# Patient Record
Sex: FEMALE | Race: WHITE | NOT HISPANIC OR LATINO | Employment: OTHER | URBAN - METROPOLITAN AREA
[De-identification: names, ages, dates, MRNs, and addresses within clinical notes are randomized per-mention and may not be internally consistent; named-entity substitution may affect disease eponyms.]

---

## 2021-05-05 ENCOUNTER — TELEPHONE (OUTPATIENT)
Dept: ADMINISTRATIVE | Facility: OTHER | Age: 71
End: 2021-05-05

## 2021-05-05 RX ORDER — LEVOTHYROXINE SODIUM 0.1 MG/1
100 TABLET ORAL DAILY
COMMUNITY
End: 2021-06-02 | Stop reason: SDUPTHER

## 2021-05-05 NOTE — TELEPHONE ENCOUNTER
----- Message from Skip Sun MA sent at 5/5/2021 10:22 AM EDT -----  Regarding: mammo; [facility]  05/05/21 10:22 AM    Hello, our patient Autumn Mccauley has had Mammogram completed/performed  Please assist in updating the patient chart by pulling the Care Everywhere (CE) document  The date of service is 9/17/2020       Thank you,  Skip Sun MA  [facility]

## 2021-05-06 ENCOUNTER — TELEPHONE (OUTPATIENT)
Dept: ADMINISTRATIVE | Facility: OTHER | Age: 71
End: 2021-05-06

## 2021-05-06 ENCOUNTER — OFFICE VISIT (OUTPATIENT)
Dept: FAMILY MEDICINE CLINIC | Facility: CLINIC | Age: 71
End: 2021-05-06
Payer: COMMERCIAL

## 2021-05-06 VITALS
HEIGHT: 65 IN | RESPIRATION RATE: 16 BRPM | BODY MASS INDEX: 28.46 KG/M2 | DIASTOLIC BLOOD PRESSURE: 82 MMHG | SYSTOLIC BLOOD PRESSURE: 122 MMHG | OXYGEN SATURATION: 99 % | WEIGHT: 170.8 LBS | HEART RATE: 82 BPM

## 2021-05-06 DIAGNOSIS — Z12.11 SCREEN FOR COLON CANCER: ICD-10-CM

## 2021-05-06 DIAGNOSIS — Z13.820 OSTEOPOROSIS SCREENING: ICD-10-CM

## 2021-05-06 DIAGNOSIS — C88.0 WALDENSTROM MACROGLOBULINEMIA (HCC): ICD-10-CM

## 2021-05-06 DIAGNOSIS — Z00.00 MEDICARE ANNUAL WELLNESS VISIT, INITIAL: Primary | ICD-10-CM

## 2021-05-06 DIAGNOSIS — H61.23 BILATERAL IMPACTED CERUMEN: ICD-10-CM

## 2021-05-06 DIAGNOSIS — Z13.220 SCREENING, LIPID: ICD-10-CM

## 2021-05-06 DIAGNOSIS — D46.9 MYELODYSPLASIA (MYELODYSPLASTIC SYNDROME) (HCC): ICD-10-CM

## 2021-05-06 DIAGNOSIS — E03.9 HYPOTHYROIDISM, UNSPECIFIED TYPE: ICD-10-CM

## 2021-05-06 PROBLEM — C88.00 WALDENSTROM MACROGLOBULINEMIA: Status: ACTIVE | Noted: 2021-05-06

## 2021-05-06 PROCEDURE — 1125F AMNT PAIN NOTED PAIN PRSNT: CPT | Performed by: FAMILY MEDICINE

## 2021-05-06 PROCEDURE — 3725F SCREEN DEPRESSION PERFORMED: CPT | Performed by: FAMILY MEDICINE

## 2021-05-06 PROCEDURE — 1170F FXNL STATUS ASSESSED: CPT | Performed by: FAMILY MEDICINE

## 2021-05-06 PROCEDURE — 3288F FALL RISK ASSESSMENT DOCD: CPT | Performed by: FAMILY MEDICINE

## 2021-05-06 PROCEDURE — G0438 PPPS, INITIAL VISIT: HCPCS | Performed by: FAMILY MEDICINE

## 2021-05-06 PROCEDURE — 1101F PT FALLS ASSESS-DOCD LE1/YR: CPT | Performed by: FAMILY MEDICINE

## 2021-05-06 NOTE — TELEPHONE ENCOUNTER
Upon review of the In Basket request we have found this is a duplicate request and no further action is needed  This request was completed upon initial request, the patient chart is up to date, and this message will now be closed  Any additional questions or concerns should be emailed to the Practice Liaisons via [email or web address] email, please do not reply via In Basket      Thank you  Aron Ty

## 2021-05-06 NOTE — PATIENT INSTRUCTIONS
Medicare Preventive Visit Patient Instructions  Thank you for completing your Welcome to Medicare Visit or Medicare Annual Wellness Visit today  Your next wellness visit will be due in one year (5/7/2022)  The screening/preventive services that you may require over the next 5-10 years are detailed below  Some tests may not apply to you based off risk factors and/or age  Screening tests ordered at today's visit but not completed yet may show as past due  Also, please note that scanned in results may not display below  Preventive Screenings:  Service Recommendations Previous Testing/Comments   Colorectal Cancer Screening  * Colonoscopy    * Fecal Occult Blood Test (FOBT)/Fecal Immunochemical Test (FIT)  * Fecal DNA/Cologuard Test  * Flexible Sigmoidoscopy Age: 54-65 years old   Colonoscopy: every 10 years (may be performed more frequently if at higher risk)  OR  FOBT/FIT: every 1 year  OR  Cologuard: every 3 years  OR  Sigmoidoscopy: every 5 years  Screening may be recommended earlier than age 48 if at higher risk for colorectal cancer  Also, an individualized decision between you and your healthcare provider will decide whether screening between the ages of 74-80 would be appropriate  Colonoscopy: Not on file  FOBT/FIT: Not on file  Cologuard: Not on file  Sigmoidoscopy: Not on file          Breast Cancer Screening Age: 36 years old  Frequency: every 1-2 years  Not required if history of left and right mastectomy Mammogram: Not on file        Cervical Cancer Screening Between the ages of 21-29, pap smear recommended once every 3 years  Between the ages of 33-67, can perform pap smear with HPV co-testing every 5 years     Recommendations may differ for women with a history of total hysterectomy, cervical cancer, or abnormal pap smears in past  Pap Smear: Not on file    Screening Not Indicated   Hepatitis C Screening Once for adults born between [facility]  More frequently in patients at high risk for Hepatitis C Hep C Antibody: Not on file    Screening Current   Diabetes Screening 1-2 times per year if you're at risk for diabetes or have pre-diabetes Fasting glucose: No results in last 5 years   A1C: No results in last 5 years        Cholesterol Screening Once every 5 years if you don't have a lipid disorder  May order more often based on risk factors  Lipid panel: Not on file          Other Preventive Screenings Covered by Medicare:  1  Abdominal Aortic Aneurysm (AAA) Screening: covered once if your at risk  You're considered to be at risk if you have a family history of AAA  2  Lung Cancer Screening: covers low dose CT scan once per year if you meet all of the following conditions: (1) Age 50-69; (2) No signs or symptoms of lung cancer; (3) Current smoker or have quit smoking within the last 15 years; (4) You have a tobacco smoking history of at least 30 pack years (packs per day multiplied by number of years you smoked); (5) You get a written order from a healthcare provider  3  Glaucoma Screening: covered annually if you're considered high risk: (1) You have diabetes OR (2) Family history of glaucoma OR (3)  aged 48 and older OR (3)  American aged 72 and older  3  Osteoporosis Screening: covered every 2 years if you meet one of the following conditions: (1) You're estrogen deficient and at risk for osteoporosis based off medical history and other findings; (2) Have a vertebral abnormality; (3) On glucocorticoid therapy for more than 3 months; (4) Have primary hyperparathyroidism; (5) On osteoporosis medications and need to assess response to drug therapy  · Last bone density test (DXA Scan): Not on file  5  HIV Screening: covered annually if you're between the age of 12-76  Also covered annually if you are younger than 13 and older than 72 with risk factors for HIV infection  For pregnant patients, it is covered up to 3 times per pregnancy      Immunizations:  Immunization Recommendations   Influenza Vaccine Annual influenza vaccination during flu season is recommended for all persons aged >= 6 months who do not have contraindications   Pneumococcal Vaccine (Prevnar and Pneumovax)  * Prevnar = PCV13  * Pneumovax = PPSV23   Adults 25-60 years old: 1-3 doses may be recommended based on certain risk factors  Adults 72 years old: Prevnar (PCV13) vaccine recommended followed by Pneumovax (PPSV23) vaccine  If already received PPSV23 since turning 65, then PCV13 recommended at least one year after PPSV23 dose  Hepatitis B Vaccine 3 dose series if at intermediate or high risk (ex: diabetes, end stage renal disease, liver disease)   Tetanus (Td) Vaccine - COST NOT COVERED BY MEDICARE PART B Following completion of primary series, a booster dose should be given every 10 years to maintain immunity against tetanus  Td may also be given as tetanus wound prophylaxis  Tdap Vaccine - COST NOT COVERED BY MEDICARE PART B Recommended at least once for all adults  For pregnant patients, recommended with each pregnancy  Shingles Vaccine (Shingrix) - COST NOT COVERED BY MEDICARE PART B  2 shot series recommended in those aged 48 and above     Health Maintenance Due:      Topic Date Due    MAMMOGRAM  Never done    Colorectal Cancer Screening  Never done    Hepatitis C Screening  05/06/2022 (Originally 1950)     Immunizations Due:      Topic Date Due    Pneumococcal Vaccine: 65+ Years (1 of 1 - PPSV23) Never done     Advance Directives   What are advance directives? Advance directives are legal documents that state your wishes and plans for medical care  These plans are made ahead of time in case you lose your ability to make decisions for yourself  Advance directives can apply to any medical decision, such as the treatments you want, and if you want to donate organs  What are the types of advance directives?   There are many types of advance directives, and each state has rules about how to use them  You may choose a combination of any of the following:  · Living will: This is a written record of the treatment you want  You can also choose which treatments you do not want, which to limit, and which to stop at a certain time  This includes surgery, medicine, IV fluid, and tube feedings  · Durable power of  for healthcare [facility]): This is a written record that states who you want to make healthcare choices for you when you are unable to make them for yourself  This person, called a proxy, is usually a family member or a friend  You may choose more than 1 proxy  · Do not resuscitate (DNR) order:  A DNR order is used in case your heart stops beating or you stop breathing  It is a request not to have certain forms of treatment, such as CPR  A DNR order may be included in other types of advance directives  · Medical directive: This covers the care that you want if you are in a coma, near death, or unable to make decisions for yourself  You can list the treatments you want for each condition  Treatment may include pain medicine, surgery, blood transfusions, dialysis, IV or tube feedings, and a ventilator (breathing machine)  · Values history: This document has questions about your views, beliefs, and how you feel and think about life  This information can help others choose the care that you would choose  Why are advance directives important? An advance directive helps you control your care  Although spoken wishes may be used, it is better to have your wishes written down  Spoken wishes can be misunderstood, or not followed  Treatments may be given even if you do not want them  An advance directive may make it easier for your family to make difficult choices about your care  Weight Management   Why it is important to manage your weight:  Being overweight increases your risk of health conditions such as heart disease, high blood pressure, type 2 diabetes, and certain types of cancer   It can also increase your risk for osteoarthritis, sleep apnea, and other respiratory problems  Aim for a slow, steady weight loss  Even a small amount of weight loss can lower your risk of health problems  How to lose weight safely:  A safe and healthy way to lose weight is to eat fewer calories and get regular exercise  You can lose up about 1 pound a week by decreasing the number of calories you eat by 500 calories each day  Healthy meal plan for weight management:  A healthy meal plan includes a variety of foods, contains fewer calories, and helps you stay healthy  A healthy meal plan includes the following:  · Eat whole-grain foods more often  A healthy meal plan should contain fiber  Fiber is the part of grains, fruits, and vegetables that is not broken down by your body  Whole-grain foods are healthy and provide extra fiber in your diet  Some examples of whole-grain foods are whole-wheat breads and pastas, oatmeal, brown rice, and bulgur  · Eat a variety of vegetables every day  Include dark, leafy greens such as spinach, kale, maryellen greens, and mustard greens  Eat yellow and orange vegetables such as carrots, sweet potatoes, and winter squash  · Eat a variety of fruits every day  Choose fresh or canned fruit (canned in its own juice or light syrup) instead of juice  Fruit juice has very little or no fiber  · Eat low-fat dairy foods  Drink fat-free (skim) milk or 1% milk  Eat fat-free yogurt and low-fat cottage cheese  Try low-fat cheeses such as mozzarella and other reduced-fat cheeses  · Choose meat and other protein foods that are low in fat  Choose beans or other legumes such as split peas or lentils  Choose fish, skinless poultry (chicken or turkey), or lean cuts of red meat (beef or pork)  Before you cook meat or poultry, cut off any visible fat  · Use less fat and oil  Try baking foods instead of frying them   Add less fat, such as margarine, sour cream, regular salad dressing and mayonnaise to foods  Eat fewer high-fat foods  Some examples of high-fat foods include french fries, doughnuts, ice cream, and cakes  · Eat fewer sweets  Limit foods and drinks that are high in sugar  This includes candy, cookies, regular soda, and sweetened drinks  Exercise:  Exercise at least 30 minutes per day on most days of the week  Some examples of exercise include walking, biking, dancing, and swimming  You can also fit in more physical activity by taking the stairs instead of the elevator or parking farther away from stores  Ask your healthcare provider about the best exercise plan for you  © Copyright [employer] 2018 Information is for End User's use only and may not be sold, redistributed or otherwise used for commercial purposes   All illustrations and images included in CareNotes® are the copyrighted property of A D A M , Inc  or [address] [place] [address]

## 2021-05-06 NOTE — TELEPHONE ENCOUNTER
----- Message from InstallFreejoselo Qureshi sent at 5/6/2021  8:51 AM EDT -----  Regarding: MAMMO  05/06/21 8:53 AM    Hello, our patient Ruth Villafuerte has had Mammogram completed/performed  Please assist in updating the patient chart by pulling the Care Everywhere (CE) document  The date of service is 09/2020       Thank you,  Marivel York [facility]

## 2021-05-06 NOTE — TELEPHONE ENCOUNTER
Upon review of the In Basket request we were able to locate, review, and update the patient chart as requested for Mammogram     Any additional questions or concerns should be emailed to the Practice Liaisons via [email or web address] email, please do not reply via In Basket      Thank you  Teresa Gipson

## 2021-05-06 NOTE — PROGRESS NOTES
Assessment and Plan:     Problem List Items Addressed This Visit     None      BMI Counseling: Body mass index is 28 42 kg/m²  The BMI is above normal  Nutrition recommendations include reducing portion sizes, decreasing overall calorie intake, 3-5 servings of fruits/vegetables daily, reducing fast food intake and consuming healthier snacks  Exercise recommendations include moderate aerobic physical activity for 150 minutes/week  Preventive health issues were discussed with patient, and age appropriate screening tests were ordered as noted in patient's After Visit Summary  Personalized health advice and appropriate referrals for health education or preventive services given if needed, as noted in patient's After Visit Summary  History of Present Illness:     Patient presents for Medicare Annual Wellness visit    Patient Care Team:  Wolf Redmond MD as PCP - General (Family Medicine)     Problem List:     There is no problem list on file for this patient       Past Medical and Surgical History:     Past Medical History:   Diagnosis Date    Cancer [facility])      Past Surgical History:   Procedure Laterality Date    HYSTERECTOMY        Family History:     Family History   Problem Relation Age of Onset    No Known Problems Mother     No Known Problems Father     Cancer Maternal Aunt       Social History:     E-Cigarette/Vaping    E-Cigarette Use Never User      E-Cigarette/Vaping Substances    Nicotine No     THC No     CBD No     Flavoring No     Other No     Unknown No      Social History     Socioeconomic History    Marital status:      Spouse name: None    Number of children: None    Years of education: None    Highest education level: None   Occupational History    None   Social Needs    Financial resource strain: None    Food insecurity     Worry: None     Inability: None    Transportation needs     Medical: None     Non-medical: None   Tobacco Use    Smoking status: Former Smoker     Packs/day: 0 50     Years: 10 00     Pack years: 5 00     Quit date: 45     Years since quittin 3    Smokeless tobacco: Never Used   Substance and Sexual Activity    Alcohol use: Never     Frequency: Never    Drug use: Never    Sexual activity: None   Lifestyle    Physical activity     Days per week: None     Minutes per session: None    Stress: None   Relationships    Social connections     Talks on phone: None     Gets together: None     Attends Judaism service: None     Active member of club or organization: None     Attends meetings of clubs or organizations: None     Relationship status: None    Intimate partner violence     Fear of current or ex partner: None     Emotionally abused: None     Physically abused: None     Forced sexual activity: None   Other Topics Concern    None   Social History Narrative    Most recent tobacco use screenin2018      Medications and Allergies:     Current Outpatient Medications   Medication Sig Dispense Refill    levothyroxine 100 mcg tablet Take 100 mcg by mouth daily       No current facility-administered medications for this visit  Allergies   Allergen Reactions    Penicillins Rash      Immunizations:     Immunization History   Administered Date(s) Administered    SARS-CoV-2 / COVID-19 mRNA IM (Ernst Caldwell) 2021, 2021      Health Maintenance:         Topic Date Due    MAMMOGRAM  Never done    Colorectal Cancer Screening  Never done    Hepatitis C Screening  2022 (Originally 1950)         Topic Date Due    Pneumococcal Vaccine: 65+ Years (1 of 1 - PPSV23) Never done      Medicare Health Risk Assessment:     /82   Pulse 82   Resp 16   Ht 5' 5" (1 651 m)   Wt 77 5 kg (170 lb 12 8 oz)   SpO2 99%   BMI 28 42 kg/m²      Dulce Cleveland is here for her Initial Wellness visit  Health Risk Assessment:   Patient rates overall health as excellent  Patient feels that their physical health rating is same   Patient is very satisfied with their life  Eyesight was rated as same  Hearing was rated as same  Patient feels that their emotional and mental health rating is same  Patients states they are never, rarely angry  Patient states they are never, rarely unusually tired/fatigued  Pain experienced in the last 7 days has been none  Depression Screening:   PHQ-2 Score: 0      Fall Risk Screening: In the past year, patient has experienced: no history of falling in past year      Urinary Incontinence Screening:   Patient has not leaked urine accidently in the last six months  Home Safety:  Patient does not have trouble with stairs inside or outside of their home  Patient has working smoke alarms and has working carbon monoxide detector  Home safety hazards include: none  Nutrition:   Current diet is Regular  Medications:   Patient is not currently taking any over-the-counter supplements  Patient is able to manage medications  Activities of Daily Living (ADLs)/Instrumental Activities of Daily Living (IADLs):   Walk and transfer into and out of bed and chair?: Yes  Dress and groom yourself?: Yes    Bathe or shower yourself?: Yes    Feed yourself?  Yes  Do your laundry/housekeeping?: Yes  Manage your money, pay your bills and track your expenses?: Yes  Make your own meals?: Yes    Do your own shopping?: Yes    Previous Hospitalizations:   Any hospitalizations or ED visits within the last 12 months?: No      Advance Care Planning:   Living will: No    Durable POA for healthcare: No    Advanced directive: No      Cognitive Screening:   Provider or family/friend/caregiver concerned regarding cognition?: No    PREVENTIVE SCREENINGS      Cardiovascular Screening:    General: Risks and Benefits Discussed    Due for: Lipid Panel      Diabetes Screening:     General: Risks and Benefits Discussed    Due for: Blood Glucose      Colorectal Cancer Screening:     General: Risks and Benefits Discussed    Due for: [place] Breast Cancer Screening:     General: Risks and Benefits Discussed and Screening Current      Cervical Cancer Screening:    General: Screening Not Indicated      Osteoporosis Screening:    General: Risks and Benefits Discussed    Due for: DXA Axial      Abdominal Aortic Aneurysm (AAA) Screening:        General: Risks and Benefits Discussed and Screening Not Indicated      Lung Cancer Screening:     General: Screening Not Indicated      Hepatitis C Screening:    General: Screening Current    Screening, Brief Intervention, and Referral to Treatment (SBIRT)    Screening    Typical number of drinks in a week: 0    AUDIT-C Screenin) How often did you have a drink containing alcohol in the past year? never  2) How many drinks did you have on a typical day when you were drinking in the past year? never  3) How often did you have 6 or more drinks on one occasion in the past year? never    AUDIT-C Score: 0  Interpretation: Score 0-2 (female): Negative screen for alcohol misuse    Other Counseling Topics:   Car/seat belt/driving safety, skin self-exam, sunscreen and calcium and vitamin D intake and regular weightbearing exercise         Lamont Pires MD

## 2021-05-13 ENCOUNTER — OFFICE VISIT (OUTPATIENT)
Dept: FAMILY MEDICINE CLINIC | Facility: CLINIC | Age: 71
End: 2021-05-13
Payer: COMMERCIAL

## 2021-05-13 VITALS
SYSTOLIC BLOOD PRESSURE: 126 MMHG | HEART RATE: 110 BPM | DIASTOLIC BLOOD PRESSURE: 86 MMHG | HEIGHT: 65 IN | BODY MASS INDEX: 28.42 KG/M2 | WEIGHT: 170.6 LBS | OXYGEN SATURATION: 98 % | RESPIRATION RATE: 16 BRPM

## 2021-05-13 DIAGNOSIS — Z13.820 OSTEOPOROSIS SCREENING: ICD-10-CM

## 2021-05-13 DIAGNOSIS — H61.23 BILATERAL IMPACTED CERUMEN: Primary | ICD-10-CM

## 2021-05-13 PROCEDURE — 69210 REMOVE IMPACTED EAR WAX UNI: CPT | Performed by: FAMILY MEDICINE

## 2021-05-13 PROCEDURE — 1160F RVW MEDS BY RX/DR IN RCRD: CPT | Performed by: FAMILY MEDICINE

## 2021-05-13 PROCEDURE — 1036F TOBACCO NON-USER: CPT | Performed by: FAMILY MEDICINE

## 2021-05-13 PROCEDURE — 99214 OFFICE O/P EST MOD 30 MIN: CPT | Performed by: FAMILY MEDICINE

## 2021-05-13 PROCEDURE — 3008F BODY MASS INDEX DOCD: CPT | Performed by: FAMILY MEDICINE

## 2021-05-13 RX ORDER — CIPROFLOXACIN AND DEXAMETHASONE 3; 1 MG/ML; MG/ML
4 SUSPENSION/ DROPS AURICULAR (OTIC) 2 TIMES DAILY
Qty: 7.5 ML | Refills: 0 | Status: SHIPPED | OUTPATIENT
Start: 2021-05-13 | End: 2022-01-15 | Stop reason: HOSPADM

## 2021-05-13 NOTE — PROGRESS NOTES
Subjective:      Patient ID: Krish Juan is a 69 y o  female  Ear Fullness   There is pain in both ears  This is a chronic problem  The current episode started more than 1 month ago  The problem has been unchanged  There has been no fever  The pain is moderate  Associated symptoms include hearing loss  Pertinent negatives include no headaches  Treatments tried: Debrox  The treatment provided no relief  Past Medical History:   Diagnosis Date    Cancer [facility])        Family History   Problem Relation Age of Onset    No Known Problems Mother     No Known Problems Father     Cancer Maternal Aunt        Past Surgical History:   Procedure Laterality Date    HYSTERECTOMY          reports that she quit smoking about 28 years ago  She has a 5 00 pack-year smoking history  She has never used smokeless tobacco  She reports that she does not drink alcohol or use drugs  Current Outpatient Medications:     carbamide peroxide (DEBROX) 6 5 % otic solution, Administer 5 drops into both ears 2 (two) times a day for 4 days, Disp: 15 mL, Rfl: 0    ciprofloxacin-dexamethasone (CIPRODEX) otic suspension, Administer 4 drops into both ears 2 (two) times a day for 5 days, Disp: 7 5 mL, Rfl: 0    levothyroxine 100 mcg tablet, Take 100 mcg by mouth daily, Disp: , Rfl:     The following portions of the patient's history were reviewed and updated as appropriate: allergies, current medications, past family history, past medical history, past social history, past surgical history and problem list     Review of Systems   Constitutional: Negative  HENT: Positive for hearing loss  Eyes: Negative  Respiratory: Negative  Negative for shortness of breath  Cardiovascular: Negative  Negative for chest pain and palpitations  Gastrointestinal: Negative  Endocrine: Negative  Genitourinary: Negative  Musculoskeletal: Negative  Negative for myalgias  Neurological: Negative  Negative for headaches  Psychiatric/Behavioral: Negative  Objective:    /86   Pulse (!) 110   Resp 16   Ht 5' 5" (1 651 m)   Wt 77 4 kg (170 lb 9 6 oz)   SpO2 98%   BMI 28 39 kg/m²      Physical Exam  Constitutional:       Appearance: She is well-developed  HENT:      Head: Normocephalic  Right Ear: External ear normal  There is impacted cerumen  Left Ear: External ear normal  There is impacted cerumen  Eyes:      Pupils: Pupils are equal, round, and reactive to light  Neck:      Musculoskeletal: Normal range of motion and neck supple  Cardiovascular:      Rate and Rhythm: Normal rate and regular rhythm  Pulmonary:      Effort: Pulmonary effort is normal       Breath sounds: Normal breath sounds  Abdominal:      General: Bowel sounds are normal       Palpations: Abdomen is soft  Musculoskeletal: Normal range of motion  Neurological:      Mental Status: She is alert and oriented to person, place, and time  Psychiatric:         Behavior: Behavior normal          Judgment: Judgment normal            Ear cerumen removal    Date/Time: 5/13/2021 3:16 PM  Performed by: Bryan Robles MD  Authorized by: Bryan Robles MD   Universal Protocol:  Consent: Verbal consent obtained  Risks and benefits: risks, benefits and alternatives were discussed  Consent given by: patient      Patient location:  Clinic  Procedure details:     Local anesthetic:  None    Location:  R ear (Both ears)    Procedure type: curette      Approach:  External  Post-procedure details:     Complication:  None    Hearing quality:  Improved    Patient tolerance of procedure: Tolerated well, no immediate complications  Comments: Thick hard wax removed both ears        No results found for this or any previous visit (from the past 1008 hour(s))  Assessment/Plan:    No problem-specific Assessment & Plan notes found for this encounter             Problem List Items Addressed This Visit        Nervous and Auditory Bilateral impacted cerumen - Primary     B/l Ear wax removal done  Thick hard wax removed  Relevant Medications    ciprofloxacin-dexamethasone (CIPRODEX) otic suspension    Other Relevant Orders    Ear cerumen removal       Other    Osteoporosis screening    Relevant Orders    DXA bone density spine hip and pelvis          I have spent 40 minutes with Patient  today in which greater than 50% of this time was spent in counseling/coordination of care regarding ear wax removal

## 2021-05-30 LAB
CHOLEST SERPL-MCNC: 239 MG/DL
CHOLEST/HDLC SERPL: 4.1 (CALC)
HDLC SERPL-MCNC: 59 MG/DL
LDLC SERPL CALC-MCNC: 157 MG/DL (CALC)
NONHDLC SERPL-MCNC: 180 MG/DL (CALC)
TRIGL SERPL-MCNC: 113 MG/DL
TSH SERPL-ACNC: 1.55 MIU/L (ref 0.4–4.5)

## 2021-06-01 ENCOUNTER — TELEPHONE (OUTPATIENT)
Dept: FAMILY MEDICINE CLINIC | Facility: CLINIC | Age: 71
End: 2021-06-01

## 2021-06-01 DIAGNOSIS — E78.2 MIXED HYPERLIPIDEMIA: Primary | ICD-10-CM

## 2021-06-01 RX ORDER — LEVOTHYROXINE SODIUM 0.1 MG/1
100 TABLET ORAL DAILY
Qty: 90 TABLET | Refills: 0 | Status: CANCELLED | OUTPATIENT
Start: 2021-06-01

## 2021-06-01 NOTE — TELEPHONE ENCOUNTER
----- Message from Caroline Alba MD sent at 6/1/2021  1:09 PM EDT -----  Please call the patient regarding her abnormal result  Patient's cholesterol is elevated  Please advised low-fat diet  Repeat lipid panel at 4 months interval   Will follow up thereafter  Please provide with lab orders

## 2021-06-01 NOTE — TELEPHONE ENCOUNTER
ADVISED PT; [facility] FOR 10/7/2021 NEEDS THYROID MED SENT TO PHARMACY

## 2021-06-02 DIAGNOSIS — E03.9 HYPOTHYROIDISM, UNSPECIFIED TYPE: Primary | ICD-10-CM

## 2021-06-02 RX ORDER — LEVOTHYROXINE SODIUM 0.1 MG/1
100 TABLET ORAL DAILY
Qty: 90 TABLET | Refills: 1 | Status: SHIPPED | OUTPATIENT
Start: 2021-06-02 | End: 2021-11-26

## 2021-06-29 NOTE — TELEPHONE ENCOUNTER
Not sure if fish oil could increase please her cholesterol  Please advise her to discontinue fish oil  Metabolic panel has been ordered September to reassess lipid panel  Please advise her to use over-the-counter tear substitute like refresh tear for dry eye  If persist, she should fup with ophthalmology    A a so this is not a working and this is not working

## 2021-06-29 NOTE — TELEPHONE ENCOUNTER
Patient called in, she said that she has been taking fish oil for her dry eyes and wants to know if that could cause her high cholesterol? She said its never been this high and if that is the cause, she will just use eye drops  Please call her back

## 2021-09-29 LAB
ALBUMIN SERPL-MCNC: 4.1 G/DL (ref 3.6–5.1)
ALBUMIN/GLOB SERPL: 1.8 (CALC) (ref 1–2.5)
ALP SERPL-CCNC: 70 U/L (ref 37–153)
ALT SERPL-CCNC: 9 U/L (ref 6–29)
AST SERPL-CCNC: 15 U/L (ref 10–35)
BILIRUB SERPL-MCNC: 0.5 MG/DL (ref 0.2–1.2)
BUN SERPL-MCNC: 17 MG/DL (ref 7–25)
BUN/CREAT SERPL: NORMAL (CALC) (ref 6–22)
CALCIUM SERPL-MCNC: 9 MG/DL (ref 8.6–10.4)
CHLORIDE SERPL-SCNC: 106 MMOL/L (ref 98–110)
CHOLEST SERPL-MCNC: 202 MG/DL
CHOLEST/HDLC SERPL: 3.5 (CALC)
CO2 SERPL-SCNC: 32 MMOL/L (ref 20–32)
CREAT SERPL-MCNC: 0.67 MG/DL (ref 0.6–0.93)
GLOBULIN SER CALC-MCNC: 2.3 G/DL (CALC) (ref 1.9–3.7)
GLUCOSE SERPL-MCNC: 92 MG/DL (ref 65–99)
HDLC SERPL-MCNC: 58 MG/DL
LDLC SERPL CALC-MCNC: 126 MG/DL (CALC)
NONHDLC SERPL-MCNC: 144 MG/DL (CALC)
POTASSIUM SERPL-SCNC: 3.8 MMOL/L (ref 3.5–5.3)
PROT SERPL-MCNC: 6.4 G/DL (ref 6.1–8.1)
SL AMB EGFR AFRICAN AMERICAN: 102 ML/MIN/1.73M2
SL AMB EGFR NON AFRICAN AMERICAN: 88 ML/MIN/1.73M2
SODIUM SERPL-SCNC: 145 MMOL/L (ref 135–146)
TRIGL SERPL-MCNC: 79 MG/DL

## 2021-10-06 ENCOUNTER — TELEPHONE (OUTPATIENT)
Dept: ADMINISTRATIVE | Facility: OTHER | Age: 71
End: 2021-10-06

## 2021-10-07 ENCOUNTER — OFFICE VISIT (OUTPATIENT)
Dept: FAMILY MEDICINE CLINIC | Facility: CLINIC | Age: 71
End: 2021-10-07
Payer: COMMERCIAL

## 2021-10-07 VITALS
WEIGHT: 157.8 LBS | BODY MASS INDEX: 26.29 KG/M2 | SYSTOLIC BLOOD PRESSURE: 130 MMHG | OXYGEN SATURATION: 97 % | HEIGHT: 65 IN | RESPIRATION RATE: 18 BRPM | HEART RATE: 90 BPM | DIASTOLIC BLOOD PRESSURE: 82 MMHG

## 2021-10-07 DIAGNOSIS — E03.9 HYPOTHYROIDISM, UNSPECIFIED TYPE: Primary | ICD-10-CM

## 2021-10-07 DIAGNOSIS — Z23 ENCOUNTER FOR IMMUNIZATION: ICD-10-CM

## 2021-10-07 DIAGNOSIS — E78.2 MIXED HYPERLIPIDEMIA: ICD-10-CM

## 2021-10-07 PROBLEM — H61.23 BILATERAL IMPACTED CERUMEN: Status: RESOLVED | Noted: 2021-05-06 | Resolved: 2021-10-07

## 2021-10-07 PROCEDURE — 90662 IIV NO PRSV INCREASED AG IM: CPT | Performed by: FAMILY MEDICINE

## 2021-10-07 PROCEDURE — 3725F SCREEN DEPRESSION PERFORMED: CPT | Performed by: FAMILY MEDICINE

## 2021-10-07 PROCEDURE — G0008 ADMIN INFLUENZA VIRUS VAC: HCPCS | Performed by: FAMILY MEDICINE

## 2021-10-07 PROCEDURE — 1036F TOBACCO NON-USER: CPT | Performed by: FAMILY MEDICINE

## 2021-10-07 PROCEDURE — 1160F RVW MEDS BY RX/DR IN RCRD: CPT | Performed by: FAMILY MEDICINE

## 2021-10-07 PROCEDURE — 99214 OFFICE O/P EST MOD 30 MIN: CPT | Performed by: FAMILY MEDICINE

## 2021-10-07 PROCEDURE — 3008F BODY MASS INDEX DOCD: CPT | Performed by: FAMILY MEDICINE

## 2021-10-08 ENCOUNTER — TELEPHONE (OUTPATIENT)
Dept: ADMINISTRATIVE | Facility: OTHER | Age: 71
End: 2021-10-08

## 2021-12-16 ENCOUNTER — OFFICE VISIT (OUTPATIENT)
Dept: FAMILY MEDICINE CLINIC | Facility: CLINIC | Age: 71
End: 2021-12-16
Payer: COMMERCIAL

## 2021-12-16 VITALS
HEART RATE: 131 BPM | BODY MASS INDEX: 26.16 KG/M2 | WEIGHT: 157 LBS | OXYGEN SATURATION: 98 % | HEIGHT: 65 IN | SYSTOLIC BLOOD PRESSURE: 130 MMHG | DIASTOLIC BLOOD PRESSURE: 82 MMHG | RESPIRATION RATE: 16 BRPM

## 2021-12-16 DIAGNOSIS — R30.0 DYSURIA: Primary | ICD-10-CM

## 2021-12-16 LAB
SL AMB  POCT GLUCOSE, UA: NORMAL
SL AMB LEUKOCYTE ESTERASE,UA: 500
SL AMB POCT BILIRUBIN,UA: NORMAL
SL AMB POCT BLOOD,UA: 50
SL AMB POCT CLARITY,UA: CLEAR
SL AMB POCT COLOR,UA: YELLOW
SL AMB POCT KETONES,UA: NORMAL
SL AMB POCT NITRITE,UA: NORMAL
SL AMB POCT PH,UA: 5
SL AMB POCT SPECIFIC GRAVITY,UA: 1.02
SL AMB POCT URINE PROTEIN: NORMAL
SL AMB POCT UROBILINOGEN: NORMAL

## 2021-12-16 PROCEDURE — 81003 URINALYSIS AUTO W/O SCOPE: CPT | Performed by: FAMILY MEDICINE

## 2021-12-16 PROCEDURE — 3008F BODY MASS INDEX DOCD: CPT | Performed by: FAMILY MEDICINE

## 2021-12-16 PROCEDURE — 1036F TOBACCO NON-USER: CPT | Performed by: FAMILY MEDICINE

## 2021-12-16 PROCEDURE — 1160F RVW MEDS BY RX/DR IN RCRD: CPT | Performed by: FAMILY MEDICINE

## 2021-12-16 PROCEDURE — 99214 OFFICE O/P EST MOD 30 MIN: CPT | Performed by: FAMILY MEDICINE

## 2021-12-16 RX ORDER — CIPROFLOXACIN 500 MG/1
500 TABLET, FILM COATED ORAL EVERY 12 HOURS SCHEDULED
Qty: 10 TABLET | Refills: 0 | Status: SHIPPED | OUTPATIENT
Start: 2021-12-16 | End: 2021-12-21

## 2022-01-10 ENCOUNTER — APPOINTMENT (EMERGENCY)
Dept: RADIOLOGY | Facility: HOSPITAL | Age: 72
DRG: 308 | End: 2022-01-10
Payer: COMMERCIAL

## 2022-01-10 ENCOUNTER — HOSPITAL ENCOUNTER (INPATIENT)
Facility: HOSPITAL | Age: 72
LOS: 5 days | Discharge: HOME | DRG: 308 | End: 2022-01-15
Attending: EMERGENCY MEDICINE | Admitting: INTERNAL MEDICINE
Payer: COMMERCIAL

## 2022-01-10 ENCOUNTER — APPOINTMENT (INPATIENT)
Dept: NON INVASIVE DIAGNOSTICS | Facility: HOSPITAL | Age: 72
DRG: 308 | End: 2022-01-10
Payer: COMMERCIAL

## 2022-01-10 DIAGNOSIS — I48.91 NEW ONSET A-FIB (HCC): ICD-10-CM

## 2022-01-10 DIAGNOSIS — I50.9 CHF (CONGESTIVE HEART FAILURE) (HCC): ICD-10-CM

## 2022-01-10 DIAGNOSIS — R00.0 IRREGULAR TACHYCARDIA: Primary | ICD-10-CM

## 2022-01-10 DIAGNOSIS — I50.9 ACUTE CONGESTIVE HEART FAILURE, UNSPECIFIED HEART FAILURE TYPE (HCC): ICD-10-CM

## 2022-01-10 DIAGNOSIS — R06.02 SOB (SHORTNESS OF BREATH): ICD-10-CM

## 2022-01-10 PROBLEM — C88.00 WALDENSTROM MACROGLOBULINEMIA: Status: ACTIVE | Noted: 2017-07-26

## 2022-01-10 PROBLEM — Z85.71 HISTORY OF HODGKIN'S LYMPHOMA: Status: ACTIVE | Noted: 2017-07-26

## 2022-01-10 PROBLEM — D46.9 MYELODYSPLASIA (MYELODYSPLASTIC SYNDROME) (HCC): Status: ACTIVE | Noted: 2017-07-26

## 2022-01-10 PROBLEM — I50.41 ACUTE HEART FAILURE WITH REDUCED EJECTION FRACTION AND DIASTOLIC DYSFUNCTION (HCC): Status: ACTIVE | Noted: 2022-01-10

## 2022-01-10 PROBLEM — C88.0 WALDENSTROM MACROGLOBULINEMIA (HCC): Status: ACTIVE | Noted: 2017-07-26

## 2022-01-10 PROBLEM — E83.42 HYPOMAGNESEMIA: Status: ACTIVE | Noted: 2022-01-10

## 2022-01-10 PROBLEM — R21 RASH: Status: ACTIVE | Noted: 2017-07-26

## 2022-01-10 LAB
2HR DELTA HS TROPONIN: 21 NG/L
4HR DELTA HS TROPONIN: 24 NG/L
ALBUMIN SERPL BCP-MCNC: 3.9 G/DL (ref 3.5–5)
ALP SERPL-CCNC: 77 U/L (ref 46–116)
ALT SERPL W P-5'-P-CCNC: 16 U/L (ref 12–78)
ANION GAP SERPL CALCULATED.3IONS-SCNC: 8 MMOL/L (ref 4–13)
AORTIC ROOT: 4.8 CM
APICAL FOUR CHAMBER EJECTION FRACTION: 10 %
APTT PPP: 30 SECONDS (ref 23–37)
APTT PPP: 31 SECONDS (ref 23–37)
ASCENDING AORTA: 3.6 CM
AST SERPL W P-5'-P-CCNC: 14 U/L (ref 5–45)
ATRIAL RATE: 124 BPM
ATRIAL RATE: 153 BPM
AV LVOT PEAK GRADIENT: 2 MMHG
AV PEAK GRADIENT: 7 MMHG
AV REGURGITATION PRESSURE HALF TIME: 0.3 MS
BASOPHILS # BLD AUTO: 0.03 THOUSANDS/ΜL (ref 0–0.1)
BASOPHILS NFR BLD AUTO: 0 % (ref 0–1)
BILIRUB SERPL-MCNC: 0.78 MG/DL (ref 0.2–1)
BUN SERPL-MCNC: 10 MG/DL (ref 5–25)
CALCIUM SERPL-MCNC: 9.5 MG/DL (ref 8.3–10.1)
CARDIAC TROPONIN I PNL SERPL HS: 18 NG/L
CARDIAC TROPONIN I PNL SERPL HS: 39 NG/L
CARDIAC TROPONIN I PNL SERPL HS: 42 NG/L
CHLORIDE SERPL-SCNC: 103 MMOL/L (ref 100–108)
CO2 SERPL-SCNC: 30 MMOL/L (ref 21–32)
CREAT SERPL-MCNC: 0.75 MG/DL (ref 0.6–1.3)
D DIMER PPP FEU-MCNC: 0.55 UG/ML FEU
DOP CALC RVOT PEAK VEL: 0.59 M/S
E WAVE DECELERATION TIME: 108 MS
EOSINOPHIL # BLD AUTO: 0.1 THOUSAND/ΜL (ref 0–0.61)
EOSINOPHIL NFR BLD AUTO: 2 % (ref 0–6)
ERYTHROCYTE [DISTWIDTH] IN BLOOD BY AUTOMATED COUNT: 14.1 % (ref 11.6–15.1)
ERYTHROCYTE [DISTWIDTH] IN BLOOD BY AUTOMATED COUNT: 14.2 % (ref 11.6–15.1)
FLUAV RNA RESP QL NAA+PROBE: NEGATIVE
FLUBV RNA RESP QL NAA+PROBE: NEGATIVE
GFR SERPL CREATININE-BSD FRML MDRD: 79 ML/MIN/1.73SQ M
GLUCOSE SERPL-MCNC: 120 MG/DL (ref 65–140)
HCT VFR BLD AUTO: 40.1 % (ref 34.8–46.1)
HCT VFR BLD AUTO: 42.6 % (ref 34.8–46.1)
HGB BLD-MCNC: 13.1 G/DL (ref 11.5–15.4)
HGB BLD-MCNC: 13.8 G/DL (ref 11.5–15.4)
IMM GRANULOCYTES # BLD AUTO: 0.03 THOUSAND/UL (ref 0–0.2)
IMM GRANULOCYTES NFR BLD AUTO: 0 % (ref 0–2)
INR PPP: 1.03 (ref 0.84–1.19)
INR PPP: 1.03 (ref 0.84–1.19)
INTERVENTRICULAR SEPTUM IN DIASTOLE (PARASTERNAL SHORT AXIS VIEW): 1.2 CM
LEFT ATRIUM AREA SYSTOLE SINGLE PLANE A4C: 23.2 CM2
LEFT VENTRICULAR INTERNAL DIMENSION IN DIASTOLE: 5 CM (ref 4.03–6)
LEFT VENTRICULAR POSTERIOR WALL IN END DIASTOLE: 1.2 CM
LYMPHOCYTES # BLD AUTO: 1.62 THOUSANDS/ΜL (ref 0.6–4.47)
LYMPHOCYTES NFR BLD AUTO: 24 % (ref 14–44)
MAGNESIUM SERPL-MCNC: 1.5 MG/DL (ref 1.6–2.6)
MAGNESIUM SERPL-MCNC: 2.2 MG/DL (ref 1.6–2.6)
MCH RBC QN AUTO: 29.6 PG (ref 26.8–34.3)
MCH RBC QN AUTO: 29.6 PG (ref 26.8–34.3)
MCHC RBC AUTO-ENTMCNC: 32.4 G/DL (ref 31.4–37.4)
MCHC RBC AUTO-ENTMCNC: 32.7 G/DL (ref 31.4–37.4)
MCV RBC AUTO: 91 FL (ref 82–98)
MCV RBC AUTO: 91 FL (ref 82–98)
MONOCYTES # BLD AUTO: 0.46 THOUSAND/ΜL (ref 0.17–1.22)
MONOCYTES NFR BLD AUTO: 7 % (ref 4–12)
MV PEAK A VEL: 0.3 M/S
MV PEAK E VEL: 108 CM/S
MV STENOSIS PRESSURE HALF TIME: 0 MS
NEUTROPHILS # BLD AUTO: 4.56 THOUSANDS/ΜL (ref 1.85–7.62)
NEUTS SEG NFR BLD AUTO: 67 % (ref 43–75)
NRBC BLD AUTO-RTO: 0 /100 WBCS
NT-PROBNP SERPL-MCNC: 4408 PG/ML
P AXIS: 94 DEGREES
POTASSIUM SERPL-SCNC: 3.5 MMOL/L (ref 3.5–5.3)
PR INTERVAL: 184 MS
PROT SERPL-MCNC: 7.7 G/DL (ref 6.4–8.2)
PROTHROMBIN TIME: 13.5 SECONDS (ref 11.6–14.5)
PROTHROMBIN TIME: 13.5 SECONDS (ref 11.6–14.5)
PV PEAK GRADIENT: 2 MMHG
QRS AXIS: -13 DEGREES
QRS AXIS: -54 DEGREES
QRSD INTERVAL: 102 MS
QRSD INTERVAL: 152 MS
QT INTERVAL: 346 MS
QT INTERVAL: 410 MS
QTC INTERVAL: 487 MS
QTC INTERVAL: 648 MS
RBC # BLD AUTO: 4.42 MILLION/UL (ref 3.81–5.12)
RBC # BLD AUTO: 4.66 MILLION/UL (ref 3.81–5.12)
RIGHT ATRIUM AREA SYSTOLE A4C: 14.6 CM2
RIGHT VENTRICLE ID DIMENSION: 3.6 CM
RSV RNA RESP QL NAA+PROBE: NEGATIVE
SARS-COV-2 RNA RESP QL NAA+PROBE: NEGATIVE
SL CV AV DECELERATION TIME RETROGRADE: 1025 MS
SL CV AV PEAK GRADIENT RETROGRADE: 95 MMHG
SL CV LV EF: 25
SL CV PED ECHO LEFT VENTRICLE DIASTOLIC VOLUME (MOD BIPLANE) 2D: 116 ML
SL CV RVOT MAX GRADIENT: 1 MMHG
SODIUM SERPL-SCNC: 141 MMOL/L (ref 136–145)
T WAVE AXIS: 72 DEGREES
T WAVE AXIS: 77 DEGREES
TRICUSPID VALVE PEAK REGURGITATION VELOCITY: 2.51 M/S
TSH SERPL DL<=0.05 MIU/L-ACNC: 2.33 UIU/ML (ref 0.36–3.74)
TV PEAK GRADIENT: 25 MMHG
VENTRICULAR RATE: 119 BPM
VENTRICULAR RATE: 150 BPM
WBC # BLD AUTO: 6.8 THOUSAND/UL (ref 4.31–10.16)
WBC # BLD AUTO: 6.88 THOUSAND/UL (ref 4.31–10.16)
Z-SCORE OF LEFT VENTRICULAR DIMENSION IN END SYSTOLE: 0.16

## 2022-01-10 PROCEDURE — 71045 X-RAY EXAM CHEST 1 VIEW: CPT

## 2022-01-10 PROCEDURE — 99285 EMERGENCY DEPT VISIT HI MDM: CPT | Performed by: EMERGENCY MEDICINE

## 2022-01-10 PROCEDURE — 83735 ASSAY OF MAGNESIUM: CPT

## 2022-01-10 PROCEDURE — 85379 FIBRIN DEGRADATION QUANT: CPT

## 2022-01-10 PROCEDURE — 83880 ASSAY OF NATRIURETIC PEPTIDE: CPT

## 2022-01-10 PROCEDURE — 85730 THROMBOPLASTIN TIME PARTIAL: CPT

## 2022-01-10 PROCEDURE — 93005 ELECTROCARDIOGRAM TRACING: CPT

## 2022-01-10 PROCEDURE — 83735 ASSAY OF MAGNESIUM: CPT | Performed by: INTERNAL MEDICINE

## 2022-01-10 PROCEDURE — 80053 COMPREHEN METABOLIC PANEL: CPT

## 2022-01-10 PROCEDURE — 85027 COMPLETE CBC AUTOMATED: CPT | Performed by: INTERNAL MEDICINE

## 2022-01-10 PROCEDURE — 99222 1ST HOSP IP/OBS MODERATE 55: CPT | Performed by: INTERNAL MEDICINE

## 2022-01-10 PROCEDURE — 85025 COMPLETE CBC W/AUTO DIFF WBC: CPT

## 2022-01-10 PROCEDURE — 93356 MYOCRD STRAIN IMG SPCKL TRCK: CPT | Performed by: INTERNAL MEDICINE

## 2022-01-10 PROCEDURE — 96375 TX/PRO/DX INJ NEW DRUG ADDON: CPT

## 2022-01-10 PROCEDURE — 93010 ELECTROCARDIOGRAM REPORT: CPT | Performed by: INTERNAL MEDICINE

## 2022-01-10 PROCEDURE — 93306 TTE W/DOPPLER COMPLETE: CPT

## 2022-01-10 PROCEDURE — 85730 THROMBOPLASTIN TIME PARTIAL: CPT | Performed by: INTERNAL MEDICINE

## 2022-01-10 PROCEDURE — 0241U HB NFCT DS VIR RESP RNA 4 TRGT: CPT

## 2022-01-10 PROCEDURE — 36415 COLL VENOUS BLD VENIPUNCTURE: CPT

## 2022-01-10 PROCEDURE — 85610 PROTHROMBIN TIME: CPT

## 2022-01-10 PROCEDURE — 87040 BLOOD CULTURE FOR BACTERIA: CPT | Performed by: INTERNAL MEDICINE

## 2022-01-10 PROCEDURE — 85610 PROTHROMBIN TIME: CPT | Performed by: INTERNAL MEDICINE

## 2022-01-10 PROCEDURE — 93356 MYOCRD STRAIN IMG SPCKL TRCK: CPT

## 2022-01-10 PROCEDURE — 84443 ASSAY THYROID STIM HORMONE: CPT

## 2022-01-10 PROCEDURE — 99223 1ST HOSP IP/OBS HIGH 75: CPT | Performed by: INTERNAL MEDICINE

## 2022-01-10 PROCEDURE — 93306 TTE W/DOPPLER COMPLETE: CPT | Performed by: INTERNAL MEDICINE

## 2022-01-10 PROCEDURE — 99285 EMERGENCY DEPT VISIT HI MDM: CPT

## 2022-01-10 PROCEDURE — 84484 ASSAY OF TROPONIN QUANT: CPT

## 2022-01-10 PROCEDURE — 96365 THER/PROPH/DIAG IV INF INIT: CPT

## 2022-01-10 RX ORDER — LEVOTHYROXINE SODIUM 0.1 MG/1
100 TABLET ORAL DAILY
Status: DISCONTINUED | OUTPATIENT
Start: 2022-01-11 | End: 2022-01-15 | Stop reason: HOSPADM

## 2022-01-10 RX ORDER — FUROSEMIDE 10 MG/ML
20 INJECTION INTRAMUSCULAR; INTRAVENOUS ONCE
Status: COMPLETED | OUTPATIENT
Start: 2022-01-10 | End: 2022-01-10

## 2022-01-10 RX ORDER — HEPARIN SODIUM 5000 [USP'U]/ML
5000 INJECTION, SOLUTION INTRAVENOUS; SUBCUTANEOUS EVERY 8 HOURS SCHEDULED
Status: DISCONTINUED | OUTPATIENT
Start: 2022-01-10 | End: 2022-01-10

## 2022-01-10 RX ORDER — MAGNESIUM SULFATE HEPTAHYDRATE 40 MG/ML
2 INJECTION, SOLUTION INTRAVENOUS ONCE
Status: COMPLETED | OUTPATIENT
Start: 2022-01-10 | End: 2022-01-10

## 2022-01-10 RX ORDER — FUROSEMIDE 10 MG/ML
40 INJECTION INTRAMUSCULAR; INTRAVENOUS DAILY
Status: DISCONTINUED | OUTPATIENT
Start: 2022-01-10 | End: 2022-01-13

## 2022-01-10 RX ORDER — AMIODARONE HYDROCHLORIDE 200 MG/1
200 TABLET ORAL 3 TIMES DAILY
Status: DISCONTINUED | OUTPATIENT
Start: 2022-01-11 | End: 2022-01-15 | Stop reason: HOSPADM

## 2022-01-10 RX ORDER — DILTIAZEM HYDROCHLORIDE 5 MG/ML
15 INJECTION INTRAVENOUS ONCE
Status: COMPLETED | OUTPATIENT
Start: 2022-01-10 | End: 2022-01-10

## 2022-01-10 RX ORDER — BENZONATATE 100 MG/1
100 CAPSULE ORAL 3 TIMES DAILY PRN
Status: DISCONTINUED | OUTPATIENT
Start: 2022-01-10 | End: 2022-01-10

## 2022-01-10 RX ORDER — ACETAMINOPHEN 325 MG/1
650 TABLET ORAL EVERY 6 HOURS PRN
Status: DISCONTINUED | OUTPATIENT
Start: 2022-01-10 | End: 2022-01-15 | Stop reason: HOSPADM

## 2022-01-10 RX ORDER — METOPROLOL TARTRATE 5 MG/5ML
5 INJECTION INTRAVENOUS EVERY 6 HOURS PRN
Status: DISCONTINUED | OUTPATIENT
Start: 2022-01-10 | End: 2022-01-15 | Stop reason: HOSPADM

## 2022-01-10 RX ORDER — AMIODARONE HYDROCHLORIDE 200 MG/1
200 TABLET ORAL 3 TIMES DAILY
Status: DISCONTINUED | OUTPATIENT
Start: 2022-01-11 | End: 2022-01-10

## 2022-01-10 RX ORDER — ONDANSETRON 2 MG/ML
4 INJECTION INTRAMUSCULAR; INTRAVENOUS EVERY 6 HOURS PRN
Status: DISCONTINUED | OUTPATIENT
Start: 2022-01-10 | End: 2022-01-15 | Stop reason: HOSPADM

## 2022-01-10 RX ORDER — POTASSIUM CHLORIDE 20 MEQ/1
40 TABLET, EXTENDED RELEASE ORAL ONCE
Status: COMPLETED | OUTPATIENT
Start: 2022-01-10 | End: 2022-01-10

## 2022-01-10 RX ORDER — HEPARIN SODIUM 10000 [USP'U]/100ML
3-20 INJECTION, SOLUTION INTRAVENOUS
Status: DISCONTINUED | OUTPATIENT
Start: 2022-01-10 | End: 2022-01-11

## 2022-01-10 RX ORDER — HEPARIN SODIUM 1000 [USP'U]/ML
3900 INJECTION, SOLUTION INTRAVENOUS; SUBCUTANEOUS ONCE
Status: DISCONTINUED | OUTPATIENT
Start: 2022-01-10 | End: 2022-01-11

## 2022-01-10 RX ADMIN — DILTIAZEM HYDROCHLORIDE 15 MG: 5 INJECTION, SOLUTION INTRAVENOUS at 08:01

## 2022-01-10 RX ADMIN — MAGNESIUM SULFATE HEPTAHYDRATE 2 G: 40 INJECTION, SOLUTION INTRAVENOUS at 08:44

## 2022-01-10 RX ADMIN — POTASSIUM CHLORIDE 40 MEQ: 1500 TABLET, EXTENDED RELEASE ORAL at 13:59

## 2022-01-10 RX ADMIN — HEPARIN SODIUM 5000 UNITS: 5000 INJECTION INTRAVENOUS; SUBCUTANEOUS at 13:59

## 2022-01-10 RX ADMIN — HEPARIN SODIUM 12 UNITS/KG/HR: 10000 INJECTION, SOLUTION INTRAVENOUS at 15:39

## 2022-01-10 RX ADMIN — FUROSEMIDE 20 MG: 10 INJECTION, SOLUTION INTRAMUSCULAR; INTRAVENOUS at 08:38

## 2022-01-10 RX ADMIN — FUROSEMIDE 40 MG: 10 INJECTION, SOLUTION INTRAMUSCULAR; INTRAVENOUS at 17:52

## 2022-01-10 RX ADMIN — METOPROLOL TARTRATE 25 MG: 25 TABLET, FILM COATED ORAL at 13:59

## 2022-01-10 RX ADMIN — METOPROLOL TARTRATE 25 MG: 25 TABLET, FILM COATED ORAL at 20:06

## 2022-01-10 RX ADMIN — DILTIAZEM HYDROCHLORIDE 30 MG: 30 TABLET, FILM COATED ORAL at 09:10

## 2022-01-10 RX ADMIN — DEXTROSE 150 MG: 50 INJECTION, SOLUTION INTRAVENOUS at 18:19

## 2022-01-10 NOTE — H&P
[facility]  [facility]- Brant Pickup 1950, 66 y o  female MRN: [id number]  Unit/Bed#: ED 07 Encounter: [id number]  Primary Care Provider: Arjun Shafer MD   Date and time admitted to hospital: 1/10/2022  6:45 AM    * New onset a-fib w/ RVR ([facility])  Assessment & Plan  · Heart rate in the 120's, EKG shows sinus tachycardia, telemetry reviewed which shows runs of AFib  · Patient was given 10 mg IV Cardizem as well as 15 mg IV Cardizem  TSH wnl  No known valvular heart disease  · Start p o  Metoprolol tartrate 25 mg q 12, titrate for HR <110  Would avoid further Cardizem S patient may have cardiomyopathy - f/u EF from TTE  · Telemetry monitoring  · Check TTE assess left atrium/valvular dysfunction  · [id number] = 3 start heparin gtt, consideration for DOAC, though will need to price out with patient's insurance before starting  · Cardiology consult appreciated    Acute congestive heart failure ([facility] [address] )  Assessment & Plan  Wt Readings from Last 3 Encounters:   01/10/22 68 9 kg (152 lb)   12/16/21 71 2 kg (157 lb)   10/07/21 71 6 kg (157 lb 12 8 oz)   Presents with progressive dyspnea and orthopnea for at least last 2 months  Chest x-ray with asymmetric pulmonary edema right greater than left  ProBNP level elevated at 4408  · Suspect tachy mediated cardiomyopathy, likely some degree of systolic dysfunction  Will follow up TTE  · Patient did receive x1 dose at 20 mg IV Lasix in the ED, will reassess for further IV diuresis  · 1800 mL fluid restriction and 2 g sodium restriction  · Daily standing weights  · Nutrition consult for dietary counseling  · Cardiology consult          Hypomagnesemia  Assessment & Plan  · Repleted with 2 g Mag sulfate  Telemetry monitoring  Follow up repeat magnesium level this afternoon    History of Hodgkin's lymphoma  Assessment & Plan  · In remission    Continue observation with [facility]    Mixed hyperlipidemia  Assessment & Plan  · Most recent lipid panel with  triglycerides 79  · Patient not on statin    Waldenstrom macroglobulinemia ([facility] [address] )  Assessment & Plan  · Follows with [facility]  Continue observation  Myelodysplasia (myelodysplastic syndrome) ([facility] [address] )  Assessment & Plan  · Follows with [facility]  Follow-up platelet count    Hypothyroidism  Assessment & Plan  · TSH within normal limits  Continue levothyroxine 100 mcg daily    VTE Pharmacologic Prophylaxis: VTE Score: 3 Moderate Risk (Score 3-4) - Pharmacological DVT Prophylaxis Ordered: heparin drip  Code Status: Level 1 - Full Code   Discussion with family: Updated  (daughter) at bedside  Anticipated Length of Stay: Patient will be admitted on an inpatient basis with an anticipated length of stay of greater than 2 midnights secondary to New onset AFib with RVR and heart failure  Total Time for Visit, including Counseling / Coordination of Care: 60 minutes Greater than 50% of this total time spent on direct patient counseling and coordination of care  Chief Complaint: SOB for 2 months     History of Present Illness:  Gt Dorantes is a 66 y o  female with a PMH of mixed hyperlipidemia, hypothyroidism controlled with levothyroxine, myelodysplasia, and Waldenström macroglobulinemia who presents with SOB for 2 months  Patient has been experiencing orthopnea which is relieved by elevation via pillows and SOB with walking which is relieved with sitting down  Denies any chest pain during these episodes  Pt has had a cough for many months which is worsened with respiratory viral illnesses  She had a viral illness 2 months ago and the cough worsened and has lingered  Patient went to the doctors 3 weeks ago for a UTI which has since resolved, but had an increased HR which she was observed for in office until it resolved  She does admitted to having palpitations occasionally, but has no associated syncope or dizziness   Denies leg edema, abdominal swelling or pain, nausea, vomiting,diarrhea, headaches, fever, chills, weakness or fatigue  Patient lives at home and has no issues ambulating or doing her ADLs  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  Quit smoking 35 years ago, no alcohol or illicit drug use  Review of Systems:  Review of Systems   Constitutional: Negative for chills, fatigue and fever  Respiratory: Positive for cough and shortness of breath (for 2 months)  Negative for wheezing  Cardiovascular: Positive for palpitations  Negative for chest pain and leg swelling  Gastrointestinal: Negative for abdominal distention, abdominal pain, diarrhea, nausea and vomiting  Neurological: Negative for dizziness, syncope, light-headedness and headaches  Past Medical and Surgical History:   Past Medical History:   Diagnosis Date    Cancer ([facility] [address] )     Disease of thyroid gland        Past Surgical History:   Procedure Laterality Date    HYSTERECTOMY         Meds/Allergies:  Prior to Admission medications    Medication Sig Start Date End Date Taking? Authorizing Provider   levothyroxine 100 mcg tablet TAKE 1 TABLET BY MOUTH EVERY DAY 21  Yes Tahmina Lei MD   carbamide peroxide (DEBROX) 6 5 % otic solution Administer 5 drops into both ears 2 (two) times a day for 4 days 5/6/21 5/10/21  Jayson Cockayne, MD   ciprofloxacin-dexamethasone (CIPRODEX) otic suspension Administer 4 drops into both ears 2 (two) times a day for 5 days 21  Jayson Cockayne, MD     I have reviewed home medications with patient personally  Allergies:    Allergies   Allergen Reactions    Penicillins Rash       Social History:  Marital Status:    Occupation: hairdresser    Substance Use History:   Social History     Substance and Sexual Activity   Alcohol Use Never     Social History     Tobacco Use   Smoking Status Former Smoker    Packs/day: 0 50    Years: 10 00    Pack years: 5 00    Quit date: 45    Years since quittin 0   Smokeless Tobacco Never Used     Social History     Substance and Sexual Activity   Drug Use Never       Family History:  Family History   Problem Relation Age of Onset    No Known Problems Mother     No Known Problems Father     Cancer Maternal Aunt        Physical Exam:     Vitals:   Blood Pressure: 116/75 (01/10/22 1500)  Pulse: 92 (01/10/22 1500)  Temperature: 97 9 °F (36 6 °C) (01/10/22 1500)  Temp Source: Oral (01/10/22 1500)  Respirations: 18 (01/10/22 1500)  Height: 5' 4" (162 6 cm) (01/10/22 1330)  Weight - Scale: 68 9 kg (152 lb) (01/10/22 1330)  SpO2: 97 % (01/10/22 1500)    Physical Exam  Constitutional:       General: She is not in acute distress  Appearance: Normal appearance  HENT:      Head: Normocephalic and atraumatic  Mouth/Throat:      Mouth: Mucous membranes are moist       Pharynx: Oropharynx is clear  Cardiovascular:      Rate and Rhythm: Regular rhythm  Tachycardia present  Pulses: Normal pulses  Heart sounds: Normal heart sounds  Pulmonary:      Effort: Pulmonary effort is normal       Breath sounds: Rhonchi present  Abdominal:      General: Bowel sounds are normal  There is no distension  Palpations: Abdomen is soft  Tenderness: There is no abdominal tenderness  Musculoskeletal:      Right lower leg: No edema  Left lower leg: No edema  Skin:     General: Skin is warm and dry  Neurological:      Mental Status: She is alert and oriented to person, place, and time  Psychiatric:         Mood and Affect: Mood normal          Behavior: Behavior normal          Thought Content:  Thought content normal          Judgment: Judgment normal         Additional Data:     Lab Results:  Results from last 7 days   Lab Units 01/10/22  0735   WBC Thousand/uL 6 80   HEMOGLOBIN g/dL 13 8   HEMATOCRIT % 42 6   NEUTROS PCT % 67   LYMPHS PCT % 24   MONOS PCT % 7   EOS PCT % 2     Results from last 7 days   Lab Units 01/10/22  0735   SODIUM mmol/L 141   POTASSIUM mmol/L 3 5   CHLORIDE mmol/L 103   CO2 mmol/L 30   BUN mg/dL 10   CREATININE mg/dL 0 75   ANION GAP mmol/L 8   CALCIUM mg/dL 9 5   ALBUMIN g/dL 3 9   TOTAL BILIRUBIN mg/dL 0 78   ALK PHOS U/L 77   ALT U/L 16   AST U/L 14   GLUCOSE RANDOM mg/dL 120     Results from last 7 days   Lab Units 01/10/22  0735   INR  1 03                   Imaging: Reviewed radiology reports from this admission including: chest xray  XR chest 1 view portable   ED Interpretation by Orly Fortune DO (01/10 5876)   Diffuse right-sided patchy infiltrates, infiltrates of the left lung base      Final Result by Claude Breaux MD (01/10 9436)      Patchy bilateral ground glass opacity, greater on the right, which could be due to Covid 19 and/or asymmetric edema  Workstation performed: [id number]             EKG and Other Studies Reviewed on Admission:   · EKG: Sinus Tachycardia    w/ PACs    ** Please Note: This note has been constructed using a voice recognition system   **

## 2022-01-10 NOTE — ASSESSMENT & PLAN NOTE
· Heart rate in the 120's, EKG shows sinus tachycardia, telemetry reviewed which shows runs of AFib  · Patient was given 10 mg IV Cardizem as well as 15 mg IV Cardizem  TSH wnl  No known valvular heart disease  · Start p o  Metoprolol tartrate 25 mg q 12, titrate for HR <110    Would avoid further Cardizem S patient may have cardiomyopathy - f/u EF from TTE  · Telemetry monitoring  · Check TTE assess left atrium/valvular dysfunction  · [id number] = 3 start heparin gtt, consideration for DOAC, though will need to price out with patient's insurance before starting  · Cardiology consult appreciated

## 2022-01-10 NOTE — ASSESSMENT & PLAN NOTE
Wt Readings from Last 3 Encounters:   01/10/22 68 9 kg (152 lb)   12/16/21 71 2 kg (157 lb)   10/07/21 71 6 kg (157 lb 12 8 oz)   Presents with progressive dyspnea and orthopnea for at least last 2 months  Chest x-ray with asymmetric pulmonary edema right greater than left  ProBNP level elevated at 4408  · Suspect tachy mediated cardiomyopathy, likely some degree of systolic dysfunction    Will follow up TTE  · Patient did receive x1 dose at 20 mg IV Lasix in the ED, will reassess for further IV diuresis  · 1800 mL fluid restriction and 2 g sodium restriction  · Daily standing weights  · Nutrition consult for dietary counseling  · Cardiology consult

## 2022-01-10 NOTE — CONSULTS
[facility] 1950, 66 y o  female MRN: [id number]  Unit/Bed#: ED 07 Encounter: [id number]  Primary Care Provider: Mickie Lopes MD   Date and time admitted to hospital: 1/10/2022  6:45 AM    Inpatient Consult to Nutrition Services  Consult performed by: Caridad Mcfarland MD  Consult ordered by: King Loco MD        Assessment and Plan      1  Acute Congestive Heart Failure  POA which shortness of breath, orthopnea, consistent cough that has been ongoing for last 2-3 months  Warts URI to 3 months ago, since the infection shortness of breath has gotten worse, last night woke up 4 a m  With difficulty breathing  In ED on admission was found to have AFib with RVR  Lab Results   Component Value Date    HSTNI0 18 01/10/2022    HSTNI2 39 01/10/2022    HSTNID2 21 01/10/2022     -TSH - WNL  No recent T4 available,   -[id number] = 3  -Mg 1 5 on admission, given IV Mg 2g in ED    -CAD risk factors , quit 30 years ago, 20 pack/year history, mixed hyperlipidemia  -presenting EKG - signs of left atrial dilatation, QRS with left axis deviation  1st EKG with a fib, last EKG NSR, no ST changes  -ECHO :  LVEF 25%, severe global hypokinesis, unable to assess diastolic function  Left atrial filling pressure elevated  The AV - mild regurgitation, small mobile mass on the aortic aspect  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  MV - moderate annular calcification, mild regurgitation  TV - is mild regurgitation  -CXR :  Patchy bilateral ground-glass opacity, greater on the right, no effusion  L    BP Readings from Last 3 Encounters:   01/10/22 116/75   12/16/21 130/82   10/07/21 130/82    Estimated Creatinine Clearance: 64 7 mL/min (by C-G formula based on SCr of 0 75 mg/dL)         3  New onset atrial fibrillation with RVR    POA was found to have atrial fibrillation with RVR, patient received Cardizem 15 mg IV then 30 mg PO,  In ED HR was averaging  in NSR  4  Mixed HLD   Lab Results   Component Value Date    LDLCALC 126 (H) 09/29/2021    HDL 58 09/29/2021    CHOLESTEROL 202 (H) 09/29/2021     Not on any statin medication, doing diet modification, low fat diet  Nutritionist is consulted  5  Hypothyroidism - levothyroxine 100 mcg daily  6  History of Hodgkin Lymphoma, completed chemotherapy, in remission  7  Early myelodysplastic syndrome with mild thrombocytopenia, platelet count stable  Impression:  Presenting symptoms currently secondary to acute congestive heart failure, unclear etiology  Possible secondary to chronic atrial fibrillation versus viral cardiomyopathy  Cannot rule out CAD, which is low on differential at this point, considering imaging pattern, labs and clinical presentation  Patient will need diuresis, with the addition of other heart failure medications such as ENTRESTO/ACEI/ARB and heart rate and rhythm control, goal is to keep patient in normal sinus rhythm  Plan     · Continue IV lasix 40 mg daily  · Patient is on 2L NC, wean as appropriate  · Metoprolol 25 mg bid  · Continue Heparin gtt, patient will need AC,  Price NOAC price check will be needed  · Will likely start on Entresto, will need to price check, if not affordable then will start on ACEI/ARB  · Will give Amiodarone 150 mg IV 1hr load today, then 200 mg PO tid for 1 week, followed by 200 mg bid, then 200 mg daily  · Will try to keep the patient in NSR and evaluate in 3 months, with ECHO f/u/  · Will then evaluate for ischemic cardiomyopathy/CAD, if there is no improvement with rate/rhytm control therapy  · Patient will likely need a life vest    · Continue Telemetry  · Daily BMP, keep Mg>2 0, K>4 0  Strict I/O, weight , 3574-0944 ml fluid restriction and 1 5-2 0 g salt restriction  Patient will benefit from a dietitian consult, inpatient consult to nutrition services is ordered                History of Present Illness:  Russ Olmedo is a 66 y o  female with past medical history of hyperlipidemia, hypothyroidism, myelodysplasia, history of Hodgkin lymphoma in remission who is originally presents for shortness of breath for 2 months, orthopnea which is relieved by elevation of pillows sleeping in a recliner/sofa  Reports upper respiratory infection 2-3 months ago, when whole family was sick  Since that has had persistent coughing and worsening shortness of breath, initially thought, shortness of breath is due to wearing a face mask consistently, it got worse eventually, to a point that the patient wasn't able to sleep flat and developed SOB while taking the steps in her condo  Yesterday morning woke up with severe shortness of breath which prompted the emergency department visit  Patient received IV Cardizem 15 mg in the ED, followed by 30 mg of p o  Cardizem, 20 mg Lasix and was started on 2 L of oxygen, NC  Patient denies any cardiac pathology history, denies any cardiac family history  Denies any chest pain, however reports occasional palpitations, she was found to be tachycardic during her last doctor visit  Blood pressure than at last visit was 130/82, POA blood pressure was 188/111, which improved with Cardizem, left blood pressure 116/75  On exam there is no lower extremity edema, course inspiratory crackles were auscultated bilaterally throughout lungs  No murmurs normal S1-S2  Labs were remarkable for:  Magnesium 1 5, BNP of 4400, troponins are negative  Chest x-ray - consistent with patchy opacity, more right than left, likely in the setting of vascular congestion  Echo showed EF of 25%, global hypokinesis  Review of Systems:  Review of Systems   Constitutional: Negative for chills and fever  HENT: Negative for ear pain and sore throat  Eyes: Negative for pain and visual disturbance  Respiratory: Positive for cough, chest tightness and shortness of breath      Cardiovascular: Positive for palpitations  Negative for chest pain  Gastrointestinal: Negative for abdominal pain and vomiting  Genitourinary: Negative for dysuria and hematuria  Musculoskeletal: Negative for arthralgias and back pain  Skin: Negative for color change and rash  Neurological: Negative for seizures and syncope  All other systems reviewed and are negative  Past Medical and Surgical History:   Past Medical History:   Diagnosis Date    Cancer ([address] )     Disease of thyroid gland        Past Surgical History:   Procedure Laterality Date    HYSTERECTOMY         Meds/Allergies:  all medications and allergies reviewed    Allergies: Allergies   Allergen Reactions    Penicillins Rash       Social History:  Marital Status: /Civil Union  Substance Use History:   Social History     Substance and Sexual Activity   Alcohol Use Never     Social History     Tobacco Use   Smoking Status Former Smoker    Packs/day: 0 50    Years: 10 00    Pack years: 5 00    Quit date: 45    Years since quittin 0   Smokeless Tobacco Never Used     Social History     Substance and Sexual Activity   Drug Use Never       Family History:  Family History   Problem Relation Age of Onset    No Known Problems Mother     No Known Problems Father     Cancer Maternal Aunt        Physical Exam:   Vitals:   Blood Pressure: 116/75 (01/10/22 1500)  Pulse: 92 (01/10/22 1500)  Temperature: 97 9 °F (36 6 °C) (01/10/22 1500)  Temp Source: Oral (01/10/22 1500)  Respirations: 18 (01/10/22 1500)  Height: 5' 4" (162 6 cm) (01/10/22 1330)  Weight - Scale: 68 9 kg (152 lb) (01/10/22 1330)  SpO2: 97 % (01/10/22 1500)    Physical Exam  Vitals and nursing note reviewed  Constitutional:       General: She is not in acute distress  Appearance: She is well-developed  She is not diaphoretic  Interventions: She is not intubated  HENT:      Head: Normocephalic and atraumatic        Nose: Nose normal    Eyes:      General: No scleral icterus  Conjunctiva/sclera: Conjunctivae normal    Neck:      Trachea: No tracheal deviation  Cardiovascular:      Rate and Rhythm: Tachycardia present  Rhythm irregular  Pulses: Normal pulses  Radial pulses are 2+ on the right side and 2+ on the left side  Dorsalis pedis pulses are 2+ on the right side and 2+ on the left side  Heart sounds: Normal heart sounds, S1 normal and S2 normal  No murmur heard  No friction rub  No gallop  Pulmonary:      Effort: Pulmonary effort is normal  No respiratory distress  She is not intubated  Breath sounds: No stridor  Rhonchi and rales present  No decreased breath sounds or wheezing  Chest:      Chest wall: No tenderness  Abdominal:      General: Bowel sounds are normal  There is no distension  Palpations: Abdomen is soft  There is no mass  Tenderness: There is no abdominal tenderness  Musculoskeletal:         General: No tenderness or deformity  Right lower leg: No edema  Left lower leg: No edema  Lymphadenopathy:      Cervical: No cervical adenopathy  Skin:     General: Skin is warm and dry  Coloration: Skin is not pale  Findings: No erythema  Neurological:      Mental Status: She is alert and oriented to person, place, and time  Psychiatric:         Speech: Speech normal          Behavior: Behavior normal          Thought Content:  Thought content normal          Judgment: Judgment normal             Additional Data:   Lab Results:    Results from last 7 days   Lab Units 01/10/22  0735   WBC Thousand/uL 6 80   HEMOGLOBIN g/dL 13 8   HEMATOCRIT % 42 6   NEUTROS PCT % 67   LYMPHS PCT % 24   MONOS PCT % 7   EOS PCT % 2     Results from last 7 days   Lab Units 01/10/22  0735   SODIUM mmol/L 141   POTASSIUM mmol/L 3 5   CHLORIDE mmol/L 103   CO2 mmol/L 30   BUN mg/dL 10   CREATININE mg/dL 0 75   ANION GAP mmol/L 8   CALCIUM mg/dL 9 5   ALBUMIN g/dL 3 9   TOTAL BILIRUBIN mg/dL 0 78   ALK PHOS U/L 77   ALT U/L 16   AST U/L 14   GLUCOSE RANDOM mg/dL 120     Results from last 7 days   Lab Units 01/10/22  0735   INR  1 03         No results found for: HGBA1C            Imaging: Reviewed radiology reports from this admission including: chest xray and ECHO  XR chest 1 view portable   ED Interpretation by 82Hossein Cantor DO (01/10 7611)   Diffuse right-sided patchy infiltrates, infiltrates of the left lung base      Final Result by Elodia Blank MD (01/10 9279)      Patchy bilateral ground glass opacity, greater on the right, which could be due to Covid 19 and/or asymmetric edema  Workstation performed: [id number]             EKG, Pathology, and Other Studies Reviewed on Admission:   · EKG: Atrial fibrillation  HR averagin 110  ** Please Note: This note may have been constructed using a voice recognition system  **

## 2022-01-10 NOTE — ED ATTENDING ATTESTATION
1/10/2022  IDemarco DO, saw and evaluated the patient  I have discussed the patient with the resident/non-physician practitioner and agree with the resident's/non-physician practitioner's findings, Plan of Care, and MDM as documented in the resident's/non-physician practitioner's note, except where noted  All available labs and Radiology studies were reviewed  I was present for key portions of any procedure(s) performed by the resident/non-physician practitioner and I was immediately available to provide assistance  At this point I agree with the current assessment done in the Emergency Department  I have conducted an independent evaluation of this patient a history and physical is as follows:    65-year-old female presenting to the ED for evaluation of shortness of breath, intermittent palpitations, orthopnea, ongoing for at least 1 month possibly longer  She states time she will get a bed in the middle the night and feel very short of breath feels better when she stands up  She denies any peripheral edema  No fevers or chills  She has been vaccinated for COVID-19, but has not had a booster given yet  She has remote history of tobacco use,  but no longer smokes  On physical exam: pt is unwell appearing, in NAD  mucous membranes moist   She is mildly tachypneic, has bilateral rales, crackles at the bases and mid lung fields, with faint expiratory wheezes in the upper lung fields  Heart is tachycardic, irregular rate and rhythm  Abdomen NT, ND, no R/R/G  Normal bowel sounds  Neuro intact, gcs 15  Cap refill < 2 sec, skin warm and dry      ED Course     Admit for acute CHF w/ rapid afib vs  MAT, needs cardiac eval, echo, diuresis    Critical Care Time  Procedures

## 2022-01-10 NOTE — ASSESSMENT & PLAN NOTE
· Repleted with 2 g Mag sulfate  Telemetry monitoring    Follow up repeat magnesium level this afternoon

## 2022-01-10 NOTE — ED PROVIDER NOTES
History  Chief Complaint   Patient presents with    Shortness of Breath     pt states has been having SOB on and off more at night, pt was tested for Covid Fri was neg, today SOB is worse today     Sidney Pinzon is a 66year old female with a history of myelodysplastic syndrome and hypothyroidism here today for evaluation of worsening shortness of breath worse at night/while lying down  Patient says that she had a cold" 2 months ago, the patient says that she began with some mild shortness of breath at this time, and that over the next 2 months her shortness of breath has worsened  She says her shortness breath is particularly worse at night while she is lying down to sleep, she says she has to get up walk around her house to improve her breathing  Early this morning around 4:30 a m , the patient awoke and was feeling short of breath, she tried to walk around her house as she has done before but it did not help her symptoms prompting her to come to the emergency department for evaluation  Patient denies any recent upper respiratory illnesses  Patient denies any significant cardiac history  She denies any chest pain today  Patient does not have a history of PE or DVT, though she does admit to a history of a blood clot at the site of a PICC line  Around the patient denies any history of COPD or emphysema, though she does have a 20 pack-year smoking history, she says that she quit smoking 30 years ago        History provided by:  Patient   used: No    Shortness of Breath  Severity:  Moderate  Onset quality:  Gradual  Duration:  2 months  Relieved by:  Sitting up  Associated symptoms: no abdominal pain, no chest pain, no cough, no diaphoresis, no ear pain, no fever, no hemoptysis, no neck pain, no rash, no sore throat, no sputum production, no syncope, no swollen glands, no vomiting and no wheezing    Risk factors: hx of cancer    Risk factors: no hx of PE/DVT, no prolonged immobilization and no recent surgery        Prior to Admission Medications   Prescriptions Last Dose Informant Patient Reported? Taking?   carbamide peroxide (DEBROX) 6 5 % otic solution   No No   Sig: Administer 5 drops into both ears 2 (two) times a day for 4 days   ciprofloxacin-dexamethasone (CIPRODEX) otic suspension   No No   Sig: Administer 4 drops into both ears 2 (two) times a day for 5 days   levothyroxine 100 mcg tablet   No No   Sig: TAKE 1 TABLET BY MOUTH EVERY DAY      Facility-Administered Medications: None       Past Medical History:   Diagnosis Date    Cancer ([facility] [address] )     Disease of thyroid gland        Past Surgical History:   Procedure Laterality Date    HYSTERECTOMY         Family History   Problem Relation Age of Onset    No Known Problems Mother     No Known Problems Father     Cancer Maternal Aunt      I have reviewed and agree with the history as documented  E-Cigarette/Vaping    E-Cigarette Use Never User      E-Cigarette/Vaping Substances    Nicotine No     THC No     CBD No     Flavoring No     Other No     Unknown No      Social History     Tobacco Use    Smoking status: Former Smoker     Packs/day: 0 50     Years: 10 00     Pack years: 5 00     Quit date:      Years since quittin 0    Smokeless tobacco: Never Used   Vaping Use    Vaping Use: Never used   Substance Use Topics    Alcohol use: Never    Drug use: Never        Review of Systems   Constitutional: Negative for chills, diaphoresis and fever  HENT: Negative for ear pain and sore throat  Eyes: Negative for pain and visual disturbance  Respiratory: Positive for shortness of breath  Negative for cough, hemoptysis, sputum production and wheezing  Cardiovascular: Negative for chest pain, palpitations, leg swelling and syncope  Gastrointestinal: Negative for abdominal pain and vomiting  Genitourinary: Negative for dysuria and hematuria  Musculoskeletal: Negative for arthralgias, back pain and neck pain     Skin: Negative for color change and rash  Neurological: Negative for dizziness, seizures, syncope, weakness and light-headedness  All other systems reviewed and are negative  Physical Exam  ED Triage Vitals   Temperature Pulse Respirations Blood Pressure SpO2   01/10/22 0629 01/10/22 0629 01/10/22 0629 01/10/22 0629 01/10/22 0629   98 1 °F (36 7 °C) (!) 122 18 (!) 186/111 95 %      Temp Source Heart Rate Source Patient Position - Orthostatic VS BP Location FiO2 (%)   01/10/22 0629 -- 01/10/22 0830 01/10/22 0830 --   Oral  Lying Right arm       Pain Score       01/10/22 0629       No Pain             Orthostatic Vital Signs  Vitals:    01/10/22 0629 01/10/22 0815 01/10/22 0830 01/10/22 0910   BP: (!) 186/111 156/88 147/85 151/90   Pulse: (!) 122 (!) 112 (!) 108    Patient Position - Orthostatic VS:   Lying        Physical Exam  Vitals and nursing note reviewed  Constitutional:       General: She is not in acute distress  Appearance: Normal appearance  Comments: Patient is somewhat cachectic in appearance, she does not appear to be in any acute distress   HENT:      Head: Normocephalic and atraumatic  Mouth/Throat:      Mouth: Mucous membranes are moist       Pharynx: Oropharynx is clear  Eyes:      General: No scleral icterus  Conjunctiva/sclera: Conjunctivae normal    Cardiovascular:      Rate and Rhythm: Regular rhythm  Tachycardia present  Heart sounds: Normal heart sounds  No murmur heard  No friction rub  Pulmonary:      Effort: Pulmonary effort is normal  No tachypnea, accessory muscle usage or respiratory distress  Breath sounds: Examination of the right-upper field reveals wheezing  Examination of the left-upper field reveals wheezing  Examination of the right-middle field reveals rales  Examination of the left-middle field reveals rales  Examination of the right-lower field reveals rales  Examination of the left-lower field reveals rales  Wheezing and rales present   No decreased breath sounds or rhonchi  Comments: Patient is not in any respiratory distress, she is not tachypneic or using any accessory muscles  Patient is satting at 94% on room air, 96% on 2 L NC  Chest:      Chest wall: No mass or tenderness  Abdominal:      General: Abdomen is flat  There is no distension  Palpations: Abdomen is soft  There is no mass  Tenderness: There is no abdominal tenderness  There is no guarding  Musculoskeletal:         General: No tenderness or signs of injury  Cervical back: Neck supple  No rigidity  Right lower leg: No tenderness  No edema  Left lower leg: No tenderness  No edema  Skin:     General: Skin is warm  Coloration: Skin is not jaundiced  Findings: No erythema or rash  Neurological:      General: No focal deficit present  Mental Status: She is alert  Mental status is at baseline  Psychiatric:         Mood and Affect: Mood normal          Behavior: Behavior normal          ED Medications  Medications   magnesium sulfate 2 g/50 mL IVPB (premix) 2 g (2 g Intravenous New Bag 1/10/22 0844)   diltiazem (CARDIZEM) injection 15 mg (15 mg Intravenous Given 1/10/22 0801)   furosemide (LASIX) injection 20 mg (20 mg Intravenous Given 1/10/22 0838)   diltiazem (CARDIZEM) tablet 30 mg (30 mg Oral Given 1/10/22 0910)       Diagnostic Studies  Results Reviewed     Procedure Component Value Units Date/Time    COVID/FLU/RSV - 2 hour TAT [[id number]]  (Normal) Collected: 01/10/22 0804    Lab Status: Final result Specimen: Nares from Nose Updated: 01/10/22 0848     SARS-CoV-2 Negative     INFLUENZA A PCR Negative     INFLUENZA B PCR Negative     RSV PCR Negative    Narrative:      FOR PEDIATRIC PATIENTS - copy/paste COVID Guidelines URL to browser: [email or web address] [email or web address]    SARS-CoV-2 assay is a Nucleic Acid Amplification assay intended for the  qualitative detection of nucleic acid from SARS-CoV-2 in nasopharyngeal  swabs  Results are for the presumptive identification of SARS-CoV-2 RNA  Positive results are indicative of infection with SARS-CoV-2, the virus  causing COVID-19, but do not rule out bacterial infection or co-infection  with other viruses  Laboratories within the [place] and its  territories are required to report all positive results to the appropriate  public health authorities  Negative results do not preclude SARS-CoV-2  infection and should not be used as the sole basis for treatment or other  patient management decisions  Negative results must be combined with  clinical observations, patient history, and epidemiological information  This test has not been FDA cleared or approved  This test has been authorized by FDA under an Emergency Use Authorization  (EUA)  This test is only authorized for the duration of time the  declaration that circumstances exist justifying the authorization of the  emergency use of an in vitro diagnostic tests for detection of SARS-CoV-2  virus and/or diagnosis of COVID-19 infection under section 564(b)(1) of  the Act, 21 U  S C  838YUK-7(B)(4), unless the authorization is terminated  or revoked sooner  The test has been validated but independent review by FDA  and CLIA is pending  Test performed using [employer] GeneXpert: This RT-PCR assay targets N2,  a region unique to SARS-CoV-2  A conserved region in the E-gene was chosen  for pan-Sarbecovirus detection which includes SARS-CoV-2      D-Dimer [[id number]]  (Abnormal) Collected: 01/10/22 0735    Lab Status: Final result Specimen: Blood from Arm, Left Updated: 01/10/22 0818     D-Dimer, Quant 0 55 ug/ml FEU     CBC and differential [[id number]] Collected: 01/10/22 0735    Lab Status: Final result Specimen: Blood from Arm, Left Updated: 01/10/22 0818     WBC 6 80 Thousand/uL      RBC 4 66 Million/uL      Hemoglobin 13 8 g/dL      Hematocrit 42 6 %      MCV 91 fL      MCH 29 6 pg      MCHC 32 4 g/dL      RDW 14 2 %      MPV --     Platelets --     nRBC 0 /100 WBCs      Neutrophils Relative 67 %      Immat GRANS % 0 %      Lymphocytes Relative 24 %      Monocytes Relative 7 %      Eosinophils Relative 2 %      Basophils Relative 0 %      Neutrophils Absolute 4 56 Thousands/µL      Immature Grans Absolute 0 03 Thousand/uL      Lymphocytes Absolute 1 62 Thousands/µL      Monocytes Absolute 0 46 Thousand/µL      Eosinophils Absolute 0 10 Thousand/µL      Basophils Absolute 0 03 Thousands/µL     NT-BNP PRO [[id number]]  (Abnormal) Collected: 01/10/22 0735    Lab Status: Final result Specimen: Blood from Arm, Left Updated: 01/10/22 0816     NT-proBNP 4,408 pg/mL     TSH [[id number]]  (Normal) Collected: 01/10/22 0735    Lab Status: Final result Specimen: Blood from Arm, Left Updated: 01/10/22 0816     TSH 3RD GENERATON 2 325 uIU/mL     Narrative:      Patients undergoing fluorescein dye angiography may retain small amounts of fluorescein in the body for 48-72 hours post procedure  Samples containing fluorescein can produce falsely depressed TSH values  If the patient had this procedure,a specimen should be resubmitted post fluorescein clearance        Magnesium [[id number]]  (Abnormal) Collected: 01/10/22 0735    Lab Status: Final result Specimen: Blood from Arm, Left Updated: 01/10/22 0816     Magnesium 1 5 mg/dL     HS Troponin 0hr (reflex protocol) [[id number]]  (Normal) Collected: 01/10/22 0735    Lab Status: Final result Specimen: Blood from Arm, Left Updated: 01/10/22 0815     hs TnI 0hr 18 ng/L     HS Troponin I 2hr [[id number]]     Lab Status: No result Specimen: Blood     HS Troponin I 4hr [[id number]]     Lab Status: No result Specimen: Blood     Comprehensive metabolic panel [[id number]] Collected: 01/10/22 0735    Lab Status: Final result Specimen: Blood from Arm, Left Updated: 01/10/22 0807     Sodium 141 mmol/L      Potassium 3 5 mmol/L      Chloride 103 mmol/L      CO2 30 mmol/L      ANION GAP 8 mmol/L BUN 10 mg/dL      Creatinine 0 75 mg/dL      Glucose 120 mg/dL      Calcium 9 5 mg/dL      AST 14 U/L      ALT 16 U/L      Alkaline Phosphatase 77 U/L      Total Protein 7 7 g/dL      Albumin 3 9 g/dL      Total Bilirubin 0 78 mg/dL      eGFR 79 ml/min/1 73sq m     Narrative:      [place] guidelines for Chronic Kidney Disease (CKD):     Stage 1 with normal or high GFR (GFR > 90 mL/min/1 73 square meters)    Stage 2 Mild CKD (GFR = 60-89 mL/min/1 73 square meters)    Stage 3A Moderate CKD (GFR = 45-59 mL/min/1 73 square meters)    Stage 3B Moderate CKD (GFR = 30-44 mL/min/1 73 square meters)    Stage 4 Severe CKD (GFR = 15-29 mL/min/1 73 square meters)    Stage 5 End Stage CKD (GFR <15 mL/min/1 73 square meters)  Note: GFR calculation is accurate only with a steady state creatinine    Protime-INR [[id number]]  (Normal) Collected: 01/10/22 0735    Lab Status: Final result Specimen: Blood from Arm, Left Updated: 01/10/22 0759     Protime 13 5 seconds      INR 1 03    APTT [[id number]]  (Normal) Collected: 01/10/22 0735    Lab Status: Final result Specimen: Blood from Arm, Left Updated: 01/10/22 0759     PTT 30 seconds                  XR chest 1 view portable   ED Interpretation by Adriel Cantor DO (01/10 9921)   Diffuse right-sided patchy infiltrates, infiltrates of the left lung base      Final Result by Chriss Ayers MD (01/10 1060)      Patchy bilateral ground glass opacity, greater on the right, which could be due to Covid 19 and/or asymmetric edema                    Workstation performed: [id number]               Procedures  ECG 12 Lead Documentation Only    Date/Time: 1/10/2022 6:56 AM  Performed by: Adriel Cantor DO  Authorized by: Adriel Cantor DO     Indications / Diagnosis:  Shortness of breath  ECG reviewed by me, the ED Provider: yes    Patient location:  ED  Previous ECG:     Previous ECG:  Unavailable  Interpretation: Interpretation: abnormal    Rate:     ECG rate:  150    ECG rate assessment: tachycardic    Rhythm:     Rhythm: atrial fibrillation    Ectopy:     Ectopy: PVCs      PVCs:  Infrequent  QRS:     QRS axis:  Normal    QRS intervals:  Normal  Conduction:     Conduction: normal    ST segments:     ST segments:  Normal  T waves:     T waves: normal    Comments:      Atrial fibrillation with rapid ventricular response versus multifocal atrial tachycardia  ECG 12 Lead Documentation Only    Date/Time: 1/10/2022 8:22 AM  Performed by: The Ratnakar BankHossein Cantor DO  Authorized by: Adriel Cantor DO     Indications / Diagnosis:  Tachycardia  ECG reviewed by me, the ED Provider: yes    Patient location:  ED  Previous ECG:     Previous ECG:  Compared to current    Comparison ECG info:  Heart rate is decreased slightly, this EKG is post Cardizem bolus    Similarity:  Changes noted  Interpretation:     Interpretation: abnormal    Rate:     ECG rate:  118    ECG rate assessment: tachycardic    Rhythm:     Rhythm: atrial fibrillation    Ectopy:     Ectopy: none    QRS:     QRS axis:  Normal    QRS intervals:  Normal  Conduction:     Conduction: normal    ST segments:     ST segments:  Normal  T waves:     T waves: normal    Comments:      Atrial fibrillation with RVR verses multifocal atrial tachycardia  ECG 12 Lead Documentation Only    Date/Time: 1/10/2022 8:59 AM  Performed by: 82Hossein Cantor DO  Authorized by: 82Hossein Cantor DO     Indications / Diagnosis:  Tachycardia  Patient location:  ED  Previous ECG:     Previous ECG:  Compared to current    Comparison ECG info:  Patient has a regular rhythm    Similarity:  Changes noted  Interpretation:     Interpretation: non-specific    Rate:     ECG rate:  110    ECG rate assessment: tachycardic    Rhythm:     Rhythm: sinus rhythm    Ectopy:     Ectopy: none    QRS:     QRS axis:  Normal    QRS intervals:  Normal  Conduction:     Conduction: normal    ST segments:     ST segments:  Normal  T waves:     T waves: normal    Comments:      Sinus tachycardia          ED Course       [id number] SCORE      Most Recent Value   [id number]    Age 1 Filed at: 01/10/2022 4355   Sex 1 Filed at: 01/10/2022 0910   CHF History 0 Filed at: 01/10/2022 0910   HTN History 0 Filed at: 01/10/2022 6217   Stroke or TIA Symptoms Previously 0 Filed at: 01/10/2022 0910   Vascular Disease History 0 Filed at: 01/10/2022 7364   Diabetes History 0 Filed at: 01/10/2022 0910   [id number] Score 2 Filed at: 01/10/2022 3192                                Wells' Criteria for PE      Most Recent Value   Wells' Criteria for PE    Clinical signs and symptoms of DVT 0 Filed at: 01/10/2022 2255   PE is primary diagnosis or equally likely 3 Filed at: 01/10/2022 0713   HR >100 1 5 Filed at: 01/10/2022 5519   Immobilization at least 3 days or Surgery in the previous 4 weeks 0 Filed at: 01/10/2022 4492   Previous, objectively diagnosed PE or DVT 0 Filed at: 01/10/2022 2987   Hemoptysis 0 Filed at: 01/10/2022 9310   Malignancy with treatment within 6 months or palliative 1 Filed at: 01/10/2022 3542   Wells' Criteria Total 5 5 Filed at: 01/10/2022 1510            MDM  Number of Diagnoses or Management Options  CHF (congestive heart failure) ([facility] [address] ): new and requires workup  Irregular tachycardia: new and requires workup  SOB (shortness of breath): new and requires workup     Amount and/or Complexity of Data Reviewed  Clinical lab tests: ordered and reviewed  Tests in the radiology section of CPT®: ordered and reviewed  Review and summarize past medical records: yes  Independent visualization of images, tracings, or specimens: yes    Risk of Complications, Morbidity, and/or Mortality  General comments: 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 with a history of myelodysplastic syndrome and hypothyroidism here today for evaluation of worsening shortness of breath worse at night/while lying down       On arrival, the patient was tachycardic between 140 and 150, she was satting at 94% on room air at rest   Patient was placed on 2 L nasal cannula, her oxygen saturation paula to 96%  Lung exam showed scattered crackles worse at the lung bases and occasional expiratory wheezes  ECG showed what appeared to be in irregular tachycardia, atrial fibrillation versus MT  Patient was given a bolus of Cardizem, repeat ECG showed her to still be in a regular tachycardia, though her heart rate did improve was around 110 beats per minute  After some more time, repeat EKG showed the patient to now be in sinus tachycardia with a regular rhythm, she was still tachycardic at around 110 beats per minute  Chest x-ray showed bilateral patchy infiltrates consistent with pneumonia or uneven edema  COVID/RSV/influenza swab was negative  BNP was significantly elevated over 4400, given her chest x-ray conjunction this BNP level she is most likely in new onset congestive heart failure which is causing her shortness of breath  Patient to be admitted for further treatment and evaluation of this new onset congestive heart failure in the setting of an irregular tachycardia      Patient Progress  Patient progress: stable      Disposition  Final diagnoses:   Irregular tachycardia   SOB (shortness of breath)   CHF (congestive heart failure) ([facility] [address] )     Time reflects when diagnosis was documented in both MDM as applicable and the Disposition within this note     Time User Action Codes Description Comment    1/10/2022  9:13 AM Yvon Chen Add [R00 0] Irregular tachycardia     1/10/2022  9:14 AM Yvon Chen Add [R06 02] SOB (shortness of breath)     1/10/2022  9:15 AM Yvon Chen Add [I50 9] CHF (congestive heart failure) [facility])       ED Disposition     ED Disposition Condition Date/Time Comment    Admit Stable Mon Jose Enrique 10, 2022  9:20 AM Case was discussed with Dr Verenice Francois and the patient's admission status was agreed to be Admission Status: inpatient status to the service of Dr Nilson Li  Follow-up Information    None         Patient's Medications   Discharge Prescriptions    No medications on file     No discharge procedures on file  PDMP Review     None           ED Provider  Attending physically available and evaluated Hardy Gan I managed the patient along with the ED Attending      Electronically Signed by         Akin Hutton DO  01/10/22 3512

## 2022-01-11 LAB
ANION GAP SERPL CALCULATED.3IONS-SCNC: 7 MMOL/L (ref 4–13)
APTT PPP: 56 SECONDS (ref 23–37)
APTT PPP: 61 SECONDS (ref 23–37)
ATRIAL RATE: 116 BPM
BASOPHILS # BLD AUTO: 0.02 THOUSANDS/ΜL (ref 0–0.1)
BASOPHILS NFR BLD AUTO: 0 % (ref 0–1)
BUN SERPL-MCNC: 13 MG/DL (ref 5–25)
CALCIUM SERPL-MCNC: 9.3 MG/DL (ref 8.3–10.1)
CHLORIDE SERPL-SCNC: 101 MMOL/L (ref 100–108)
CO2 SERPL-SCNC: 31 MMOL/L (ref 21–32)
CREAT SERPL-MCNC: 0.79 MG/DL (ref 0.6–1.3)
EOSINOPHIL # BLD AUTO: 0.14 THOUSAND/ΜL (ref 0–0.61)
EOSINOPHIL NFR BLD AUTO: 3 % (ref 0–6)
ERYTHROCYTE [DISTWIDTH] IN BLOOD BY AUTOMATED COUNT: 14.3 % (ref 11.6–15.1)
GFR SERPL CREATININE-BSD FRML MDRD: 75 ML/MIN/1.73SQ M
GLUCOSE SERPL-MCNC: 111 MG/DL (ref 65–140)
HCT VFR BLD AUTO: 40.9 % (ref 34.8–46.1)
HGB BLD-MCNC: 13.1 G/DL (ref 11.5–15.4)
IMM GRANULOCYTES # BLD AUTO: 0.01 THOUSAND/UL (ref 0–0.2)
IMM GRANULOCYTES NFR BLD AUTO: 0 % (ref 0–2)
LYMPHOCYTES # BLD AUTO: 2.01 THOUSANDS/ΜL (ref 0.6–4.47)
LYMPHOCYTES NFR BLD AUTO: 37 % (ref 14–44)
MCH RBC QN AUTO: 29.2 PG (ref 26.8–34.3)
MCHC RBC AUTO-ENTMCNC: 32 G/DL (ref 31.4–37.4)
MCV RBC AUTO: 91 FL (ref 82–98)
MONOCYTES # BLD AUTO: 0.44 THOUSAND/ΜL (ref 0.17–1.22)
MONOCYTES NFR BLD AUTO: 8 % (ref 4–12)
NEUTROPHILS # BLD AUTO: 2.86 THOUSANDS/ΜL (ref 1.85–7.62)
NEUTS SEG NFR BLD AUTO: 52 % (ref 43–75)
NRBC BLD AUTO-RTO: 0 /100 WBCS
P AXIS: 50 DEGREES
PLATELET # BLD AUTO: 63 THOUSANDS/UL (ref 149–390)
PMV BLD AUTO: 11.9 FL (ref 8.9–12.7)
POTASSIUM SERPL-SCNC: 3.9 MMOL/L (ref 3.5–5.3)
PR INTERVAL: 150 MS
QRS AXIS: -53 DEGREES
QRSD INTERVAL: 98 MS
QT INTERVAL: 368 MS
QTC INTERVAL: 501 MS
RBC # BLD AUTO: 4.49 MILLION/UL (ref 3.81–5.12)
SODIUM SERPL-SCNC: 139 MMOL/L (ref 136–145)
T WAVE AXIS: 85 DEGREES
VENTRICULAR RATE: 111 BPM
WBC # BLD AUTO: 5.48 THOUSAND/UL (ref 4.31–10.16)

## 2022-01-11 PROCEDURE — 80048 BASIC METABOLIC PNL TOTAL CA: CPT | Performed by: INTERNAL MEDICINE

## 2022-01-11 PROCEDURE — 99232 SBSQ HOSP IP/OBS MODERATE 35: CPT | Performed by: INTERNAL MEDICINE

## 2022-01-11 PROCEDURE — 93010 ELECTROCARDIOGRAM REPORT: CPT | Performed by: INTERNAL MEDICINE

## 2022-01-11 PROCEDURE — 99232 SBSQ HOSP IP/OBS MODERATE 35: CPT | Performed by: PHYSICIAN ASSISTANT

## 2022-01-11 PROCEDURE — 85025 COMPLETE CBC W/AUTO DIFF WBC: CPT | Performed by: INTERNAL MEDICINE

## 2022-01-11 PROCEDURE — 85730 THROMBOPLASTIN TIME PARTIAL: CPT | Performed by: INTERNAL MEDICINE

## 2022-01-11 RX ADMIN — METOPROLOL TARTRATE 25 MG: 25 TABLET, FILM COATED ORAL at 21:37

## 2022-01-11 RX ADMIN — AMIODARONE HYDROCHLORIDE 200 MG: 200 TABLET ORAL at 21:37

## 2022-01-11 RX ADMIN — APIXABAN 5 MG: 5 TABLET, FILM COATED ORAL at 18:31

## 2022-01-11 RX ADMIN — SACUBITRIL AND VALSARTAN 1 TABLET: 24; 26 TABLET, FILM COATED ORAL at 18:31

## 2022-01-11 RX ADMIN — METOPROLOL TARTRATE 25 MG: 25 TABLET, FILM COATED ORAL at 10:00

## 2022-01-11 RX ADMIN — FUROSEMIDE 40 MG: 10 INJECTION, SOLUTION INTRAMUSCULAR; INTRAVENOUS at 10:00

## 2022-01-11 RX ADMIN — AMIODARONE HYDROCHLORIDE 200 MG: 200 TABLET ORAL at 10:00

## 2022-01-11 RX ADMIN — AMIODARONE HYDROCHLORIDE 200 MG: 200 TABLET ORAL at 16:04

## 2022-01-11 RX ADMIN — LEVOTHYROXINE SODIUM 100 MCG: 100 TABLET ORAL at 10:00

## 2022-01-11 NOTE — ASSESSMENT & PLAN NOTE
Wt Readings from Last 3 Encounters:   01/11/22 65 9 kg (145 lb 4 5 oz)   12/16/21 71 2 kg (157 lb)   10/07/21 71 6 kg (157 lb 12 8 oz)   Presented with progressive dyspnea and orthopnea for at least last 2 months  Chest x-ray with asymmetric pulmonary edema right greater than left  ProBNP level elevated at 4408  · Suspect tachy mediated cardiomyopathy- TTE "LVEF is 25%  Systolic function is severely reduced  There is severe global hypokinesis with regional variation  "  · Lasix 40 mg IV daily  · 1800 mL fluid restriction and 2 g sodium restriction  · Need I/Os and daily standing weights  · Nutrition consult for dietary counseling  · Cardiology consulted-- Add entresto at d/c if affordable  · Life vest?  · Patient preferred to avoid cardiac cath at this time  · Monitor oxygen saturations    Currently requiring 2-3 L

## 2022-01-11 NOTE — CASE MANAGEMENT
Case Management Progress Note    Patient name Ruth Villafuerte  Location S /S -01 MRN [id number]  : 1950 Date 2022       LOS (days): 1  Geometric Mean LOS (GMLOS) (days): 3 50  Days to GMLOS:2 2        OBJECTIVE:        Current admission status: Inpatient  Preferred Pharmacy:   [facility]/pharmacy #[id number] [address] [address]  Phone: [phone] Fax: [phone]    Primary Care Provider: Stan Serrano MD    Primary Insurance: [employer] [facility]  Secondary Insurance:     PROGRESS NOTE:    TT from Jaun Carrion relaying patient anticipated to need LifeVest ast d/c  TT with cardiology resident re: same and form for Maurice provided to him  Will need LifeVest order form complete to make referral  Will follow-up once received

## 2022-01-11 NOTE — ASSESSMENT & PLAN NOTE
· Heart rate in the 120's on admission, EKG shows sinus tachycardia, telemetry reviewed which shows runs of AFib  · Patient was given IV Cardizem initially but this was discontinued after echocardiogram showed low ejection fraction  · Started p o  Metoprolol tartrate 25 mg q 12, titrate for HR <110    Also started on amiodarone load --now NSR with frequent ectopy  · Telemetry monitoring  · [id number] = 3 started heparin gtt, Eliquis Rx sent for price check  · Cardiology consult appreciated

## 2022-01-11 NOTE — CASE MANAGEMENT
Case Management Assessment & Discharge Planning Note    Patient name Mihaela DARDEN /S -01 MRN [id number]  : 1950 Date 2022       Current Admission Date: 1/10/2022  Current Admission Diagnosis:New onset a-fib w/ RVR ([facility] )   Patient Active Problem List    Diagnosis Date Noted    New onset a-fib w/ RVR ([facility] ) 01/10/2022    Hypomagnesemia 01/10/2022    Acute heart failure with reduced ejection fraction and diastolic dysfunction ([facility] ) 01/10/2022    Dysuria 2021    Mixed hyperlipidemia 10/07/2021    Osteoporosis screening 2021    Hypothyroidism 2021    Myelodysplasia (myelodysplastic syndrome) ([place] [address] ) 2017    Waldenstrom macroglobulinemia ([place] [address] ) 2017    History of Hodgkin's lymphoma 2017    Rash 2017      LOS (days): 1  Geometric Mean LOS (GMLOS) (days): 3 50  Days to GMLOS:2 3     OBJECTIVE:    Risk of Unplanned Readmission Score: 10         Current admission status: Inpatient       Preferred Pharmacy:   [facility]/pharmacy #[id number] [place] [address]  Phone: [phone] Fax: [phone]    Primary Care Provider: Jessica Carrillo MD    Primary Insurance: [facility]  Secondary Insurance:     ASSESSMENT:  Luisagaarturo Rosenthal Agents    There are no active Health Care Agents on file  Patient Information  Admitted from[de-identified] Home  Mental Status: Alert  During Assessment patient was accompanied by: Daughter Stacey Ashley)  Assessment information provided by[de-identified] Patient  Primary Caregiver: Self  Support Systems: Self,Children,Family members  [place] of Residence: [address] [place] [address] do you live in?: [place] entry access options   Select all that apply : Stairs  Number of steps to enter home : 1  Type of Current Residence: [address] [place] [address] home ([facility])  Upon entering residence, is there a bedroom on the main floor (no further steps)?: No  A bedroom is located on the following floor levels of residence (select all that apply):: 3rd Floor  Upon entering residence, is there a bathroom on the main floor (no further steps)?: Yes  Number of steps to 3rd floor from main floor: Two Flights  In the last 12 months, was there a time when you were not able to pay the mortgage or rent on time?: No  In the last 12 months, how many places have you lived?: 1  Homeless/housing insecurity resource given?: N/A  Living Arrangements: Lives Alone (granddaughter stays with her frequently)  Is patient a ?: No    Activities of Daily Living Prior to Admission  Functional Status: Independent  Completes ADLs independently?: Yes  Ambulates independently?: Yes  Does patient use assisted devices?: No  Does patient currently own DME?: No  Does patient have a history of Outpatient Therapy (PT/OT)?: No  Does the patient have a history of Short-Term Rehab?: No  Does patient have a history of HHC?: No  Does patient currently have [facility]?: No         Patient Information Continued  Does patient have prescription coverage?: Yes  Food insecurity resource given?: N/A  Does patient receive dialysis treatments?: No  Does patient have a history of substance abuse?: No  Does patient have a history of Mental Health Diagnosis?: No         Means of Transportation  Means of Transport to Appts[de-identified] Drives Self  In the past 12 months, has lack of transportation kept you from medical appointments or from getting medications?: No  In the past 12 months, has lack of transportation kept you from meetings, work, or from getting things needed for daily living?: No  Was application for public transport provided?: N/A        DISCHARGE DETAILS:    Discharge planning discussed with[de-identified] patient and daughterJay, at bedside        CM contacted family/caregiver?: Yes (daughterJay, at bedside)  Were Treatment Team discharge recommendations reviewed with patient/caregiver?: Yes  Did patient/caregiver verbalize understanding of patient care needs?: Yes  Were patient/caregiver advised of the risks associated with not following Treatment Team discharge recommendations?: Yes    Contacts  Patient Contacts: daughters, Jay Nieves and Carolina Banegas  Relationship to Patient[de-identified] Family  Reason/Outcome: Emergency Contact              Other Referral/Resources/Interventions Provided:  Interventions: LifeVest  Referral Comments: Patient admitted due to new onset afib with RVR  Met with patient and daughter, Jay Nieves, at bedside to complete assessment; patient answered majority of questions  Patient reports that she lives alone in a 3-story townshouse with main living area on 2nd floor and bedroom on 3rd floor; has bathrooms on each level  Reports that granddaughter (23yo) has been staying with her lately  Patient reports that she was previously independent in all aspects  Denies any history of DME, home care services or rehab in the past  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  : No - Declined    Treatment Team Recommendation: Home     Transport at Discharge : Bertram IRELAND Given (Date):: 01/10/22     Family notified[de-identified] daughterJay

## 2022-01-11 NOTE — UTILIZATION REVIEW
Inpatient Admission Authorization Request   NOTIFICATION OF INPATIENT ADMISSION/INPATIENT AUTHORIZATION REQUEST   SERVICING FACILITY:   [facility]  Alli HORVATH, [address] [place] [address]  Tax ID: [id number]  NPI: [id number]  Place of Service: [facility]patient [facility]  [address]  Place of Service Code: 24     ATTENDING PROVIDER:  Attending Name and NPI#: Jaun Lee [[id number]]  Address: Alli HORVATH, [address] [place] [address]  Phone: [phone]     UTILIZATION REVIEW CONTACT:  Hunter Ferguson Utilization   Network Utilization Review Department  Phone: [phone]  Fax: [phone]  Email: [email or web address]     PHYSICIAN ADVISORY SERVICES:  FOR [id number] REVIEW - MEDICAL NECESSITY DENIAL  Phone: [phone]  Fax: [phone]  Email: [email or web address]     TYPE OF REQUEST:  Inpatient Status     ADMISSION INFORMATION:  ADMISSION DATE/TIME: 1/10/22  9:21 AM  PATIENT DIAGNOSIS CODE/DESCRIPTION:  CHF (congestive heart failure) (HCC) [I50 9]  SOB (shortness of breath) [R06 02]  New onset a-fib (HCC) [I48 91]  Irregular tachycardia [R00 0]  Acute congestive heart failure, unspecified heart failure type ([facility] ) [I50 9]  DISCHARGE DATE/TIME: No discharge date for patient encounter  DISCHARGE DISPOSITION (IF DISCHARGED): Final discharge disposition not confirmed     IMPORTANT INFORMATION:  Please contact the Hunter Ferguson directly with any questions or concerns regarding this request  Department voicemails are confidential     Send requests for admission clinical reviews, concurrent reviews, approvals, and administrative denials due to lack of clinical to fax [phone]

## 2022-01-11 NOTE — UTILIZATION REVIEW
Initial Clinical Review    Admission: Date/Time/Statement:   Admission Orders (From admission, onward)     Ordered        01/10/22 0921  Inpatient Admission  Once                      Orders Placed This Encounter   Procedures    Inpatient Admission     Standing Status:   Standing     Number of Occurrences:   1     Order Specific Question:   Level of Care     Answer:   Med Surg [16]     Order Specific Question:   Estimated length of stay     Answer:   More than 2 Midnights     Order Specific Question:   Certification     Answer:   I certify that inpatient services are medically necessary for this patient for a duration of greater than two midnights  See H&P and MD Progress Notes for additional information about the patient's course of treatment  ED Arrival Information     Expected Arrival Acuity    - 1/10/2022 06:11 Urgent         Means of arrival Escorted by Service Admission type    Walk-In Family Member Hospitalist Urgent         Arrival complaint    sob        Chief Complaint   Patient presents with    Shortness of Breath     pt states has been having SOB on and off more at night, pt was tested for Covid Fri was neg, today SOB is worse today       Initial Presentation:  67 yo female PMH of mixed hyperlipidemia, hypothyroidism controlled with levothyroxine, myelodysplasia, and Waldenström macroglobulinemia to ED from home presents w shortness of breath  Reports 2 month worsening SOB w opthopnea & LANTIGUA   Orthopnea releved by elevation w pillows & LANTIGUA from walking relieved by sitting down  Recent URi w cough that has lingered & URi 3 weeks ago, followed by PCP for increased HR that spontaneously resolved  IN ED: EKG 1st : Afib, [id number] = 3 , labs elevated BNP, hypomagnesemia, exam w tachycardia rales & wheezing  CXR w symmetric Pulmonary edema R >L,  armen GGO   Admit Inpatient med surg w new onset Afib w RVR, acute congestive HF, hypomagnesemia, HX Hodgkins, Waldenstrom macroglobinemia, myelodysplasia, hypothyroidism  PLAN: ongoing IV Cardizem drip, PO Metoprolol BID, avoid further Cardizem, IV Heparin drip, ECHO, TTE assess valvular dysfunction  Consult Cardiology,1800ML fluid restrict & 2g NA diet, s/p x1 IV Lasix; reassess for further IV diuresis, s/p Mag repletion & repeat MAG  Tele  OP Oncology  Cont levothyroxine  CARDIOLOGY:  Exam tachycardia, rales & rhonchi on breath sounds  S/P multiple URi wo COVID testing w hx of rapid HR; progressive symptoms of SOB  EKG w atrial fib, echo reveals profound LV systolic dysfunction  Acute CHF: [id number] = 3; Cont IV diuretics daily , cont rate control strategy for afib short term amiodarone, BB  AC  Can add ACEi, avoid IV Cardizem due to profound LV dysfunction  Desires to hold off on invasive left heart cath testing to R/O CAD  IF LV dysfunction persist despite maintenance of SR & medical therapy ischemic eval should be pursued  Date: 1/11/2022   Day 2: CARDIOLOGY  Physical exam unchanged from yesterday-tachycardia,  coarse breath sounds/ crackles; Reports urinating multiple times to BR  Increased to 3 L NC from 2L  Tele overnight reveals NSR w 1 episode of VTACH, cont IV Lasix 40 mg daily, 2L NC  BB BID  IV Heparin drip & will need oral AC  Cont amiodarone, attempt NSR for 3 months then re eval w follow up ECHO & eval for ischemic cardiomyopathy/ CAD  Likely req LifeVest  Daily BMP  Provider  New onset Afib: on Metoprolol 25 mg BIS, amiodarone lad now w NSR frequent ectopy; IIV Cardizem DC after EF low on ECHO  Cont tele  [id number] = 3 started heparin gtt, Eliquis Rx   Acute HF w red EF & diastolic dysfunction:  Suspect tachy medicated Cardiomyopathy-TTE LVEF 14%, systolic function sev reduced w global hypokinesis w regional variation  IV Lasix daily  Currently on 3 L NC   Patient prefers avoid cardiac cath currently     ED Triage Vitals   Temperature Pulse Respirations Blood Pressure SpO2   01/10/22 0629 01/10/22 0629 01/10/22 0629 01/10/22 0629 01/10/22 0629   98 1 °F (36 7 °C) (!) 122 18 (!) 186/111 95 %      Temp Source Heart Rate Source Patient Position - Orthostatic VS BP Location FiO2 (%)   01/10/22 0629 01/10/22 1330 01/10/22 0830 01/10/22 0830 --   Oral Monitor Lying Right arm       Pain Score       01/10/22 0629       No Pain          Wt Readings from Last 1 Encounters:   01/11/22 65 9 kg (145 lb 4 5 oz)     Additional Vital Signs:   Date/Time Temp Pulse Resp BP MAP (mmHg) SpO2 Calculated FIO2 (%) - Nasal Cannula Nasal Cannula O2 Flow Rate (L/min) O2 Device Patient Position - Orthostatic VS   01/11/22 0700 98 4 °F (36 9 °C) 93 18 124/78 96 100 % -- -- Nasal cannula Lying   01/11/22 0254 98 3 °F (36 8 °C) 87 18 125/69 -- 98 % 32 3 L/min Nasal cannula Lying   01/11/22 0200 -- 108 Abnormal  -- 123/77 95 93 % -- -- -- --   01/11/22 0041 -- 92 18 119/73 91 98 % 28 2 L/min Nasal cannula Lying   01/10/22 2015 -- 92 -- 119/71 90 96 % -- -- -- --   01/10/22 2006 -- 93 -- 119/71 -- -- -- -- -- --   01/10/22 2000 -- 94 -- -- -- 96 % -- -- -- --   01/10/22 1945 -- 96 -- -- -- 96 % -- -- -- --   01/10/22 1500 97 9 °F (36 6 °C) 92 18 116/75 91 97 % 28 2 L/min Nasal cannula Lying   01/10/22 1359 -- 128 Abnormal  -- 145/91 -- -- -- -- -- --   01/10/22 1330 -- 126 Abnormal  18 132/77 99 96 % -- -- None (Room air) Lying   01/10/22 1050 -- -- -- -- -- -- -- -- Nasal cannula --   01/10/22 1045 -- 106 Abnormal  18 129/85 101 98 % 28 2 L/min Nasal cannula --   01/10/22 0945 -- 118 Abnormal  18 145/93 114 97 % 28 2 L/min Nasal cannula --   01/10/22 0930 -- 128 Abnormal  -- -- -- 97 % -- -- -- --   01/10/22 0910 -- -- -- 151/90 -- -- -- -- -- --   01/10/22 0830 -- 108 Abnormal  18 147/85 110 95 % 28 2 L/min Nasal cannula Lying   01/10/22 0815 -- 112 Abnormal  20 156/88 115 95 % 28 2 L/min Nasal cannula --   01/10/22 0629 98 1 °F (36 7 °C) 122 Abnormal  18 186/111 Abnormal  -- 95 % -- -- -- --       Weights (last 14 days)    Date/Time Weight Weight Method Height   01/11/22 0959 65 9 kg (145 lb 4 5 oz) Standing scale --   01/10/22 1330 68 9 kg (152 lb) -- 5' 4" (1 626 m)   01/10/22 0629 69 kg (152 lb 1 9 oz) -- 5' 4" (1 626 m)       Pertinent Labs/Diagnostic Test Results:   Results from last 7 days   Lab Units 01/10/22  0804   SARS-COV-2  Negative     Results from last 7 days   Lab Units 01/11/22  0440 01/10/22  1508 01/10/22  0735   WBC Thousand/uL 5 48 6 88 6 80   HEMOGLOBIN g/dL 13 1 13 1 13 8   HEMATOCRIT % 40 9 40 1 42 6   PLATELETS Thousands/uL 63*  --   --    NEUTROS ABS Thousands/µL 2 86  --  4 56         Results from last 7 days   Lab Units 01/11/22  0440 01/10/22  1327 01/10/22  0735   SODIUM mmol/L 139  --  141   POTASSIUM mmol/L 3 9  --  3 5   CHLORIDE mmol/L 101  --  103   CO2 mmol/L 31  --  30   ANION GAP mmol/L 7  --  8   BUN mg/dL 13  --  10   CREATININE mg/dL 0 79  --  0 75   EGFR ml/min/1 73sq m 75  --  79   CALCIUM mg/dL 9 3  --  9 5   MAGNESIUM mg/dL  --  2 2 1 5*     Results from last 7 days   Lab Units 01/10/22  0735   AST U/L 14   ALT U/L 16   ALK PHOS U/L 77   TOTAL PROTEIN g/dL 7 7   ALBUMIN g/dL 3 9   TOTAL BILIRUBIN mg/dL 0 78         Results from last 7 days   Lab Units 01/11/22  0440 01/10/22  0735   GLUCOSE RANDOM mg/dL 111 120             No results found for: BETA-HYDROXYBUTYRATE                   Results from last 7 days   Lab Units 01/10/22  1327 01/10/22  1055 01/10/22  0735   HS TNI 0HR ng/L  --   --  18   HS TNI 2HR ng/L  --  39  --    HSTNI D2 ng/L  --  21  --    HS TNI 4HR ng/L 42  --   --    HSTNI D4 ng/L 24  --   --      Results from last 7 days   Lab Units 01/10/22  0735   D-DIMER QUANTITATIVE ug/ml FEU 0 55*     Results from last 7 days   Lab Units 01/10/22  2319 01/10/22  1508 01/10/22  0735   PROTIME seconds  --  13 5 13 5   INR   --  1 03 1 03   PTT seconds 56* 31 30     Results from last 7 days   Lab Units 01/10/22  0735   TSH 3RD GENERATON uIU/mL 2 325                     Results from last 7 days   Lab Units 01/10/22  0735   NT-PRO BNP pg/mL 2,446*                                 Results from last 7 days   Lab Units 01/10/22  0804   INFLUENZA A PCR  Negative   INFLUENZA B PCR  Negative   RSV PCR  Negative                             Results from last 7 days   Lab Units 01/10/22  1617 01/10/22  0735   BLOOD CULTURE  Received in Microbiology Lab  Culture in Progress  Received in Microbiology Lab  Culture in Progress  XR chest 1 view portable   ED Interpretation by [id number],  (01/10 0747)   Diffuse right-sided patchy infiltrates, infiltrates of the left lung base      Final Result by Theodora Rolle MD (01/10 0966)      Patchy bilateral ground glass opacity, greater on the right, which could be due to Covid 19 and/or asymmetric edema  1/10/2022 ECHO=Left Ventricle: Left ventricular cavity size is mildly dilated  The left ventricular ejection fraction is 25%  Systolic function is severely reduced  Wall thickness is mildly increased  There is severe global hypokinesis with regional variation  Unable to assess diastolic function  Left atrial filling pressure is elevated    Left Atrium: The atrium is mild to moderately dilated    Aortic Valve: There is mild to moderate regurgitation  There is probably a small, mobile mass on the aortic aspect  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    Mitral Valve: There is moderate annular calcification  There is mild regurgitation    Tricuspid Valve:  There is mild regurgitation    1/10 ecg  Atrial fibrillation with rapid ventricular response with premature ventricular or aberrantly conducted complexes  Non-specific intra-ventricular conduction block  Nonspecific ST and T wave abnormality  Abnormal ECG    ED Treatment:   Medication Administration from 01/10/2022 0611 to 01/11/2022 0247       Date/Time Order Dose Route Action     01/10/2022 0801 diltiazem (CARDIZEM) injection 15 mg 15 mg Intravenous Given     01/10/2022 0838 furosemide (LASIX) injection 20 mg 20 mg Intravenous Given     01/10/2022 0844 magnesium sulfate 2 g/50 mL IVPB (premix) 2 g 2 g Intravenous New Bag     01/10/2022 0910 diltiazem (CARDIZEM) tablet 30 mg 30 mg Oral Given     01/10/2022 2006 metoprolol tartrate (LOPRESSOR) tablet 25 mg 25 mg Oral Given     01/10/2022 1359 metoprolol tartrate (LOPRESSOR) tablet 25 mg 25 mg Oral Given     01/10/2022 1359 potassium chloride (K-DUR,KLOR-CON) CR tablet 40 mEq 40 mEq Oral Given     01/10/2022 1359 heparin (porcine) subcutaneous injection 5,000 Units 5,000 Units Subcutaneous Given     01/10/2022 1531 heparin (porcine) injection 3,900 Units 3,900 Units Intravenous Not Given     01/11/2022 0040 heparin (porcine) 25,000 units in 0 45% NaCl 250 mL infusion (premix) 14 Units/kg/hr Intravenous Rate/Dose Change     01/10/2022 1539 heparin (porcine) 25,000 units in 0 45% NaCl 250 mL infusion (premix) 12 Units/kg/hr Intravenous New Bag     01/10/2022 1819 amiodarone 150 mg in dextrose 5 % 100 mL IV bolus 150 mg Intravenous New Bag     01/10/2022 1752 furosemide (LASIX) injection 40 mg 40 mg Intravenous Given        Past Medical History:   Diagnosis Date    Cancer ([place] [address] )     Disease of thyroid gland      Present on Admission:   Hypothyroidism   Mixed hyperlipidemia   [facility]elod[facility]   Waldenstrom macroglobulinemia ([facility])   New onset a-fib w/ RVR ([facility])   Acute congestive heart failure ([place] [address] )    Admitting Diagnosis: CHF (congestive heart failure) ([facility]) [I50 9]  SOB (shortness of breath) [R06 02]  New onset a-fib ([facility]) [I48 91]  Irregular tachycardia [R00 0]  Acute congestive heart failure, unspecified heart failure type ([place] [address] ) [I50 9]  Age/Sex: 66 y o  female  Admission Orders:  Tele  NC  Cardiac diet & 1800 ML fluid restrict   I&O      Scheduled Medications:  amiodarone, 200 mg, Oral, TID  furosemide, 40 mg, Intravenous, Daily  heparin (porcine), 3,900 Units, Intravenous, Once  levothyroxine, 100 mcg, Oral, Daily  metoprolol tartrate, 25 mg, Oral, Q12H [address]    Continuous IV Infusions:  heparin (porcine), 3-20 Units/kg/hr (Order-Specific), Intravenous, Titrated    PRN Meds:  acetaminophen, 650 mg, Oral, Q6H PRN  metoprolol, 5 mg, Intravenous, Q6H PRN  ondansetron, 4 mg, Intravenous, Q6H PRN    IP CONSULT TO NUTRITION SERVICES  Network Utilization Review Department  ATTENTION: Please call with any questions or concerns to [phone] and carefully listen to the prompts so that you are directed to the right person  All voicemails are confidential   Triprima Basim all requests for admission clinical reviews, approved or denied determinations and any other requests to dedicated fax number below belonging to the campus where the patient is receiving treatment   List of dedicated fax numbers for the Facilities:  [address] DENIALS (Administrative/Medical Necessity) [phone]   [address] (Maternity/NICU/Pediatrics) [phone]   [address]  [address] [facility] [place] [address] [address] [place] [address] [address]wyn [address] [facility] [facility] [phone]

## 2022-01-11 NOTE — PROGRESS NOTES
[facility]  Progress Note - Brant Pickup 1950, 66 y o  female MRN: [id number]  Unit/Bed#: S -01 Encounter: [id number]  Primary Care Provider: Arjun Shafer MD   Date and time admitted to hospital: 1/10/2022  6:45 AM    * New onset a-fib w/ RVR (HCC)  Assessment & Plan  · Heart rate in the 120's on admission, EKG shows sinus tachycardia, telemetry reviewed which shows runs of AFib  · Patient was given IV Cardizem initially but this was discontinued after echocardiogram showed low ejection fraction  · Started p o  Metoprolol tartrate 25 mg q 12, titrate for HR <110  Also started on amiodarone load --now NSR with frequent ectopy  · Telemetry monitoring  · [id number] = 3 started heparin gtt, Eliquis Rx sent for price check  · Cardiology consult appreciated    Acute heart failure with reduced ejection fraction and diastolic dysfunction (HCC)  Assessment & Plan  Wt Readings from Last 3 Encounters:   01/11/22 65 9 kg (145 lb 4 5 oz)   12/16/21 71 2 kg (157 lb)   10/07/21 71 6 kg (157 lb 12 8 oz)   Presented with progressive dyspnea and orthopnea for at least last 2 months  Chest x-ray with asymmetric pulmonary edema right greater than left  ProBNP level elevated at 4408  · Suspect tachy mediated cardiomyopathy- TTE "LVEF is 25%  Systolic function is severely reduced  There is severe global hypokinesis with regional variation  "  · Lasix 40 mg IV daily  · 1800 mL fluid restriction and 2 g sodium restriction  · Need I/Os and daily standing weights  · Nutrition consult for dietary counseling  · Cardiology consulted-- Add entresto at d/c if affordable  · Life vest?  · Patient preferred to avoid cardiac cath at this time  · Monitor oxygen saturations  Currently requiring 2-3 L            Myelodysplasia (myelodysplastic syndrome) ([facility] [address] )  Assessment & Plan  · Follows with [facility]    Noted low platelet count    Waldenstrom macroglobulinemia ([facility] [place] [facility] )  Assessment & Plan  · Follows with [facility]  Continue observation  History of Hodgkin's lymphoma  Assessment & Plan  · In remission  Continue observation with [facility]    Hypomagnesemia  Assessment & Plan  · Repleted with 2 g Mag sulfate  Telemetry monitoring  Follow up repeat magnesium level this afternoon    VTE Pharmacologic Prophylaxis:   Pharmacologic: Heparin Drip  Mechanical VTE Prophylaxis in Place: No    Patient Centered Rounds: spoke with RN    Discussions with Specialists or Other Care Team Provider:     Education and Discussions with Family / Patient:  Joi Hargrove to call patient's daughter but unable to leave a voicemail due to an issue with her phone    Time Spent for Care: 30 minutes  More than 50% of total time spent on counseling and coordination of care as described above  Current Length of Stay: 1 day(s)    Current Patient Status: Inpatient   Certification Statement: The patient will continue to require additional inpatient hospital stay due to Rapid AFib and new heart failure workup    Discharge Plan:  When cleared by Cardiology--needs new meds and lifevest      Code Status: Level 1 - Full Code    Subjective:   Patient reports she feels so much better now  Not reporting any chest pain or shortness of breath or leg edema  Objective:     Vitals:   Temp (24hrs), Av 2 °F (36 8 °C), Min:97 9 °F (36 6 °C), Max:98 4 °F (36 9 °C)    Temp:  [97 9 °F (36 6 °C)-98 4 °F (36 9 °C)] 98 4 °F (36 9 °C)  HR:  [] 93  Resp:  [18] 18  BP: (116-145)/(69-91) 124/78  SpO2:  [93 %-100 %] 100 %  Body mass index is 24 94 kg/m²  Input and Output Summary (last 24 hours): Intake/Output Summary (Last 24 hours) at 2022 1229  Last data filed at 1/10/2022 1829  Gross per 24 hour   Intake 150 ml   Output --   Net 150 ml       Physical Exam:     Physical Exam  Vitals reviewed  Constitutional:       General: She is not in acute distress  Appearance: She is not ill-appearing, toxic-appearing or diaphoretic  Comments: thin   Eyes:      General: No scleral icterus  Right eye: No discharge  Left eye: No discharge  Conjunctiva/sclera: Conjunctivae normal    Cardiovascular:      Rate and Rhythm: Normal rate and regular rhythm  Heart sounds: No murmur heard  Pulmonary:      Effort: No respiratory distress  Breath sounds: No stridor  No wheezing or rhonchi  Abdominal:      General: There is no distension  Palpations: Abdomen is soft  Tenderness: There is no guarding  Musculoskeletal:         General: No deformity  Right lower leg: No edema  Left lower leg: No edema  Skin:     General: Skin is warm and dry  Coloration: Skin is not jaundiced or pale  Findings: No bruising, erythema, lesion or rash  Neurological:      General: No focal deficit present  Mental Status: She is alert  Mental status is at baseline  Comments: No confusion   Psychiatric:         Mood and Affect: Mood normal          Thought Content: Thought content normal          Additional Data:     Labs:    Results from last 7 days   Lab Units 01/11/22  0440   WBC Thousand/uL 5 48   HEMOGLOBIN g/dL 13 1   HEMATOCRIT % 40 9   PLATELETS Thousands/uL 63*   NEUTROS PCT % 52   LYMPHS PCT % 37   MONOS PCT % 8   EOS PCT % 3     Results from last 7 days   Lab Units 01/11/22  0440 01/10/22  0735 01/10/22  0735   SODIUM mmol/L 139   < > 141   POTASSIUM mmol/L 3 9   < > 3 5   CHLORIDE mmol/L 101   < > 103   CO2 mmol/L 31   < > 30   BUN mg/dL 13   < > 10   CREATININE mg/dL 0 79   < > 0 75   ANION GAP mmol/L 7   < > 8   CALCIUM mg/dL 9 3   < > 9 5   ALBUMIN g/dL  --   --  3 9   TOTAL BILIRUBIN mg/dL  --   --  0 78   ALK PHOS U/L  --   --  77   ALT U/L  --   --  16   AST U/L  --   --  14   GLUCOSE RANDOM mg/dL 111   < > 120    < > = values in this interval not displayed       Results from last 7 days   Lab Units 01/10/22  1508   INR  1 03                   * I Have Reviewed All Lab Data Listed Above   * Additional Pertinent Lab Tests Reviewed: [address] Admission Reviewed    Imaging:    Imaging Reports Reviewed Today Include: echo  Imaging Personally Reviewed by Myself Includes:      Recent Cultures (last 7 days):     Results from last 7 days   Lab Units 01/10/22  1617 01/10/22  0735   BLOOD CULTURE  Received in Microbiology Lab  Culture in Progress  Received in Microbiology Lab  Culture in Progress  Last 24 Hours Medication List:   Current Facility-Administered Medications   Medication Dose Route Frequency Provider Last Rate    acetaminophen  650 mg Oral Q6H PRN Marium Diehl MD      amiodarone  200 mg Oral TID Christi Ontiveros MD      furosemide  40 mg Intravenous Daily Christi Ontiveros MD      heparin (porcine)  3-20 Units/kg/hr (Order-Specific) Intravenous Titrated Marium Diehl MD 14 Units/kg/hr (01/11/22 1100)    heparin (porcine)  3,900 Units Intravenous Once Marium Diehl MD      levothyroxine  100 mcg Oral Daily Marium Diehl MD      metoprolol  5 mg Intravenous Q6H PRN Marium Diehl MD      metoprolol tartrate  25 mg Oral Q12H [address] [facility] [address], [place]      ondansetron  4 mg Intravenous Q6H PRN Marium Diehl MD          Today, Patient Was Seen By: Paula Martinez PA-C    ** Please Note: Dictation voice to text software may have been used in the creation of this document   **

## 2022-01-11 NOTE — PLAN OF CARE
Problem: PAIN - ADULT  Goal: Verbalizes/displays adequate comfort level or baseline comfort level  Description: Interventions:  - Encourage patient to monitor pain and request assistance  - Assess pain using appropriate pain scale  - Administer analgesics based on type and severity of pain and evaluate response  - Implement non-pharmacological measures as appropriate and evaluate response  - Consider cultural and social influences on pain and pain management  - Notify physician/advanced practitioner if interventions unsuccessful or patient reports new pain  Outcome: Progressing     Problem: INFECTION - ADULT  Goal: Absence or prevention of progression during hospitalization  Description: INTERVENTIONS:  - Assess and monitor for signs and symptoms of infection  - Monitor lab/diagnostic results  - Monitor all insertion sites, i e  indwelling lines, tubes, and drains  - Monitor endotracheal if appropriate and nasal secretions for changes in amount and color  - [place] appropriate cooling/warming therapies per order  - Administer medications as ordered  - Instruct and encourage patient and family to use good hand hygiene technique  - Identify and instruct in appropriate isolation precautions for identified infection/condition  Outcome: Progressing  Goal: Absence of fever/infection during neutropenic period  Description: INTERVENTIONS:  - Monitor WBC    Outcome: Progressing     Problem: SAFETY ADULT  Goal: Patient will remain free of falls  Description: INTERVENTIONS:  - Educate patient/family on patient safety including physical limitations  - Instruct patient to call for assistance with activity   - Consult OT/PT to assist with strengthening/mobility   - Keep Call bell within reach  - Keep bed low and locked with side rails adjusted as appropriate  - Keep care items and personal belongings within reach  - Initiate and maintain comfort rounds  - Make Fall Risk Sign visible to staff  - Offer Toileting every  Hours, in advance of need  - Initiate/Maintain alarm  - Obtain necessary fall risk management equipment:   - Apply yellow socks and bracelet for high fall risk patients  - Consider moving patient to room near nurses station  Outcome: Progressing  Goal: Maintain or return to baseline ADL function  Description: INTERVENTIONS:  -  Assess patient's ability to carry out ADLs; assess patient's baseline for ADL function and identify physical deficits which impact ability to perform ADLs (bathing, care of mouth/teeth, toileting, grooming, dressing, etc )  - Assess/evaluate cause of self-care deficits   - Assess range of motion  - Assess patient's mobility; develop plan if impaired  - Assess patient's need for assistive devices and provide as appropriate  - Encourage maximum independence but intervene and supervise when necessary  - Involve family in performance of ADLs  - Assess for home care needs following discharge   - Consider OT consult to assist with ADL evaluation and planning for discharge  - Provide patient education as appropriate  Outcome: Progressing  Goal: Maintains/Returns to pre admission functional level  Description: INTERVENTIONS:  - Perform BMAT or MOVE assessment daily    - Set and communicate daily mobility goal to care team and patient/family/caregiver  - Collaborate with rehabilitation services on mobility goals if consulted  - Perform Range of Motion  times a day  - Reposition patient every  hours    - Dangle patient  times a day  - Stand patient  times a day  - Ambulate patient  times a day  - Out of bed to chair  times a day   - Out of bed for meals times a day  - Out of bed for toileting  - Record patient progress and toleration of activity level   Outcome: Progressing     Problem: DISCHARGE PLANNING  Goal: Discharge to home or other facility with appropriate resources  Description: INTERVENTIONS:  - Identify barriers to discharge w/patient and caregiver  - Arrange for needed discharge resources and transportation as appropriate  - Identify discharge learning needs (meds, wound care, etc )  - Arrange for interpretive services to assist at discharge as needed  - Refer to Case Management Department for coordinating discharge planning if the patient needs post-hospital services based on physician/advanced practitioner order or complex needs related to functional status, cognitive ability, or social support system  Outcome: Progressing     Problem: Knowledge Deficit  Goal: Patient/family/caregiver demonstrates understanding of disease process, treatment plan, medications, and discharge instructions  Description: Complete learning assessment and assess knowledge base    Interventions:  - Provide teaching at level of understanding  - Provide teaching via preferred learning methods  Outcome: Progressing

## 2022-01-11 NOTE — PROGRESS NOTES
[facility]  Progress Note - Benedetto Litten 1950, 66 y o  female MRN: [id number]  Unit/Bed#: S -01 Encounter: [id number]  Primary Care Provider: Norbert Mtz MD   Date and time admitted to hospital: 1/10/2022  6:45 AM    Assessment and Plan      1  Acute Congestive Heart Failure    POA which shortness of breath, orthopnea, consistent cough that has been ongoing for last 2-3 months  URI 2- 3 months ago, has reported SOB since the infection and orthopnea  In ED on admission was found to have AFib with RVR  -TSH - WNL  No recent T4 available,   -Creatinine, BUN - 0 79, BUN 13  -[id number] = 3  -Mg 1 5 on admission, given IV Mg 2g in ED, last Mg 2  -CAD risk factors , hx of smoking, quit 30 years ago, 20 pack/year history, mixed hyperlipidemia  -presenting EKG - signs of left atrial dilatation, QRS with left axis deviation  1st EKG with a fib, last EKG NSR, no ST changes  -ECHO :  LVEF 25%, severe global hypokinesis, unable to assess diastolic function  Left atrial filling pressure elevated  The AV - mild regurgitation, small mobile mass on the aortic aspect  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  MV - moderate annular calcification, mild regurgitation  TV - is mild regurgitation  -CXR :  Patchy bilateral ground-glass opacity, greater on the right, no effusion  L     BP Readings from Last 3 Encounters:   01/11/22 124/78   12/16/21 130/82   10/07/21 130/82     Estimated Creatinine Clearance: 61 4 mL/min (by C-G formula based on SCr of 0 79 mg/dL)        3  New onset atrial fibrillation with RVR     POA was found to have atrial fibrillation with RVR, patient received Cardizem 15 mg IV then 30 mg PO, was then given 150 mg IV Amiodarone  In ED HR was averaging  in NSR    -Overnight telemetry was reviewed: NSR HR averaging 90, 1 episode of VTACH - 3 beats        4   Mixed HLD         Lab Results   Component Value Date     LDLCALC 126 (H) 09/29/2021     HDL 58 09/29/2021     CHOLESTEROL 202 (H) 09/29/2021      Not on any statin medication, doing diet modification, low fat diet  Nutritionist is consulted       5  Hypothyroidism - levothyroxine 100 mcg daily  Last TSH WNL  6  History of Hodgkin Lymphoma, completed chemotherapy, in remission  7  Early myelodysplastic syndrome with mild thrombocytopenia, platelet count stable per prior notes  Platelets (Thousands/uL)   Date Value   01/11/2022 63 (L)          Impression:  Presenting symptoms indicating an acute congestive heart failure with unclear etiology  Possible 2/2 to chronic atrial fibrillation versus viral cardiomyopathy considering hx of infection, did not get tested for COVID-19, Vaccinated x2  CAD/ischemic cardiomyopathy is low on the differential, considering the clinical presentation, labs and imaging, however, will not rule out at this time  Patient will need to follow up in 3 months at the cardiology clinic, will need an ECHO for assessment, will investigate for possible ischemic etiology if there is no improvement with current medication regimen  The goal is to keep the patient in the NSR at all times       Plan      · Continue IV lasix 40 mg daily  Need strict I/O, nurse was notified  · Patient is on 2 5L NC, wean as appropriate  · Metoprolol 25 mg bid  · Continue Heparin gtt, patient will need oral AC,  NOAC price check submitted  Stop IV heparin if PLT<50K  · Will likely start on Entresto, submitted for price check, if not affordable then will start on ACEI/ARB  · Amiodarone 150 mg IV given yesterday, start 200 mg PO tid for 1 week, followed by 200 mg bid, then 200 mg daily  · Will try to keep the patient in NSR and evaluate in 3 months, with ECHO f/u/  · Will then evaluate for ischemic cardiomyopathy/CAD, if there is no improvement with rate/rhytm control therapy    · Patient will likely need a life vest    · Continue Telemetry  · Daily BMP, keep Mg>2 0, K>4 0  Strict I/O, weight , 8121-2036 ml fluid restriction and 1 5-2 0 g salt restriction  Patient will benefit from a dietitian consult, inpatient consult to nutrition services is ordered              Code Status: Level 1 - Full Code    Subjective:   Patient was seen this morning at bedside  She was awake, stated that she feels much better and was able to sleep  She was awaken a few times during the night but then was able to fall asleep  Denied any SOB or chest pain  ROS otherwise unremarkable  PE was unchanged since yesterday, w/o any LE edema, lungs with coarse crackles throughout b/l  Will continue diuresis  Patient reports urinating multiple times in ED, however, did wake up to go to the bathroom at night  She did go this am  Nurse was notified that strict I/O orders are in place, no I/O were available for the patient since the admission  Objective:     Vitals:   Temp (24hrs), Av 2 °F (36 8 °C), Min:97 9 °F (36 6 °C), Max:98 4 °F (36 9 °C)    Temp:  [97 9 °F (36 6 °C)-98 4 °F (36 9 °C)] 98 4 °F (36 9 °C)  HR:  [] 93  Resp:  [18] 18  BP: (116-145)/(69-93) 124/78  SpO2:  [93 %-100 %] 100 %  Body mass index is 26 09 kg/m²  Input and Output Summary (last 24 hours): Intake/Output Summary (Last 24 hours) at 2022 0930  Last data filed at 1/10/2022 1829  Gross per 24 hour   Intake 150 ml   Output --   Net 150 ml       Physical Exam:   Physical Exam  Vitals and nursing note reviewed  Constitutional:       General: She is not in acute distress  Appearance: Normal appearance  She is well-developed  She is not diaphoretic  Interventions: She is not intubated  HENT:      Head: Normocephalic and atraumatic  Nose: Nose normal       Mouth/Throat:      Mouth: Mucous membranes are moist       Pharynx: Oropharynx is clear  Eyes:      General: No scleral icterus  Conjunctiva/sclera: Conjunctivae normal    Neck:      Trachea: No tracheal deviation     Cardiovascular: Rate and Rhythm: Regular rhythm  Tachycardia present  Pulses: Normal pulses  Radial pulses are 2+ on the right side and 2+ on the left side  Dorsalis pedis pulses are 2+ on the right side and 2+ on the left side  Heart sounds: Normal heart sounds, S1 normal and S2 normal  No murmur heard  No friction rub  No gallop  Pulmonary:      Effort: Pulmonary effort is normal  No respiratory distress  She is not intubated  Breath sounds: No stridor  Rhonchi and rales present  No decreased breath sounds or wheezing  Chest:      Chest wall: No tenderness  Abdominal:      General: Bowel sounds are normal  There is no distension  Palpations: Abdomen is soft  There is no mass  Tenderness: There is no abdominal tenderness  Musculoskeletal:         General: No tenderness or deformity  Right lower leg: No edema  Left lower leg: No edema  Lymphadenopathy:      Cervical: No cervical adenopathy  Skin:     General: Skin is warm and dry  Coloration: Skin is not pale  Findings: No erythema  Neurological:      Mental Status: She is alert and oriented to person, place, and time  Psychiatric:         Mood and Affect: Mood normal          Speech: Speech normal          Behavior: Behavior normal          Thought Content:  Thought content normal          Judgment: Judgment normal           Additional Data:     Labs:  Results from last 7 days   Lab Units 01/11/22  0440   WBC Thousand/uL 5 48   HEMOGLOBIN g/dL 13 1   HEMATOCRIT % 40 9   PLATELETS Thousands/uL 63*   NEUTROS PCT % 52   LYMPHS PCT % 37   MONOS PCT % 8   EOS PCT % 3     Results from last 7 days   Lab Units 01/11/22  0440 01/10/22  0735 01/10/22  0735   SODIUM mmol/L 139   < > 141   POTASSIUM mmol/L 3 9   < > 3 5   CHLORIDE mmol/L 101   < > 103   CO2 mmol/L 31   < > 30   BUN mg/dL 13   < > 10   CREATININE mg/dL 0 79   < > 0 75   ANION GAP mmol/L 7   < > 8   CALCIUM mg/dL 9 3   < > 9 5   ALBUMIN g/dL  --   -- 3  9   TOTAL BILIRUBIN mg/dL  --   --  0 78   ALK PHOS U/L  --   --  77   ALT U/L  --   --  16   AST U/L  --   --  14   GLUCOSE RANDOM mg/dL 111   < > 120    < > = values in this interval not displayed  Results from last 7 days   Lab Units 01/10/22  1508   INR  1 03                   Lines/Drains:  Invasive Devices  Report    Peripheral Intravenous Line            Peripheral IV 01/10/22 Left Antecubital 1 day    Peripheral IV 01/10/22 Right;Dorsal (posterior) Forearm <1 day                  Telemetry:  Telemetry Orders (From admission, onward)             48 Hour Telemetry Monitoring  Continuous x 48 hours        References:    Telemetry Guidelines   Question:  Reason for 48 Hour Telemetry  Answer:  Arrhythmias Requiring Medical Therapy (eg  SVT, Vtach/fib, Bradycardia, Uncontrolled A-fib)                 Telemetry Reviewed: Normal Sinus Rhythm  Indication for Continued Telemetry Use: Arrthymias requiring medical therapy             Imaging: No pertinent imaging reviewed  Recent Cultures (last 7 days):   Results from last 7 days   Lab Units 01/10/22  1617 01/10/22  0735   BLOOD CULTURE  Received in Microbiology Lab  Culture in Progress  Received in Microbiology Lab  Culture in Progress         Last 24 Hours Medication List:   Current Facility-Administered Medications   Medication Dose Route Frequency Provider Last Rate    acetaminophen  650 mg Oral Q6H PRN Lenny Bauman MD      amiodarone  200 mg Oral TID Anita Weber MD      furosemide  40 mg Intravenous Daily Anita Weber MD      heparin (porcine)  3-20 Units/kg/hr (Order-Specific) Intravenous Titrated Lenny Bauman MD 14 Units/kg/hr (01/11/22 0040)    heparin (porcine)  3,900 Units Intravenous Once Lenny Bauman MD      levothyroxine  100 mcg Oral Daily Lenny Bauman MD      metoprolol  5 mg Intravenous Q6H PRN Lenny Bauman MD      metoprolol tartrate  25 mg Oral Q12H [facility] Lenny Bauman MD      ondansetron  4 mg Intravenous Q6H PRN Adonay Maradiaga Peggy Akhtar MD          Today, Patient Was Seen By: Anita Weber MD    **Please Note: This note may have been constructed using a voice recognition system  **

## 2022-01-12 LAB
ANION GAP SERPL CALCULATED.3IONS-SCNC: 10 MMOL/L (ref 4–13)
BASOPHILS # BLD AUTO: 0.03 THOUSANDS/ΜL (ref 0–0.1)
BASOPHILS NFR BLD AUTO: 1 % (ref 0–1)
BUN SERPL-MCNC: 19 MG/DL (ref 5–25)
CALCIUM SERPL-MCNC: 9.3 MG/DL (ref 8.3–10.1)
CHLORIDE SERPL-SCNC: 103 MMOL/L (ref 100–108)
CO2 SERPL-SCNC: 30 MMOL/L (ref 21–32)
CREAT SERPL-MCNC: 0.84 MG/DL (ref 0.6–1.3)
EOSINOPHIL # BLD AUTO: 0.16 THOUSAND/ΜL (ref 0–0.61)
EOSINOPHIL NFR BLD AUTO: 3 % (ref 0–6)
ERYTHROCYTE [DISTWIDTH] IN BLOOD BY AUTOMATED COUNT: 14 % (ref 11.6–15.1)
GFR SERPL CREATININE-BSD FRML MDRD: 69 ML/MIN/1.73SQ M
GLUCOSE SERPL-MCNC: 113 MG/DL (ref 65–140)
HCT VFR BLD AUTO: 43.2 % (ref 34.8–46.1)
HGB BLD-MCNC: 13.7 G/DL (ref 11.5–15.4)
IMM GRANULOCYTES # BLD AUTO: 0.01 THOUSAND/UL (ref 0–0.2)
IMM GRANULOCYTES NFR BLD AUTO: 0 % (ref 0–2)
LYMPHOCYTES # BLD AUTO: 1.66 THOUSANDS/ΜL (ref 0.6–4.47)
LYMPHOCYTES NFR BLD AUTO: 32 % (ref 14–44)
MAGNESIUM SERPL-MCNC: 1.7 MG/DL (ref 1.6–2.6)
MCH RBC QN AUTO: 29.1 PG (ref 26.8–34.3)
MCHC RBC AUTO-ENTMCNC: 31.7 G/DL (ref 31.4–37.4)
MCV RBC AUTO: 92 FL (ref 82–98)
MONOCYTES # BLD AUTO: 0.48 THOUSAND/ΜL (ref 0.17–1.22)
MONOCYTES NFR BLD AUTO: 9 % (ref 4–12)
NEUTROPHILS # BLD AUTO: 2.81 THOUSANDS/ΜL (ref 1.85–7.62)
NEUTS SEG NFR BLD AUTO: 55 % (ref 43–75)
NRBC BLD AUTO-RTO: 0 /100 WBCS
PLATELET # BLD AUTO: 74 THOUSANDS/UL (ref 149–390)
PMV BLD AUTO: 12.6 FL (ref 8.9–12.7)
POTASSIUM SERPL-SCNC: 4 MMOL/L (ref 3.5–5.3)
RBC # BLD AUTO: 4.7 MILLION/UL (ref 3.81–5.12)
SODIUM SERPL-SCNC: 143 MMOL/L (ref 136–145)
WBC # BLD AUTO: 5.15 THOUSAND/UL (ref 4.31–10.16)

## 2022-01-12 PROCEDURE — 99232 SBSQ HOSP IP/OBS MODERATE 35: CPT | Performed by: PHYSICIAN ASSISTANT

## 2022-01-12 PROCEDURE — 83735 ASSAY OF MAGNESIUM: CPT | Performed by: PHYSICIAN ASSISTANT

## 2022-01-12 PROCEDURE — 94664 DEMO&/EVAL PT USE INHALER: CPT

## 2022-01-12 PROCEDURE — 99232 SBSQ HOSP IP/OBS MODERATE 35: CPT | Performed by: INTERNAL MEDICINE

## 2022-01-12 PROCEDURE — 94668 MNPJ CHEST WALL SBSQ: CPT

## 2022-01-12 PROCEDURE — 85025 COMPLETE CBC W/AUTO DIFF WBC: CPT | Performed by: PHYSICIAN ASSISTANT

## 2022-01-12 PROCEDURE — 80048 BASIC METABOLIC PNL TOTAL CA: CPT | Performed by: PHYSICIAN ASSISTANT

## 2022-01-12 RX ORDER — METOPROLOL TARTRATE 50 MG/1
50 TABLET, FILM COATED ORAL EVERY 12 HOURS SCHEDULED
Status: DISCONTINUED | OUTPATIENT
Start: 2022-01-12 | End: 2022-01-15

## 2022-01-12 RX ADMIN — AMIODARONE HYDROCHLORIDE 200 MG: 200 TABLET ORAL at 08:07

## 2022-01-12 RX ADMIN — FUROSEMIDE 40 MG: 10 INJECTION, SOLUTION INTRAMUSCULAR; INTRAVENOUS at 08:08

## 2022-01-12 RX ADMIN — METOPROLOL TARTRATE 50 MG: 50 TABLET, FILM COATED ORAL at 20:26

## 2022-01-12 RX ADMIN — METOPROLOL TARTRATE 25 MG: 25 TABLET, FILM COATED ORAL at 08:07

## 2022-01-12 RX ADMIN — APIXABAN 5 MG: 5 TABLET, FILM COATED ORAL at 17:23

## 2022-01-12 RX ADMIN — METOPROLOL TARTRATE 25 MG: 25 TABLET, FILM COATED ORAL at 15:23

## 2022-01-12 RX ADMIN — SACUBITRIL AND VALSARTAN 1 TABLET: 24; 26 TABLET, FILM COATED ORAL at 08:06

## 2022-01-12 RX ADMIN — AMIODARONE HYDROCHLORIDE 200 MG: 200 TABLET ORAL at 20:26

## 2022-01-12 RX ADMIN — APIXABAN 5 MG: 5 TABLET, FILM COATED ORAL at 08:07

## 2022-01-12 RX ADMIN — AMIODARONE HYDROCHLORIDE 200 MG: 200 TABLET ORAL at 15:23

## 2022-01-12 RX ADMIN — SACUBITRIL AND VALSARTAN 1 TABLET: 24; 26 TABLET, FILM COATED ORAL at 17:23

## 2022-01-12 RX ADMIN — LEVOTHYROXINE SODIUM 100 MCG: 100 TABLET ORAL at 08:07

## 2022-01-12 NOTE — CASE MANAGEMENT
Case Management Progress Note    Patient name Mihaela Ingram  Location S /S -01 MRN [id number]  : 1950 Date 2022       LOS (days): 2  Geometric Mean LOS (GMLOS) (days): 3 50  Days to GMLOS:1 3        OBJECTIVE:        Current admission status: Inpatient  Preferred Pharmacy:   [facility]/pharmacy #[id number]- [facility] [address] [address]  Phone: [phone] Fax: [phone]    Primary Care Provider: Jessica Carrillo MD    Primary Insurance: [facility]  Secondary Insurance:     PROGRESS NOTE:    Order received for LifeVest - faxed to Davia Denver for review  Call also made to [facility] and  left requesting return call  Will follow-up for insurance auth and scheduled fitting

## 2022-01-12 NOTE — ASSESSMENT & PLAN NOTE
· Heart rate in the 120's on admission  · Patient was given IV Cardizem initially but this was discontinued after echocardiogram showed low ejection fraction  · Started p o  Metoprolol tartrate 25 mg q  12   Also started on amiodarone load --converted to NSR with frequent ectopy  · Telemetry monitoring  · [id number] = 3 on eliquis (confirmed affordability)  · Cardiology consult appreciated

## 2022-01-12 NOTE — PROGRESS NOTES
[facility]  Progress Note - Edward Benjamin 1950, 66 y o  female MRN: [id number]  Unit/Bed#: S -[facility]oro Encounter: [id number]  Primary Care Provider: Alejandra Estevez MD   Date and time admitted to hospital: 1/10/2022  6:45 AM      Assessment and Plan      1  Acute Congestive Heart Failure    23-year-old female presented with acute onset shortness of breath, orthopnea, persistent cough for the last 2-3 months, reports URI and having worsening she SOB since the infection  Was found to have AFib with RVR in ED     Lab Results   Component Value Date    CREATININE 0 84 01/12/2022    CREATININE 0 79 01/11/2022    MG 1 7 01/12/2022   Estimated Creatinine Clearance: 52 3 mL/min (by C-G formula based on SCr of 0 84 mg/dL)  -[id number] = 3  -CAD risk factors , hx of smoking, quit 30 years ago, 20 pack/year history, mixed hyperlipidemia     -presenting EKG - signs of left atrial dilatation, QRS with left axis deviation  1st EKG with a fib, last EKG NSR, no ST changes    -ECHO :  LVEF 25%, severe global hypokinesis   Left atrial filling pressure elevated   The AV - mild regurgitation, small mobile mass on the aortic aspect   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  MV - moderate annular calcification, mild regurgitation   TV - is mild regurgitation  -CXR :  Patchy bilateral ground-glass opacity, greater on the right, no effusion         2  New onset atrial fibrillation with RVR     POA was found to have atrial fibrillation with RVR, patient received Cardizem 15 mg IV then 30 mg PO, was then given 150 mg IV Amiodarone  In ED HR was averaging  in NSR    -Overnight telemetry was reviewed: no acute events HR averaging 80 NSR       4  Mixed HLD   Lab Results   Component Value Date    LDLCALC 126 (H) 09/29/2021    HDL 58 09/29/2021    CHOLESTEROL 202 (H) 09/29/2021     Not on any statin medication, doing diet modification, low fat diet     Dietitian is following       5  Hypothyroidism - levothyroxine 100 mcg daily  Last TSH WNL  6  History of Hodgkin Lymphoma, completed chemotherapy, in remission  7  Early myelodysplastic syndrome with mild thrombocytopenia, platelet count stable  Platelets (Thousands/uL)   Date Value   01/12/2022 74 (L)   01/11/2022 63 (L)          Impression:  Presenting symptoms indicating an acute congestive heart failure with unclear etiology  Possible 2/2 to chronic atrial fibrillation versus viral cardiomyopathy considering hx of infection, did not get tested for COVID-19, Vaccinated x2   CAD/ischemic cardiomyopathy is low on the differential, considering the clinical presentation, labs and imaging, however, will not rule out at this time  Patient will need to follow up in 3 months at the cardiology clinic, will need an ECHO for assessment, will investigate for possible ischemic etiology if there is no improvement with current medication regimen  The goal is to keep the patient in the NSR at all times        Plan      · Continue IV lasix 40 mg daily  (-770 ml charted for yesterday)  WT changed from 145 lb - 143 lb  · Patient is currently off oxygen  · Metoprolol 25 mg bid  · Heparin stopped, will continue Eliquis 5 mg bid  · Started on Entresto 24-26 mg  · Continue Amiodarone  200 mg PO tid for 1 week, followed by 200 mg bid, then 200 mg daily  · Will try to keep the patient in NSR and evaluate in 3 months, with ECHO f/u/  · Will then evaluate for ischemic cardiomyopathy/CAD, if there is no improvement with rate/rhytm control therapy  · Life vest ordered  · Continue Telemetry, no events overnight  · Daily BMP, keep Mg>2 0, K>4 0  Strict I/O, weight , 8199-7410 ml fluid restriction and 1 5-2 0 g salt restriction   Patient will benefit from a dietitian consult, inpatient consult to nutrition services is ordered        Code Status: Level 1 - Full Code    Subjective:   I have seen the patient this morning at bedside, she was feeling very well  And stated that she slept very well and feels much better than yesterday, complains about the breakfast quality, no other complaints, completely off oxygen supplementation  ROS, otherwise unremarkable  Physical exam unchanged since yesterday  Rhonchi auscultated bilaterally, suspect pulmonary etiology secondary to viral infection  Objective:     Vitals:   Temp (24hrs), Av °F (36 7 °C), Min:97 8 °F (36 6 °C), Max:98 3 °F (36 8 °C)    Temp:  [97 8 °F (36 6 °C)-98 3 °F (36 8 °C)] 97 8 °F (36 6 °C)  HR:  [] 85  Resp:  [18] 18  BP: (112-124)/(64-70) 117/64  SpO2:  [95 %-97 %] 95 %  Body mass index is 24 6 kg/m²  Input and Output Summary (last 24 hours): Intake/Output Summary (Last 24 hours) at 2022 0900  Last data filed at 2022 0593  Gross per 24 hour   Intake 300 ml   Output 950 ml   Net -650 ml       Physical Exam:   Physical Exam  Vitals and nursing note reviewed  Constitutional:       General: She is not in acute distress  Appearance: Normal appearance  She is well-developed  She is not diaphoretic  Interventions: She is not intubated  HENT:      Head: Normocephalic and atraumatic  Nose: Nose normal       Mouth/Throat:      Mouth: Mucous membranes are moist       Pharynx: Oropharynx is clear  Eyes:      General: No scleral icterus  Conjunctiva/sclera: Conjunctivae normal    Neck:      Trachea: No tracheal deviation  Cardiovascular:      Rate and Rhythm: Regular rhythm  Tachycardia present  Pulses: Normal pulses  Radial pulses are 2+ on the right side and 2+ on the left side  Dorsalis pedis pulses are 2+ on the right side and 2+ on the left side  Heart sounds: Normal heart sounds, S1 normal and S2 normal  No murmur heard  No friction rub  No gallop  Pulmonary:      Effort: Pulmonary effort is normal  No respiratory distress  She is not intubated  Breath sounds: No stridor  Rhonchi and rales present   No decreased breath sounds or wheezing  Chest:      Chest wall: No tenderness  Abdominal:      General: Bowel sounds are normal  There is no distension  Palpations: Abdomen is soft  There is no mass  Tenderness: There is no abdominal tenderness  Musculoskeletal:         General: No tenderness or deformity  Right lower leg: No edema  Left lower leg: No edema  Lymphadenopathy:      Cervical: No cervical adenopathy  Skin:     General: Skin is warm and dry  Coloration: Skin is not pale  Findings: No erythema  Neurological:      Mental Status: She is alert and oriented to person, place, and time  Psychiatric:         Mood and Affect: Mood normal          Speech: Speech normal          Behavior: Behavior normal          Thought Content: Thought content normal          Judgment: Judgment normal           Additional Data:     Labs:  Results from last 7 days   Lab Units 01/12/22  0502   WBC Thousand/uL 5 15   HEMOGLOBIN g/dL 13 7   HEMATOCRIT % 43 2   PLATELETS Thousands/uL 74*   NEUTROS PCT % 55   LYMPHS PCT % 32   MONOS PCT % 9   EOS PCT % 3     Results from last 7 days   Lab Units 01/12/22  0502 01/11/22  0440 01/10/22  0735   SODIUM mmol/L 143   < > 141   POTASSIUM mmol/L 4 0   < > 3 5   CHLORIDE mmol/L 103   < > 103   CO2 mmol/L 30   < > 30   BUN mg/dL 19   < > 10   CREATININE mg/dL 0 84   < > 0 75   ANION GAP mmol/L 10   < > 8   CALCIUM mg/dL 9 3   < > 9 5   ALBUMIN g/dL  --   --  3 9   TOTAL BILIRUBIN mg/dL  --   --  0 78   ALK PHOS U/L  --   --  77   ALT U/L  --   --  16   AST U/L  --   --  14   GLUCOSE RANDOM mg/dL 113   < > 120    < > = values in this interval not displayed       Results from last 7 days   Lab Units 01/10/22  1508   INR  1 03                   Lines/Drains:  Invasive Devices  Report    Peripheral Intravenous Line            Peripheral IV 01/10/22 Left Antecubital 2 days    Peripheral IV 01/10/22 Right;Dorsal (posterior) Forearm 1 day Telemetry:  Telemetry Orders (From admission, onward)             48 Hour Telemetry Monitoring  Continuous x 48 hours        References:    Telemetry Guidelines   Question:  Reason for 48 Hour Telemetry  Answer:  Arrhythmias Requiring Medical Therapy (eg  SVT, Vtach/fib, Bradycardia, Uncontrolled A-fib)                 Telemetry Reviewed: Normal Sinus Rhythm  Indication for Continued Telemetry Use: Arrthymias requiring medical therapy             Imaging: No pertinent imaging reviewed  Recent Cultures (last 7 days):   Results from last 7 days   Lab Units 01/10/22  1617 01/10/22  0735   BLOOD CULTURE  No Growth at 24 hrs  No Growth at 24 hrs  Last 24 Hours Medication List:   Current Facility-Administered Medications   Medication Dose Route Frequency Provider Last Rate    acetaminophen  650 mg Oral Q6H PRN Laura Shirley MD      amiodarone  200 mg Oral TID Jose C Aguilar MD      apixaban  5 mg Oral BID Agus Murdock DO      furosemide  40 mg Intravenous Daily Jose C Aguilar MD      levothyroxine  100 mcg Oral Daily Laura Shirley MD      metoprolol  5 mg Intravenous Q6H PRN Laura Shirley MD      metoprolol tartrate  25 mg Oral Q12H [address] Laura Shirley MD      ondansetron  4 mg Intravenous Q6H PRN Laura Shirley MD      sacubitril-valsartan  1 tablet Oral BID Agus Murdock DO          Today, Patient Was Seen By: Jose C Aguilar MD    **Please Note: This note may have been constructed using a voice recognition system  **

## 2022-01-12 NOTE — CASE MANAGEMENT
Case Management Progress Note    Patient name Sacha Diaz  Location S /S -01 MRN [id number]  : 1950 Date 2022       LOS (days): 2  Geometric Mean LOS (GMLOS) (days): 3 50  Days to GMLOS:1 3        OBJECTIVE:        Current admission status: Inpatient  Preferred Pharmacy:   [facility]/pharmacy #[id number]- [place] - [address] [address]  Phone: [phone] Fax: [phone]    Primary Care Provider: Sandra Valdez MD    Primary Insurance: [facility] [employer] [facility]  Secondary Insurance:     PROGRESS NOTE:    Per PA, patient not ready for d/c today  Spoke with Naresh Jones as Nora Palumbo who relays patient is able to be fit tomorrow, , at 10:00am for [employer]      Met with patient at bedside to discuss same  Patient aware the [employer] rep to be in tomorrow morning and states that she will reach out to daughter to relay same  Offer made to contact family and patient in agreement to same - states that Oriana Tejada will be to hospital this afternoon and she will let her know when she arrives  Call made to daughter Ma ([phone]) and informed of [employer] fit for tomorrow  at 10:00am  Reports that she will discuss with sister and one of them should be able to be there at that time

## 2022-01-12 NOTE — PLAN OF CARE
Problem: PAIN - ADULT  Goal: Verbalizes/displays adequate comfort level or baseline comfort level  Description: Interventions:  - Encourage patient to monitor pain and request assistance  - Assess pain using appropriate pain scale  - Administer analgesics based on type and severity of pain and evaluate response  - Implement non-pharmacological measures as appropriate and evaluate response  - Consider cultural and social influences on pain and pain management  - Notify physician/advanced practitioner if interventions unsuccessful or patient reports new pain  Outcome: Progressing     Problem: INFECTION - ADULT  Goal: Absence or prevention of progression during hospitalization  Description: INTERVENTIONS:  - Assess and monitor for signs and symptoms of infection  - Monitor lab/diagnostic results  - Monitor all insertion sites, i e  indwelling lines, tubes, and drains  - Monitor endotracheal if appropriate and nasal secretions for changes in amount and color  - [place] appropriate cooling/warming therapies per order  - Administer medications as ordered  - Instruct and encourage patient and family to use good hand hygiene technique  - Identify and instruct in appropriate isolation precautions for identified infection/condition  Outcome: Progressing  Goal: Absence of fever/infection during neutropenic period  Description: INTERVENTIONS:  - Monitor WBC    Outcome: Progressing     Problem: SAFETY ADULT  Goal: Patient will remain free of falls  Description: INTERVENTIONS:  - Educate patient/family on patient safety including physical limitations  - Instruct patient to call for assistance with activity   - Consult OT/PT to assist with strengthening/mobility   - Keep Call bell within reach  - Keep bed low and locked with side rails adjusted as appropriate  - Keep care items and personal belongings within reach  - Initiate and maintain comfort rounds  - Make Fall Risk Sign visible to staff  - Offer Toileting every  Hours, in advance of need  - Initiate/Maintain alarm  - Obtain necessary fall risk management equipment:   - Apply yellow socks and bracelet for high fall risk patients  - Consider moving patient to room near nurses station  Outcome: Progressing  Goal: Maintain or return to baseline ADL function  Description: INTERVENTIONS:  -  Assess patient's ability to carry out ADLs; assess patient's baseline for ADL function and identify physical deficits which impact ability to perform ADLs (bathing, care of mouth/teeth, toileting, grooming, dressing, etc )  - Assess/evaluate cause of self-care deficits   - Assess range of motion  - Assess patient's mobility; develop plan if impaired  - Assess patient's need for assistive devices and provide as appropriate  - Encourage maximum independence but intervene and supervise when necessary  - Involve family in performance of ADLs  - Assess for home care needs following discharge   - Consider OT consult to assist with ADL evaluation and planning for discharge  - Provide patient education as appropriate  Outcome: Progressing  Goal: Maintains/Returns to pre admission functional level  Description: INTERVENTIONS:  - Perform BMAT or MOVE assessment daily    - Set and communicate daily mobility goal to care team and patient/family/caregiver  - Collaborate with rehabilitation services on mobility goals if consulted  - Perform Range of Motion  times a day  - Reposition patient every  hours    - Dangle patient  times a day  - Stand patient  times a day  - Ambulate patient  times a day  - Out of bed to chair  times a day   - Out of bed for meals  times a day  - Out of bed for toileting  - Record patient progress and toleration of activity level   Outcome: Progressing     Problem: DISCHARGE PLANNING  Goal: Discharge to home or other facility with appropriate resources  Description: INTERVENTIONS:  - Identify barriers to discharge w/patient and caregiver  - Arrange for needed discharge resources and transportation as appropriate  - Identify discharge learning needs (meds, wound care, etc )  - Arrange for interpretive services to assist at discharge as needed  - Refer to Case Management Department for coordinating discharge planning if the patient needs post-hospital services based on physician/advanced practitioner order or complex needs related to functional status, cognitive ability, or social support system  Outcome: Progressing     Problem: Knowledge Deficit  Goal: Patient/family/caregiver demonstrates understanding of disease process, treatment plan, medications, and discharge instructions  Description: Complete learning assessment and assess knowledge base  Interventions:  - Provide teaching at level of understanding  - Provide teaching via preferred learning methods  Outcome: Progressing     Problem: Potential for Falls  Goal: Patient will remain free of falls  Description: INTERVENTIONS:  - Educate patient/family on patient safety including physical limitations  - Instruct patient to call for assistance with activity   - Consult OT/PT to assist with strengthening/mobility   - Keep Call bell within reach  - Keep bed low and locked with side rails adjusted as appropriate  - Keep care items and personal belongings within reach  - Initiate and maintain comfort rounds  - Make Fall Risk Sign visible to staff  - Offer Toileting every  Hours, in advance of need  - Initiate/Maintain alarm  - Obtain necessary fall risk management equipment:  Apply yellow socks and bracelet for high fall risk patients  - Consider moving patient to room near nurses station  Outcome: Progressing     Problem: Nutrition/Hydration-ADULT  Goal: Nutrient/Hydration intake appropriate for improving, restoring or maintaining nutritional needs  Description: Monitor and assess patient's nutrition/hydration status for malnutrition  Collaborate with interdisciplinary team and initiate plan and interventions as ordered    Monitor patient's weight and dietary intake as ordered or per policy  Utilize nutrition screening tool and intervene as necessary  Determine patient's food preferences and provide high-protein, high-caloric foods as appropriate       INTERVENTIONS:  - Monitor oral intake, urinary output, labs, and treatment plans  - Assess nutrition and hydration status and recommend course of action  - Evaluate amount of meals eaten  - Assist patient with eating if necessary   - Allow adequate time for meals  - Recommend/ encourage appropriate diets, oral nutritional supplements, and vitamin/mineral supplements  - Order, calculate, and assess calorie counts as needed  - Recommend, monitor, and adjust tube feedings and TPN/PPN based on assessed needs  - Assess need for intravenous fluids  - Provide specific nutrition/hydration education as appropriate  - Include patient/family/caregiver in decisions related to nutrition  Outcome: Progressing

## 2022-01-12 NOTE — PROGRESS NOTES
[facility]  Progress Note - Marashley Alberta 1950, 66 y o  female MRN: [id number]  Unit/Bed#: S -01 Encounter: [id number]  Primary Care Provider: Cristian Trujillo MD   Date and time admitted to hospital: 1/10/2022  6:45 AM    * New onset a-fib w/ RVR ([facility] 75 )  Assessment & Plan  · Heart rate in the 120's on admission  · Patient was given IV Cardizem initially but this was discontinued after echocardiogram showed low ejection fraction  · Started p o  Metoprolol tartrate 25 mg q  12  Also started on amiodarone load --converted to NSR with frequent ectopy  · Telemetry monitoring  · [id number] = 3 on eliquis (confirmed affordability)  · Cardiology consult appreciated    Acute heart failure with reduced ejection fraction and diastolic dysfunction (HCC)  Assessment & Plan  Wt Readings from Last 3 Encounters:   01/12/22 65 kg (143 lb 4 8 oz)   12/16/21 71 2 kg (157 lb)   10/07/21 71 6 kg (157 lb 12 8 oz)   Presented with progressive dyspnea and orthopnea for at least last 2 months  Chest x-ray with asymmetric pulmonary edema right greater than left  ProBNP level elevated at 4408  · Suspect tachy mediated cardiomyopathy- TTE "LVEF is 25%  Systolic function is severely reduced  There is severe global hypokinesis with regional variation  "  · Lasix 40 mg IV daily  · 1800 mL fluid restriction and 2 g sodium restriction  · I/Os and daily standing weights; weighty down 9 lbs  · Nutrition consult for dietary counseling  · Cardiology consulted-- Added entresto confirmed affordability  · Life vest will need to be provided  · Patient preferred to avoid cardiac cath at this time  · Monitor oxygen saturations  Currently requiring room air  · Added flutter valve for mucousy cough (covid negative)            Myelodysplasia (myelodysplastic syndrome) ([facility] )  Assessment & Plan  · Follows with [facility]    Noted low platelet count but stable/improved    Waldenstrom macroglobulinemia (HCC)  Assessment & Plan  · Follows with [facility]  Continue observation  History of Hodgkin's lymphoma  Assessment & Plan  · In remission  Continue observation with [facility]    VTE Pharmacologic Prophylaxis:   Pharmacologic: Apixaban (Eliquis)  Mechanical VTE Prophylaxis in Place: No    Patient Centered Rounds: spoke with RN    Discussions with Specialists or Other Care Team Provider: cardiology, case mgmt    Education and Discussions with Family / Patient: called daughter with update    Time Spent for Care: 30 minutes  More than 50% of total time spent on counseling and coordination of care as described above  Current Length of Stay: 2 day(s)    Current Patient Status: Inpatient   Certification Statement: The patient will continue to require additional inpatient hospital stay due to IV diuretics    Discharge Plan: hopefully tomorrow if ok with cardiology    Code Status: Level 1 - Full Code    Subjective:   Patient noting morning cough  She is not a smoker, no fever/chills  Reports overall she feels so much better since she got to the hospital and continues to feel well at this time, and is wondering when she can go home  Note that she has been weaned off of oxygen  Objective:     Vitals:   Temp (24hrs), Av °F (36 7 °C), Min:97 8 °F (36 6 °C), Max:98 3 °F (36 8 °C)    Temp:  [97 8 °F (36 6 °C)-98 3 °F (36 8 °C)] 97 8 °F (36 6 °C)  HR:  [] 85  Resp:  [18] 18  BP: (112-124)/(64-70) 117/64  SpO2:  [95 %-97 %] 95 %  Body mass index is 24 6 kg/m²  Input and Output Summary (last 24 hours): Intake/Output Summary (Last 24 hours) at 2022 1125  Last data filed at 2022 1121  Gross per 24 hour   Intake 420 ml   Output 1650 ml   Net -1230 ml       Physical Exam:     Physical Exam  Vitals reviewed  Constitutional:       General: She is not in acute distress  Appearance: Normal appearance  She is not ill-appearing, toxic-appearing or diaphoretic  Eyes:      General: No scleral icterus  Right eye: No discharge  Left eye: No discharge  Conjunctiva/sclera: Conjunctivae normal    Cardiovascular:      Rate and Rhythm: Normal rate and regular rhythm  Heart sounds: No murmur heard  Pulmonary:      Effort: No respiratory distress  Breath sounds: No stridor  No wheezing or rhonchi  Comments: Noted that today she had a moist cough but able to clear it, and no dyspnea noted  Still with some right basilar crackles  Not on any oxygen  Abdominal:      General: There is no distension  Palpations: Abdomen is soft  Tenderness: There is no abdominal tenderness  There is no guarding  Musculoskeletal:      Right lower leg: No edema  Left lower leg: No edema  Skin:     General: Skin is warm and dry  Coloration: Skin is not jaundiced or pale  Findings: No bruising, erythema, lesion or rash  Neurological:      General: No focal deficit present  Mental Status: She is alert  Mental status is at baseline  Psychiatric:         Mood and Affect: Mood normal          Behavior: Behavior normal          Thought Content:  Thought content normal          Judgment: Judgment normal          Tele NSR    Additional Data:     Labs:    Results from last 7 days   Lab Units 01/12/22  0502   WBC Thousand/uL 5 15   HEMOGLOBIN g/dL 13 7   HEMATOCRIT % 43 2   PLATELETS Thousands/uL 74*   NEUTROS PCT % 55   LYMPHS PCT % 32   MONOS PCT % 9   EOS PCT % 3     Results from last 7 days   Lab Units 01/12/22  0502 01/11/22  0440 01/10/22  0735   SODIUM mmol/L 143   < > 141   POTASSIUM mmol/L 4 0   < > 3 5   CHLORIDE mmol/L 103   < > 103   CO2 mmol/L 30   < > 30   BUN mg/dL 19   < > 10   CREATININE mg/dL 0 84   < > 0 75   ANION GAP mmol/L 10   < > 8   CALCIUM mg/dL 9 3   < > 9 5   ALBUMIN g/dL  --   --  3 9   TOTAL BILIRUBIN mg/dL  --   --  0 78   ALK PHOS U/L  --   --  77   ALT U/L  --   --  16   AST U/L  --   --  14   GLUCOSE RANDOM mg/dL 113   < > 120    < > = values in this interval not displayed  Results from last 7 days   Lab Units 01/10/22  1508   INR  1 03                   * I Have Reviewed All Lab Data Listed Above  * Additional Pertinent Lab Tests Reviewed: [facility] Admission Reviewed    Imaging:    Imaging Reports Reviewed Today Include:   Imaging Personally Reviewed by Myself Includes:      Recent Cultures (last 7 days):     Results from last 7 days   Lab Units 01/10/22  1617 01/10/22  0735   BLOOD CULTURE  No Growth at 24 hrs  No Growth at 24 hrs  Last 24 Hours Medication List:   Current Facility-Administered Medications   Medication Dose Route Frequency Provider Last Rate    acetaminophen  650 mg Oral Q6H PRN Gurdeep Li MD      amiodarone  200 mg Oral TID Andrei Toussaint MD      apixaban  5 mg Oral BID Adan Moraesiff, DO      furosemide  40 mg Intravenous Daily Andrei Toussaint MD      levothyroxine  100 mcg Oral Daily Gurdeep Li MD      metoprolol  5 mg Intravenous Q6H PRN Gurdeep Li MD      metoprolol tartrate  25 mg Oral Q12H [address] Gurdeep Li MD      ondansetron  4 mg Intravenous Q6H PRN Gurdeep Li MD      sacubitril-valsartan  1 tablet Oral BID Sardav Nur, DO          Today, Patient Was Seen By: Leonid Waddell PA-C    ** Please Note: Dictation voice to text software may have been used in the creation of this document   **

## 2022-01-12 NOTE — PLAN OF CARE
Problem: PAIN - ADULT  Goal: Verbalizes/displays adequate comfort level or baseline comfort level  Description: Interventions:  - Encourage patient to monitor pain and request assistance  - Assess pain using appropriate pain scale  - Administer analgesics based on type and severity of pain and evaluate response  - Implement non-pharmacological measures as appropriate and evaluate response  - Consider cultural and social influences on pain and pain management  - Notify physician/advanced practitioner if interventions unsuccessful or patient reports new pain  Outcome: Progressing     Problem: INFECTION - ADULT  Goal: Absence or prevention of progression during hospitalization  Description: INTERVENTIONS:  - Assess and monitor for signs and symptoms of infection  - Monitor lab/diagnostic results  - Monitor all insertion sites, i e  indwelling lines, tubes, and drains  - Monitor endotracheal if appropriate and nasal secretions for changes in amount and color  - [place] appropriate cooling/warming therapies per order  - Administer medications as ordered  - Instruct and encourage patient and family to use good hand hygiene technique  - Identify and instruct in appropriate isolation precautions for identified infection/condition  Outcome: Progressing  Goal: Absence of fever/infection during neutropenic period  Description: INTERVENTIONS:  - Monitor WBC    Outcome: Progressing     Goal: Maintain or return to baseline ADL function  Description: INTERVENTIONS:  -  Assess patient's ability to carry out ADLs; assess patient's baseline for ADL function and identify physical deficits which impact ability to perform ADLs (bathing, care of mouth/teeth, toileting, grooming, dressing, etc )  - Assess/evaluate cause of self-care deficits   - Assess range of motion  - Assess patient's mobility; develop plan if impaired  - Assess patient's need for assistive devices and provide as appropriate  - Encourage maximum independence but intervene and supervise when necessary  - Involve family in performance of ADLs  - Assess for home care needs following discharge   - Consider OT consult to assist with ADL evaluation and planning for discharge  - Provide patient education as appropriate  Outcome: Progressing  Goal: Maintains/Returns to pre admission functional level  Description: INTERVENTIONS:  - Perform BMAT or MOVE assessment daily    - Set and communicate daily mobility goal to care team and patient/family/caregiver  - Collaborate with rehabilitation services on mobility goals if consulted  - Perform Range of Motion 3 times a day  - Reposition patient every 2 hours  - Dangle patient 3 times a day  - Stand patient 3 times a day  - Ambulate patient 3 times a day  - Out of bed to chair 3 times a day   - Out of bed for meals 3 times a day  - Out of bed for toileting  - Record patient progress and toleration of activity level   Outcome: Progressing     Problem: DISCHARGE PLANNING  Goal: Discharge to home or other facility with appropriate resources  Description: INTERVENTIONS:  - Identify barriers to discharge w/patient and caregiver  - Arrange for needed discharge resources and transportation as appropriate  - Identify discharge learning needs (meds, wound care, etc )  - Arrange for interpretive services to assist at discharge as needed  - Refer to Case Management Department for coordinating discharge planning if the patient needs post-hospital services based on physician/advanced practitioner order or complex needs related to functional status, cognitive ability, or social support system  Outcome: Progressing     Problem: Knowledge Deficit  Goal: Patient/family/caregiver demonstrates understanding of disease process, treatment plan, medications, and discharge instructions  Description: Complete learning assessment and assess knowledge base    Interventions:  - Provide teaching at level of understanding  - Provide teaching via preferred learning methods  Outcome: Progressing     Problem: Potential for Falls  Goal: Patient will remain free of falls  Description: INTERVENTIONS:  - Educate patient/family on patient safety including physical limitations  - Instruct patient to call for assistance with activity   - Consult OT/PT to assist with strengthening/mobility   - Keep Call bell within reach  - Keep bed low and locked with side rails adjusted as appropriate  - Keep care items and personal belongings within reach  - Initiate and maintain comfort rounds  - Make Fall Risk Sign visible to staff  - Apply yellow socks and bracelet for high fall risk patients  - Consider moving patient to room near nurses station  Outcome: Progressing     Problem: Nutrition/Hydration-ADULT  Goal: Nutrient/Hydration intake appropriate for improving, restoring or maintaining nutritional needs  Description: Monitor and assess patient's nutrition/hydration status for malnutrition  Collaborate with interdisciplinary team and initiate plan and interventions as ordered  Monitor patient's weight and dietary intake as ordered or per policy  Utilize nutrition screening tool and intervene as necessary  Determine patient's food preferences and provide high-protein, high-caloric foods as appropriate       INTERVENTIONS:  - Monitor oral intake, urinary output, labs, and treatment plans  - Assess nutrition and hydration status and recommend course of action  - Evaluate amount of meals eaten  - Assist patient with eating if necessary   - Allow adequate time for meals  - Recommend/ encourage appropriate diets, oral nutritional supplements, and vitamin/mineral supplements  - Order, calculate, and assess calorie counts as needed  - Recommend, monitor, and adjust tube feedings and TPN/PPN based on assessed needs  - Assess need for intravenous fluids  - Provide specific nutrition/hydration education as appropriate  - Include patient/family/caregiver in decisions related to nutrition  Outcome: Progressing

## 2022-01-12 NOTE — ASSESSMENT & PLAN NOTE
Wt Readings from Last 3 Encounters:   01/12/22 65 kg (143 lb 4 8 oz)   12/16/21 71 2 kg (157 lb)   10/07/21 71 6 kg (157 lb 12 8 oz)   Presented with progressive dyspnea and orthopnea for at least last 2 months  Chest x-ray with asymmetric pulmonary edema right greater than left  ProBNP level elevated at 4408  · Suspect tachy mediated cardiomyopathy- TTE "LVEF is 25%  Systolic function is severely reduced  There is severe global hypokinesis with regional variation  "  · Lasix 40 mg IV daily  · 1800 mL fluid restriction and 2 g sodium restriction  · I/Os and daily standing weights; weighty down 9 lbs  · Nutrition consult for dietary counseling  · Cardiology consulted-- Added entresto confirmed affordability  · Life vest will need to be provided  · Patient preferred to avoid cardiac cath at this time  · Monitor oxygen saturations    Currently requiring room air  · Added flutter valve for mucousy cough (covid negative)

## 2022-01-13 LAB
ANION GAP SERPL CALCULATED.3IONS-SCNC: 8 MMOL/L (ref 4–13)
BASOPHILS # BLD AUTO: 0.04 THOUSANDS/ΜL (ref 0–0.1)
BASOPHILS NFR BLD AUTO: 1 % (ref 0–1)
BUN SERPL-MCNC: 28 MG/DL (ref 5–25)
CALCIUM SERPL-MCNC: 9.6 MG/DL (ref 8.3–10.1)
CHLORIDE SERPL-SCNC: 102 MMOL/L (ref 100–108)
CO2 SERPL-SCNC: 30 MMOL/L (ref 21–32)
CREAT SERPL-MCNC: 1.04 MG/DL (ref 0.6–1.3)
EOSINOPHIL # BLD AUTO: 0.14 THOUSAND/ΜL (ref 0–0.61)
EOSINOPHIL NFR BLD AUTO: 2 % (ref 0–6)
ERYTHROCYTE [DISTWIDTH] IN BLOOD BY AUTOMATED COUNT: 13.9 % (ref 11.6–15.1)
GFR SERPL CREATININE-BSD FRML MDRD: 53 ML/MIN/1.73SQ M
GLUCOSE SERPL-MCNC: 110 MG/DL (ref 65–140)
HCT VFR BLD AUTO: 44.1 % (ref 34.8–46.1)
HGB BLD-MCNC: 14.5 G/DL (ref 11.5–15.4)
IMM GRANULOCYTES # BLD AUTO: 0.02 THOUSAND/UL (ref 0–0.2)
IMM GRANULOCYTES NFR BLD AUTO: 0 % (ref 0–2)
LYMPHOCYTES # BLD AUTO: 2.47 THOUSANDS/ΜL (ref 0.6–4.47)
LYMPHOCYTES NFR BLD AUTO: 31 % (ref 14–44)
MAGNESIUM SERPL-MCNC: 1.9 MG/DL (ref 1.6–2.6)
MCH RBC QN AUTO: 30 PG (ref 26.8–34.3)
MCHC RBC AUTO-ENTMCNC: 32.9 G/DL (ref 31.4–37.4)
MCV RBC AUTO: 91 FL (ref 82–98)
MONOCYTES # BLD AUTO: 0.67 THOUSAND/ΜL (ref 0.17–1.22)
MONOCYTES NFR BLD AUTO: 9 % (ref 4–12)
NEUTROPHILS # BLD AUTO: 4.52 THOUSANDS/ΜL (ref 1.85–7.62)
NEUTS SEG NFR BLD AUTO: 57 % (ref 43–75)
NRBC BLD AUTO-RTO: 0 /100 WBCS
PLATELET # BLD AUTO: 61 THOUSANDS/UL (ref 149–390)
PMV BLD AUTO: 11 FL (ref 8.9–12.7)
POTASSIUM SERPL-SCNC: 4.3 MMOL/L (ref 3.5–5.3)
RBC # BLD AUTO: 4.83 MILLION/UL (ref 3.81–5.12)
SODIUM SERPL-SCNC: 140 MMOL/L (ref 136–145)
WBC # BLD AUTO: 7.86 THOUSAND/UL (ref 4.31–10.16)

## 2022-01-13 PROCEDURE — 93005 ELECTROCARDIOGRAM TRACING: CPT

## 2022-01-13 PROCEDURE — 99232 SBSQ HOSP IP/OBS MODERATE 35: CPT | Performed by: INTERNAL MEDICINE

## 2022-01-13 PROCEDURE — 99232 SBSQ HOSP IP/OBS MODERATE 35: CPT | Performed by: PHYSICIAN ASSISTANT

## 2022-01-13 PROCEDURE — 83735 ASSAY OF MAGNESIUM: CPT | Performed by: PHYSICIAN ASSISTANT

## 2022-01-13 PROCEDURE — 80048 BASIC METABOLIC PNL TOTAL CA: CPT | Performed by: PHYSICIAN ASSISTANT

## 2022-01-13 PROCEDURE — 85025 COMPLETE CBC W/AUTO DIFF WBC: CPT | Performed by: PHYSICIAN ASSISTANT

## 2022-01-13 RX ORDER — METOPROLOL TARTRATE 50 MG/1
50 TABLET, FILM COATED ORAL EVERY 12 HOURS SCHEDULED
Qty: 60 TABLET | Refills: 0 | Status: CANCELLED | OUTPATIENT
Start: 2022-01-13

## 2022-01-13 RX ORDER — METOPROLOL SUCCINATE 100 MG/1
100 TABLET, EXTENDED RELEASE ORAL DAILY
Qty: 30 TABLET | Refills: 0 | Status: CANCELLED | OUTPATIENT
Start: 2022-01-13

## 2022-01-13 RX ORDER — FUROSEMIDE 40 MG/1
40 TABLET ORAL DAILY
Qty: 30 TABLET | Refills: 0 | Status: CANCELLED | OUTPATIENT
Start: 2022-01-13

## 2022-01-13 RX ORDER — FUROSEMIDE 40 MG/1
40 TABLET ORAL
Status: DISCONTINUED | OUTPATIENT
Start: 2022-01-13 | End: 2022-01-14

## 2022-01-13 RX ADMIN — APIXABAN 5 MG: 5 TABLET, FILM COATED ORAL at 08:02

## 2022-01-13 RX ADMIN — AMIODARONE HYDROCHLORIDE 200 MG: 200 TABLET ORAL at 08:03

## 2022-01-13 RX ADMIN — FUROSEMIDE 40 MG: 10 INJECTION, SOLUTION INTRAMUSCULAR; INTRAVENOUS at 08:04

## 2022-01-13 RX ADMIN — AMIODARONE HYDROCHLORIDE 1 MG/MIN: 50 INJECTION, SOLUTION INTRAVENOUS at 14:53

## 2022-01-13 RX ADMIN — LEVOTHYROXINE SODIUM 100 MCG: 100 TABLET ORAL at 08:02

## 2022-01-13 RX ADMIN — AMIODARONE HYDROCHLORIDE 0.5 MG/MIN: 50 INJECTION, SOLUTION INTRAVENOUS at 22:17

## 2022-01-13 RX ADMIN — METOPROLOL TARTRATE 50 MG: 50 TABLET, FILM COATED ORAL at 22:23

## 2022-01-13 RX ADMIN — APIXABAN 5 MG: 5 TABLET, FILM COATED ORAL at 18:18

## 2022-01-13 RX ADMIN — DEXTROSE 150 MG: 50 INJECTION, SOLUTION INTRAVENOUS at 14:29

## 2022-01-13 RX ADMIN — SACUBITRIL AND VALSARTAN 1 TABLET: 24; 26 TABLET, FILM COATED ORAL at 18:18

## 2022-01-13 RX ADMIN — AMIODARONE HYDROCHLORIDE 200 MG: 200 TABLET ORAL at 16:40

## 2022-01-13 RX ADMIN — METOPROLOL TARTRATE 50 MG: 50 TABLET, FILM COATED ORAL at 08:03

## 2022-01-13 RX ADMIN — SACUBITRIL AND VALSARTAN 1 TABLET: 24; 26 TABLET, FILM COATED ORAL at 08:02

## 2022-01-13 RX ADMIN — AMIODARONE HYDROCHLORIDE 200 MG: 200 TABLET ORAL at 22:26

## 2022-01-13 RX ADMIN — FUROSEMIDE 40 MG: 40 TABLET ORAL at 16:40

## 2022-01-13 NOTE — PROGRESS NOTES
[facility]  Progress Note - Flakita Codding 1950, 66 y o  female MRN: [id number]  Unit/Bed#: S -01 Encounter: [id number]  Primary Care Provider: Jarrell Major MD   Date and time admitted to hospital: 1/10/2022  6:45 AM    [facility]  Progress Note - Flakita Codding 1950, 66 y o  female MRN: [id number]  Unit/Bed#: S -01 Encounter: [id number]  Primary Care Provider: Jarrell Major MD   Date and time admitted to hospital: 1/10/2022  6:45 AM    1  Acute Congestive Heart Failure     78-year-old female presented with acute onset shortness of breath, orthopnea, persistent cough for the last 2-3 months, reports URI and having worsening she SOB since the infection  Was found to have AFib with RVR in ED  Lab Results   Component Value Date    CREATININE 1 04 01/13/2022    CREATININE 0 84 01/12/2022    MG 1 9 01/13/2022     Estimated Creatinine Clearance: 42 2 mL/min (by C-G formula based on SCr of 1 04 mg/dL)  -[id number] = 3  -CAD risk factors , hx of smoking, quit 30 years ago, 20 pack/year history, mixed hyperlipidemia  -presenting EKG - signs of left atrial dilatation, QRS with left axis deviation  1st EKG with a fib, last EKG NSR, no ST changes  -ECHO :  LVEF 25%, severe global hypokinesis  Left atrial filling pressure elevated  The AV - mild regurgitation, small mobile mass on the aortic aspect  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  MV - moderate annular calcification, mild regurgitation  TV - is mild regurgitation  -CXR :  Patchy bilateral ground-glass opacity, greater on the right, no effusion  2  New onset atrial fibrillation with RVR     POA was found to have atrial fibrillation with RVR, patient received Cardizem 15 mg IV then 30 mg PO, was then given 150 mg IV Amiodarone   In ED HR was averaging  in NSR    -Overnight telemetry was reviewed: no acute events HR averaging 80 NSR       4  Mixed HLD         Lab Results   Component Value Date     LDLCALC 126 (H) 09/29/2021     HDL 58 09/29/2021     CHOLESTEROL 202 (H) 09/29/2021      Not on any statin medication, doing diet modification, low fat diet  Dietitian is following  5   Hypothyroidism - levothyroxine 100 mcg daily  Last TSH WNL  6  History of Hodgkin Lymphoma, completed chemotherapy, in remission  7  Early myelodysplastic syndrome with mild thrombocytopenia, platelet count stable  Lab Results   Component Value Date    PLT 61 (L) 01/13/2022    PLT 74 (L) 01/12/2022    PLT 63 (L) 01/11/2022        Impression:  Presenting symptoms indicating an acute congestive heart failure with unclear etiology  Possible 2/2 to chronic atrial fibrillation versus viral cardiomyopathy considering hx of infection, did not get tested for COVID-19, Vaccinated x2  CAD/ischemic cardiomyopathy is low on the differential, considering the clinical presentation, labs and imaging, however, will not rule out at this time  Patient will need to follow up in 3 months at the cardiology clinic, will need an ECHO for assessment, will investigate for possible ischemic etiology if there is no improvement with current medication regimen  The goal is to keep the patient in the NSR at all times  Plan      · Patient continues having episodes of HR in 120 , a flutter v a fib? · Will start on IV Amiodarone infusion for 24 hours, reassess for HR improvement,  · Transition to PO lasix 40 mg bid  (-940 ml charted for yesterday)  Last  4 8 oz   · Patient is currently off oxygen  · Metoprolol 50 mg bid  · Continue Eliquis 5 mg bid  · Continue  Entresto 24-26 mg  · Continue Amiodarone  200 mg PO tid for 1 week, followed by 200 mg bid, then 200 mg daily  · Will try to keep the patient in NSR and evaluate in 3 months, with ECHO f/u/     · Will then evaluate for ischemic cardiomyopathy/CAD, if there is no improvement with rate/rhytm control therapy  · Paperwork is filled out for life vest    · Continue Telemetry, occassional episodes of tachycardia HR jumping to 120s  · Daily BMP, keep Mg>2 0, K>4 0  Strict I/O, weight , 8673-9485 ml fluid restriction and 1 5-2 0 g salt restriction  Patient will benefit from a dietitian consult, inpatient consult to nutrition services is ordered  Code Status: Level 1 - Full Code    Subjective: Leanna seen Ms Aaron Newell this morning at bedside  She reports feeling well, denies any complains  Walks to the bathroom and back without any issues, no palpitations or dizziness and lightheadedness are noted while standing up or walking  Will continue monitoring due to increased episodes of HR  Ronchi are still appreciated on the lungs exam, however, less rales and there is an improvement, no LE edema or increased abdominal girth  Objective:     Vitals:   Temp (24hrs), Av 9 °F (36 6 °C), Min:97 7 °F (36 5 °C), Max:98 1 °F (36 7 °C)    Temp:  [97 7 °F (36 5 °C)-98 1 °F (36 7 °C)] 98 1 °F (36 7 °C)  HR:  [] 120  Resp:  [16] 16  BP: (102-123)/(59-69) 119/66  SpO2:  [91 %-94 %] 91 %  Body mass index is 24 6 kg/m²  Input and Output Summary (last 24 hours): Intake/Output Summary (Last 24 hours) at 2022 1303  Last data filed at 2022 1232  Gross per 24 hour   Intake 840 ml   Output 700 ml   Net 140 ml       Physical Exam:   Physical Exam  Vitals and nursing note reviewed  Constitutional:       General: She is not in acute distress  Appearance: Normal appearance  She is well-developed  She is not diaphoretic  Interventions: She is not intubated  HENT:      Head: Normocephalic and atraumatic  Nose: Nose normal       Mouth/Throat:      Mouth: Mucous membranes are moist       Pharynx: Oropharynx is clear  Eyes:      General: No scleral icterus  Conjunctiva/sclera: Conjunctivae normal    Neck:      Trachea: No tracheal deviation     Cardiovascular:      Rate and Rhythm: Regular rhythm  Tachycardia present  Pulses: Normal pulses  Radial pulses are 2+ on the right side and 2+ on the left side  Dorsalis pedis pulses are 2+ on the right side and 2+ on the left side  Heart sounds: Normal heart sounds, S1 normal and S2 normal  No murmur heard  No friction rub  No gallop  Pulmonary:      Effort: Pulmonary effort is normal  No respiratory distress  She is not intubated  Breath sounds: No stridor  Rhonchi and rales present  No decreased breath sounds or wheezing  Chest:      Chest wall: No tenderness  Abdominal:      General: Bowel sounds are normal  There is no distension  Palpations: Abdomen is soft  There is no mass  Tenderness: There is no abdominal tenderness  Musculoskeletal:         General: No tenderness or deformity  Right lower leg: No edema  Left lower leg: No edema  Lymphadenopathy:      Cervical: No cervical adenopathy  Skin:     General: Skin is warm and dry  Coloration: Skin is not pale  Findings: No erythema  Neurological:      Mental Status: She is alert and oriented to person, place, and time  Psychiatric:         Mood and Affect: Mood normal          Speech: Speech normal          Behavior: Behavior normal          Thought Content:  Thought content normal          Judgment: Judgment normal           Additional Data:     Labs:  Results from last 7 days   Lab Units 01/13/22  0651   WBC Thousand/uL 7 86   HEMOGLOBIN g/dL 14 5   HEMATOCRIT % 44 1   PLATELETS Thousands/uL 61*   NEUTROS PCT % 57   LYMPHS PCT % 31   MONOS PCT % 9   EOS PCT % 2     Results from last 7 days   Lab Units 01/13/22  0651 01/11/22  0440 01/10/22  0735   SODIUM mmol/L 140   < > 141   POTASSIUM mmol/L 4 3   < > 3 5   CHLORIDE mmol/L 102   < > 103   CO2 mmol/L 30   < > 30   BUN mg/dL 28*   < > 10   CREATININE mg/dL 1 04   < > 0 75   ANION GAP mmol/L 8   < > 8   CALCIUM mg/dL 9 6   < > 9 5   ALBUMIN g/dL  --   --  3 9   TOTAL BILIRUBIN mg/dL  --   --  0 78   ALK PHOS U/L  --   --  77   ALT U/L  --   --  16   AST U/L  --   --  14   GLUCOSE RANDOM mg/dL 110   < > 120    < > = values in this interval not displayed  Results from last 7 days   Lab Units 01/10/22  1508   INR  1 03                   Lines/Drains:  Invasive Devices  Report    Peripheral Intravenous Line            Peripheral IV 01/10/22 Left Antecubital 3 days    Peripheral IV 01/10/22 Right;Dorsal (posterior) Forearm 2 days                  Telemetry:  Telemetry Orders (From admission, onward)             48 Hour Telemetry Monitoring  Continuous x 48 hours        References:    Telemetry Guidelines   Question:  Reason for 48 Hour Telemetry  Answer:  Arrhythmias Requiring Medical Therapy (eg  SVT, Vtach/fib, Bradycardia, Uncontrolled A-fib)                 Telemetry Reviewed: Sinus Tachycardia  Indication for Continued Telemetry Use: Arrthymias requiring medical therapy             Imaging: No pertinent imaging reviewed  Recent Cultures (last 7 days):   Results from last 7 days   Lab Units 01/10/22  1617 01/10/22  0735   BLOOD CULTURE  No Growth at 48 hrs  No Growth at 48 hrs  Last 24 Hours Medication List:   Current Facility-Administered Medications   Medication Dose Route Frequency Provider Last Rate    acetaminophen  650 mg Oral Q6H PRN Dre Ferrera MD      amiodarone  200 mg Oral TID Latoya Mckeon MD      apixaban  5 mg Oral BID [facility]      furosemide  40 mg Intravenous Daily Latoya Mckeon MD      levothyroxine  100 mcg Oral Daily Dre Ferrera MD      metoprolol  5 mg Intravenous Q6H PRN Dre Ferrera MD      metoprolol tartrate  50 mg Oral Q12H [facility] Latoya Mckeon MD      ondansetron  4 mg Intravenous Q6H PRN Dre Ferrera MD      sacubitril-valsartan  1 tablet Oral BID [facility]          Today, Patient Was Seen By: Latoya Mckeon MD    **Please Note: This note may have been constructed using a voice recognition system  **

## 2022-01-13 NOTE — ASSESSMENT & PLAN NOTE
Wt Readings from Last 3 Encounters:   01/13/22 65 kg (143 lb 4 8 oz)   12/16/21 71 2 kg (157 lb)   10/07/21 71 6 kg (157 lb 12 8 oz)   Presented with progressive dyspnea and orthopnea for at least last 2 months  Chest x-ray with asymmetric pulmonary edema right greater than left  ProBNP level elevated at 4408  Patient has felt substantially better for the last 2 48 hours  · Suspect tachy mediated cardiomyopathy- TTE "LVEF is 25%  Systolic function is severely reduced  There is severe global hypokinesis with regional variation  "  · Lasix 40 mg IV daily provided during hospitalization, defer to Cardiology regarding oral diuretic dosing for discharge  · 1800 mL fluid restriction and 2 g sodium restriction  · I/Os and daily standing weights--please note that these were not being accurately done initially on admission; weight is down 9 lbs her and >2 2 L urine output today  · Nutrition consult for dietary counseling  · Cardiology consulted-- Added entresto, confirmed affordability  · Life vest to be placed today  · Patient preferred to avoid cardiac cath at this time  · Monitor oxygen saturations    Currently requiring room air  · Added flutter valve for mucousy cough (covid negative)

## 2022-01-13 NOTE — ASSESSMENT & PLAN NOTE
· Heart rate in the 120's on admission  · Patient was given IV Cardizem initially but this was discontinued after echocardiogram showed low ejection fraction  · Continue Lopressor 50 mg BID with plans to change to Toprol per Cardiology on discharge and amiodarone load with taper  · Patient converted to sinus rhythm but with sporadic events of sinus tachycardia  · Telemetry monitoring  · [id number] = 3 on eliquis (confirmed affordability)  · Cardiology consult appreciated

## 2022-01-13 NOTE — PROGRESS NOTES
[facility]  Progress Note - Mckayla Mendoza 1950, 66 y o  female MRN: [id number]  Unit/Bed#: S -01 Encounter: [id number]  Primary Care Provider: Caroline Alba MD   Date and time admitted to hospital: 1/10/2022  6:45 AM    * New onset a-fib w/ RVR ([facility] [address] )  Assessment & Plan  · Heart rate in the 120's on admission  · Patient was given IV Cardizem initially but this was discontinued after echocardiogram showed low ejection fraction  · Continue Lopressor 50 mg BID with plans to change to Toprol per Cardiology on discharge and amiodarone load with taper  · Patient converted to sinus rhythm but with sporadic events of sinus tachycardia  · Telemetry monitoring  · [id number] = 3 on eliquis (confirmed affordability)  · Cardiology consult appreciated    Acute heart failure with reduced ejection fraction and diastolic dysfunction ([facility] [address] )  Assessment & Plan  Wt Readings from Last 3 Encounters:   01/13/22 65 kg (143 lb 4 8 oz)   12/16/21 71 2 kg (157 lb)   10/07/21 71 6 kg (157 lb 12 8 oz)   Presented with progressive dyspnea and orthopnea for at least last 2 months  Chest x-ray with asymmetric pulmonary edema right greater than left  ProBNP level elevated at 4408  Patient has felt substantially better for the last 2 48 hours  · Suspect tachy mediated cardiomyopathy- TTE "LVEF is 25%  Systolic function is severely reduced    There is severe global hypokinesis with regional variation  "  · Lasix 40 mg IV daily provided during hospitalization, defer to Cardiology regarding oral diuretic dosing for discharge  · 1800 mL fluid restriction and 2 g sodium restriction  · I/Os and daily standing weights--please note that these were not being accurately done initially on admission; weight is down 9 lbs her and >2 2 L urine output today  · Nutrition consult for dietary counseling  · Cardiology consulted-- Added entresto, confirmed affordability  · Life vest to be placed today  · Patient preferred to avoid cardiac cath at this time  · Monitor oxygen saturations  Currently requiring room air  · Added flutter valve for mucousy cough (covid negative)            Myelodysplasia (myelodysplastic syndrome) ([facility] [address] )  Assessment & Plan  · Follows with [facility]  Noted low platelet count but stable/improved    Waldenstrom macroglobulinemia (HCC)  Assessment & Plan  · Follows with [facility]  Continue observation  History of Hodgkin's lymphoma  Assessment & Plan  · In remission  Continue observation with [facility]    VTE Pharmacologic Prophylaxis:   Pharmacologic: Apixaban (Eliquis)  Mechanical VTE Prophylaxis in Place: No    Patient Centered Rounds: spoke with RN    Discussions with Specialists or Other Care Team Provider:  Case Management, Cardiology    Education and Discussions with Family / Patient:     Time Spent for Care: 25 min  More than 50% of total time spent on counseling and coordination of care as described above  Current Length of Stay: 3 day(s)    Current Patient Status: Inpatient   Certification Statement: The patient will continue to require additional inpatient hospital stay due to [address] Cardiology input    Discharge Plan:  Hopefully discharge later today if cleared by Cardiology    Code Status: Level 1 - Full Code      Subjective:   Patient says she feels good and wants to go home today  Denies any chest pain or shortness of breath  Very minimal cough  She has been out of bed ambulating  Not reporting any dizziness, lightheadedness, palpitations or sense of heart racing but is aware of her heartbeat when it is elevated    Objective:     Vitals:   Temp (24hrs), Av 9 °F (36 6 °C), Min:97 7 °F (36 5 °C), Max:98 1 °F (36 7 °C)    Temp:  [97 7 °F (36 5 °C)-98 1 °F (36 7 °C)] 98 1 °F (36 7 °C)  HR:  [] 120  Resp:  [16] 16  BP: (102-123)/(59-69) 119/66  SpO2:  [91 %-94 %] 91 %  Body mass index is 24 6 kg/m²  Input and Output Summary (last 24 hours):        Intake/Output Summary (Last 24 hours) at 1/13/2022 1314  Last data filed at 1/13/2022 1232  Gross per 24 hour   Intake 840 ml   Output 700 ml   Net 140 ml       Physical Exam:     Physical Exam  Vitals reviewed  Constitutional:       General: She is not in acute distress  Appearance: She is not ill-appearing, toxic-appearing or diaphoretic  Eyes:      General: No scleral icterus  Right eye: No discharge  Left eye: No discharge  Conjunctiva/sclera: Conjunctivae normal    Cardiovascular:      Rate and Rhythm: Regular rhythm  Tachycardia present  Heart sounds: No murmur heard  Pulmonary:      Effort: No respiratory distress  Breath sounds: No stridor  No wheezing or rhonchi  Comments: Bilateral crackles no cough, wheezing or distress  Abdominal:      General: There is no distension  Tenderness: There is no guarding  Musculoskeletal:      Right lower leg: No edema  Left lower leg: No edema  Skin:     General: Skin is warm and dry  Coloration: Skin is not jaundiced or pale  Findings: No bruising, erythema, lesion or rash  Neurological:      General: No focal deficit present  Mental Status: She is alert     Psychiatric:         Mood and Affect: Mood normal          Additional Data:     Labs:    Results from last 7 days   Lab Units 01/13/22  0651   WBC Thousand/uL 7 86   HEMOGLOBIN g/dL 14 5   HEMATOCRIT % 44 1   PLATELETS Thousands/uL 61*   NEUTROS PCT % 57   LYMPHS PCT % 31   MONOS PCT % 9   EOS PCT % 2     Results from last 7 days   Lab Units 01/13/22  0651 01/11/22  0440 01/10/22  0735   SODIUM mmol/L 140   < > 141   POTASSIUM mmol/L 4 3   < > 3 5   CHLORIDE mmol/L 102   < > 103   CO2 mmol/L 30   < > 30   BUN mg/dL 28*   < > 10   CREATININE mg/dL 1 04   < > 0 75   ANION GAP mmol/L 8   < > 8   CALCIUM mg/dL 9 6   < > 9 5   ALBUMIN g/dL  --   --  3 9   TOTAL BILIRUBIN mg/dL  --   --  0 78   ALK PHOS U/L  --   --  77   ALT U/L  --   --  16   AST U/L  --   -- 14   GLUCOSE RANDOM mg/dL 110   < > 120    < > = values in this interval not displayed  Results from last 7 days   Lab Units 01/10/22  1508   INR  1 03                   Telemetry reviewed  * I Have Reviewed All Lab Data Listed Above  * Additional Pertinent Lab Tests Reviewed: [address] Admission Reviewed    Imaging:    Imaging Reports Reviewed Today Include:   Imaging Personally Reviewed by Myself Includes:      Recent Cultures (last 7 days):     Results from last 7 days   Lab Units 01/10/22  1617 01/10/22  0735   BLOOD CULTURE  No Growth at 48 hrs  No Growth at 48 hrs  Last 24 Hours Medication List:   Current Facility-Administered Medications   Medication Dose Route Frequency Provider Last Rate    acetaminophen  650 mg Oral Q6H PRN Dre Ferrera MD      amiodarone  200 mg Oral TID Latoya Mckeon MD      apixaban  5 mg Oral BID Devika [place], DO      furosemide  40 mg Intravenous Daily Latoya Mckeon MD      levothyroxine  100 mcg Oral Daily Dre Ferrera MD      metoprolol  5 mg Intravenous Q6H PRN Dre Ferrera MD      metoprolol tartrate  50 mg Oral Q12H [address] Latoya Mckeon MD      ondansetron  4 mg Intravenous Q6H PRN Dre Ferrera MD      sacubitril-valsartan  1 tablet Oral BID Devika [place], DO          Today, Patient Was Seen By: Obie Prader, PA-C    ** Please Note: Dictation voice to text software may have been used in the creation of this document   **

## 2022-01-13 NOTE — PLAN OF CARE
Problem: PAIN - ADULT  Goal: Verbalizes/displays adequate comfort level or baseline comfort level  Description: Interventions:  - Encourage patient to monitor pain and request assistance  - Assess pain using appropriate pain scale  - Administer analgesics based on type and severity of pain and evaluate response  - Implement non-pharmacological measures as appropriate and evaluate response  - Consider cultural and social influences on pain and pain management  - Notify physician/advanced practitioner if interventions unsuccessful or patient reports new pain  Outcome: Progressing     Problem: INFECTION - ADULT  Goal: Absence or prevention of progression during hospitalization  Description: INTERVENTIONS:  - Assess and monitor for signs and symptoms of infection  - Monitor lab/diagnostic results  - Monitor all insertion sites, i e  indwelling lines, tubes, and drains  - Monitor endotracheal if appropriate and nasal secretions for changes in amount and color  - Royal appropriate cooling/warming therapies per order  - Administer medications as ordered  - Instruct and encourage patient and family to use good hand hygiene technique  - Identify and instruct in appropriate isolation precautions for identified infection/condition  Outcome: Progressing  Goal: Absence of fever/infection during neutropenic period  Description: INTERVENTIONS:  - Monitor WBC    Outcome: Progressing       Goal: Maintain or return to baseline ADL function  Description: INTERVENTIONS:  -  Assess patient's ability to carry out ADLs; assess patient's baseline for ADL function and identify physical deficits which impact ability to perform ADLs (bathing, care of mouth/teeth, toileting, grooming, dressing, etc )  - Assess/evaluate cause of self-care deficits   - Assess range of motion  - Assess patient's mobility; develop plan if impaired  - Assess patient's need for assistive devices and provide as appropriate  - Encourage maximum independence but intervene and supervise when necessary  - Involve family in performance of ADLs  - Assess for home care needs following discharge   - Consider OT consult to assist with ADL evaluation and planning for discharge  - Provide patient education as appropriate  Outcome: Progressing  Goal: Maintains/Returns to pre admission functional level  Description: INTERVENTIONS:  - Perform BMAT or MOVE assessment daily    - Set and communicate daily mobility goal to care team and patient/family/caregiver  - Collaborate with rehabilitation services on mobility goals if consulted  - Perform Range of Motion 3 times a day  - Reposition patient every 2 hours  - Dangle patient 3 times a day  - Stand patient 3 times a day  - Ambulate patient 3 times a day  - Out of bed to chair 3 times a day   - Out of bed for meals 3 times a day  - Out of bed for toileting  - Record patient progress and toleration of activity level   Outcome: Progressing     Problem: Knowledge Deficit  Goal: Patient/family/caregiver demonstrates understanding of disease process, treatment plan, medications, and discharge instructions  Description: Complete learning assessment and assess knowledge base    Interventions:  - Provide teaching at level of understanding  - Provide teaching via preferred learning methods  Outcome: Progressing     Problem: Potential for Falls  Goal: Patient will remain free of falls  Description: INTERVENTIONS:  - Educate patient/family on patient safety including physical limitations  - Instruct patient to call for assistance with activity   - Consult OT/PT to assist with strengthening/mobility   - Keep Call bell within reach  - Keep bed low and locked with side rails adjusted as appropriate  - Keep care items and personal belongings within reach  - Initiate and maintain comfort rounds  - Make Fall Risk Sign visible to staff  - Apply yellow socks and bracelet for high fall risk patients  - Consider moving patient to room near nurses station  Outcome: Progressing     Problem: Nutrition/Hydration-ADULT  Goal: Nutrient/Hydration intake appropriate for improving, restoring or maintaining nutritional needs  Description: Monitor and assess patient's nutrition/hydration status for malnutrition  Collaborate with interdisciplinary team and initiate plan and interventions as ordered  Monitor patient's weight and dietary intake as ordered or per policy  Utilize nutrition screening tool and intervene as necessary  Determine patient's food preferences and provide high-protein, high-caloric foods as appropriate       INTERVENTIONS:  - Monitor oral intake, urinary output, labs, and treatment plans  - Assess nutrition and hydration status and recommend course of action  - Evaluate amount of meals eaten  - Assist patient with eating if necessary   - Allow adequate time for meals  - Recommend/ encourage appropriate diets, oral nutritional supplements, and vitamin/mineral supplements  - Order, calculate, and assess calorie counts as needed  - Recommend, monitor, and adjust tube feedings and TPN/PPN based on assessed needs  - Assess need for intravenous fluids  - Provide specific nutrition/hydration education as appropriate  - Include patient/family/caregiver in decisions related to nutrition  Outcome: Progressing

## 2022-01-13 NOTE — DISCHARGE INSTR - AVS FIRST PAGE
Dear Alicia Patel,     It was our pleasure to care for you here at [facility], [facility]  It is our hope that we were always able to exceed the expected standards for your care during your stay  You were hospitalized due to new onset rapid AFib with low pumping function of the heart causing heart failure  You were cared for on the 3rd floor by Yasmani Mcdermott PA-C under the service of Roshni Vann MD with the [facility] who covers for your primary care physician (PCP), Wolf Redmond MD, while you were hospitalized  If you have any questions or concerns related to this hospitalization, you may contact us at [phone]  For follow up as well as any medication refills, we recommend that you follow up with your primary care physician  A registered nurse will reach out to you by phone within a few days after your discharge to answer any additional questions that you may have after going home  However, at this time we provide for you here, the most important instructions / recommendations at discharge:     Notable Medication Adjustments -   Entresto 1 tablet twice a day  Metoprolol 25 mg twice daily  Eliquis 5 mg twice a day  Amiodarone 200 mg 3 times a day for 3 more days then 200 mg twice daily  for 7 days then 200 mg daily  Lasix 20 mg twice daily   Testing Required after Discharge -   Liver enzymes and repeat thyroid levels to be checked while on amiodarone per Cardiology  CT of the chest should be done in about 4 weeks to see if there is any fibrosis/scarring ("interstitial lung disease") once all the fluid is gone  BMP and magnesium in 1 week  Repeat echocardiogram should be done in 3 months    If heart pumping function remains low, the cardiology team will talk to you about scheduling a cardiac catheterization to look for any blockages of the heart  Important follow up information -   Cardiology  Other Instructions -   Wear your Life Ezra  Please review this entire after visit summary as additional general instructions including medication list, appointments, activity, diet, any pertinent wound care, and other additional recommendations from your care team that may be provided for you        Sincerely,     Alessandro Samano PA-C

## 2022-01-14 ENCOUNTER — APPOINTMENT (INPATIENT)
Dept: RADIOLOGY | Facility: HOSPITAL | Age: 72
DRG: 308 | End: 2022-01-14
Payer: COMMERCIAL

## 2022-01-14 PROBLEM — R93.89 ABNORMAL CXR: Status: ACTIVE | Noted: 2022-01-14

## 2022-01-14 LAB
ANION GAP SERPL CALCULATED.3IONS-SCNC: 10 MMOL/L (ref 4–13)
BUN SERPL-MCNC: 35 MG/DL (ref 5–25)
CALCIUM SERPL-MCNC: 9.1 MG/DL (ref 8.3–10.1)
CHLORIDE SERPL-SCNC: 99 MMOL/L (ref 100–108)
CO2 SERPL-SCNC: 28 MMOL/L (ref 21–32)
CREAT SERPL-MCNC: 1.48 MG/DL (ref 0.6–1.3)
GFR SERPL CREATININE-BSD FRML MDRD: 35 ML/MIN/1.73SQ M
GLUCOSE SERPL-MCNC: 166 MG/DL (ref 65–140)
POTASSIUM SERPL-SCNC: 3.9 MMOL/L (ref 3.5–5.3)
SODIUM SERPL-SCNC: 137 MMOL/L (ref 136–145)

## 2022-01-14 PROCEDURE — 99232 SBSQ HOSP IP/OBS MODERATE 35: CPT | Performed by: PHYSICIAN ASSISTANT

## 2022-01-14 PROCEDURE — 80048 BASIC METABOLIC PNL TOTAL CA: CPT | Performed by: PHYSICIAN ASSISTANT

## 2022-01-14 PROCEDURE — 93005 ELECTROCARDIOGRAM TRACING: CPT

## 2022-01-14 PROCEDURE — 71046 X-RAY EXAM CHEST 2 VIEWS: CPT

## 2022-01-14 PROCEDURE — 99232 SBSQ HOSP IP/OBS MODERATE 35: CPT | Performed by: INTERNAL MEDICINE

## 2022-01-14 RX ADMIN — AMIODARONE HYDROCHLORIDE 200 MG: 200 TABLET ORAL at 16:39

## 2022-01-14 RX ADMIN — AMIODARONE HYDROCHLORIDE 200 MG: 200 TABLET ORAL at 21:18

## 2022-01-14 RX ADMIN — AMIODARONE HYDROCHLORIDE 200 MG: 200 TABLET ORAL at 08:30

## 2022-01-14 RX ADMIN — APIXABAN 5 MG: 5 TABLET, FILM COATED ORAL at 08:30

## 2022-01-14 RX ADMIN — METOPROLOL TARTRATE 50 MG: 50 TABLET, FILM COATED ORAL at 08:30

## 2022-01-14 RX ADMIN — APIXABAN 5 MG: 5 TABLET, FILM COATED ORAL at 17:46

## 2022-01-14 RX ADMIN — LEVOTHYROXINE SODIUM 100 MCG: 100 TABLET ORAL at 08:30

## 2022-01-14 NOTE — ASSESSMENT & PLAN NOTE
· CXR on admission "Patchy bilateral ground glass opacity, greater on the right, which could be due to Covid 19 and/or asymmetric edema " In the context of her abnormal echocardiogram this was suspected to be edema  · covid test negative  · However, patient still has significantly abnormal breath sounds despite substantially reported improvement with diuresis and having been weaned off of oxygen  She has had some moist cough   Will repeat PA/lateral CXR for completeness

## 2022-01-14 NOTE — ASSESSMENT & PLAN NOTE
Wt Readings from Last 3 Encounters:   01/14/22 65 4 kg (144 lb 2 9 oz)   12/16/21 71 2 kg (157 lb)   10/07/21 71 6 kg (157 lb 12 8 oz)   Presented with progressive dyspnea and orthopnea for at least last 2 months  Chest x-ray with suspected asymmetric pulmonary edema  ProBNP level elevated at 4408  · Suspect tachy mediated cardiomyopathy- TTE "LVEF is 25%  Systolic function is severely reduced  There is severe global hypokinesis with regional variation  "  · Lasix 40 mg IV daily provided during hospitalization, changed to Lasix 40 mg po BID on 1/13/22 pm  · 1800 mL fluid restriction and 2 g sodium restriction  · I/Os and daily standing weights--please note that these were not being accurately done initially on admission  · Nutrition consult for dietary counseling  · Cardiology consulted-- Added entresto, confirmed affordability  · Life vest provided on 1/13/22  · Patient preferred to avoid cardiac cath at this time; reassess this conversation in 3 months if repeat echocardiogram remains abnormal  · Monitor oxygen saturations    Currently requiring room air

## 2022-01-14 NOTE — PROGRESS NOTES
[facility]  Progress Note - Charity Isaacs 1950, 66 y o  female MRN: [id number]  Unit/Bed#: S -01 Encounter: [id number]  Primary Care Provider: Mariluz Freire MD   Date and time admitted to hospital: 1/10/2022  6:45 AM          1  Acute Congestive Heart Failure     41-year-old female presented with acute onset shortness of breath, orthopnea, persistent cough for the last 2-3 months, reports URI and having worsening she SOB since the infection  Was found to have AFib with RVR in ED  Lab Results   Component Value Date    CREATININE 1 48 (H) 01/14/2022    CREATININE 1 04 01/13/2022     Estimated Creatinine Clearance: 29 7 mL/min (A) (by C-G formula based on SCr of 1 48 mg/dL (H))  -[id number] = 3  -CAD risk factors , hx of smoking, quit 30 years ago, 20 pack/year history, mixed hyperlipidemia  -presenting EKG - signs of left atrial dilatation, QRS with left axis deviation  1st EKG with a fib, last EKG NSR, no ST changes  -ECHO :  LVEF 25%, severe global hypokinesis  Left atrial filling pressure elevated  The AV - mild regurgitation, small mobile mass on the aortic aspect  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  MV - moderate annular calcification, mild regurgitation  TV - is mild regurgitation  -CXR :  Patchy bilateral ground-glass opacity, greater on the right, no effusion  I/O -  (-600 since the admission), not sure if its accurate  Wt - 01/13/22 143 4 8 >> 01/14/22 144 lb 2 9 oz    2  New onset atrial fibrillation with RVR     POA was found to have atrial fibrillation with RVR, patient received Cardizem 15 mg IV then 30 mg PO, was then given 150 mg IV Amiodarone  In ED HR was averaging  in NSR    -Overnight telemetry was reviewed: no acute events HR averaging 80 NSR        4  Mixed HLD         Lab Results   Component Value Date     LDLCALC 126 (H) 09/29/2021     HDL 58 09/29/2021     CHOLESTEROL 202 (H) 09/29/2021      Not on any statin medication, doing diet modification, low fat diet  Dietitian is following  5   Hypothyroidism - levothyroxine 100 mcg daily  Last TSH WNL  6  History of Hodgkin Lymphoma, completed chemotherapy, in remission  7  Early myelodysplastic syndrome with mild thrombocytopenia, platelet count stable  Lab Results   Component Value Date    PLT 61 (L) 01/13/2022    PLT 74 (L) 01/12/2022    PLT 63 (L) 01/11/2022           Impression:  Presenting symptoms indicating an acute congestive heart failure with unclear etiology  Possible 2/2 to chronic atrial fibrillation versus viral cardiomyopathy considering hx of infection, did not get tested for COVID-19, Vaccinated x2  CAD/ischemic cardiomyopathy is low on the differential, considering the clinical presentation, labs and imaging, however, will not rule out at this time  Patient will need to follow up in 3 months at the cardiology clinic, will need an ECHO for assessment, will investigate for possible ischemic etiology if there is no improvement with current medication regimen  The goal is to keep the patient in the NSR at all times  Plan      · Will hold lasix for today, patient has TERESA and creatinine bumped from 1 04>> 1 48  · Telemetry reviewed, no tachycardia since 8:00 p m  Yesterday night, 2-3 episodes of bradycardia  Patient denies any symptoms  · Patient currently in room air  · Continue metoprolol 50 mg b i d , plan to change to metoprolol succinate on discharge  · Continue Eliquis 5 mg b i d  · Will hold Entresto 24-26 mg for today  · Started on IV amiodarone load yesterday 1 mg an hour for 6 hours, 0 5 mg/hr for 18 hours  · Continue Amiodarone  200 mg PO tid for 1 week, followed by 200 mg bid, then 200 mg daily  · Will try to keep the patient in NSR and evaluate in 3 months, with ECHO f/u/     · Will then evaluate for ischemic cardiomyopathy/CAD, if there is no improvement with rate/rhytm control therapy  · The patient is wearing a life vest currently  · Continue Telemetry, no events overnight  · Daily BMP, keep Mg>2 0, K>4 0  Strict I/O, weight , 4251-7630 ml fluid restriction and 1 5-2 0 g salt restriction  Patient will benefit from a dietitian consult, inpatient consult to nutrition services is ordered  Code Status: Level 1 - Full Code    Subjective:   I've seen Ms Weaver this morning at bedside  She denied any complaints  She is walking around the room when I walked in  Reports no chest pain or shortness of breath, denies any palpitations  Patient did not have any episodes of tachycardia after  telemetry review since starting the IV on amiodarone load, however had a few episodes of bradycardia heart rate in the 40s  Patient denies any symptoms:  No lightheadedness or dizziness  Physical exam:  Grossly unchanged since yesterday, lungs sound better, rhonchi or still appreciated bilaterally, less on the right side  Objective:     Vitals:   Temp (24hrs), Av 9 °F (36 6 °C), Min:97 8 °F (36 6 °C), Max:98 °F (36 7 °C)    Temp:  [97 8 °F (36 6 °C)-98 °F (36 7 °C)] 98 °F (36 7 °C)  HR:  [] 69  Resp:  [16-18] 18  BP: ()/(54-73) 103/59  SpO2:  [94 %-95 %] 95 %  Body mass index is 24 75 kg/m²  Input and Output Summary (last 24 hours): Intake/Output Summary (Last 24 hours) at 2022 1156  Last data filed at 2022 2342  Gross per 24 hour   Intake 490 ml   Output 250 ml   Net 240 ml       Physical Exam:   Physical Exam  Vitals and nursing note reviewed  Constitutional:       General: She is not in acute distress  Appearance: Normal appearance  She is well-developed  She is not diaphoretic  Interventions: She is not intubated  HENT:      Head: Normocephalic and atraumatic  Nose: Nose normal       Mouth/Throat:      Mouth: Mucous membranes are moist       Pharynx: Oropharynx is clear  Eyes:      General: No scleral icterus       Conjunctiva/sclera: Conjunctivae normal    Neck:      Trachea: No tracheal deviation  Cardiovascular:      Rate and Rhythm: Regular rhythm  Tachycardia present  Pulses: Normal pulses  Radial pulses are 2+ on the right side and 2+ on the left side  Dorsalis pedis pulses are 2+ on the right side and 2+ on the left side  Heart sounds: Normal heart sounds, S1 normal and S2 normal  No murmur heard  No friction rub  No gallop  Pulmonary:      Effort: Pulmonary effort is normal  No respiratory distress  She is not intubated  Breath sounds: No stridor  Rhonchi present  No decreased breath sounds, wheezing or rales  Chest:      Chest wall: No tenderness  Abdominal:      General: Bowel sounds are normal  There is no distension  Palpations: Abdomen is soft  There is no mass  Tenderness: There is no abdominal tenderness  Musculoskeletal:         General: No tenderness or deformity  Right lower leg: No edema  Left lower leg: No edema  Lymphadenopathy:      Cervical: No cervical adenopathy  Skin:     General: Skin is warm and dry  Coloration: Skin is not pale  Findings: No erythema  Neurological:      Mental Status: She is alert and oriented to person, place, and time  Psychiatric:         Mood and Affect: Mood normal          Speech: Speech normal          Behavior: Behavior normal          Thought Content:  Thought content normal          Judgment: Judgment normal           Additional Data:     Labs:  Results from last 7 days   Lab Units 01/13/22  0651   WBC Thousand/uL 7 86   HEMOGLOBIN g/dL 14 5   HEMATOCRIT % 44 1   PLATELETS Thousands/uL 61*   NEUTROS PCT % 57   LYMPHS PCT % 31   MONOS PCT % 9   EOS PCT % 2     Results from last 7 days   Lab Units 01/14/22  1014 01/11/22  0440 01/10/22  0735   SODIUM mmol/L 137   < > 141   POTASSIUM mmol/L 3 9   < > 3 5   CHLORIDE mmol/L 99*   < > 103   CO2 mmol/L 28   < > 30   BUN mg/dL 35*   < > 10   CREATININE mg/dL 1 48*   < > 0  75   ANION GAP mmol/L 10   < > 8   CALCIUM mg/dL 9 1   < > 9 5   ALBUMIN g/dL  --   --  3 9   TOTAL BILIRUBIN mg/dL  --   --  0 78   ALK PHOS U/L  --   --  77   ALT U/L  --   --  16   AST U/L  --   --  14   GLUCOSE RANDOM mg/dL 166*   < > 120    < > = values in this interval not displayed  Results from last 7 days   Lab Units 01/10/22  1508   INR  1 03                   Lines/Drains:  Invasive Devices  Report    Peripheral Intravenous Line            Peripheral IV 01/10/22 Left Antecubital 4 days    Peripheral IV 01/14/22 Right Forearm <1 day                  Telemetry:  Telemetry Orders (From admission, onward)             48 Hour Telemetry Monitoring  Continuous x 48 hours        References:    Telemetry Guidelines   Question:  Reason for 48 Hour Telemetry  Answer:  Arrhythmias Requiring Medical Therapy (eg  SVT, Vtach/fib, Bradycardia, Uncontrolled A-fib)                 Telemetry Reviewed: Sinus Tachycardia  Indication for Continued Telemetry Use: Acute CHF on >200 mg lasix/day or equivalent dose or with new reduced EF  Imaging: No pertinent imaging reviewed  Recent Cultures (last 7 days):   Results from last 7 days   Lab Units 01/10/22  1617 01/10/22  0735   BLOOD CULTURE  No Growth at 72 hrs  No Growth at 72 hrs         Last 24 Hours Medication List:   Current Facility-Administered Medications   Medication Dose Route Frequency Provider Last Rate    acetaminophen  650 mg Oral Q6H PRN Michelle Romero MD      amiodarone  0 5 mg/min Intravenous Continuous Nikki Hale DO 0 5 mg/min (01/13/22 2317)    amiodarone  200 mg Oral TID Kurtis Zaman MD      apixaban  5 mg Oral BID Nikki Hale DO      furosemide  40 mg Oral BID (diuretic) Nikki Hale DO      levothyroxine  100 mcg Oral Daily Michelle Romero MD      metoprolol  5 mg Intravenous Q6H PRN Michelle Romero MD      metoprolol tartrate  50 mg Oral Q12H [address] Kurtis Zaman MD      ondansetron  4 mg Intravenous Q6H PRN Michelle Romero MD  sacubitril-valsartan  1 tablet Oral BID Brush Clonts, DO          Today, Patient Was Seen By: Lukas Moncada MD    **Please Note: This note may have been constructed using a voice recognition system  **

## 2022-01-14 NOTE — PROGRESS NOTES
[facility]  Progress Note - Krish Doe 1950, 66 y o  female MRN: [id number]  Unit/Bed#: S -01 Encounter: [id number]  Primary Care Provider: Sinai Rushing MD   Date and time admitted to hospital: 1/10/2022  6:45 AM    * New onset a-fib w/ RVR ([address] )  Assessment & Plan  · Heart rate in the 120's on admission, rapid AFib  · Patient was given IV Cardizem initially but this was discontinued after echocardiogram showed low ejection fraction  · Received IV amiodarone and initially converted to normal sinus rhythm  She was placed on oral amiodarone load  However she then began having sinus tachycardia and then additional events of atrial tachycardia  She was placed back on IV amiodarone by Cardiology in the afternoon of January 13th  Currently remains on both IV and p o  Amiodarone  · Continue Lopressor 50 mg BID with plans to change to Toprol per Cardiology on discharge  · Maintain telemetry monitoring  · [id number] = 3 on eliquis (confirmed affordability)  · Cardiology consult appreciated    Acute heart failure with reduced ejection fraction and diastolic dysfunction (HCC)  Assessment & Plan  Wt Readings from Last 3 Encounters:   01/14/22 65 4 kg (144 lb 2 9 oz)   12/16/21 71 2 kg (157 lb)   10/07/21 71 6 kg (157 lb 12 8 oz)   Presented with progressive dyspnea and orthopnea for at least last 2 months  Chest x-ray with suspected asymmetric pulmonary edema  ProBNP level elevated at 4408  · Suspect tachy mediated cardiomyopathy- TTE "LVEF is 25%  Systolic function is severely reduced    There is severe global hypokinesis with regional variation  "  · Lasix 40 mg IV daily provided during hospitalization, changed to Lasix 40 mg po BID on 1/13/22 pm  · 1800 mL fluid restriction and 2 g sodium restriction  · I/Os and daily standing weights--please note that these were not being accurately done initially on admission  · Nutrition consult for dietary counseling  · Cardiology consulted-- Added entresto, confirmed affordability  · Life vest provided on 1/13/22  · Patient preferred to avoid cardiac cath at this time; reassess this conversation in 3 months if repeat echocardiogram remains abnormal  · Monitor oxygen saturations  Currently requiring room air              Myelodysplasia (myelodysplastic syndrome) ([facility] [address] )  Assessment & Plan  · Follows with [facility]  Noted low platelet count but stable/improved    Abnormal CXR  Assessment & Plan  · CXR on admission "Patchy bilateral ground glass opacity, greater on the right, which could be due to Covid 19 and/or asymmetric edema " In the context of her abnormal echocardiogram this was suspected to be edema  · covid test negative  · However, patient still has significantly abnormal breath sounds despite substantially reported improvement with diuresis and having been weaned off of oxygen  She has had some moist cough  Will repeat PA/lateral CXR for completeness    Waldenstrom macroglobulinemia [facility])  Assessment & Plan  · Follows with [facility]  Continue observation  VTE Pharmacologic Prophylaxis:   Pharmacologic: Apixaban (Eliquis)  Mechanical VTE Prophylaxis in Place: No    Patient Centered Rounds: spoke with RN    Discussions with Specialists or Other Care Team Provider: case mgmt    Education and Discussions with Family / Patient:     Time Spent for Care: 30 minutes  More than 50% of total time spent on counseling and coordination of care as described above      Current Length of Stay: 4 day(s)    Current Patient Status: Inpatient   Certification Statement: The patient will continue to require additional inpatient hospital stay due to on IV amiodarone    Discharge Plan: when cleared by cardiology    Code Status: Level 1 - Full Code      Subjective:   Patient feels "apprehensive" about everything going on and anxious that when she gets up something will get triggered with her heart rate, but tells me she isn't actually experiencing any heart racing, palpitations, pounding, dizziness, lightheadedness, shortness of breath, chest pain  She states she is using her flutter valve  Still has a little bit of cough going on  Notes that she has not been doing nearly as much as she usually would as she is typically an extremely active person and in fact this morning says she became quite "weepy" and upset about having to still be in the hospital     RN reported to me that patient's blood pressure was a little lower this morning and she held the dose of Lasix as per the parameter    Objective:     Vitals:   Temp (24hrs), Av 9 °F (36 6 °C), Min:97 8 °F (36 6 °C), Max:98 °F (36 7 °C)    Temp:  [97 8 °F (36 6 °C)-98 °F (36 7 °C)] 98 °F (36 7 °C)  HR:  [] 69  Resp:  [16-18] 18  BP: ()/(54-73) 103/59  SpO2:  [94 %-95 %] 95 %  Body mass index is 24 75 kg/m²  Input and Output Summary (last 24 hours): Intake/Output Summary (Last 24 hours) at 2022 1105  Last data filed at 2022 2342  Gross per 24 hour   Intake 490 ml   Output 250 ml   Net 240 ml       Physical Exam:     Physical Exam  Vitals reviewed  Constitutional:       General: She is not in acute distress  Appearance: Normal appearance  She is not ill-appearing, toxic-appearing or diaphoretic  Eyes:      General: No scleral icterus  Right eye: No discharge  Left eye: No discharge  Conjunctiva/sclera: Conjunctivae normal    Cardiovascular:      Rate and Rhythm: Normal rate and regular rhythm  Heart sounds: No murmur heard  Pulmonary:      Effort: No respiratory distress  Breath sounds: No stridor  Rhonchi and rales present  No wheezing  Comments: Not requiring any oxygen  No wheezing, tachypnea, dyspnea or distress but still was quite notable bilat rales/rhonchi  Abdominal:      General: There is no distension  Tenderness: There is no guarding  Skin:     General: Skin is warm and dry        Coloration: Skin is not jaundiced or pale  Findings: No bruising, erythema, lesion or rash  Neurological:      General: No focal deficit present  Mental Status: She is alert  Mental status is at baseline  Psychiatric:         Mood and Affect: Mood normal          Thought Content: Thought content normal          Tele reviewed     Additional Data:     Labs:    Results from last 7 days   Lab Units 01/13/22  0651   WBC Thousand/uL 7 86   HEMOGLOBIN g/dL 14 5   HEMATOCRIT % 44 1   PLATELETS Thousands/uL 61*   NEUTROS PCT % 57   LYMPHS PCT % 31   MONOS PCT % 9   EOS PCT % 2     Results from last 7 days   Lab Units 01/13/22  0651 01/11/22  0440 01/10/22  0735   SODIUM mmol/L 140   < > 141   POTASSIUM mmol/L 4 3   < > 3 5   CHLORIDE mmol/L 102   < > 103   CO2 mmol/L 30   < > 30   BUN mg/dL 28*   < > 10   CREATININE mg/dL 1 04   < > 0 75   ANION GAP mmol/L 8   < > 8   CALCIUM mg/dL 9 6   < > 9 5   ALBUMIN g/dL  --   --  3 9   TOTAL BILIRUBIN mg/dL  --   --  0 78   ALK PHOS U/L  --   --  77   ALT U/L  --   --  16   AST U/L  --   --  14   GLUCOSE RANDOM mg/dL 110   < > 120    < > = values in this interval not displayed  Results from last 7 days   Lab Units 01/10/22  1508   INR  1 03                   * I Have Reviewed All Lab Data Listed Above  * Additional Pertinent Lab Tests Reviewed: [address] Admission Reviewed    Imaging:    Imaging Reports Reviewed Today Include:   Imaging Personally Reviewed by Myself Includes:      Recent Cultures (last 7 days):     Results from last 7 days   Lab Units 01/10/22  1617 01/10/22  0735   BLOOD CULTURE  No Growth at 72 hrs  No Growth at 72 hrs         Last 24 Hours Medication List:   Current Facility-Administered Medications   Medication Dose Route Frequency Provider Last Rate    acetaminophen  650 mg Oral Q6H PRN Eddie Cunha MD      amiodarone  0 5 mg/min Intravenous Continuous Dala Arely DO 0 5 mg/min (01/13/22 2217)    amiodarone  200 mg Oral TID Alma Rosa Naida Kwasi Davis MD      apixaban  5 mg Oral BID Alfredo Suarez DO      furosemide  40 mg Oral BID (diuretic) Alfredo Suarez DO      levothyroxine  100 mcg Oral Daily Ova MD Armen      metoprolol  5 mg Intravenous Q6H PRN Wendy Ayers MD      metoprolol tartrate  50 mg Oral Q12H [address] Tracie Sage MD      ondansetron  4 mg Intravenous Q6H PRN Wendy Ayers MD      sacubitril-valsartan  1 tablet Oral BID Alfredo Suarez DO          Today, Patient Was Seen By: Luiza Bruno PA-C    ** Please Note: Dictation voice to text software may have been used in the creation of this document   **

## 2022-01-14 NOTE — ASSESSMENT & PLAN NOTE
· Heart rate in the 120's on admission, rapid AFib  · Patient was given IV Cardizem initially but this was discontinued after echocardiogram showed low ejection fraction  · Received IV amiodarone and initially converted to normal sinus rhythm  She was placed on oral amiodarone load  However she then began having sinus tachycardia and then additional events of atrial tachycardia  She was placed back on IV amiodarone by Cardiology in the afternoon of January 13th  Currently remains on both IV and p o   Amiodarone  · Continue Lopressor 50 mg BID with plans to change to Toprol per Cardiology on discharge  · Maintain telemetry monitoring  · [id number] = 3 on eliquis (confirmed affordability)  · Cardiology consult appreciated

## 2022-01-15 VITALS
HEIGHT: 64 IN | HEART RATE: 76 BPM | RESPIRATION RATE: 16 BRPM | WEIGHT: 144.18 LBS | TEMPERATURE: 97.8 F | OXYGEN SATURATION: 97 % | DIASTOLIC BLOOD PRESSURE: 59 MMHG | SYSTOLIC BLOOD PRESSURE: 119 MMHG | BODY MASS INDEX: 24.62 KG/M2

## 2022-01-15 PROBLEM — E83.42 HYPOMAGNESEMIA: Status: RESOLVED | Noted: 2022-01-10 | Resolved: 2022-01-15

## 2022-01-15 LAB
ANION GAP SERPL CALCULATED.3IONS-SCNC: 5 MMOL/L (ref 4–13)
BACTERIA BLD CULT: NORMAL
BACTERIA BLD CULT: NORMAL
BASOPHILS # BLD AUTO: 0.03 THOUSANDS/ΜL (ref 0–0.1)
BASOPHILS NFR BLD AUTO: 1 % (ref 0–1)
BUN SERPL-MCNC: 31 MG/DL (ref 5–25)
CALCIUM SERPL-MCNC: 9.4 MG/DL (ref 8.3–10.1)
CHLORIDE SERPL-SCNC: 99 MMOL/L (ref 100–108)
CO2 SERPL-SCNC: 32 MMOL/L (ref 21–32)
CREAT SERPL-MCNC: 1.3 MG/DL (ref 0.6–1.3)
EOSINOPHIL # BLD AUTO: 0.1 THOUSAND/ΜL (ref 0–0.61)
EOSINOPHIL NFR BLD AUTO: 2 % (ref 0–6)
ERYTHROCYTE [DISTWIDTH] IN BLOOD BY AUTOMATED COUNT: 13.8 % (ref 11.6–15.1)
GFR SERPL CREATININE-BSD FRML MDRD: 41 ML/MIN/1.73SQ M
GLUCOSE SERPL-MCNC: 111 MG/DL (ref 65–140)
HCT VFR BLD AUTO: 43.7 % (ref 34.8–46.1)
HGB BLD-MCNC: 13.8 G/DL (ref 11.5–15.4)
IMM GRANULOCYTES # BLD AUTO: 0.01 THOUSAND/UL (ref 0–0.2)
IMM GRANULOCYTES NFR BLD AUTO: 0 % (ref 0–2)
LYMPHOCYTES # BLD AUTO: 1.47 THOUSANDS/ΜL (ref 0.6–4.47)
LYMPHOCYTES NFR BLD AUTO: 22 % (ref 14–44)
MCH RBC QN AUTO: 29.2 PG (ref 26.8–34.3)
MCHC RBC AUTO-ENTMCNC: 31.6 G/DL (ref 31.4–37.4)
MCV RBC AUTO: 92 FL (ref 82–98)
MONOCYTES # BLD AUTO: 0.49 THOUSAND/ΜL (ref 0.17–1.22)
MONOCYTES NFR BLD AUTO: 8 % (ref 4–12)
NEUTROPHILS # BLD AUTO: 4.46 THOUSANDS/ΜL (ref 1.85–7.62)
NEUTS SEG NFR BLD AUTO: 67 % (ref 43–75)
NRBC BLD AUTO-RTO: 0 /100 WBCS
PLATELET # BLD AUTO: 132 THOUSANDS/UL (ref 149–390)
PMV BLD AUTO: 12.2 FL (ref 8.9–12.7)
POTASSIUM SERPL-SCNC: 4.1 MMOL/L (ref 3.5–5.3)
RBC # BLD AUTO: 4.73 MILLION/UL (ref 3.81–5.12)
SODIUM SERPL-SCNC: 136 MMOL/L (ref 136–145)
WBC # BLD AUTO: 6.56 THOUSAND/UL (ref 4.31–10.16)

## 2022-01-15 PROCEDURE — 80048 BASIC METABOLIC PNL TOTAL CA: CPT | Performed by: PHYSICIAN ASSISTANT

## 2022-01-15 PROCEDURE — 85025 COMPLETE CBC W/AUTO DIFF WBC: CPT | Performed by: INTERNAL MEDICINE

## 2022-01-15 PROCEDURE — 99232 SBSQ HOSP IP/OBS MODERATE 35: CPT | Performed by: INTERNAL MEDICINE

## 2022-01-15 PROCEDURE — 99239 HOSP IP/OBS DSCHRG MGMT >30: CPT | Performed by: PHYSICIAN ASSISTANT

## 2022-01-15 RX ORDER — AMIODARONE HYDROCHLORIDE 200 MG/1
200 TABLET ORAL
Qty: 46 TABLET | Refills: 0 | Status: SHIPPED | OUTPATIENT
Start: 2022-01-15 | End: 2022-01-15

## 2022-01-15 RX ORDER — FUROSEMIDE 20 MG/1
20 TABLET ORAL 2 TIMES DAILY
Qty: 60 TABLET | Refills: 0 | Status: SHIPPED | OUTPATIENT
Start: 2022-01-16 | End: 2022-01-19 | Stop reason: SDUPTHER

## 2022-01-15 RX ORDER — AMIODARONE HYDROCHLORIDE 200 MG/1
200 TABLET ORAL
Qty: 46 TABLET | Refills: 0 | Status: SHIPPED | OUTPATIENT
Start: 2022-01-15 | End: 2022-05-04

## 2022-01-15 RX ADMIN — AMIODARONE HYDROCHLORIDE 200 MG: 200 TABLET ORAL at 08:29

## 2022-01-15 RX ADMIN — LEVOTHYROXINE SODIUM 100 MCG: 100 TABLET ORAL at 08:29

## 2022-01-15 RX ADMIN — APIXABAN 5 MG: 5 TABLET, FILM COATED ORAL at 08:29

## 2022-01-15 RX ADMIN — METOPROLOL TARTRATE 25 MG: 25 TABLET, FILM COATED ORAL at 08:29

## 2022-01-15 NOTE — NURSING NOTE
Patient sleeping in bed  Patient appears comfortable  No visible signs or symptoms of distress noted at this time  Bed low & locked  Call bell within reach  Will continue to monitor      Kade Rojas RN

## 2022-01-15 NOTE — PROGRESS NOTES
Cardiology Progress Note - Annie Martinez 66 y o  female MRN: [id number]    Unit/Bed#: S -01 Encounter: [id number]      Assessment:  1  Paroxysmal atrial fibrillation/flutter  2  Cardiomyopathy- ? Tachy - mediated vs viral   3  Acute HFrEF  4  TERESA  5  Myelodysplastic syndrome  6  Waldenstrom macroglobulinemia    Plan:  1  Rhythm remains stable on IV amiodarone - will discontinue  2  Volume status stable - awaiting AM BMP given diuretics and Entresto stopped yesterday due to TERESA  3  Chest x-ray noted - needs high-resolution CT scan at some point, ? Changes secondary to undiagnosed COVID given viral illness few months ago  4  Continue oral amiodarone along with systemic anticoagulation with Eliquis  5  Plan for short term amiodarone with repeat echo in three months to reassess EF   6  Declined ischemic evaluation with cath, home on Life Vest   7  If renal function stable, improved stable for discharge with outpatient follow-up which has been scheduled, needs BMP in one week     Subjective:   Patient seen and examined  No significant events overnight  Objective:     Vitals: Blood pressure 90/60, pulse 65, temperature 98 °F (36 7 °C), temperature source Oral, resp  rate 18, height 5' 4" (1 626 m), weight 65 4 kg (144 lb 2 9 oz), SpO2 94 %  , Body mass index is 24 75 kg/m² ,   Orthostatic Blood Pressures      Most Recent Value   Blood Pressure 90/60 filed at 01/14/2022 2119   Patient Position - Orthostatic VS Lying filed at 01/14/2022 2119            Intake/Output Summary (Last 24 hours) at 1/15/2022 0701  Last data filed at 1/14/2022 2119  Gross per 24 hour   Intake 240 ml   Output 750 ml   Net -510 ml         Physical Exam:    GEN: Annie Martinez appears well, alert and oriented x 3, pleasant and cooperative   HEENT: pupils equal, round, and reactive to light; extraocular muscles intact  NECK: supple, no carotid bruits   HEART: regular rhythm, normal S1 and S2, no murmurs, clicks, gallops or rubs   LUNGS:  crackles bilaterally   ABDOMEN: normal bowel sounds, soft, no tenderness, no distention  EXTREMITIES: peripheral pulses normal; no clubbing, cyanosis, or edema  NEURO: no focal findings   SKIN: normal without suspicious lesions on exposed skin    Medications:      Current Facility-Administered Medications:     acetaminophen (TYLENOL) tablet 650 mg, 650 mg, Oral, Q6H PRN, Sergio Victoria MD    [] amiodarone (CORDARONE) 900 mg in dextrose 5 % 500 mL infusion, 1 mg/min, Intravenous, Continuous, Stopped at 22 **FOLLOWED BY** amiodarone (CORDARONE) 900 mg in dextrose 5 % 500 mL infusion, 0 5 mg/min, Intravenous, Continuous, Jannette Browning DO, Last Rate: 16 7 mL/hr at 22, 0 5 mg/min at 22    amiodarone tablet 200 mg, 200 mg, Oral, TID, Diana Flanagan MD, 200 mg at 22 2118    apixaban (ELIQUIS) tablet 5 mg, 5 mg, Oral, BID, Jannette Browning DO, 5 mg at 22 1746    levothyroxine tablet 100 mcg, 100 mcg, Oral, Daily, Sergio Victoria MD, 100 mcg at 22 0830    metoprolol (LOPRESSOR) injection 5 mg, 5 mg, Intravenous, Q6H PRN, Sergio Victoria MD    metoprolol tartrate (LOPRESSOR) tablet 25 mg, 25 mg, Oral, Q12H [facility], Jannette Browning DO    ondansetron [facility]) injection 4 mg, 4 mg, Intravenous, Q6H PRN, Sergio Victoria MD     Labs & Results:        Results from last 7 days   Lab Units 22  0651 22  0502 22  0440   WBC Thousand/uL 7 86 5 15 5 48   HEMOGLOBIN g/dL 14 5 13 7 13 1   HEMATOCRIT % 44 1 43 2 40 9   PLATELETS Thousands/uL 61* 74* 63*         Results from last 7 days   Lab Units 22  1014 22  0651 22  0502 22  0440 01/10/22  0735   POTASSIUM mmol/L 3 9 4 3 4 0   < > 3 5   CHLORIDE mmol/L 99* 102 103   < > 103   CO2 mmol/L 28 30 30   < > 30   BUN mg/dL 35* 28* 19   < > 10   CREATININE mg/dL 1 48* 1 04 0 84   < > 0 75   CALCIUM mg/dL 9 1 9 6 9 3   < > 9 5   ALK PHOS U/L  --   --   --   --  77   ALT U/L  --   --   --   -- 16   AST U/L  --   --   --   --  14    < > = values in this interval not displayed  Results from last 7 days   Lab Units 01/11/22  0952 01/10/22  2319 01/10/22  1508 01/10/22  0735 01/10/22  0735   INR   --   --  1 03  --  1 03   PTT seconds 61* 56* 31   < > 30    < > = values in this interval not displayed  Results from last 7 days   Lab Units 01/13/22  0651 01/12/22  0502 01/10/22  1327   MAGNESIUM mg/dL 1 9 1 7 2 2         Counseling / Coordination of Care  Total floor / unit time spent today 25 minutes  Greater than 50% of total time was spent with the patient and / or family counseling and / or coordination of care

## 2022-01-15 NOTE — PLAN OF CARE
Problem: PAIN - ADULT  Goal: Verbalizes/displays adequate comfort level or baseline comfort level  Description: Interventions:  - Encourage patient to monitor pain and request assistance  - Assess pain using appropriate pain scale  - Administer analgesics based on type and severity of pain and evaluate response  - Implement non-pharmacological measures as appropriate and evaluate response  - Consider cultural and social influences on pain and pain management  - Notify physician/advanced practitioner if interventions unsuccessful or patient reports new pain  Outcome: Progressing     Problem: INFECTION - ADULT  Goal: Absence or prevention of progression during hospitalization  Description: INTERVENTIONS:  - Assess and monitor for signs and symptoms of infection  - Monitor lab/diagnostic results  - Monitor all insertion sites, i e  indwelling lines, tubes, and drains  - Monitor endotracheal if appropriate and nasal secretions for changes in amount and color  - [place] appropriate cooling/warming therapies per order  - Administer medications as ordered  - Instruct and encourage patient and family to use good hand hygiene technique  - Identify and instruct in appropriate isolation precautions for identified infection/condition  Outcome: Progressing  Goal: Absence of fever/infection during neutropenic period  Description: INTERVENTIONS:  - Monitor WBC    Outcome: Progressing     Problem: SAFETY ADULT  Goal: Patient will remain free of falls  Description: INTERVENTIONS:  - Educate patient/family on patient safety including physical limitations  - Instruct patient to call for assistance with activity   - Consult OT/PT to assist with strengthening/mobility   - Keep Call bell within reach  - Keep bed low and locked with side rails adjusted as appropriate  - Keep care items and personal belongings within reach  - Initiate and maintain comfort rounds  - Make Fall Risk Sign visible to staff  - Offer Toileting every  Hours, in advance of need  - Initiate/Maintain alarm  - Obtain necessary fall risk management equipment:   - Apply yellow socks and bracelet for high fall risk patients  - Consider moving patient to room near nurses station  Outcome: Progressing  Goal: Maintain or return to baseline ADL function  Description: INTERVENTIONS:  -  Assess patient's ability to carry out ADLs; assess patient's baseline for ADL function and identify physical deficits which impact ability to perform ADLs (bathing, care of mouth/teeth, toileting, grooming, dressing, etc )  - Assess/evaluate cause of self-care deficits   - Assess range of motion  - Assess patient's mobility; develop plan if impaired  - Assess patient's need for assistive devices and provide as appropriate  - Encourage maximum independence but intervene and supervise when necessary  - Involve family in performance of ADLs  - Assess for home care needs following discharge   - Consider OT consult to assist with ADL evaluation and planning for discharge  - Provide patient education as appropriate  Outcome: Progressing  Goal: Maintains/Returns to pre admission functional level  Description: INTERVENTIONS:  - Perform BMAT or MOVE assessment daily    - Set and communicate daily mobility goal to care team and patient/family/caregiver  - Collaborate with rehabilitation services on mobility goals if consulted  - Perform Range of Motion  times a day  - Reposition patient every  hours    - Dangle patient  times a day  - Stand patient  times a day  - Ambulate patient  times a day  - Out of bed to chair  times a day   - Out of bed for meals  times a day  - Out of bed for toileting  - Record patient progress and toleration of activity level   Outcome: Progressing     Problem: DISCHARGE PLANNING  Goal: Discharge to home or other facility with appropriate resources  Description: INTERVENTIONS:  - Identify barriers to discharge w/patient and caregiver  - Arrange for needed discharge resources and transportation as appropriate  - Identify discharge learning needs (meds, wound care, etc )  - Arrange for interpretive services to assist at discharge as needed  - Refer to Case Management Department for coordinating discharge planning if the patient needs post-hospital services based on physician/advanced practitioner order or complex needs related to functional status, cognitive ability, or social support system  Outcome: Progressing     Problem: Knowledge Deficit  Goal: Patient/family/caregiver demonstrates understanding of disease process, treatment plan, medications, and discharge instructions  Description: Complete learning assessment and assess knowledge base  Interventions:  - Provide teaching at level of understanding  - Provide teaching via preferred learning methods  Outcome: Progressing     Problem: Potential for Falls  Goal: Patient will remain free of falls  Description: INTERVENTIONS:  - Educate patient/family on patient safety including physical limitations  - Instruct patient to call for assistance with activity   - Consult OT/PT to assist with strengthening/mobility   - Keep Call bell within reach  - Keep bed low and locked with side rails adjusted as appropriate  - Keep care items and personal belongings within reach  - Initiate and maintain comfort rounds  - Make Fall Risk Sign visible to staff  - Offer Toileting every  Hours, in advance of need  - Initiate/Maintain alarm  - Obtain necessary fall risk management equipment:   - Apply yellow socks and bracelet for high fall risk patients  - Consider moving patient to room near nurses station  Outcome: Progressing     Problem: Nutrition/Hydration-ADULT  Goal: Nutrient/Hydration intake appropriate for improving, restoring or maintaining nutritional needs  Description: Monitor and assess patient's nutrition/hydration status for malnutrition  Collaborate with interdisciplinary team and initiate plan and interventions as ordered    Monitor patient's weight and dietary intake as ordered or per policy  Utilize nutrition screening tool and intervene as necessary  Determine patient's food preferences and provide high-protein, high-caloric foods as appropriate       INTERVENTIONS:  - Monitor oral intake, urinary output, labs, and treatment plans  - Assess nutrition and hydration status and recommend course of action  - Evaluate amount of meals eaten  - Assist patient with eating if necessary   - Allow adequate time for meals  - Recommend/ encourage appropriate diets, oral nutritional supplements, and vitamin/mineral supplements  - Order, calculate, and assess calorie counts as needed  - Recommend, monitor, and adjust tube feedings and TPN/PPN based on assessed needs  - Assess need for intravenous fluids  - Provide specific nutrition/hydration education as appropriate  - Include patient/family/caregiver in decisions related to nutrition  Outcome: Progressing

## 2022-01-15 NOTE — DISCHARGE SUMMARY
[facility]  Discharge- Alondra Actis 1950, 66 y o  female MRN: [id number]  Unit/Bed#: S -01 Encounter: [id number]  Primary Care Provider: Deangelo Duke MD   Date and time admitted to hospital: 1/10/2022  6:45 AM    * New onset a-fib w/ RVR (HCC)  Assessment & Plan  · Heart rate in the 120's on admission, rapid AFib  · Patient was given IV Cardizem initially but this was discontinued after echocardiogram showed low ejection fraction  · Received IV amiodarone and initially converted to normal sinus rhythm  She was placed on oral amiodarone load  However she then began having sinus tachycardia and then additional events of atrial tachycardia  She was placed back on IV amiodarone by Cardiology in the afternoon of January 13th  Currently remains on PO Amiodarone  · Stable for discharge   · Continue Amiodarone PO load   · Continue 200 mg TID dosing for 8 more doses, then 200 mg BID for 7 days, then 200 mg QD  · Continue Metoprolol 25 mg BID   · Eliquis for anticoagulation   · Repeat echo 3 months   · Referred to Cardiology     Acute heart failure with reduced ejection fraction and diastolic dysfunction (HCC)  Assessment & Plan  Wt Readings from Last 3 Encounters:   01/14/22 65 4 kg (144 lb 2 9 oz)   12/16/21 71 2 kg (157 lb)   10/07/21 71 6 kg (157 lb 12 8 oz)   · Presented with progressive dyspnea and orthopnea for at least last 2 months  Chest x-ray with suspected asymmetric pulmonary edema  ProBNP level elevated at 4408  · Suspect tachy mediated cardiomyopathy with LVEF 25%  Systolic function is severely reduced  There is severe global hypokinesis with regional variation    · Lasix 40 mg IV daily provided during hospitalization, changed to Lasix 40 mg po BID on 1/13/22 PM, however will be on Lasix 20 mg PO BID at discharge   · Continue fluid and salt restriction at home   · Continue daily weights at home   · Repeat echo 3 months  · Continue Entresto   · Life vest provided on 1/13/22  · Patient preferred to avoid cardiac cath at this time; reassess this conversation in 3 months if repeat echocardiogram remains abnormal            Abnormal CXR  Assessment & Plan  · CXR on admission "Patchy bilateral ground glass opacity, greater on the right, which could be due to Covid 19 and/or asymmetric edema " In the context of her abnormal echocardiogram this was suspected to be edema  · COVID test negative  · However, patient still has significantly abnormal breath sounds despite substantially reported improvement with diuresis and having been weaned off of oxygen  She has had some moist cough  Will repeat PA/lateral CXR for completeness  · Have patient follow up with High Res CT outpatient     Hypomagnesemia-resolved as of 1/15/2022  Assessment & Plan  · Repleted     Myelodysplasia (myelodysplastic syndrome) ([facility] [address] )  Assessment & Plan  · Follows with [facility]  Noted low platelet count but stable/improved    Waldenstrom macroglobulinemia (HCC)  Assessment & Plan  · Follows with [facility]  Continue observation  History of Hodgkin's lymphoma  Assessment & Plan  · In remission  Continue observation with [facility]      Medical Problems             Resolved Problems  Date Reviewed: 1/15/2022          Resolved    Hypomagnesemia 1/15/2022     Resolved by  Myrna Santizo PA-C              Discharging Physician / Practitioner: Myrna Santizo PA-C  PCP: Caroline Alba MD  Admission Date:   Admission Orders (From admission, onward)     Ordered        01/10/22 0921  Inpatient Admission  Once                      Discharge Date: 01/15/22    Consultations During Hospital Stay:  · Cardiology - Dr Bety Sow     Procedures Performed:   · CXR x 2  · Echo     Significant Findings / Test Results:   · CXR: Patchy bilateral groundglass opacity, greater on the right, which could be due to COVID-19 and/or symmetric edema   · CXR: Improving edema with bibasilar fibrosis    · Echo:  EF 25%, severely reduced systolic function  Severe global hypokinesis with regional variation     Incidental Findings:   · none     Test Results Pending at Discharge (will require follow up): · None     Outpatient Tests Requested:  · CT chest high res   · Repeat Echo   · BMP    Complications:  None    Reason for Admission: New A  Fib, sCHF    Hospital Course:   Mihaela Ingram is a 66 y o  female patient who originally presented to the hospital on 1/10/2022 due to SOB  Patient was found to be in new onset A  Fib  Patient was admitted and started on IV Cardizem, however this was stopped once her EF was found to be low  IV Amiodarone was started and she converted to NSR and transitioned to PO to continue loading doses  She had an echo and was found to have a severely reduced EF of 25%  She was started on Entresto, Metoprolol, and diuresed  She developed a slight TERESA so Entresto and Lasix were held for 2 days and then restarted after discharge  She will continue on Amiodarone and Metoprolol for rate/rhythm control and Eliquis for anticoagulation  She will be on Entresto for disease modification and Lasix for diuresis  LifeVest was provided at discharge  She will have BMP repeated on Friday to follow up on  Repeat Echo will be done in 3 months to see if EF improved and conversation of a cardiac cath will be revisited, however at this time she is declining this  She will need a high-res CT scan ordered to evaluate fibrosis seen on CXR  Please see above list of diagnoses and related plan for additional information  Condition at Discharge: stable    Discharge Day Visit / Exam:   Subjective:  Patient reports feeling much better and wants to go home  Denies chest pain or shortness of breath     Vitals: Blood Pressure: 119/59 (01/15/22 0700)  Pulse: 76 (01/15/22 0700)  Temperature: 97 8 °F (36 6 °C) (01/15/22 0700)  Temp Source: Oral (01/15/22 0700)  Respirations: 16 (01/15/22 0700)  Height: 5' 4" (162 6 cm) (01/10/22 1330)  Weight - Scale: 65 4 kg (144 lb 2 9 oz) (01/14/22 0900)  SpO2: 97 % (01/15/22 0700)  Exam:   Physical Exam  Constitutional:       General: She is not in acute distress  Appearance: Normal appearance  She is normal weight  She is not ill-appearing or diaphoretic  HENT:      Head: Normocephalic and atraumatic  Mouth/Throat:      Mouth: Mucous membranes are moist    Eyes:      General: No scleral icterus  Pupils: Pupils are equal, round, and reactive to light  Cardiovascular:      Rate and Rhythm: Normal rate and regular rhythm  Pulses: Normal pulses  Heart sounds: Normal heart sounds, S1 normal and S2 normal  No murmur heard  No systolic murmur is present  No diastolic murmur is present  No gallop  No S3 or S4 sounds  Pulmonary:      Effort: Pulmonary effort is normal  No accessory muscle usage or respiratory distress  Breath sounds: No stridor  Examination of the right-upper field reveals rales  Examination of the left-upper field reveals rales  Examination of the right-lower field reveals rales  Examination of the left-lower field reveals rales  Rales (From fibrosis, not edema ) present  No decreased breath sounds, wheezing or rhonchi  Chest:      Chest wall: No tenderness  Abdominal:      General: Bowel sounds are normal  There is no distension  Palpations: Abdomen is soft  Tenderness: There is no abdominal tenderness  There is no guarding  Musculoskeletal:      Right lower leg: No edema  Left lower leg: No edema  Skin:     General: Skin is warm and dry  Coloration: Skin is not jaundiced  Neurological:      General: No focal deficit present  Mental Status: She is alert  Mental status is at baseline  Motor: No tremor or seizure activity  Psychiatric:         Behavior: Behavior is cooperative  Discussion with Family: Patient declined call to        Discharge instructions/Information to patient and family: See after visit summary for information provided to patient and family  Provisions for Follow-Up Care:  See after visit summary for information related to follow-up care and any pertinent home health orders  Disposition:   Home    Planned Readmission: None     Discharge Statement:  I spent 45 minutes discharging the patient  This time was spent on the day of discharge  I had direct contact with the patient on the day of discharge  Greater than 50% of the total time was spent examining patient, answering all patient questions, arranging and discussing plan of care with patient as well as directly providing post-discharge instructions  Additional time then spent on discharge activities  Discharge Medications:  See after visit summary for reconciled discharge medications provided to patient and/or family        **Please Note: This note may have been constructed using a voice recognition system**

## 2022-01-15 NOTE — ASSESSMENT & PLAN NOTE
· Heart rate in the 120's on admission, rapid AFib  · Patient was given IV Cardizem initially but this was discontinued after echocardiogram showed low ejection fraction  · Received IV amiodarone and initially converted to normal sinus rhythm  She was placed on oral amiodarone load  However she then began having sinus tachycardia and then additional events of atrial tachycardia  She was placed back on IV amiodarone by Cardiology in the afternoon of January 13th    Currently remains on PO Amiodarone  · Stable for discharge   · Continue Amiodarone PO load   · Continue 200 mg TID dosing for 8 more doses, then 200 mg BID for 7 days, then 200 mg QD  · Continue Metoprolol 25 mg BID   · Eliquis for anticoagulation   · Repeat echo 3 months   · Referred to Cardiology

## 2022-01-15 NOTE — PLAN OF CARE
Problem: PAIN - ADULT  Goal: Verbalizes/displays adequate comfort level or baseline comfort level  Description: Interventions:  - Encourage patient to monitor pain and request assistance  - Assess pain using appropriate pain scale  - Administer analgesics based on type and severity of pain and evaluate response  - Implement non-pharmacological measures as appropriate and evaluate response  - Consider cultural and social influences on pain and pain management  - Notify physician/advanced practitioner if interventions unsuccessful or patient reports new pain  Outcome: Progressing     Problem: INFECTION - ADULT  Goal: Absence or prevention of progression during hospitalization  Description: INTERVENTIONS:  - Assess and monitor for signs and symptoms of infection  - Monitor lab/diagnostic results  - Monitor all insertion sites, i e  indwelling lines, tubes, and drains  - Monitor endotracheal if appropriate and nasal secretions for changes in amount and color  - [place] appropriate cooling/warming therapies per order  - Administer medications as ordered  - Instruct and encourage patient and family to use good hand hygiene technique  - Identify and instruct in appropriate isolation precautions for identified infection/condition  Outcome: Progressing  Goal: Absence of fever/infection during neutropenic period  Description: INTERVENTIONS:  - Monitor WBC    Outcome: Progressing  Goal: Maintain or return to baseline ADL function  Description: INTERVENTIONS:  -  Assess patient's ability to carry out ADLs; assess patient's baseline for ADL function and identify physical deficits which impact ability to perform ADLs (bathing, care of mouth/teeth, toileting, grooming, dressing, etc )  - Assess/evaluate cause of self-care deficits   - Assess range of motion  - Assess patient's mobility; develop plan if impaired  - Assess patient's need for assistive devices and provide as appropriate  - Encourage maximum independence but intervene and supervise when necessary  - Involve family in performance of ADLs  - Assess for home care needs following discharge   - Consider OT consult to assist with ADL evaluation and planning for discharge  - Provide patient education as appropriate  Outcome: Progressing  Goal: Maintains/Returns to pre admission functional level  Description: INTERVENTIONS:  - Perform BMAT or MOVE assessment daily    - Set and communicate daily mobility goal to care team and patient/family/caregiver  - Collaborate with rehabilitation services on mobility goals if consulted  - Perform Range of Motion 3 times a day  - Reposition patient every 2 hours  - Dangle patient 3 times a day  - Stand patient 3 times a day  - Ambulate patient 3 times a day  - Out of bed to chair 3 times a day   - Out of bed for meals 3 times a day  - Out of bed for toileting  - Record patient progress and toleration of activity level   Outcome: Progressing     Problem: Knowledge Deficit  Goal: Patient/family/caregiver demonstrates understanding of disease process, treatment plan, medications, and discharge instructions  Description: Complete learning assessment and assess knowledge base    Interventions:  - Provide teaching at level of understanding  - Provide teaching via preferred learning methods  Outcome: Progressing     Problem: Potential for Falls  Goal: Patient will remain free of falls  Description: INTERVENTIONS:  - Educate patient/family on patient safety including physical limitations  - Instruct patient to call for assistance with activity   - Consult OT/PT to assist with strengthening/mobility   - Keep Call bell within reach  - Keep bed low and locked with side rails adjusted as appropriate  - Keep care items and personal belongings within reach  - Initiate and maintain comfort rounds  - Make Fall Risk Sign visible to staff  - Apply yellow socks and bracelet for high fall risk patients  - Consider moving patient to room near nurses station  Outcome: Progressing     Problem: Nutrition/Hydration-ADULT  Goal: Nutrient/Hydration intake appropriate for improving, restoring or maintaining nutritional needs  Description: Monitor and assess patient's nutrition/hydration status for malnutrition  Collaborate with interdisciplinary team and initiate plan and interventions as ordered  Monitor patient's weight and dietary intake as ordered or per policy  Utilize nutrition screening tool and intervene as necessary  Determine patient's food preferences and provide high-protein, high-caloric foods as appropriate       INTERVENTIONS:  - Monitor oral intake, urinary output, labs, and treatment plans  - Assess nutrition and hydration status and recommend course of action  - Evaluate amount of meals eaten  - Assist patient with eating if necessary   - Allow adequate time for meals  - Recommend/ encourage appropriate diets, oral nutritional supplements, and vitamin/mineral supplements  - Order, calculate, and assess calorie counts as needed  - Recommend, monitor, and adjust tube feedings and TPN/PPN based on assessed needs  - Assess need for intravenous fluids  - Provide specific nutrition/hydration education as appropriate  - Include patient/family/caregiver in decisions related to nutrition  Outcome: Progressing

## 2022-01-15 NOTE — INCIDENTAL FINDINGS
The following findings require follow up:  Radiographic finding   Finding: Lung Fibrosis    Follow up required: High-Res CT Chest    Follow up should be done within 4 week(s)    Please notify the following clinician to assist with the follow up:   Dr Paola Sy

## 2022-01-15 NOTE — ASSESSMENT & PLAN NOTE
Wt Readings from Last 3 Encounters:   01/14/22 65 4 kg (144 lb 2 9 oz)   12/16/21 71 2 kg (157 lb)   10/07/21 71 6 kg (157 lb 12 8 oz)   · Presented with progressive dyspnea and orthopnea for at least last 2 months  Chest x-ray with suspected asymmetric pulmonary edema  ProBNP level elevated at 4408  · Suspect tachy mediated cardiomyopathy with LVEF 25%  Systolic function is severely reduced  There is severe global hypokinesis with regional variation    · Lasix 40 mg IV daily provided during hospitalization, changed to Lasix 40 mg po BID on 1/13/22 PM, however will be on Lasix 20 mg PO BID at discharge   · Continue fluid and salt restriction at home   · Continue daily weights at home   · Repeat echo 3 months  · Continue Entresto   · Life vest provided on 1/13/22  · Patient preferred to avoid cardiac cath at this time; reassess this conversation in 3 months if repeat echocardiogram remains abnormal

## 2022-01-15 NOTE — ASSESSMENT & PLAN NOTE
· CXR on admission "Patchy bilateral ground glass opacity, greater on the right, which could be due to Covid 19 and/or asymmetric edema " In the context of her abnormal echocardiogram this was suspected to be edema  · COVID test negative  · However, patient still has significantly abnormal breath sounds despite substantially reported improvement with diuresis and having been weaned off of oxygen  She has had some moist cough   Will repeat PA/lateral CXR for completeness  · Have patient follow up with High Res CT outpatient

## 2022-01-17 ENCOUNTER — TRANSITIONAL CARE MANAGEMENT (OUTPATIENT)
Dept: FAMILY MEDICINE CLINIC | Facility: CLINIC | Age: 72
End: 2022-01-17

## 2022-01-17 PROBLEM — I48.19 PERSISTENT ATRIAL FIBRILLATION (HCC): Status: ACTIVE | Noted: 2022-01-10

## 2022-01-17 PROBLEM — I50.41 ACUTE HEART FAILURE WITH REDUCED EJECTION FRACTION AND DIASTOLIC DYSFUNCTION (HCC): Status: RESOLVED | Noted: 2022-01-10 | Resolved: 2022-01-17

## 2022-01-17 PROBLEM — I50.42 CHRONIC COMBINED SYSTOLIC (CONGESTIVE) AND DIASTOLIC (CONGESTIVE) HEART FAILURE (HCC): Status: ACTIVE | Noted: 2022-01-17

## 2022-01-17 NOTE — UTILIZATION REVIEW
Notification of Discharge   This is a Notification of Discharge from our facility [address]  Please be advised that this patient has been discharge from our facility  Below you will find the admission and discharge date and time including the patients disposition  UTILIZATION REVIEW CONTACT:  Mason Galindo  Utilization   Network Utilization Review Department  Phone: [phone] x carefully listen to the prompts  All voicemails are confidential   Email: [email or web address]     PHYSICIAN ADVISORY SERVICES:  FOR [id number] REVIEW - MEDICAL NECESSITY DENIAL  Phone: [phone]  Fax: [phone]  Email: [email or web address]     PRESENTATION DATE: 1/10/2022  6:45 AM  OBERVATION ADMISSION DATE:   INPATIENT ADMISSION DATE: 1/10/22  9:21 AM   DISCHARGE DATE: 1/15/2022  1:57 PM  DISPOSITION: Home/Self Care Home/Self Care      IMPORTANT INFORMATION:  Send all requests for admission clinical reviews, approved or denied determinations and any other requests to dedicated fax number below belonging to the campus where the patient is receiving treatment   List of dedicated fax numbers:  [address] DENIALS (Administrative/Medical Necessity) [phone]   1000  [facility] (Maternity/NICU/Pediatrics) [phone]   Morgan Nugent [phone]   130 [facility] [phone]   [address] [place] [address] [phone]   2000 [facility] [address] [place] [address],[address] [phone]   [facility]  [phone]   2205 [facility]  2401 [facility] 1000 [facility] [phone]

## 2022-01-17 NOTE — PROGRESS NOTES
Cardiology  Hospital Follow Up   Office Visit Note  Aleksandra Cerda   66 y o    female   MRN: [id number]  [facility] [place]  [address]  [address]  [address] [address] [phone]    PCP: Margaux Pearson MD  Cardiologist: will be Dr Milo Ling                Summary of recommendations  Low-sodium diet, Heart failure education provided as below  Follow-up echocardiogram 3 months   Switch metoprolol tartrate to metoprolol succinate for more consistent, around the clock beta-blockade  For the next 2 days, increase furosemide to 40 mg BID  Continue GDMT, life vest  BMP, magnesium today or tomorrow ([facility])  Home sleep study recommended to assess for sleep apnea , today she defers  Stress test recommended  Today she defers  If she changes her mind, she will call us  Follow up will be scheduled with Dr Milo Ling 3  months          Assessment/plan  Paroxysmal atrial fibrillation/flutter  Recent hosp adm 1/10-1/15/22  A rhythm control strategy is recommended given her heart failure and cardiomyopathy  -Continue oral amiodarone load:  200 mg t i d  until January 17th, then 200 mg b i d  x1 week then 200 mg daily, along with oral anticoagulation with Eliquis  · 1/10/22: TSH, LFTs within normal limits  -Plan for short term amiodarone with repeat echo in three months to reassess EF   -EKG today: Normal sinus rhythm 72 beats per minute  Nonspecific ST T-wave changes  Nonspecific IVCD  Cardiomyopathy, new onset- ? Tachy - mediated vs viral    Patient declined left heart catheterization  Chronic HFrEF, EF 25%    By echo 1/2022  -at home, she weighs 142  Her dry weight is 140 lb    Wt Readings from Last 3 Encounters:   01/19/22 66 5 kg (146 lb 8 oz)   01/14/22 65 4 kg (144 lb 2 9 oz)   12/16/21 71 2 kg (157 lb)     --beta-blocker:   metoprolol tartrate 25 mg q 12  --Diuretic:   Lasix 20 mg b i d  ;will double for 2 days  --ACE/ARB/ARNI:   Entresto 24/26 q 12  --MRA:   --SLGT2I  --ICD: Discharged with a life vest   Compliant average heart rate around 60  No alerts  --2 g sodium diet, 1800 cc fluid restriction  Daily weights, call weight gain 2-3 lb in 1 day or 5 lb in 5 days  Abnormal chest x-ray needs high-resolution CT scan as OP; ? Changes secondary to undiagnosed COVID given viral illness few months ago  BP  112/64  Lipids:  9/9/21:  , non   Not currently on statin therapy  Follow-up lipids have been requested by her PCP  ASCVD risk score is 9 1% she declines statin therapy  Myelodysplastic syndrome  Follows with [facility] hematology  Waldenstrom macroglobulinemia  Follows with [facility] hematology  History Hodgkin's lymphoma   Cindi Christiansen Follows with [facility] hematology  Hypothyroidism on levothyroxine  Cardiac testing  · TTE EF 25%, severely reduced systolic function  Severe global hypokinesis with regional variation              JOSE  Vandana Canales is a 67 yo female with myelodysplastic syndrome, water and trauma macroglobulinemia and history of Hodgkin's lymphoma who follows with [facility] Hematology  She has no known history of heart disease       Adm 1/10-1/15/22  CC:  Shortness of breath x 2 months with upper respiratory symptoms  Recent orthopnea  DX:  Atrial fibrillation, new onset, initially presenting with RVR  Cardiomyopathy, EF 25%, new onset  Question tachycardia induced, versus viral induced versus other  ProBNP greater than 4400  Transthoracic echocardiogram:  EF 25%  Severe global hypokinesis with regional variation  Chest x-ray suspected asymmetric pulmonary edema, pulmonary fibrosis  Recommend high-resolution CT to rule out interstitial lung disease  Followed by Cardiology  Diuresed  Started on GDMT, with a beta-blocker and Entresto  Placed on amiodarone    Did receive some IV amiodarone, transition to p o  loading  She did convert to sinus rhythm in the hospital  Started on anticoagulation with Eliquis  Discharged with a life vest  Patient declines left heart catheterization  Cardiology recommend follow-up echo in 3 months dizzy for EF improved  If not, revisit left heart catheterization  Outpatient high-resolution CT scan ordered to evaluate fibrosis seen on an inpatient chest x-ray  Discharge weight : 144 lb  Discharge creatinine : 1 3  Discharge diuretic: Furosemide 20 mg b i d     1/19/22  Hospital follow-up  She is accompanied by her daughter  Normally, she is a full-time   Since her hospitalization she has not been working  She is currently living with a granddaughter  Arrangements are being made to move in with 1 of her daughters  Review of systems:  Fatigue  LANTIGUA  Compliant with wearing her life vest, as per the [employer] life vest Webpage  Compliant with medications  Trying to adhere to a salt restricted diet  She denies palpitations  She admits that prior to hospitalization she did feel them  No chest pain  We talk extensively about heart failure education, the importance of adhering to salt restricted diet, fluid restriction, medical adherence  Agreeable to getting a BMP, magnesium within the next few days  Vital signs stable  On exam she has bibasilar wheezes; they do not clear with cough  She was provided samples of Entresto and Eliquis  For the next 2 days I recommend she increase her furosemide to 40 mg b i d  and increase supplemental potassium in her diet   We discussed testing or evaluation of her cardiomyopathy  At this time she declines a stress test   She declined a screening sleep study  It at times, she does find that she is tired during the day  Follow-up with her cardiologist in a few weeks          I have spent 40 minutes with Patient and family today in which greater than 50% of this time was spent in counseling/coordination of care regarding Importance of tx compliance, Risk factor reductions and Impressions    Assessment  Diagnoses and all orders for this visit:    Hospital discharge follow-up    Cardiomyopathy, unspecified type ([place] [address] )  -     Echo complete w/ contrast if indicated; Future  -     Basic metabolic panel; Future  -     Magnesium; Future    Chronic combined systolic (congestive) and diastolic (congestive) heart failure (HCC)  -     Basic metabolic panel; Future  -     Magnesium; Future  -     metoprolol succinate (TOPROL-XL) 25 mg 24 hr tablet; Take 1 tablet (25 mg total) by mouth daily  -     amiodarone 200 mg tablet; Take 1 tablet (200 mg total) by mouth daily    Persistent atrial fibrillation (HCC)  -     POCT ECG  -     Echo complete w/ contrast if indicated; Future    Mixed hyperlipidemia    Waldenstrom macroglobulinemia (HCC)    Irregular tachycardia  -     Ambulatory referral to Cardiology  -     apixaban (Eliquis) 5 mg; Take 1 tablet (5 mg total) by mouth 2 (two) times a day    New onset a-fib w/ RVR ([place] [address] )  -     Ambulatory referral to Cardiology  -     sacubitril-valsartan (ENTRESTO) 24-26 MG TABS; Take 1 tablet by mouth 2 (two) times a day  -     apixaban (Eliquis) 5 mg; Take 1 tablet (5 mg total) by mouth 2 (two) times a day  -     metoprolol succinate (TOPROL-XL) 25 mg 24 hr tablet; Take 1 tablet (25 mg total) by mouth daily    Acute congestive heart failure, unspecified heart failure type ([place] [address] )  -     Ambulatory referral to Cardiology  -     furosemide (LASIX) 20 mg tablet; Take 1 tablet (20 mg total) by mouth 2 (two) times a day    CHF (congestive heart failure) ([facility])  -     sacubitril-valsartan (ENTRESTO) 24-26 MG TABS; Take 1 tablet by mouth 2 (two) times a day          Past Medical History:   Diagnosis Date    Cancer ([place] [address] )     Disease of thyroid gland        Review of Systems   Constitutional: Positive for malaise/fatigue  Negative for chills  Cardiovascular: Negative for chest pain, claudication, cyanosis, dyspnea on exertion, irregular heartbeat, leg swelling, near-syncope, orthopnea, palpitations, paroxysmal nocturnal dyspnea and syncope     Respiratory: Negative for cough and shortness of breath  Gastrointestinal: Negative for heartburn and nausea  Neurological: Negative for dizziness, focal weakness, headaches, light-headedness and weakness  All other systems reviewed and are negative  Allergies   Allergen Reactions    Penicillins Rash           Current Outpatient Medications:     amiodarone 200 mg tablet, Take 1 tablet (200 mg total) by mouth titrated 200 mg TID x 3 more days (8 doses - 2 on 1/15, then 3 on  and ) then 200 mg BID x 1 week than continue 200 mg daily, Disp: 46 tablet, Rfl: 0    apixaban (Eliquis) 5 mg, Take 1 tablet (5 mg total) by mouth 2 (two) times a day, Disp: 180 tablet, Rfl: 1    furosemide (LASIX) 20 mg tablet, Take 1 tablet (20 mg total) by mouth 2 (two) times a day, Disp: 180 tablet, Rfl: 1    levothyroxine 100 mcg tablet, TAKE 1 TABLET BY MOUTH EVERY DAY, Disp: 90 tablet, Rfl: 0    sacubitril-valsartan (ENTRESTO) 24-26 MG TABS, Take 1 tablet by mouth 2 (two) times a day, Disp: 180 tablet, Rfl: 1    [START ON 2022] amiodarone 200 mg tablet, Take 1 tablet (200 mg total) by mouth daily, Disp: 90 tablet, Rfl: 1    metoprolol succinate (TOPROL-XL) 25 mg 24 hr tablet, Take 1 tablet (25 mg total) by mouth daily, Disp: 180 tablet, Rfl: 1        Social History     Socioeconomic History    Marital status:      Spouse name: Not on file    Number of children: Not on file    Years of education: Not on file    Highest education level: Not on file   Occupational History    Not on file   Tobacco Use    Smoking status: Former Smoker     Packs/day: 0 50     Years: 10 00     Pack years: 5 00     Quit date:      Years since quittin 0    Smokeless tobacco: Never Used   Vaping Use    Vaping Use: Never used   Substance and Sexual Activity    Alcohol use: Never    Drug use: Never    Sexual activity: Not on file   Other Topics Concern    Not on file   Social History Narrative    Most recent tobacco use screenin2018     Social Determinants of Health     Financial Resource Strain: Not on file   Food Insecurity: Not on file   Transportation Needs: No Transportation Needs    Lack of Transportation (Medical): No    Lack of Transportation (Non-Medical): No   Physical Activity: Not on file   Stress: Not on file   Social Connections: Not on file   Intimate Partner Violence: Not on file   Housing Stability: Unknown    Unable to Pay for Housing in the Last Year: No    Number of Places Lived in the Last Year: 1    Unstable Housing in the Last Year: Not on file       Family History   Problem Relation Age of Onset    No Known Problems Mother     No Known Problems Father     Cancer Maternal Aunt        Physical Exam  Vitals and nursing note reviewed  Constitutional:       General: She is not in acute distress  Appearance: She is not diaphoretic  HENT:      Head: Normocephalic and atraumatic  Eyes:      Conjunctiva/sclera: Conjunctivae normal    Cardiovascular:      Rate and Rhythm: Normal rate and regular rhythm  Pulses: Intact distal pulses  Heart sounds: Normal heart sounds  Pulmonary:      Effort: Pulmonary effort is normal       Breath sounds: Examination of the right-lower field reveals wheezing  Examination of the left-lower field reveals wheezing  Wheezing present  Abdominal:      General: Bowel sounds are normal       Palpations: Abdomen is soft  Musculoskeletal:         General: Normal range of motion  Cervical back: Normal range of motion and neck supple  Skin:     General: Skin is warm and dry  Neurological:      Mental Status: She is alert and oriented to person, place, and time  Vitals: Blood pressure 112/64, pulse 70, resp  rate 18, height 5' 4" (1 626 m), weight 66 5 kg (146 lb 8 oz), SpO2 97 %     Wt Readings from Last 3 Encounters:   22 66 5 kg (146 lb 8 oz)   22 65 4 kg (144 lb 2 9 oz)   21 71 2 kg (157 lb)         Labs & Results:  Lab Results   Component Value Date    WBC 6 56 01/15/2022    HGB 13 8 01/15/2022    HCT 43 7 01/15/2022    MCV 92 01/15/2022     (L) 01/15/2022     No results found for: BNP  No components found for: CHEM  No results found for: María Clear, TROPONINT, CKMBINDEX  No results found for this or any previous visit  No results found for this or any previous visit  This note was completed in part utilizing m-modal fluency direct voice recognition software  Grammatical errors, random word insertion, spelling mistakes, and incomplete sentences may be an occasional consequence of the system secondary to software limitations, ambient noise and hardware issues  At the time of dictation, efforts were made to edit, clarify and /or correct errors  Please read the chart carefully and recognize, using context, where substitutions have occurred    If you have any questions or concerns about the context, text or information contained within the body of this dictation, please contact myself, the provider, for further clarification

## 2022-01-18 LAB
ATRIAL RATE: 122 BPM
ATRIAL RATE: 59 BPM
P AXIS: 58 DEGREES
PR INTERVAL: 118 MS
PR INTERVAL: 154 MS
QRS AXIS: -57 DEGREES
QRS AXIS: -6 DEGREES
QRSD INTERVAL: 112 MS
QRSD INTERVAL: 84 MS
QT INTERVAL: 352 MS
QT INTERVAL: 442 MS
QTC INTERVAL: 437 MS
QTC INTERVAL: 501 MS
T WAVE AXIS: 131 DEGREES
T WAVE AXIS: 72 DEGREES
VENTRICULAR RATE: 122 BPM
VENTRICULAR RATE: 59 BPM

## 2022-01-18 PROCEDURE — 93010 ELECTROCARDIOGRAM REPORT: CPT | Performed by: INTERNAL MEDICINE

## 2022-01-19 ENCOUNTER — OFFICE VISIT (OUTPATIENT)
Dept: CARDIOLOGY CLINIC | Facility: CLINIC | Age: 72
End: 2022-01-19
Payer: COMMERCIAL

## 2022-01-19 VITALS
HEIGHT: 64 IN | WEIGHT: 146.5 LBS | DIASTOLIC BLOOD PRESSURE: 64 MMHG | BODY MASS INDEX: 25.01 KG/M2 | SYSTOLIC BLOOD PRESSURE: 112 MMHG | OXYGEN SATURATION: 97 % | RESPIRATION RATE: 18 BRPM | HEART RATE: 70 BPM

## 2022-01-19 DIAGNOSIS — Z09 HOSPITAL DISCHARGE FOLLOW-UP: Primary | ICD-10-CM

## 2022-01-19 DIAGNOSIS — I50.9 ACUTE CONGESTIVE HEART FAILURE, UNSPECIFIED HEART FAILURE TYPE (HCC): ICD-10-CM

## 2022-01-19 DIAGNOSIS — R00.0 IRREGULAR TACHYCARDIA: ICD-10-CM

## 2022-01-19 DIAGNOSIS — E78.2 MIXED HYPERLIPIDEMIA: ICD-10-CM

## 2022-01-19 DIAGNOSIS — I42.9 CARDIOMYOPATHY, UNSPECIFIED TYPE (HCC): ICD-10-CM

## 2022-01-19 DIAGNOSIS — C88.0 WALDENSTROM MACROGLOBULINEMIA (HCC): ICD-10-CM

## 2022-01-19 DIAGNOSIS — I48.91 NEW ONSET A-FIB (HCC): ICD-10-CM

## 2022-01-19 DIAGNOSIS — I50.9 CHF (CONGESTIVE HEART FAILURE) (HCC): ICD-10-CM

## 2022-01-19 DIAGNOSIS — I48.19 PERSISTENT ATRIAL FIBRILLATION (HCC): ICD-10-CM

## 2022-01-19 DIAGNOSIS — I50.42 CHRONIC COMBINED SYSTOLIC (CONGESTIVE) AND DIASTOLIC (CONGESTIVE) HEART FAILURE (HCC): ICD-10-CM

## 2022-01-19 PROCEDURE — 1160F RVW MEDS BY RX/DR IN RCRD: CPT | Performed by: NURSE PRACTITIONER

## 2022-01-19 PROCEDURE — 93000 ELECTROCARDIOGRAM COMPLETE: CPT | Performed by: NURSE PRACTITIONER

## 2022-01-19 PROCEDURE — 1036F TOBACCO NON-USER: CPT | Performed by: NURSE PRACTITIONER

## 2022-01-19 PROCEDURE — 99215 OFFICE O/P EST HI 40 MIN: CPT | Performed by: NURSE PRACTITIONER

## 2022-01-19 PROCEDURE — 1111F DSCHRG MED/CURRENT MED MERGE: CPT | Performed by: NURSE PRACTITIONER

## 2022-01-19 RX ORDER — AMIODARONE HYDROCHLORIDE 200 MG/1
200 TABLET ORAL DAILY
Qty: 90 TABLET | Refills: 1 | Status: SHIPPED | OUTPATIENT
Start: 2022-01-25 | End: 2022-05-04

## 2022-01-19 RX ORDER — METOPROLOL SUCCINATE 25 MG/1
25 TABLET, EXTENDED RELEASE ORAL DAILY
Qty: 180 TABLET | Refills: 1 | Status: SHIPPED | OUTPATIENT
Start: 2022-01-19

## 2022-01-19 RX ORDER — FUROSEMIDE 20 MG/1
20 TABLET ORAL 2 TIMES DAILY
Qty: 180 TABLET | Refills: 1 | Status: SHIPPED | OUTPATIENT
Start: 2022-01-19 | End: 2022-07-22 | Stop reason: SDUPTHER

## 2022-01-19 NOTE — LETTER
January 19, 2022     Margaux Pearson MD  [address]  [place] [address]    Patient: Aleksandra Cerda   YOB: 1950   Date of Visit: 1/19/2022       Dear Dr Josh Hogan:    Thank you for referring Jeniffer Strange to me for evaluation  Below are my notes for this consultation  If you have questions, please do not hesitate to call me  I look forward to following your patient along with you  Sincerely,        BAO Curz        CC: Elisha Flores, DO Evon Duckworth, [address]  1/19/2022 10:03 AM  Sign when [facility] Visit  Cardiology  Hospital Follow Up   Office Visit Note  Aleksandra Cerda   66 y o    female   MRN: [id number]  [address] [facility] [address]  [address]  [address]  [id number] [phone]    PCP: Margaux Pearson MD  Cardiologist: will be Dr Milo Ling                Summary of recommendations  Low-sodium diet, Heart failure education provided as below  Follow-up echocardiogram 3 months   Switch metoprolol tartrate to metoprolol succinate for more consistent, around the clock beta-blockade  For the next 2 days, increase furosemide to 40 mg BID  Continue GDMT, life vest  BMP, magnesium today or tomorrow ([facility])  Home sleep study recommended to assess for sleep apnea , today she defers  Stress test recommended  Today she defers  If she changes her mind, she will call us  Follow up will be scheduled with Dr Milo Ling 3  months          Assessment/plan  Paroxysmal atrial fibrillation/flutter  Recent hosp adm 1/10-1/15/22  A rhythm control strategy is recommended given her heart failure and cardiomyopathy  -Continue oral amiodarone load:  200 mg t i d  until January 17th, then 200 mg b i d  x1 week then 200 mg daily, along with oral anticoagulation with Eliquis  · 1/10/22: TSH, LFTs within normal limits  -Plan for short term amiodarone with repeat echo in three months to reassess EF   -EKG today: Normal sinus rhythm 72 beats per minute    Nonspecific ST T-wave changes  Nonspecific IVCD  Cardiomyopathy, new onset- ? Tachy - mediated vs viral    Patient declined left heart catheterization  Chronic HFrEF, EF 25%    By echo 1/2022  -at home, she weighs 142  Her dry weight is 140 lb  Wt Readings from Last 3 Encounters:   01/19/22 66 5 kg (146 lb 8 oz)   01/14/22 65 4 kg (144 lb 2 9 oz)   12/16/21 71 2 kg (157 lb)     --beta-blocker:   metoprolol tartrate 25 mg q 12  --Diuretic:   Lasix 20 mg b i d  ;will double for 2 days  --ACE/ARB/ARNI:   Entresto 24/26 q 12  --MRA:   --SLGT2I  --ICD:  Discharged with a life vest   Compliant average heart rate around 60  No alerts  --2 g sodium diet, 1800 cc fluid restriction  Daily weights, call weight gain 2-3 lb in 1 day or 5 lb in 5 days  Abnormal chest x-ray needs high-resolution CT scan as OP; ? Changes secondary to undiagnosed COVID given viral illness few months ago  BP  112/64  Lipids:  9/9/21:  , non   Not currently on statin therapy  Follow-up lipids have been requested by her PCP  ASCVD risk score is 9 1% she declines statin therapy  Myelodysplastic syndrome  Follows with [facility] hematology  Waldenstrom macroglobulinemia  Follows with [facility] hematology  History Hodgkin's lymphoma   Arnaldo Ryan Follows with [facility] hematology  Hypothyroidism on levothyroxine  Cardiac testing  · TTE EF 25%, severely reduced systolic function  Severe global hypokinesis with regional variation              HPI  Lacretia Flaco is a 65 yo female with myelodysplastic syndrome, water and trauma macroglobulinemia and history of Hodgkin's lymphoma who follows with [facility] Hematology  She has no known history of heart disease       Adm 1/10-1/15/22  CC:  Shortness of breath x 2 months with upper respiratory symptoms  Recent orthopnea  DX:  Atrial fibrillation, new onset, initially presenting with RVR  Cardiomyopathy, EF 25%, new onset    Question tachycardia induced, versus viral induced versus other  ProBNP greater than 4400  Transthoracic echocardiogram:  EF 25%  Severe global hypokinesis with regional variation  Chest x-ray suspected asymmetric pulmonary edema, pulmonary fibrosis  Recommend high-resolution CT to rule out interstitial lung disease  Followed by Cardiology  Diuresed  Started on GDMT, with a beta-blocker and Entresto  Placed on amiodarone  Did receive some IV amiodarone, transition to p o  loading  She did convert to sinus rhythm in the hospital  Started on anticoagulation with Eliquis  Discharged with a life vest  Patient declines left heart catheterization  Cardiology recommend follow-up echo in 3 months dizzy for EF improved  If not, revisit left heart catheterization  Outpatient high-resolution CT scan ordered to evaluate fibrosis seen on an inpatient chest x-ray  Discharge weight : 144 lb  Discharge creatinine : 1 3  Discharge diuretic: Furosemide 20 mg b i d     1/19/22  Hospital follow-up  She is accompanied by her daughter  Normally, she is a full-time   Since her hospitalization she has not been working  She is currently living with a granddaughter  Arrangements are being made to move in with 1 of her daughters  Review of systems:  Fatigue  LANTIGUA  Compliant with wearing her life vest, as per the [employer] life vest Webpage  Compliant with medications  Trying to adhere to a salt restricted diet  She denies palpitations  She admits that prior to hospitalization she did feel them  No chest pain  We talk extensively about heart failure education, the importance of adhering to salt restricted diet, fluid restriction, medical adherence  Agreeable to getting a BMP, magnesium within the next few days     Vital signs stable  On exam she has bibasilar wheezes; they do not clear with cough  She was provided samples of Entresto and Eliquis  For the next 2 days I recommend she increase her furosemide to 40 mg b i d  and increase supplemental potassium in her diet   We discussed testing or evaluation of her cardiomyopathy  At this time she declines a stress test   She declined a screening sleep study  It at times, she does find that she is tired during the day  Follow-up with her cardiologist in a few weeks          I have spent 40 minutes with Patient and family today in which greater than 50% of this time was spent in counseling/coordination of care regarding Importance of tx compliance, Risk factor reductions and Impressions  Assessment  Diagnoses and all orders for this visit:    Hospital discharge follow-up    Cardiomyopathy, unspecified type ([place] [address] )  -     Echo complete w/ contrast if indicated; Future  -     Basic metabolic panel; Future  -     Magnesium; Future    Chronic combined systolic (congestive) and diastolic (congestive) heart failure (HCC)  -     Basic metabolic panel; Future  -     Magnesium; Future  -     metoprolol succinate (TOPROL-XL) 25 mg 24 hr tablet; Take 1 tablet (25 mg total) by mouth daily  -     amiodarone 200 mg tablet; Take 1 tablet (200 mg total) by mouth daily    Persistent atrial fibrillation (HCC)  -     POCT ECG  -     Echo complete w/ contrast if indicated; Future    Mixed hyperlipidemia    Waldenstrom macroglobulinemia (HCC)    Irregular tachycardia  -     Ambulatory referral to Cardiology  -     apixaban (Eliquis) 5 mg; Take 1 tablet (5 mg total) by mouth 2 (two) times a day    New onset a-fib w/ RVR ([place] [address] )  -     Ambulatory referral to Cardiology  -     sacubitril-valsartan (ENTRESTO) 24-26 MG TABS; Take 1 tablet by mouth 2 (two) times a day  -     apixaban (Eliquis) 5 mg; Take 1 tablet (5 mg total) by mouth 2 (two) times a day  -     metoprolol succinate (TOPROL-XL) 25 mg 24 hr tablet; Take 1 tablet (25 mg total) by mouth daily    Acute congestive heart failure, unspecified heart failure type ([place] [address] )  -     Ambulatory referral to Cardiology  -     furosemide (LASIX) 20 mg tablet;  Take 1 tablet (20 mg total) by mouth 2 (two) times a day    CHF (congestive heart failure) ([place] [address] )  -     sacubitril-valsartan (ENTRESTO) 24-26 MG TABS; Take 1 tablet by mouth 2 (two) times a day          Past Medical History:   Diagnosis Date    Cancer ([place] [address] )     Disease of thyroid gland        Review of Systems   Constitutional: Positive for malaise/fatigue  Negative for chills  Cardiovascular: Negative for chest pain, claudication, cyanosis, dyspnea on exertion, irregular heartbeat, leg swelling, near-syncope, orthopnea, palpitations, paroxysmal nocturnal dyspnea and syncope  Respiratory: Negative for cough and shortness of breath  Gastrointestinal: Negative for heartburn and nausea  Neurological: Negative for dizziness, focal weakness, headaches, light-headedness and weakness  All other systems reviewed and are negative  Allergies   Allergen Reactions    Penicillins Rash           Current Outpatient Medications:     amiodarone 200 mg tablet, Take 1 tablet (200 mg total) by mouth titrated 200 mg TID x 3 more days (8 doses - 2 on 1/15, then 3 on 1/16 and 1/17) then 200 mg BID x 1 week than continue 200 mg daily, Disp: 46 tablet, Rfl: 0    apixaban (Eliquis) 5 mg, Take 1 tablet (5 mg total) by mouth 2 (two) times a day, Disp: 180 tablet, Rfl: 1    furosemide (LASIX) 20 mg tablet, Take 1 tablet (20 mg total) by mouth 2 (two) times a day, Disp: 180 tablet, Rfl: 1    levothyroxine 100 mcg tablet, TAKE 1 TABLET BY MOUTH EVERY DAY, Disp: 90 tablet, Rfl: 0    sacubitril-valsartan (ENTRESTO) 24-26 MG TABS, Take 1 tablet by mouth 2 (two) times a day, Disp: 180 tablet, Rfl: 1    [START ON 1/25/2022] amiodarone 200 mg tablet, Take 1 tablet (200 mg total) by mouth daily, Disp: 90 tablet, Rfl: 1    metoprolol succinate (TOPROL-XL) 25 mg 24 hr tablet, Take 1 tablet (25 mg total) by mouth daily, Disp: 180 tablet, Rfl: 1        Social History     Socioeconomic History    Marital status:      Spouse name: Not on file    Number of children: Not on file    Years of education: Not on file  Highest education level: Not on file   Occupational History    Not on file   Tobacco Use    Smoking status: Former Smoker     Packs/day: 0 50     Years: 10 00     Pack years: 5 00     Quit date: 45     Years since quittin 0    Smokeless tobacco: Never Used   Vaping Use    Vaping Use: Never used   Substance and Sexual Activity    Alcohol use: Never    Drug use: Never    Sexual activity: Not on file   Other Topics Concern    Not on file   Social History Narrative    Most recent tobacco use screenin2018     Social Determinants of Health     Financial Resource Strain: Not on file   Food Insecurity: Not on file   Transportation Needs: No Transportation Needs    Lack of Transportation (Medical): No    Lack of Transportation (Non-Medical): No   Physical Activity: Not on file   Stress: Not on file   Social Connections: Not on file   Intimate Partner Violence: Not on file   Housing Stability: Unknown    Unable to Pay for Housing in the Last Year: No    Number of Places Lived in the Last Year: 1    Unstable Housing in the Last Year: Not on file       Family History   Problem Relation Age of Onset    No Known Problems Mother     No Known Problems Father     Cancer Maternal Aunt        Physical Exam  Vitals and nursing note reviewed  Constitutional:       General: She is not in acute distress  Appearance: She is not diaphoretic  HENT:      Head: Normocephalic and atraumatic  Eyes:      Conjunctiva/sclera: Conjunctivae normal    Cardiovascular:      Rate and Rhythm: Normal rate and regular rhythm  Pulses: Intact distal pulses  Heart sounds: Normal heart sounds  Pulmonary:      Effort: Pulmonary effort is normal       Breath sounds: Examination of the right-lower field reveals wheezing  Examination of the left-lower field reveals wheezing  Wheezing present  Abdominal:      General: Bowel sounds are normal       Palpations: Abdomen is soft     Musculoskeletal: General: Normal range of motion  Cervical back: Normal range of motion and neck supple  Skin:     General: Skin is warm and dry  Neurological:      Mental Status: She is alert and oriented to person, place, and time  Vitals: Blood pressure 112/64, pulse 70, resp  rate 18, height 5' 4" (1 626 m), weight 66 5 kg (146 lb 8 oz), SpO2 97 %  Wt Readings from Last 3 Encounters:   01/19/22 66 5 kg (146 lb 8 oz)   01/14/22 65 4 kg (144 lb 2 9 oz)   12/16/21 71 2 kg (157 lb)         Labs & Results:  Lab Results   Component Value Date    WBC 6 56 01/15/2022    HGB 13 8 01/15/2022    HCT 43 7 01/15/2022    MCV 92 01/15/2022     (L) 01/15/2022     No results found for: BNP  No components found for: CHEM  No results found for: CKTOTAL, TROPONINI, TROPONINT, CKMBINDEX  No results found for this or any previous visit  No results found for this or any previous visit  This note was completed in part utilizing m[employer] fluency direct voice recognition software  Grammatical errors, random word insertion, spelling mistakes, and incomplete sentences may be an occasional consequence of the system secondary to software limitations, ambient noise and hardware issues  At the time of dictation, efforts were made to edit, clarify and /or correct errors  Please read the chart carefully and recognize, using context, where substitutions have occurred    If you have any questions or concerns about the context, text or information contained within the body of this dictation, please contact myself, the provider, for further clarification

## 2022-01-19 NOTE — PATIENT INSTRUCTIONS
Seasoning Without Salt   WHAT YOU NEED TO KNOW:   Seasoning foods without salt during cooking and eating can help decrease the amount of sodium in your diet  Sodium is found in salt and in many other foods  Limit sodium if you have high blood pressure and heart failure  You may also need to limit sodium if you have liver problems or kidney disease  Sodium makes your blood pressure rise if you have high blood pressure  If you have heart failure, eating too much sodium causes extra fluid to build up in your body  This extra body fluid may cause swelling, shortness of breath, or weight gain  People with other health conditions such as diabetes or heart disease may also need to make other diet changes  Ask your healthcare provider if you need to make other diet changes  DISCHARGE INSTRUCTIONS:   High-sodium seasonings and condiments to limit or avoid:   · Dhiraj sauce, soup, and other packaged sauce mixes  · Barbecue, taco, and steak sauce  · Dry salad dressing mixes  · Garlic, onion, and celery salt  · Imitation caldera bits  · Meat tenderizers and sauces  · Monosodium glutamate (MSG)  MSG may be found in [place] food, soy sauce, and oyster sauce  · Mustard, prepared horseradish sauce, and ketchup  · Pickle relish  · Salt, seasoned salt, kosher salt, and sea salt  · Soy, Worcestershire, and teriyaki sauces  Limit low sodium varieties because they still contain high amounts of sodium  · Tartar, fish, and cocktail sauce  Low-sodium herbs to use:   · Basil with eggs, fish and shellfish, beef, liver, veal, tomato sauce, soups, pasta, green salad, and vegetables  · Bay leaf with beef, white fish, soups, and tomato dishes  · Cilantro, chili powder, and cumin with egg dishes, [place] food, pork, fish, and rice  · Dill weed with breads, chicken, cooked fresh vegetables, cucumbers, fish or shellfish, potato salad, and soup      · Marjoram with beef, lamb, chicken, turkey, pasta, green salad, cream sauce, eggs, soups, and vegetables  · Parsley with stuffing, rice, egg salad, green salad, vegetable salad, baked beans, vegetables, soups, and tomato sauces  · Rosemary and thyme with veal, pork, beef, potatoes, cream or tomato sauce, soups, and vegetables  · Teodoro with chicken, turkey, fish, pork, veal, soups, onions, stuffing, tomato sauce, and vegetables  · Savory with beef, stuffing, chicken soup, green beans, poultry, red meats, and potatoes  · Tarragon with eggs, fish or shellfish, chicken, turkey, green salad, soups, sauces, and salad dressings  Low-sodium herb blends to use:   · Chili blend: mix black pepper, chili powder, cilantro, cumin, dry mustard, garlic powder, onion powder, oregano, and paprika  · [place] slaw blend: mix celery seed, dill weed, dried onion, sugar, and tarragon  · [facility] food blend:  mix basil, black pepper, garlic powder, ground red pepper, marjoram, oregano, savory, and thyme  · Onion herb blend:  mix basil, black pepper, cumin, dill weed, dried onion flakes, and garlic powder  Low-sodium spices to use:   · Cinnamon in custard and pudding, sweet breads, rolls, fruits, fruit salad, pork, pumpkin, winter squash, and sweet potatoes  · Cloves   in sweet breads, fruit, ham, pork, baked beans, tomatoes, winter squash, and sweet potatoes  · [place] powder with beef, veal, chicken, turkey, and fish or potato soup  · Kinsey with baked fish, carrots, pot roast, ham, chicken, turkey, rice, and fruit  · Mace in chicken soup, baked fruit desserts, carrots, cauliflower, custard, fruit jams, lamb, potatoes, and pumpkin  · Nutmeg in sweet breads, fruits, vegetables, and custard  Low-sodium seasonings to use:   · Chives in eggs, pasta, cream or potato soup, corn, potatoes, and salad dressing  · Garlic (minced, powdered, or freshly chopped) with shellfish, lamb, soup, dips and sauces, Italian dishes, meats, and poultry      · Lemon with chicken, fruit salads, grilled or baked fish, shellfish, spinach, and tossed salads  · Onion (dried, powdered, or freshly chopped) with beef, liver, egg salad, green salad, casseroles, pasta dishes, and stews  · Vinegar (such as balsamic, cider, flavored, red wine, or white) with cucumbers, cooked greens, potatoes, salad dressings, spinach, and seafood  How to use food labels to choose seasonings that are low in sodium:  Reading food labels is a good way to learn whether foods contain sodium and how much sodium they contain  The ingredient list on the food label will tell you if the seasoning or food contains sodium  The food contains sodium if an ingredient has Na (symbol for sodium), salt, soda, or sodium in its name  Food labels list the amount of sodium in the food in milligrams (mg)  Following are some words about sodium that may appear on a label and what they mean  Ask your healthcare provider for more information about how to read food labels  · Sodium free or salt free: Less than five mg in each serving  · Very low sodium:  Thirty-five (35) mg of sodium or less in each serving  · Low sodium:  One-hundred and forty (140) mg of sodium or less in each serving  · Reduced or less sodium: At least 25 percent less sodium in each serving  For example, a food may have 800 mg of sodium in each serving  The same food made with reduced sodium would contain 600 mg of sodium  · Light in sodium: Fifty (50) percent less sodium in each serving  For example, a food may have 500 mg of sodium in each serving  The same food that is light in sodium would have 250 mg of sodium  · Unsalted or no added salt: No extra salt is added  Other ways to decrease sodium:   · Check with your healthcare provider before using salt substitutes if you need to limit potassium in your diet  They may be too high in potassium for you to use safely  · Fast food and packaged foods are often high in sodium   Buy low salt or low sodium foods whenever possible  Eat homemade or fresh foods and meals to avoid getting too much sodium  Buy fresh vegetables, frozen vegetables or low sodium or no salt added canned vegetables  · Avoid regular canned soups or soups made from dry mixes  Buy low sodium soups or make your own at home without salt  Use low sodium broth, bouillon, or consommé  · Avoid canned, smoked, or processed poultry (chicken, turkey), fish, or meats  Limit cured meats such as caldera and ham      · Regular cheese contains a medium to high amount of salt  If you eat cheese, buy low sodium kinds as often as possible  Add only one third to one half the amount of cheese listed on recipes  © Copyright [employer] 2021 Information is for End User's use only and may not be sold, redistributed or otherwise used for commercial purposes  All illustrations and images included in CareNotes® are the copyrighted property of A D A M , Inc  or [facility]   The above information is an  only  It is not intended as medical advice for individual conditions or treatments  Talk to your doctor, nurse or pharmacist before following any medical regimen to see if it is safe and effective for you  DASH Eating Plan   WHAT YOU NEED TO KNOW:   The DASH (Dietary Approaches to Stop Hypertension) Eating Plan is designed to help prevent or lower high blood pressure  It can also help to lower LDL (bad) cholesterol and decrease your risk for heart disease  The plan is low in sodium, sugar, unhealthy fats, and total fat  It is high in potassium, calcium, magnesium, and fiber  These nutrients are added when you eat more fruits, vegetables, and whole grains  With the DASH eating plan, you need to eat a certain number of servings from each food group  This will help you get enough of certain nutrients and limit others  The amount of servings you should eat depends on how many calories you need   Your dietitian can help you create meal plans with the right number of servings for each food group  DISCHARGE INSTRUCTIONS:   What you need to know about sodium:  Your dietitian will tell you how much sodium is safe for you to have each day  People with high blood pressure should have no more than 1,500 to 2,300 mg of sodium in a day  A teaspoon (tsp) of salt has 2,300 mg of sodium  This may seem like a difficult goal, but small changes to the foods you eat can make a big difference  Your healthcare provider or dietitian can help you create a meal plan that follows your sodium limit  · Read food labels  Food labels can help you choose foods that are low in sodium  The amount of sodium is listed in milligrams (mg)  The % Daily Value (DV) column tells you how much of your daily needs are met by 1 serving of the food for each nutrient listed  Choose foods that have less than 5% of the DV of sodium  These foods are considered low in sodium  Foods that have 20% or more of the DV of sodium are considered high in sodium  Avoid foods that have more than 300 mg of sodium in each serving  Choose foods that say low-sodium, reduced-sodium, or no salt added on the food label  · Limit added salt  Do not salt food at the table if you add salt when you cook  Use herbs and spices, such as onions, garlic, and salt-free seasonings to add flavor  Try lemon or lime juice or vinegar to add a tart flavor  Use hot peppers or a small amount of hot pepper sauce to add a spicy flavor  Limit foods high in added salt, such as the following:    ? Seasonings made with salt, such as garlic salt, celery salt, onion salt, seasoned salt, meat tenderizers, and monosodium glutamate (MSG)    ? Miso soup and canned or dried soup mixes    ? Regular soy sauce, barbecue sauce, teriyaki sauce, steak sauce, Worcestershire sauce, and most flavored vinegars    ? Snack foods, such as salted chips, popcorn, pretzels, pork rinds, salted crackers, and salted nuts    ?  Frozen foods, such as dinners, entrees, vegetables with sauces, and breaded meats    · Ask about salt substitutes  Ask your healthcare provider if you may use salt substitutes  Some salt substitutes have ingredients that can be harmful if you have certain health conditions  · Choose foods carefully at restaurants  Meals from restaurants, especially fast food restaurants, are often high in sodium  Some restaurants have nutrition information that tells you the amount of sodium in their foods  Ask to have your food prepared with less, or no salt  What you need to know about fats:  Healthy fats include unsaturated fats and omega-3 fatty acids  Unhealthy fats include saturated fats and trans fats  · Include healthy fats, such as the following:      ? Cooking oils, such as soybean, canola, olive, or sunflower    ? Fatty fish, such as salmon, tuna, mackerel, or sardines    ? Flaxseed oil or ground flaxseed    ? ½ cup of cooked beans, such as black beans, kidney beans, or morrison beans    ? 1½ ounces of low-sodium nuts, such as almonds or walnuts    ? Low-sugar, low-sodium peanut butter    ? Seeds such as jay seeds or sunflower seeds       · Limit or do not have unhealthy fats, such as the following:      ? Foods that contain fat from animals, such as fatty meats, whole milk, butter, and cream    ? Shortening, stick margarine, palm oil, and coconut oil    ? Full-fat or creamy salad dressing    ? Creamy soup    ? Crackers, chips, and baked goods made with margarine or shortening    ? Foods that are fried in unhealthy fats    ? Gravy and sauces, such as Dhiraj or cheese sauces    What you need to know about carbohydrates (carbs): All carbs break down into sugar  Complex carbs contain more fiber than simple carbs  This means complex carbs go into the bloodstream more slowly and cause less of a blood sugar spike  Try to include more complex carbs and fewer simple carbs    · Include complex carbs, such as the following:      ? 1 slice of whole-grain bread    ? 1 ounce of dry cereal that does not contain added sugar    ? ½ cup of cooked oatmeal    ? 2 ounces of cooked whole-grain pasta    ? ½ cup of cooked brown rice    · Limit or do not have simple carbs, such as the following:      ? Baked goods, such as doughnuts, pastries, and cookies    ? Mixes for cornbread and biscuits    ? White rice and pasta mixes, such as boxed macaroni and cheese    ? Instant and cold cereals that contain sugar    ? Jelly, jam, and ice cream that contain sugar    ? Condiments such as ketchup    ? Drinks high in sugar, such as soft drinks, lemonade, and fruit juice    What you need to know about vegetables and fruits:  Vegetables and fruits can be fresh, frozen, or canned  If possible, try to choose low-sodium canned options  · Include a variety of vegetables and fruits, such as the following:      ? 1 medium apple, pear, or peach (about ½ cup chopped)    ? ½ small banana    ? ½ cup berries, such as blueberries, strawberries, or blackberries    ? 1 cup of raw leafy greens, such as lettuce, spinach, kale, or [place] greens    ? ½ cup of frozen or canned (no added salt) vegetables, such as green beans    ? ½ cup of fresh, frozen, or canned fruit (canned in light syrup or fruit juice)    ? ½ cup of vegetable or fruit juice    · Limit or do not have vegetables and fruits made in the following ways:      ? Frozen fruit such as cherries that have added sugar    ? Fruit in cream or butter sauce    ? Canned vegetables that are high in sodium    ? Sauerkraut, pickled vegetables, and other foods prepared in brine    ? Fried vegetables or vegetables in butter or high-fat sauces    What you need to know about protein foods:   · Include lean or low-fat protein foods, such as the following:      ? Poultry (chicken, turkey) with no skin    ? Fish (especially fatty fish, such as salmon, fresh tuna, or mackerel)    ? Lean beef and pork (loin, round, extra lean hamburger)    ?  Egg whites and egg substitutes    ? 1 cup of nonfat (skim) or 1% milk    ? 1½ ounces of fat-free or low-fat cheese    ? 6 ounces of nonfat or low-fat yogurt    · Limit or do not have high-fat protein foods, such as the following:      ? Smoked or cured meat, such as corned beef, caldera, ham, hot dogs, and sausage    ? Canned beans and canned meats or spreads, such as potted meats, sardines, anchovies, and imitation seafood    ? Deli or lunch meats, such as bologna, ham, turkey, and roast beef    ? High-fat meat (T-bone steak, regular hamburger, and ribs)    ? Whole eggs and egg yolks    ? Whole milk, 2% milk, and cream    ? Regular cheese and processed cheese    Other guidelines to follow:   · Maintain a healthy weight  Your risk for heart disease is higher if you are overweight  Your healthcare provider may suggest that you lose weight if you are overweight  You can lose weight by eating fewer calories and foods that have added sugars and fat  The DASH meal plan can help you do this  Decrease calories by eating smaller portions at each meal and fewer snacks  Ask your healthcare provider for more information about how to lose weight  · Exercise regularly  Regular exercise can help you reach or maintain a healthy weight  Regular exercise can also help decrease your blood pressure and improve your cholesterol levels  Get 30 minutes or more of moderate exercise each day of the week  To lose weight, get at least 60 minutes of exercise  Talk to your healthcare provider about the best exercise program for you  · Limit alcohol  Women should limit alcohol to 1 drink a day  Men should limit alcohol to 2 drinks a day  A drink of alcohol is 12 ounces of beer, 5 ounces of wine, or 1½ ounces of liquor  For more information:   · [facility] [address]  [address]  [place] [address]  Phone: 9- 111 - [phone]  Web Address: [employer] no    © Copyright [employer] 2021 Information is for End User's use only and may not be sold, redistributed or otherwise used for commercial purposes  All illustrations and images included in CareNotes® are the copyrighted property of A D A M , Inc  or Yina Haddad  The above information is an  only  It is not intended as medical advice for individual conditions or treatments  Talk to your doctor, nurse or pharmacist before following any medical regimen to see if it is safe and effective for you

## 2022-01-20 ENCOUNTER — RA CDI HCC (OUTPATIENT)
Dept: OTHER | Facility: HOSPITAL | Age: 72
End: 2022-01-20

## 2022-01-20 LAB
BUN SERPL-MCNC: 36 MG/DL (ref 7–25)
BUN/CREAT SERPL: 23 (CALC) (ref 6–22)
CALCIUM SERPL-MCNC: 9.7 MG/DL (ref 8.6–10.4)
CHLORIDE SERPL-SCNC: 92 MMOL/L (ref 98–110)
CO2 SERPL-SCNC: 36 MMOL/L (ref 20–32)
CREAT SERPL-MCNC: 1.54 MG/DL (ref 0.6–0.93)
GLUCOSE SERPL-MCNC: 104 MG/DL (ref 65–139)
MAGNESIUM SERPL-MCNC: 1.9 MG/DL (ref 1.5–2.5)
POTASSIUM SERPL-SCNC: 3.8 MMOL/L (ref 3.5–5.3)
SL AMB EGFR AFRICAN AMERICAN: 39 ML/MIN/1.73M2
SL AMB EGFR NON AFRICAN AMERICAN: 33 ML/MIN/1.73M2
SODIUM SERPL-SCNC: 138 MMOL/L (ref 135–146)

## 2022-01-20 NOTE — PROGRESS NOTES
[facility]  [facility]       Chart reviewed, no opportunity found: CHART REVIEWED, NO OPPORTUNITY FOUND                        Patients insurance company: [employer]

## 2022-01-25 ENCOUNTER — OFFICE VISIT (OUTPATIENT)
Dept: FAMILY MEDICINE CLINIC | Facility: CLINIC | Age: 72
End: 2022-01-25
Payer: COMMERCIAL

## 2022-01-25 VITALS
SYSTOLIC BLOOD PRESSURE: 120 MMHG | RESPIRATION RATE: 16 BRPM | BODY MASS INDEX: 24.75 KG/M2 | DIASTOLIC BLOOD PRESSURE: 70 MMHG | HEIGHT: 64 IN | OXYGEN SATURATION: 99 % | WEIGHT: 145 LBS | HEART RATE: 68 BPM

## 2022-01-25 DIAGNOSIS — Z12.11 SCREEN FOR COLON CANCER: ICD-10-CM

## 2022-01-25 DIAGNOSIS — J84.10 PULMONARY FIBROSIS (HCC): ICD-10-CM

## 2022-01-25 DIAGNOSIS — I48.19 PERSISTENT ATRIAL FIBRILLATION (HCC): ICD-10-CM

## 2022-01-25 DIAGNOSIS — I50.42 CHRONIC COMBINED SYSTOLIC (CONGESTIVE) AND DIASTOLIC (CONGESTIVE) HEART FAILURE (HCC): ICD-10-CM

## 2022-01-25 DIAGNOSIS — N18.31 STAGE 3A CHRONIC KIDNEY DISEASE (HCC): ICD-10-CM

## 2022-01-25 DIAGNOSIS — E03.9 HYPOTHYROIDISM, UNSPECIFIED TYPE: ICD-10-CM

## 2022-01-25 DIAGNOSIS — Z76.89 ENCOUNTER FOR SUPPORT AND COORDINATION OF TRANSITION OF CARE: Primary | ICD-10-CM

## 2022-01-25 PROCEDURE — 3008F BODY MASS INDEX DOCD: CPT | Performed by: NURSE PRACTITIONER

## 2022-01-25 PROCEDURE — 99496 TRANSJ CARE MGMT HIGH F2F 7D: CPT | Performed by: FAMILY MEDICINE

## 2022-01-25 PROCEDURE — 1111F DSCHRG MED/CURRENT MED MERGE: CPT | Performed by: FAMILY MEDICINE

## 2022-01-25 NOTE — ASSESSMENT & PLAN NOTE
Noted on chest x-ray when patient was admitted for congestive cardiac failure  Advised follow-up CT scan  Patient will be contacted with the results

## 2022-01-25 NOTE — ASSESSMENT & PLAN NOTE
Lab Results   Component Value Date    EGFR 41 01/15/2022    EGFR 35 01/14/2022    EGFR 53 01/13/2022    CREATININE 1 54 (H) 01/20/2022    CREATININE 1 30 01/15/2022    CREATININE 1 48 (H) 01/14/2022     Likely related to compromised cardiac function   Repeat CMP

## 2022-01-25 NOTE — PROGRESS NOTES
FAMILY MEDICINE TRANSITION OF CARE OFFICE VISIT  [facility] - [facility]    NAME: Ayush Bennett  AGE: 66 y o  SEX: female  : 1950       DATE: 2022    Assessment and Plan     Hypothyroidism  TSH stable on levothyroxine 100 micrograms  Continue same  Pulmonary fibrosis ([address] )  Noted on chest x-ray when patient was admitted for congestive cardiac failure  Advised follow-up CT scan  Patient will be contacted with the results  Persistent atrial fibrillation ([address] )  New onset AFib  Currently rate controlled on beta-blocker  Continue amiodarone,  Eliquis  Patient had her outpatient cardiology follow-up  Is scheduled  For fup  Cardiac ECHO     Chronic combined systolic (congestive) and diastolic (congestive) heart failure (HCC)  Wt Readings from Last 3 Encounters:   22 65 8 kg (145 lb)   22 66 5 kg (146 lb 8 oz)   22 65 4 kg (144 lb 2 9 oz)     EF 25 %, Denies any new onset SOB  Monitoring her salt/ fluid intake per cardiology  Schedules to repeat echo           Stage 3a chronic kidney disease [facility])  Lab Results   Component Value Date    EGFR 41 01/15/2022    EGFR 35 2022    EGFR 53 2022    CREATININE 1 54 (H) 2022    CREATININE 1 30 01/15/2022    CREATININE 1 48 (H) 2022     Likely related to compromised cardiac function   Repeat CMP       - Counseling Documentation: patient was counseled regarding: diagnostic results, instructions for management, risk factor reductions, prognosis, patient and family education, impressions, risks and benefits of treatment options and importance of compliance with treatment    Transitional Care Management Review     Ayush Bennett is a 66 y o  female here for TCM follow-up    During the TCM phone call patient stated:    TCM Call (since 2021)     Date and time call was made  2022  3:00 PM    Hospital care reviewed  Records reviewed        Patient was hospitialized at  [facility]  [facility]        Date of Admission  01/10/22    Date of discharge  01/15/22    Diagnosis  a-fib w/ RVR ([address] )    Disposition  Home    Were the patients medications reviewed and updated  Yes    Current Symptoms  None      TCM Call (since 12/25/2021)     Post hospital issues  None    Should patient be enrolled in anticoag monitoring? No    Scheduled for follow up? Yes    Patients specialists  Cardiologist    Did you obtain your prescribed medications  Yes    Do you need help managing your prescriptions or medications  No    Is transportation to your appointment needed  No    I have advised the patient to call PCP with any new or worsening symptoms  Lacy Mueller, [address] [place] [address]    The type of support provided  None    Do you have social support  Yes, as much as I need    Are you recieving any outpatient services  No    Are you recieving home care services  No    Are you using any community resources  No    Current waiver services  No    Have you fallen in the last 12 months  No    Interperter language line needed  No          History of Present Illness     HPI    Patient is following recent hospital discharge  Admitted//22 due for shortness of breath  Noted to be in new onset Afib, stabilized on IV amiodarone  Noted to have ejection fraction of 25%  Patient started on Entresto, and Lasix  Developed slight Huy I so Entresto and Lasix were held withheld and then restarted at the discharge  Chest x-ray revealed pulmonary fibrosis, so recommended an outpatient CT scan  Patient denies any symptoms of chest pain/shortness of breath/ pedal edema today  Patient outpatient Cardiology follow-up  Past history significant for hypothyroidism, TSH stable  Patient has history of dyslipidemia, patient had decline cholesterol medication in the past   She is due for metabolic labs in March  Is following Cardiology recommendations of low-salt diet and fluid restrictions  Does not have any Specific questions / concerns today  The following portions of the patient's history were reviewed and updated as appropriate: allergies, current medications, past family history, past medical history, past social history, past surgical history and problem list     Review of Systems       Review of Systems   Constitutional: Negative  Eyes: Negative  Respiratory: Negative  Negative for shortness of breath  Cardiovascular: Negative  Negative for chest pain and palpitations  Gastrointestinal: Negative  Endocrine: Negative  Genitourinary: Negative  Musculoskeletal: Negative  Negative for myalgias  Neurological: Negative  Negative for headaches  Psychiatric/Behavioral: Negative  Active Problem List     Patient Active Problem List   Diagnosis    Encounter for support and coordination of transition of care    Hypothyroidism    [facility]od[facility]elod[facility]    [facility]ulin[facility]    Mixed hyperlipidemia    Dysuria    Pers[facility]    History of Hodgkin's lymphoma    Rash    Abnormal CXR    Chronic combined systolic (congestive) and diastolic (congestive) heart failure (HCC)    Pulmonary fibrosis (HCC)    Stage 3a chronic kidney disease (HCC)       Objective     /70   Pulse 68   Resp 16   Ht 5' 4" (1 626 m)   Wt 65 8 kg (145 lb)   SpO2 99%   BMI 24 89 kg/m²     Physical Exam  Constitutional:       Appearance: She is well-developed  Cardiovascular:      Rate and Rhythm: Normal rate and regular rhythm  Heart sounds: Normal heart sounds  Pulmonary:      Effort: Pulmonary effort is normal       Breath sounds: Normal breath sounds  Abdominal:      General: Bowel sounds are normal       Palpations: Abdomen is soft  Neurological:      Mental Status: She is alert and oriented to person, place, and time     Psychiatric:         Behavior: Behavior normal          Judgment: Judgment normal          Laboratory Results: I have personally reviewed the pertinent laboratory results/reports     Radiology/Other Diagnostic Testing Results: I have personally reviewed pertinent reports  XR chest 1 view portable    Result Date: 1/10/2022  CHEST INDICATION:   Shortness of breath, tachycardia  Patient has suspected COVID-19  COMPARISON:  None EXAM PERFORMED/VIEWS:  XR CHEST PORTABLE FINDINGS: Cardiomediastinal silhouette appears unremarkable  Patchy bilateral ground glass opacity, greater on the right  No effusion or pneumothorax  Clips in the left axilla  Osseous structures appear within normal limits for patient age  Patchy bilateral ground glass opacity, greater on the right, which could be due to Covid 19 and/or asymmetric edema  Workstation performed: [id number]     Echo complete w/ contrast if indicated    Result Date: 1/10/2022    Left Ventricle: Left ventricular cavity size is mildly dilated  The left ventricular ejection fraction is 25%  Systolic function is severely reduced  Wall thickness is mildly increased  There is severe global hypokinesis with regional variation  Unable to assess diastolic function  Left atrial filling pressure is elevated    Left Atrium: The atrium is mild to moderately dilated    Aortic Valve: There is mild to moderate regurgitation  There is probably a small, mobile mass on the aortic aspect  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    Mitral Valve: There is moderate annular calcification  There is mild regurgitation    Tricuspid Valve: There is mild regurgitation          Current Medications     Current Outpatient Medications:     amiodarone 200 mg tablet, Take 1 tablet (200 mg total) by mouth daily, Disp: 90 tablet, Rfl: 1    apixaban (Eliquis) 5 mg, Take 1 tablet (5 mg total) by mouth 2 (two) times a day, Disp: 180 tablet, Rfl: 1    furosemide (LASIX) 20 mg tablet, Take 1 tablet (20 mg total) by mouth 2 (two) times a day, Disp: 180 tablet, Rfl: 1    levothyroxine 100 mcg tablet, TAKE 1 TABLET BY MOUTH EVERY DAY, Disp: 90 tablet, Rfl: 0    metoprolol succinate (TOPROL-XL) 25 mg 24 hr tablet, Take 1 tablet (25 mg total) by mouth daily, Disp: 180 tablet, Rfl: 1    sacubitril-valsartan (ENTRESTO) 24-26 MG TABS, Take 1 tablet by mouth 2 (two) times a day, Disp: 180 tablet, Rfl: 1    amiodarone 200 mg tablet, Take 1 tablet (200 mg total) by mouth titrated 200 mg TID x 3 more days (8 doses - 2 on 1/15, then 3 on 1/16 and 1/17) then 200 mg BID x 1 week than continue 200 mg daily, Disp: 46 tablet, Rfl: 0    Tahmina Lei MD  [place] [id number]

## 2022-01-25 NOTE — ASSESSMENT & PLAN NOTE
New onset AFib  Currently rate controlled on beta-blocker  Continue amiodarone,  Eliquis  Patient had her outpatient cardiology follow-up    Is scheduled  For fup  Cardiac ECHO

## 2022-01-27 ENCOUNTER — NURSE TRIAGE (OUTPATIENT)
Dept: OTHER | Facility: OTHER | Age: 72
End: 2022-01-27

## 2022-01-27 NOTE — TELEPHONE ENCOUNTER
Patient's daughter Rach Thompson called office  Patient has been feeling weak and her heart has been racing since 6am this morning  The highest was 120 the lowest was 109  Her oxygen was normal bp was 152/67 , 130/86 temp 97  7  weight 142  4  she denies chest pain sob and dizziness  2 days ago she decreased her amiodarone to 1 tablet daily as directed  She did not miss any doses of her other current medications including metoprolol  She is following sodium and fluid restrictions  Per her daughter she drinking much less than 60oz daily  Please advise

## 2022-01-27 NOTE — TELEPHONE ENCOUNTER
Reason for Disposition   Palpitations    Answer Assessment - Initial Assessment Questions  1  DESCRIPTION: "Please describe your heart rate or heartbeat that you are having" (e g , fast/slow, regular/irregular, skipped or extra beats, "palpitations")      Fast 129  2  ONSET: "When did it start?" (Minutes, hours or days)       This morning   3  DURATION: "How long does it last" (e g , seconds, minutes, hours)      Not long   4  PATTERN "Does it come and go, or has it been constant since it started?"  "Does it get worse with exertion?"   "Are you feeling it now?"      Constant, no, better  5  TAP: "Using your hand, can you tap out what you are feeling on a chair or table in front of you, so that I can hear?" (Note: not all patients can do this)        N/A  6  HEART RATE: "Can you tell me your heart rate?" "How many beats in 15 seconds?"  (Note: not all patients can do this)        129/,  Now 109  7  RECURRENT SYMPTOM: "Have you ever had this before?" If Yes, ask: "When was the last time?" and "What happened that time?"      Yes,  Before I went to the hospital admitted  8  CAUSE: "What do you think is causing the palpitations?"      Denies palpitations  9  CARDIAC HISTORY: "Do you have any history of heart disease?" (e g , heart attack, angina, bypass surgery, angioplasty, arrhythmia)       A-Fib  10  OTHER SYMPTOMS: "Do you have any other symptoms?" (e g , dizziness, chest pain, sweating, difficulty breathing)        axel caldwell   11   PREGNANCY: "Is there any chance you are pregnant?" "When was your last menstrual period?"        n/a    Protocols used: HEART RATE AND HEARTBEAT QUESTIONS-ADULT-

## 2022-02-07 DIAGNOSIS — E03.9 HYPOTHYROIDISM, UNSPECIFIED TYPE: ICD-10-CM

## 2022-02-07 RX ORDER — LEVOTHYROXINE SODIUM 0.1 MG/1
TABLET ORAL
Qty: 90 TABLET | Refills: 0 | Status: SHIPPED | OUTPATIENT
Start: 2022-02-07 | End: 2022-05-10 | Stop reason: SDUPTHER

## 2022-02-23 ENCOUNTER — TELEPHONE (OUTPATIENT)
Dept: FAMILY MEDICINE CLINIC | Facility: CLINIC | Age: 72
End: 2022-02-23

## 2022-02-23 NOTE — TELEPHONE ENCOUNTER
Pt called and stated that she is going for an CT Scan and she wears a Life Vest and wants to know how she is suppose to do the scan if she is only allowed to remove it to shower? Please call back

## 2022-03-06 LAB
ALBUMIN SERPL-MCNC: 4 G/DL (ref 3.6–5.1)
ALBUMIN/GLOB SERPL: 1.7 (CALC) (ref 1–2.5)
ALP SERPL-CCNC: 57 U/L (ref 37–153)
ALT SERPL-CCNC: 10 U/L (ref 6–29)
AST SERPL-CCNC: 15 U/L (ref 10–35)
BILIRUB SERPL-MCNC: 0.6 MG/DL (ref 0.2–1.2)
BUN SERPL-MCNC: 12 MG/DL (ref 7–25)
BUN/CREAT SERPL: 10 (CALC) (ref 6–22)
CALCIUM SERPL-MCNC: 9.4 MG/DL (ref 8.6–10.4)
CHLORIDE SERPL-SCNC: 97 MMOL/L (ref 98–110)
CHOLEST SERPL-MCNC: 209 MG/DL
CHOLEST/HDLC SERPL: 3.6 (CALC)
CO2 SERPL-SCNC: 36 MMOL/L (ref 20–32)
CREAT SERPL-MCNC: 1.17 MG/DL (ref 0.6–0.93)
GLOBULIN SER CALC-MCNC: 2.3 G/DL (CALC) (ref 1.9–3.7)
GLUCOSE SERPL-MCNC: 102 MG/DL (ref 65–99)
HDLC SERPL-MCNC: 58 MG/DL
LDLC SERPL CALC-MCNC: 131 MG/DL (CALC)
NONHDLC SERPL-MCNC: 151 MG/DL (CALC)
POTASSIUM SERPL-SCNC: 3.9 MMOL/L (ref 3.5–5.3)
PROT SERPL-MCNC: 6.3 G/DL (ref 6.1–8.1)
SL AMB EGFR AFRICAN AMERICAN: 54 ML/MIN/1.73M2
SL AMB EGFR NON AFRICAN AMERICAN: 47 ML/MIN/1.73M2
SODIUM SERPL-SCNC: 139 MMOL/L (ref 135–146)
T4 FREE SERPL-MCNC: 1.7 NG/DL (ref 0.8–1.8)
TRIGL SERPL-MCNC: 101 MG/DL
TSH SERPL-ACNC: 6.84 MIU/L (ref 0.4–4.5)

## 2022-03-09 ENCOUNTER — TELEPHONE (OUTPATIENT)
Dept: CARDIOLOGY CLINIC | Facility: CLINIC | Age: 72
End: 2022-03-09

## 2022-03-09 ENCOUNTER — TELEPHONE (OUTPATIENT)
Dept: FAMILY MEDICINE CLINIC | Facility: CLINIC | Age: 72
End: 2022-03-09

## 2022-03-09 NOTE — TELEPHONE ENCOUNTER
Pt called is scheduled for a cat scan tomorrow and currently wears a life vest?    Tried to call pt and left a message to call office

## 2022-03-09 NOTE — TELEPHONE ENCOUNTER
----- Message from Monique Sampson MD sent at 3/9/2022 12:21 PM EST -----  Please call the patient regarding her abnormal result  TSH is abnormal  I would like to repeat the TSH in 4 weeks before changing her medication dose  Please provide her with lab orders

## 2022-03-10 ENCOUNTER — HOSPITAL ENCOUNTER (OUTPATIENT)
Dept: CT IMAGING | Facility: HOSPITAL | Age: 72
Discharge: HOME | End: 2022-03-10
Payer: COMMERCIAL

## 2022-03-10 DIAGNOSIS — J84.10 PULMONARY FIBROSIS (HCC): ICD-10-CM

## 2022-03-10 PROCEDURE — 71250 CT THORAX DX C-: CPT

## 2022-03-14 ENCOUNTER — TELEPHONE (OUTPATIENT)
Dept: FAMILY MEDICINE CLINIC | Facility: CLINIC | Age: 72
End: 2022-03-14

## 2022-03-14 NOTE — TELEPHONE ENCOUNTER
----- Message from Alvin Hess MD sent at 3/14/2022 12:56 PM EDT -----  Called and left a message re abnormal CT scan  Recommended pulmonary evaluation

## 2022-03-17 ENCOUNTER — OFFICE VISIT (OUTPATIENT)
Dept: FAMILY MEDICINE CLINIC | Facility: CLINIC | Age: 72
End: 2022-03-17
Payer: COMMERCIAL

## 2022-03-17 VITALS
TEMPERATURE: 97 F | HEART RATE: 109 BPM | BODY MASS INDEX: 23.56 KG/M2 | RESPIRATION RATE: 16 BRPM | DIASTOLIC BLOOD PRESSURE: 70 MMHG | SYSTOLIC BLOOD PRESSURE: 120 MMHG | OXYGEN SATURATION: 97 % | WEIGHT: 138 LBS | HEIGHT: 64 IN

## 2022-03-17 DIAGNOSIS — T78.40XA ALLERGY, INITIAL ENCOUNTER: ICD-10-CM

## 2022-03-17 DIAGNOSIS — J84.10 PULMONARY FIBROSIS (HCC): ICD-10-CM

## 2022-03-17 DIAGNOSIS — R05.9 COUGH: Primary | ICD-10-CM

## 2022-03-17 PROCEDURE — 3008F BODY MASS INDEX DOCD: CPT | Performed by: FAMILY MEDICINE

## 2022-03-17 PROCEDURE — 99214 OFFICE O/P EST MOD 30 MIN: CPT | Performed by: FAMILY MEDICINE

## 2022-03-17 PROCEDURE — 1036F TOBACCO NON-USER: CPT | Performed by: FAMILY MEDICINE

## 2022-03-17 PROCEDURE — 1160F RVW MEDS BY RX/DR IN RCRD: CPT | Performed by: FAMILY MEDICINE

## 2022-03-17 NOTE — ASSESSMENT & PLAN NOTE
Bello with clear PND  Has history of seasonal allergies which worsened this time of the year  Patient denies any fever or chills  Recommend to start over-the-counter allergy medication which has helped in the past   Follow-up if symptoms do not improve or worsen

## 2022-03-17 NOTE — PROGRESS NOTES
Subjective:      Patient ID: Hardy Gan is a 66 y o  female  Sinusitis  This is a new problem  The current episode started in the past 7 days  The problem is unchanged  There has been no fever  The pain is mild  Associated symptoms include congestion  Pertinent negatives include no coughing, ear pain, neck pain, shortness of breath, sinus pressure, sneezing, sore throat or swollen glands  (Clear Rhinorrhea  ) Past treatments include nothing  Patient has h/o seasonal allergies, which get worse this time of the year  Patient has new onset of AFib  Currently on amiodarone  Following up with Cardiology  Noted to have reduced ejection fraction of 25%  During hospitalization she was noted to have an abnormal chest x-ray patchy bilateral ground glass opacity  This was followed by an outpatient CT scan which reveals traction bronchiectases, 7 millimeter right upper lobe lung nodule  Patient denies any symptoms of shortness of breath  Past Medical History:   Diagnosis Date    [facility] )     Disease of thyroid gland        Family History   Problem Relation Age of Onset    No Known Problems Mother     No Known Problems Father     Cancer Maternal Aunt        Past Surgical History:   Procedure Laterality Date    HYSTERECTOMY          reports that she quit smoking about 29 years ago  She has a 5 00 pack-year smoking history  She has never used smokeless tobacco  She reports that she does not drink alcohol and does not use drugs        Current Outpatient Medications:     amiodarone 200 mg tablet, Take 1 tablet (200 mg total) by mouth daily, Disp: 90 tablet, Rfl: 1    apixaban (Eliquis) 5 mg, Take 1 tablet (5 mg total) by mouth 2 (two) times a day, Disp: 180 tablet, Rfl: 1    furosemide (LASIX) 20 mg tablet, Take 1 tablet (20 mg total) by mouth 2 (two) times a day, Disp: 180 tablet, Rfl: 1    levothyroxine 100 mcg tablet, TAKE 1 TABLET BY MOUTH EVERY DAY, Disp: 90 tablet, Rfl: 0    metoprolol succinate (TOPROL-XL) 25 mg 24 hr tablet, Take 1 tablet (25 mg total) by mouth daily, Disp: 180 tablet, Rfl: 1    sacubitril-valsartan (ENTRESTO) 24-26 MG TABS, Take 1 tablet by mouth 2 (two) times a day, Disp: 180 tablet, Rfl: 1    amiodarone 200 mg tablet, Take 1 tablet (200 mg total) by mouth titrated 200 mg TID x 3 more days (8 doses - 2 on 1/15, then 3 on 1/16 and 1/17) then 200 mg BID x 1 week than continue 200 mg daily, Disp: 46 tablet, Rfl: 0    The following portions of the patient's history were reviewed and updated as appropriate: allergies, current medications, past family history, past medical history, past social history, past surgical history and problem list     Review of Systems   Constitutional: Negative  HENT: Positive for congestion  Negative for ear pain, sinus pressure, sneezing and sore throat  Respiratory: Negative for cough and shortness of breath  Cardiovascular: Negative  Negative for palpitations  Musculoskeletal: Negative for neck pain  Neurological: Negative  Psychiatric/Behavioral: Negative  Objective:    /70   Pulse (!) 109   Temp (!) 97 °F (36 1 °C)   Resp 16   Ht 5' 4" (1 626 m)   Wt 62 6 kg (138 lb)   SpO2 97%   BMI 23 69 kg/m²      Physical Exam  Constitutional:       Appearance: She is well-developed  Cardiovascular:      Rate and Rhythm: Normal rate and regular rhythm  Heart sounds: Normal heart sounds  Pulmonary:      Effort: Pulmonary effort is normal       Comments: Breath sounds diminished at lung bases  Abdominal:      General: Bowel sounds are normal       Palpations: Abdomen is soft  Neurological:      Mental Status: She is alert and oriented to person, place, and time     Psychiatric:         Behavior: Behavior normal          Judgment: Judgment normal            Recent Results (from the past 1008 hour(s))   Lipid panel    Collection Time: 03/05/22  7:31 AM   Result Value Ref Range    Total Cholesterol 209 (H) <200 mg/dL    HDL 58 > OR = 50 mg/dL    Triglycerides 101 <150 mg/dL    LDL Calculated 131 (H) mg/dL (calc)    Chol HDLC Ratio 3 6 <5 0 (calc)    Non-HDL Cholesterol 151 (H) <130 mg/dL (calc)   Comprehensive metabolic panel    Collection Time: 03/05/22  7:31 AM   Result Value Ref Range    Glucose, Random 102 (H) 65 - 99 mg/dL    BUN 12 7 - 25 mg/dL    Creatinine 1 17 (H) 0 60 - 0 93 mg/dL    eGFR Non  47 (L) > OR = 60 mL/min/1 73m2    eGFR  54 (L) > OR = 60 mL/min/1 73m2    SL AMB BUN/CREATININE RATIO 10 6 - 22 (calc)    Sodium 139 135 - 146 mmol/L    Potassium 3 9 3 5 - 5 3 mmol/L    Chloride 97 (L) 98 - 110 mmol/L    CO2 36 (H) 20 - 32 mmol/L    Calcium 9 4 8 6 - 10 4 mg/dL    Protein, Total 6 3 6 1 - 8 1 g/dL    Albumin 4 0 3 6 - 5 1 g/dL    Globulin 2 3 1 9 - 3 7 g/dL (calc)    Albumin/Globulin Ratio 1 7 1 0 - 2 5 (calc)    TOTAL BILIRUBIN 0 6 0 2 - 1 2 mg/dL    Alkaline Phosphatase 57 37 - 153 U/L    AST 15 10 - 35 U/L    ALT 10 6 - 29 U/L   TSH, 3rd generation with Free T4 reflex    Collection Time: 03/05/22  7:31 AM   Result Value Ref Range    TSH W/RFX TO FREE T4 6 84 (H) 0 40 - 4 50 mIU/L    Free t4 1 7 0 8 - 1 8 ng/dL       Assessment/Plan:             Problem List Items Addressed This Visit        Respiratory    Pulmonary fibrosis (HCC)     Results of the CT scan discussed with patient  Currently asymptomatic  Advised to consult Pulmonary regarding abnormal CT finding  Other    Cough - Primary     Couh with clear PND  Has history of seasonal allergies which worsened this time of the year  Patient denies any fever or chills  Recommend to start over-the-counter allergy medication which has helped in the past   Follow-up if symptoms do not improve or worsen           Allergies

## 2022-03-17 NOTE — ASSESSMENT & PLAN NOTE
Results of the CT scan discussed with patient  Currently asymptomatic  Advised to consult Pulmonary regarding abnormal CT finding

## 2022-04-10 LAB
T3FREE SERPL-MCNC: 1.6 PG/ML (ref 2.3–4.2)
T4 FREE SERPL-MCNC: 1.7 NG/DL (ref 0.8–1.8)
TSH SERPL-ACNC: 4.7 MIU/L (ref 0.4–4.5)

## 2022-04-11 ENCOUNTER — HOSPITAL ENCOUNTER (OUTPATIENT)
Dept: NON INVASIVE DIAGNOSTICS | Facility: CLINIC | Age: 72
Discharge: HOME | End: 2022-04-11
Payer: COMMERCIAL

## 2022-04-11 VITALS
DIASTOLIC BLOOD PRESSURE: 70 MMHG | BODY MASS INDEX: 23.56 KG/M2 | SYSTOLIC BLOOD PRESSURE: 120 MMHG | HEART RATE: 88 BPM | WEIGHT: 138 LBS | HEIGHT: 64 IN

## 2022-04-11 DIAGNOSIS — I42.9 CARDIOMYOPATHY, UNSPECIFIED TYPE (HCC): ICD-10-CM

## 2022-04-11 DIAGNOSIS — I48.19 PERSISTENT ATRIAL FIBRILLATION (HCC): ICD-10-CM

## 2022-04-11 LAB
AORTIC ROOT: 4.1 CM
APICAL FOUR CHAMBER EJECTION FRACTION: 51 %
ASCENDING AORTA: 3.9 CM (ref 1.86–2.79)
AV LVOT MEAN GRADIENT: 1 MMHG
AV LVOT PEAK GRADIENT: 2 MMHG
AV REGURGITATION PRESSURE HALF TIME: 1056 MS
DOP CALC LVOT PEAK VEL VTI: 18.95 CM
DOP CALC LVOT PEAK VEL: 0.75 M/S
E WAVE DECELERATION TIME: 134 MS
E/A RATIO: 0.56
FRACTIONAL SHORTENING: 25 (ref 28–44)
INTERVENTRICULAR SEPTUM IN DIASTOLE (PARASTERNAL SHORT AXIS VIEW): 1.3 CM
INTERVENTRICULAR SEPTUM: 1.3 CM (ref 0.5–0.93)
LAAS-AP4: 17 CM2
LEFT ATRIUM AREA SYSTOLE SINGLE PLANE A4C: 18.8 CM2
LEFT ATRIUM SIZE: 3.4 CM
LEFT ATRIUM VOLUME INDEX (MOD BIPLANE): 20.4
LEFT INTERNAL DIMENSION IN SYSTOLE: 3 CM (ref 2.37–3.59)
LEFT VENTRICULAR INTERNAL DIMENSION IN DIASTOLE: 4 CM (ref 3.86–5.74)
LEFT VENTRICULAR POSTERIOR WALL IN END DIASTOLE: 0.9 CM (ref 0.49–0.92)
LEFT VENTRICULAR STROKE VOLUME: 37 ML
LVSV (TEICH): 37 ML
MV PEAK A VEL: 1.09 M/S
MV PEAK E VEL: 61 CM/S
MV STENOSIS PRESSURE HALF TIME: 39 MS
MV VALVE AREA P 1/2 METHOD: 5.6
PA SYSTOLIC PRESSURE: 48 MMHG
RIGHT ATRIAL 2D VOLUME: 24 ML
RIGHT ATRIUM AREA SYSTOLE A4C: 12.5 CM2
RIGHT VENTRICLE ID DIMENSION: 3.2 CM
SL CV AV DECELERATION TIME RETROGRADE: 3642 MS
SL CV AV PEAK GRADIENT RETROGRADE: 93 MMHG
SL CV LV EF: 50
SL CV PED ECHO LEFT VENTRICLE DIASTOLIC VOLUME (MOD BIPLANE) 2D: 71 ML
SL CV PED ECHO LEFT VENTRICLE SYSTOLIC VOLUME (MOD BIPLANE) 2D: 34 ML
TR MAX PG: 43 MMHG
TR PEAK VELOCITY: 3.3 M/S
TRICUSPID VALVE PEAK REGURGITATION VELOCITY: 3.27 M/S
Z-SCORE OF ASCENDING AORTA: 6.72
Z-SCORE OF INTERVENTRICULAR SEPTUM IN END DIASTOLE: 5.27
Z-SCORE OF LEFT VENTRICULAR DIMENSION IN END DIASTOLE: -1.63
Z-SCORE OF LEFT VENTRICULAR DIMENSION IN END SYSTOLE: 0.23
Z-SCORE OF LEFT VENTRICULAR POSTERIOR WALL IN END DIASTOLE: 1.77

## 2022-04-11 PROCEDURE — 93306 TTE W/DOPPLER COMPLETE: CPT

## 2022-04-11 PROCEDURE — 93306 TTE W/DOPPLER COMPLETE: CPT | Performed by: INTERNAL MEDICINE

## 2022-05-03 ENCOUNTER — CONSULT (OUTPATIENT)
Dept: PULMONOLOGY | Facility: CLINIC | Age: 72
End: 2022-05-03
Payer: COMMERCIAL

## 2022-05-03 VITALS
BODY MASS INDEX: 23.32 KG/M2 | OXYGEN SATURATION: 99 % | SYSTOLIC BLOOD PRESSURE: 114 MMHG | RESPIRATION RATE: 18 BRPM | HEART RATE: 70 BPM | DIASTOLIC BLOOD PRESSURE: 68 MMHG | HEIGHT: 64 IN | TEMPERATURE: 98.5 F | WEIGHT: 136.6 LBS

## 2022-05-03 DIAGNOSIS — J47.9 BRONCHIECTASIS WITHOUT COMPLICATION (HCC): Primary | ICD-10-CM

## 2022-05-03 DIAGNOSIS — I48.19 PERSISTENT ATRIAL FIBRILLATION (HCC): ICD-10-CM

## 2022-05-03 DIAGNOSIS — J84.112 UIP (USUAL INTERSTITIAL PNEUMONITIS) (HCC): ICD-10-CM

## 2022-05-03 DIAGNOSIS — Z85.71 HISTORY OF HODGKIN'S LYMPHOMA: ICD-10-CM

## 2022-05-03 DIAGNOSIS — I27.20 PULMONARY HYPERTENSION (HCC): ICD-10-CM

## 2022-05-03 DIAGNOSIS — J84.9 ILD (INTERSTITIAL LUNG DISEASE) (HCC): ICD-10-CM

## 2022-05-03 PROBLEM — R30.0 DYSURIA: Status: RESOLVED | Noted: 2021-12-16 | Resolved: 2022-05-03

## 2022-05-03 PROBLEM — H91.90 HARD OF HEARING: Status: ACTIVE | Noted: 2022-05-03

## 2022-05-03 PROBLEM — R93.89 ABNORMAL CXR: Status: RESOLVED | Noted: 2022-01-14 | Resolved: 2022-05-03

## 2022-05-03 PROBLEM — Z76.89 ENCOUNTER FOR SUPPORT AND COORDINATION OF TRANSITION OF CARE: Status: RESOLVED | Noted: 2021-05-06 | Resolved: 2022-05-03

## 2022-05-03 PROBLEM — T78.40XA ALLERGIES: Chronic | Status: ACTIVE | Noted: 2022-03-17

## 2022-05-03 PROCEDURE — 99214 OFFICE O/P EST MOD 30 MIN: CPT | Performed by: INTERNAL MEDICINE

## 2022-05-03 NOTE — ASSESSMENT & PLAN NOTE
Previously diagnosed and treated with chemotherapy in 2001  She does not remember the exact regimen however, suspect the regimen included bleomycin and known pulmonary toxic agent and suspect the likely etiology of her current disease

## 2022-05-03 NOTE — ASSESSMENT & PLAN NOTE
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

## 2022-05-03 NOTE — PROGRESS NOTES
Pulmonary Consultation   Reji Rubi 66 y o  female MRN: [id number]  5/3/2022      Reason for Consultation:  Bronchiectasis    Referring: Elie Shine, [address],  [address] [facility]    Assessment & Plan:    1  Bronchiectasis without complication [facility])  Assessment & Plan:  Incidentally found on CT this year  Will need to initiate work up for ILD  She was previously treated with chemotherapy for hodgkin's lyphoma and likely received bleomycin as part of the regimen  Reviewed reports from [facility] with CXR imaging with reported evidence of fibrosis from as early as 2013  Likely she has a drug induced pneumonitits suspected from bleomycin exposure  Additional differential does include HP as there is a bird in her house, along with the questionable black mold exposure  Will send serology, CBC, CMP, CRP, HP panel, obtain PFTs and a HRCT to further evaluate the ILD noted  Once the HRCT has resulted we will likely discuss her case at the ILD [place] [id number]  Orders:  -     Ambulatory Referral to Pulmonology  -     CT chest high resolution; Future; Expected date: 05/03/2022  -     Complete PFT with post Bronchodilator and Six Minute walk; Future  -     Comprehensive metabolic panel; Future; Expected date: 05/03/2022  -     CBC and differential; Future; Expected date: 05/03/2022  -     Antinuclear Antibodies, by IFA  -     RF Screen w/ Reflex to Titer; Future; Expected date: 62/19/1824  -     Cyclic citrul peptide antibody, IgG; Future; Expected date: 05/03/2022  -     Aldolase; Future; Expected date: 05/03/2022  -     Hypersensitivity pneumonitis profile; Future; Expected date: 05/03/2022  -     C-reactive protein; Future; Expected date: 05/03/2022    2  ILD (interstitial lung disease) ([facility] [address] )  Assessment & Plan:  Refer to bronchiectasis discussion    Orders:  -     CT chest high resolution; Future; Expected date: 05/03/2022  -     Complete PFT with post Bronchodilator and Six Minute walk;  Future  -     Comprehensive metabolic panel; Future; Expected date: 05/03/2022  -     CBC and differential; Future; Expected date: 05/03/2022  -     Antinuclear Antibodies, by IFA  -     RF Screen w/ Reflex to Titer; Future; Expected date: 04/01/2155  -     Cyclic citrul peptide antibody, IgG; Future; Expected date: 05/03/2022  -     Aldolase; Future; Expected date: 05/03/2022  -     Hypersensitivity pneumonitis profile; Future; Expected date: 05/03/2022  -     C-reactive protein; Future; Expected date: 05/03/2022    3  UIP (usual interstitial pneumonitis) ([place] [address] )  Assessment & Plan:  Refer to bronchiectasis discussion    Orders:  -     Comprehensive metabolic panel; Future; Expected date: 05/03/2022  -     Antinuclear Antibodies, by IFA  -     RF Screen w/ Reflex to Titer; Future; Expected date: 37/80/1224  -     Cyclic citrul peptide antibody, IgG; Future; Expected date: 05/03/2022  -     Aldolase; Future; Expected date: 05/03/2022  -     Hypersensitivity pneumonitis profile; Future; Expected date: 05/03/2022  -     C-reactive protein; Future; Expected date: 05/03/2022    4  Pulmonary hypertension ([facility] )  Assessment & Plan:  Noted on Echo from mid April  Likely a combination of Group 2 and group 3 disease  Can consider cardiology evaluation and or [address]  clinic referral  May need RHC and aggressive diuresis      5  Persistent atrial fibrillation ([facility] )  Assessment & Plan:  Now on rhythm control with use of amiodarone  Amiodarone is potentially pneumotoixc though amiodarone induced pulmonary fibrosis usually comes from long term use/exposure  Obtaining PFTs to assess lung function and will need serial PFT monitoring as well  If PFTs worse would consider alternative agent        6  History of Hodgkin's lymphoma  Assessment & Plan:  Previously diagnosed and treated with chemotherapy in 2001  She does not remember the exact regimen however, suspect the regimen included bleomycin and known pulmonary toxic agent and suspect the likely etiology of her current disease  History of Present Illness   HPI:  Floridalma Gilliland is a 66 y o  female who has a past medical history of myelodysplastic syndrome, waldenstrom macroglobulinemia, hodgkin's lymphoma (chemo last in 2001 no radiation), CKD3, HFrEF, persistent atrial fibrillation, and hypothyroidism presents to [facility] for evaluation of an abnormal CT chest concerning for pulmonary fibrosis  She was admitted to [facility] from 1/10-1/15 for new onset atrial fibrillation, decompensated heart failure  She was treated with rate and rhythm controlling meds along with diuresis  While there she was noted to have an abnormal CXR with bilateral patchy opacities  A PA & lateral CXR was performed on 1/14 with documented bibasliar fibrosis with recommendation to obtain a high resolution CT    She followed up with her PCP on 1/25 who ordered a CT chest which found biapical pleural scarring, interstitial markings, and traction bronchiectasis  Additionally noted 7 mm RUL nodule  She was then referred to pulmonary due to this  She reports minimal respiratory symptoms  She states she has only this year had trouble taking deep breaths  She is most active taking care of her 21 month old granddaughter  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  Prior to hospitalization she states she woke up from sleep feeling like she couldn't breath prompting the hospitalization  Since that time she has had a frequent mild cough with clear sputum production  She reports a post nasal drip for the past year  Lived in a townhouse for the last 21 years  Reports water damage with some black mold in the ground floor starting last year  Reports history of cats  Daughter has a bird but she is never near it  Hobbies include croche and reading  Reports seasonal allergies  Started amiodarone in 1/22  UTI in 11-12/21 but not recurrent and not sure if she ever took macrobid  Denies acid reflux symptoms       Review of Systems   Constitutional: Negative for chills, fatigue and fever  HENT: Positive for postnasal drip  Negative for ear pain, rhinorrhea, sinus pressure, sinus pain and sore throat  Eyes: Negative for pain and visual disturbance  Respiratory: Positive for cough and shortness of breath  Negative for chest tightness and wheezing  Cardiovascular: Negative for chest pain and palpitations  Gastrointestinal: Negative for abdominal pain, constipation, diarrhea, nausea and vomiting  Genitourinary: Negative for dysuria, frequency, hematuria and urgency  Musculoskeletal: Negative for arthralgias, back pain and myalgias  Skin: Negative for color change and rash  Neurological: Negative for dizziness, seizures, syncope, weakness, light-headedness, numbness and headaches  Psychiatric/Behavioral: Positive for sleep disturbance  All other systems reviewed and are negative        Historical Information   Past Medical History:   Diagnosis Date    Abnormal CXR 2022    Cancer ([facility] [address] )     Disease of thyroid gland     Dysuria 2021    Encounter for support and coordination of transition of care 2021     Past Surgical History:   Procedure Laterality Date    HYSTERECTOMY       Family History   Problem Relation Age of Onset    No Known Problems Mother     No Known Problems Father     Cancer Maternal Aunt        Occupational History: retired, former salon employee    Social History:   Social History     Socioeconomic History    Marital status:      Spouse name: Not on file    Number of children: Not on file    Years of education: Not on file    Highest education level: Not on file   Occupational History    Not on file   Tobacco Use    Smoking status: Former Smoker     Packs/day: 1 00     Years: 20 00     Pack years: 20 00     Types: Cigarettes     Start date:      Quit date:      Years since quittin 3    Smokeless tobacco: Never Used   Vaping Use    Vaping Use: Never used Substance and Sexual Activity    Alcohol use: Not Currently    Drug use: Never    Sexual activity: Not on file   Other Topics Concern    Not on file   Social History Narrative    Most recent tobacco use screenin2018     Social Determinants of Health     Financial Resource Strain: Not on file   Food Insecurity: Not on file   Transportation Needs: No Transportation Needs    Lack of Transportation (Medical): No    Lack of Transportation (Non-Medical): No   Physical Activity: Not on file   Stress: Not on file   Social Connections: Not on file   Intimate Partner Violence: Not on file   Housing Stability: Unknown    Unable to Pay for Housing in the Last Year: No    Number of Places Lived in the Last Year: 1    Unstable Housing in the Last Year: Not on file        Meds/Allergies     Current Outpatient Medications:     amiodarone 200 mg tablet, Take 1 tablet (200 mg total) by mouth titrated 200 mg TID x 3 more days (8 doses - 2 on 1/15, then 3 on  and ) then 200 mg BID x 1 week than continue 200 mg daily, Disp: 46 tablet, Rfl: 0    amiodarone 200 mg tablet, Take 1 tablet (200 mg total) by mouth daily, Disp: 90 tablet, Rfl: 1    apixaban (Eliquis) 5 mg, Take 1 tablet (5 mg total) by mouth 2 (two) times a day, Disp: 180 tablet, Rfl: 1    furosemide (LASIX) 20 mg tablet, Take 1 tablet (20 mg total) by mouth 2 (two) times a day, Disp: 180 tablet, Rfl: 1    levothyroxine 100 mcg tablet, TAKE 1 TABLET BY MOUTH EVERY DAY, Disp: 90 tablet, Rfl: 0    metoprolol succinate (TOPROL-XL) 25 mg 24 hr tablet, Take 1 tablet (25 mg total) by mouth daily, Disp: 180 tablet, Rfl: 1    sacubitril-valsartan (ENTRESTO) 24-26 MG TABS, Take 1 tablet by mouth 2 (two) times a day, Disp: 180 tablet, Rfl: 1  Allergies   Allergen Reactions    Penicillins Rash       Vitals: Blood pressure 114/68, pulse 70, temperature 98 5 °F (36 9 °C), temperature source Tympanic, resp   rate 18, height 5' 4" (1 626 m), weight 62 kg (136 lb 9 6 oz), SpO2 99 %  , Body mass index is 23 45 kg/m²  Oxygen Therapy  SpO2: 99 %    Physical Exam  Physical Exam  Vitals and nursing note reviewed  Constitutional:       General: She is not in acute distress  Appearance: Normal appearance  She is well-developed and normal weight  She is not ill-appearing or toxic-appearing  Interventions: She is not intubated  HENT:      Head: Normocephalic and atraumatic  Right Ear: External ear normal       Left Ear: External ear normal       Nose: Nose normal       Mouth/Throat:      Mouth: Mucous membranes are moist       Pharynx: Oropharynx is clear  Eyes:      General: No scleral icterus  Conjunctiva/sclera: Conjunctivae normal    Cardiovascular:      Rate and Rhythm: Regular rhythm  Tachycardia present  Pulses: Normal pulses  Heart sounds: Normal heart sounds  No murmur heard  No friction rub  No gallop  Pulmonary:      Effort: Pulmonary effort is normal  No tachypnea, bradypnea, accessory muscle usage or respiratory distress  She is not intubated  Breath sounds: Normal air entry  No decreased air movement  Rales present  No decreased breath sounds, wheezing or rhonchi  Abdominal:      General: Abdomen is flat  Bowel sounds are normal       Palpations: Abdomen is soft  Musculoskeletal:         General: No swelling or tenderness  Cervical back: Neck supple  No tenderness  Right lower leg: No edema  Left lower leg: No edema  Skin:     General: Skin is warm and dry  Neurological:      General: No focal deficit present  Mental Status: She is alert and oriented to person, place, and time  Mental status is at baseline  Labs: I have personally reviewed pertinent lab results    Lab Results   Component Value Date    WBC 6 56 01/15/2022    HGB 13 8 01/15/2022    HCT 43 7 01/15/2022    MCV 92 01/15/2022     (L) 01/15/2022     Lab Results   Component Value Date    CALCIUM 9 4 03/05/2022    K 3 9 03/05/2022    CO2 36 (H) 03/05/2022    CL 97 (L) 03/05/2022    BUN 12 03/05/2022    CREATININE 1 17 (H) 03/05/2022     No results found for: IGE  Lab Results   Component Value Date    ALT 10 03/05/2022    AST 15 03/05/2022    ALKPHOS 57 03/05/2022         Imaging and other studies: I have personally reviewed pertinent reports  and I have personally reviewed pertinent films in PACS  CT chest 3/10:  LUNGS:  Central airways are patent  There is biapical pleural scarring  There is increased prominence of the interstitial markings with microcystic changes at the lung bases involving the right middle lobe, lingula and bilateral lower lobes near the   diaphragm  This does not have the typical subpleural distribution  There is evidence of traction bronchiectasis  Lung nodules as follows (series 3):  7 mm right upper lobe nodule on image #20  There are scattered foci of centrilobular opacities on image #60 and image #72  PLEURA:  Unremarkable  HEART/GREAT VESSELS: There is moderate coronary artery calcification  There is severe mitral annulus calcification  No significant pericardial effusion  No thoracic aortic aneurysm  There is enlargement of the central pulmonary arterial tree   suggesting some element of pulmonary artery hypertension  MEDIASTINUM AND ALAN:  No significant mediastinal adenopathy  There is patulous appearance of the esophagus with debris  CHEST WALL AND LOWER NECK:   Unremarkable  CXR 1/14: Improving edema  Bibasilar fibrosis  Trace left effusion  No pneumothorax  CXR 1/10: Patchy bilateral ground glass opacity, greater on the right  No effusion or pneumothorax          Pulmonary function testing: none    EKG, Pathology, and Other Studies: I have personally reviewed pertinent reports  Echo 4/11/22    Left Ventricle: Left ventricular cavity size is normal  Wall thickness is normal  The left ventricular ejection fraction is 50%   Systolic function is low normal  Wall motion is normal  Diastolic function is mildly abnormal, consistent with grade I (abnormal) relaxation    IVS: There is abnormal septal motion consistent with bundle branch block  There is sigmoid appearance of the septum    Right Ventricle: Right ventricular cavity size is normal  Systolic function is normal     Aortic Valve: There is mild regurgitation    Tricuspid Valve: There is mild regurgitation    Pulmonary Artery: The estimated pulmonary artery systolic pressure is 93 3 mmHg  The pulmonary artery systolic pressure is moderately increased  Margarita Huff MS  Pulmonary & Critical Care Medicine Fellow, PGY-IV  [facility] Pulmonary & Critical Care [facility]

## 2022-05-03 NOTE — ASSESSMENT & PLAN NOTE
Now on rhythm control with use of amiodarone  Amiodarone is potentially pneumotoixc though amiodarone induced pulmonary fibrosis usually comes from long term use/exposure  Obtaining PFTs to assess lung function and will need serial PFT monitoring as well  If PFTs worse would consider alternative agent

## 2022-05-03 NOTE — ASSESSMENT & PLAN NOTE
Incidentally found on CT this year  Will need to initiate work up for ILD  She was previously treated with chemotherapy for hodgkin's lyphoma and likely received bleomycin as part of the regimen  Reviewed reports from [facility] with CXR imaging with reported evidence of fibrosis from as early as 2013  Likely she has a drug induced pneumonitits suspected from bleomycin exposure  Additional differential does include HP as there is a bird in her house, along with the questionable black mold exposure  Will send serology, CBC, CMP, CRP, HP panel, obtain PFTs and a HRCT to further evaluate the ILD noted  Once the HRCT has resulted we will likely discuss her case at the ILD [facility] [place] [id number]

## 2022-05-04 ENCOUNTER — OFFICE VISIT (OUTPATIENT)
Dept: CARDIOLOGY CLINIC | Facility: CLINIC | Age: 72
End: 2022-05-04
Payer: COMMERCIAL

## 2022-05-04 ENCOUNTER — RA CDI HCC (OUTPATIENT)
Dept: OTHER | Facility: HOSPITAL | Age: 72
End: 2022-05-04

## 2022-05-04 VITALS
OXYGEN SATURATION: 96 % | HEART RATE: 69 BPM | SYSTOLIC BLOOD PRESSURE: 112 MMHG | BODY MASS INDEX: 23.05 KG/M2 | DIASTOLIC BLOOD PRESSURE: 68 MMHG | HEIGHT: 64 IN | WEIGHT: 135 LBS

## 2022-05-04 DIAGNOSIS — I50.42 CHRONIC COMBINED SYSTOLIC (CONGESTIVE) AND DIASTOLIC (CONGESTIVE) HEART FAILURE (HCC): ICD-10-CM

## 2022-05-04 DIAGNOSIS — I48.0 PAROXYSMAL ATRIAL FIBRILLATION (HCC): Primary | ICD-10-CM

## 2022-05-04 DIAGNOSIS — E78.5 DYSLIPIDEMIA: ICD-10-CM

## 2022-05-04 DIAGNOSIS — I42.0 DILATED CARDIOMYOPATHY (HCC): ICD-10-CM

## 2022-05-04 PROCEDURE — 93000 ELECTROCARDIOGRAM COMPLETE: CPT | Performed by: INTERNAL MEDICINE

## 2022-05-04 PROCEDURE — 99214 OFFICE O/P EST MOD 30 MIN: CPT | Performed by: INTERNAL MEDICINE

## 2022-05-04 NOTE — PROGRESS NOTES
Cardiology Follow Up    Debbie Escobar  1950  [id number]  [facility] [address]  [address]  [place] [address]  [phone]    1  Paroxysmal atrial fibrillation (HCC)  POCT ECG   2  Dilated cardiomyopathy ([address] )  NM myocardial perfusion spect (rx stress and/or rest)   3  History of chronic combined systolic (congestive) and diastolic (congestive) heart failure ([address] )     4  Dyslipidemia         Discussion/Summary:  Ms Eddie Taylor is a pleasant 71-year-old female who presents to the office today for hospital follow-up  Cardiac wise she has been feeling well  She continues to maintain normal sinus rhythm  At this point I would not continue long-term amiodarone given her age and also she is currently undergoing a workup for interstitial lung disease  We discussed long-term rhythm monitoring with a loop recorder given she was relatively asymptomatic with the atrial fibrillation to which she is agreeable  I will arrange for implant  Otherwise her current AV may blocking regimen of metoprolol succinate will be continued  She is on systemic anticoagulation with Eliquis  She denies any signs or symptoms of obstructive sleep apnea but I did recommend a sleep study  She will contemplate  She appears compensated on exam on her current diuretic regimen to which no changes were advised  A repeat echocardiogram post hospital stay revealed improvement of her EF to 50%  She is maintained on proper medical therapy for her cardiomyopathy in the form of Entresto and metoprolol succinate  Her blood pressure is well controlled in the office today  Based on her 10-year risk statin therapy is indicated  This was discussed with the patient and she will contemplate  I will see her back in the office in a few months for re-evaluation      Interval History:   Ms Eddie Taylor is a pleasant 71-year-old female who presents to the office today for hospital follow-up  I had met her when she was hospitalized with acute decompensated heart failure and rapid atrial fibrillation  She spontaneously reverted back to normal sinus rhythm on her own  An echocardiogram during her hospital stay revealed profoundly depressed ejection fraction estimated at 25% with mild-to-moderate aortic insufficiency  It was recommended that she undergo a left heart catheterization  She declined  She was placed on oral amiodarone in attempt to maintain normal sinus rhythm given it was suspected that her cardiomyopathy was tachycardia-mediated  She was placed on proper medical therapy in the form of metoprolol succinate along with Entresto  She was diuresed with IV diuretics and transition to an oral regimen  She was discharged on a LifeVest     She followed up with our advanced practitioner post hospital stay  An echocardiogram a few months later revealed improvement of her EF to 50%  Hence her Life Vest was discontinued  She has been feeling well  She does not participate in any formal physical activity  With the activity she performs she denies any exertional chest pain or shortness of breath  She denies any signs or symptoms of volume overload including lower extremity edema, paroxysmal nocturnal dyspnea, orthopnea, acute weight gain or increasing abdominal girth  She weighs herself at home anywhere between 130 and 134 lb  She abides by a salt restricted diet  She denies any sensation of  palpitations  She is maintained on systemic anticoagulation with Eliquis without any bleeding consequences or falls  She denies syncope or presyncope  She denies symptoms of claudication      Medical Problems             Problem List     Hypothyroidism    Myelodysplasia (myelodysplastic syndrome) (HCC)    Waldenstrom macroglobulinemia (HCC)    Mixed hyperlipidemia    Persistent atrial fibrillation (HCC)    History of Hodgkin's lymphoma    Rash    Chronic combined systolic (congestive) and diastolic (congestive) heart failure (HCC)    Wt Readings from Last 3 Encounters:   22 61 2 kg (135 lb)   22 62 kg (136 lb 9 6 oz)   22 62 6 kg (138 lb)                 UIP (usual interstitial pneumonitis) (HCC)    Stage 3a chronic kidney disease (HCC)    Lab Results   Component Value Date    EGFR 41 01/15/2022    EGFR 35 2022    EGFR 53 2022    CREATININE 1 17 (H) 2022    CREATININE 1 54 (H) 2022    CREATININE 1 30 01/15/2022         Cough    Allergies (Chronic)    Pulmonary hypertension (HCC)    ILD (interstitial lung disease) (HCC)    Bronchiectasis without complication (HCC)    Hard of hearing              Past Medical History:   Diagnosis Date    Abnormal CXR 2022    Cancer ([address] )     Disease of thyroid gland     Dysuria 2021    Encounter for support and coordination of transition of care 2021     Social History     Socioeconomic History    Marital status:      Spouse name: Not on file    Number of children: Not on file    Years of education: Not on file    Highest education level: Not on file   Occupational History    Not on file   Tobacco Use    Smoking status: Former Smoker     Packs/day: 1 00     Years: 20 00     Pack years: 20 00     Types: Cigarettes     Start date:      Quit date:      Years since quittin 3    Smokeless tobacco: Never Used   Vaping Use    Vaping Use: Never used   Substance and Sexual Activity    Alcohol use: Not Currently    Drug use: Never    Sexual activity: Not on file   Other Topics Concern    Not on file   Social History Narrative    Most recent tobacco use screenin2018     Social Determinants of Health     Financial Resource Strain: Not on file   Food Insecurity: Not on file   Transportation Needs: No Transportation Needs    Lack of Transportation (Medical): No    Lack of Transportation (Non-Medical):  No   Physical Activity: Not on file Stress: Not on file   Social Connections: Not on file   Intimate Partner Violence: Not on file   Housing Stability: Unknown    Unable to Pay for Housing in the Last Year: No    Number of Places Lived in the Last Year: 1    Unstable Housing in the Last Year: Not on file      Family History   Problem Relation Age of Onset    No Known Problems Mother     No Known Problems Father     Cancer Maternal Aunt      Past Surgical History:   Procedure Laterality Date    HYSTERECTOMY         Current Outpatient Medications:     apixaban (Eliquis) 5 mg, Take 1 tablet (5 mg total) by mouth 2 (two) times a day, Disp: 180 tablet, Rfl: 1    furosemide (LASIX) 20 mg tablet, Take 1 tablet (20 mg total) by mouth 2 (two) times a day, Disp: 180 tablet, Rfl: 1    levothyroxine 100 mcg tablet, TAKE 1 TABLET BY MOUTH EVERY DAY, Disp: 90 tablet, Rfl: 0    metoprolol succinate (TOPROL-XL) 25 mg 24 hr tablet, Take 1 tablet (25 mg total) by mouth daily, Disp: 180 tablet, Rfl: 1    sacubitril-valsartan (ENTRESTO) 24-26 MG TABS, Take 1 tablet by mouth 2 (two) times a day, Disp: 180 tablet, Rfl: 1  Allergies   Allergen Reactions    Penicillins Rash       Labs:     Chemistry        Component Value Date/Time    K 3 9 03/05/2022 0731    CL 97 (L) 03/05/2022 0731    CO2 36 (H) 03/05/2022 0731    BUN 12 03/05/2022 0731    CREATININE 1 17 (H) 03/05/2022 0731    CREATININE 1 30 01/15/2022 0854        Component Value Date/Time    CALCIUM 9 4 03/05/2022 0731    ALKPHOS 57 03/05/2022 0731    AST 15 03/05/2022 0731    ALT 10 03/05/2022 0731            No results found for: CHOL  Lab Results   Component Value Date    HDL 58 03/05/2022    HDL 58 09/29/2021    HDL 59 05/29/2021     Lab Results   Component Value Date    LDLCALC 131 (H) 03/05/2022    LDLCALC 126 (H) 09/29/2021    LDLCALC 157 (H) 05/29/2021     Lab Results   Component Value Date    TRIG 101 03/05/2022    TRIG 79 09/29/2021    TRIG 113 05/29/2021     No results found for: CHOLHDL    Imaging: Echo complete w/ contrast if indicated    Result Date: 4/11/2022  Narrative: [employer]  Left Ventricle: Left ventricular cavity size is normal  Wall thickness is normal  The left ventricular ejection fraction is 50%  Systolic function is low normal  Wall motion is normal  Diastolic function is mildly abnormal, consistent with grade I (abnormal) relaxation    IVS: There is abnormal septal motion consistent with bundle branch block  There is sigmoid appearance of the septum    Right Ventricle: Right ventricular cavity size is normal  Systolic function is normal    Aortic Valve: There is mild regurgitation    Tricuspid Valve: There is mild regurgitation    Pulmonary Artery: The estimated pulmonary artery systolic pressure is 13 1 mmHg  The pulmonary artery systolic pressure is moderately increased  ECG:  Normal sinus rhythm, nonspecific intraventricular conduction delay, nonspecific ST and T-wave abnormality      Review of Systems   Cardiovascular: Negative for chest pain, claudication, cyanosis, dyspnea on exertion, irregular heartbeat, leg swelling, near-syncope, orthopnea and palpitations  All other systems reviewed and are negative  Vitals:    05/04/22 0946   BP: 112/68   Pulse: 69   SpO2: 96%     Vitals:    05/04/22 0946   Weight: 61 2 kg (135 lb)     Height: 5' 4" (162 6 cm)   Body mass index is 23 17 kg/m²      Physical Exam:   General appearance:  Appears stated age, alert, well appearing and in no distress  HEENT:  PERRLA, EOMI, no scleral icterus, no conjunctival pallor  NECK:  Supple, No elevated JVP, no thyromegaly, no carotid bruits  HEART:  Regular rate and rhythm, normal S1/S2, no S3/S4, no murmur or rub  LUNGS:  Coarse breath sounds bilaterally  ABDOMEN:  Soft, non-tender, positive bowel sounds, no rebound or guarding, no organomegaly   EXTREMITIES:  No edema  VASCULAR:  Normal pedal pulses   SKIN: No lesions or rashes on exposed skin  NEURO:  CN II-XII intact, no focal deficits

## 2022-05-04 NOTE — PROGRESS NOTES
[facility]  coding opportunities       Chart reviewed, no opportunity found:   [address]        Patients Insurance     Medicare Insurance: [employer]

## 2022-05-10 ENCOUNTER — OFFICE VISIT (OUTPATIENT)
Dept: FAMILY MEDICINE CLINIC | Facility: CLINIC | Age: 72
End: 2022-05-10
Payer: COMMERCIAL

## 2022-05-10 VITALS
HEART RATE: 70 BPM | DIASTOLIC BLOOD PRESSURE: 78 MMHG | BODY MASS INDEX: 22.98 KG/M2 | SYSTOLIC BLOOD PRESSURE: 120 MMHG | WEIGHT: 134.6 LBS | HEIGHT: 64 IN | OXYGEN SATURATION: 99 %

## 2022-05-10 DIAGNOSIS — C88.0 WALDENSTROM MACROGLOBULINEMIA (HCC): ICD-10-CM

## 2022-05-10 DIAGNOSIS — I48.0 PAROXYSMAL ATRIAL FIBRILLATION (HCC): ICD-10-CM

## 2022-05-10 DIAGNOSIS — Z11.59 NEED FOR HEPATITIS C SCREENING TEST: ICD-10-CM

## 2022-05-10 DIAGNOSIS — N18.31 STAGE 3A CHRONIC KIDNEY DISEASE (HCC): ICD-10-CM

## 2022-05-10 DIAGNOSIS — E03.9 HYPOTHYROIDISM, UNSPECIFIED TYPE: Primary | ICD-10-CM

## 2022-05-10 DIAGNOSIS — Z00.00 MEDICARE ANNUAL WELLNESS VISIT, SUBSEQUENT: ICD-10-CM

## 2022-05-10 DIAGNOSIS — Z12.11 SCREEN FOR COLON CANCER: ICD-10-CM

## 2022-05-10 DIAGNOSIS — E78.5 DYSLIPIDEMIA: ICD-10-CM

## 2022-05-10 DIAGNOSIS — Z78.0 MENOPAUSE: ICD-10-CM

## 2022-05-10 DIAGNOSIS — R73.9 ELEVATED BLOOD SUGAR: ICD-10-CM

## 2022-05-10 PROBLEM — R05.9 COUGH: Status: RESOLVED | Noted: 2022-03-17 | Resolved: 2022-05-10

## 2022-05-10 PROCEDURE — 1125F AMNT PAIN NOTED PAIN PRSNT: CPT | Performed by: FAMILY MEDICINE

## 2022-05-10 PROCEDURE — 3008F BODY MASS INDEX DOCD: CPT | Performed by: FAMILY MEDICINE

## 2022-05-10 PROCEDURE — G0439 PPPS, SUBSEQ VISIT: HCPCS | Performed by: FAMILY MEDICINE

## 2022-05-10 PROCEDURE — 99214 OFFICE O/P EST MOD 30 MIN: CPT | Performed by: FAMILY MEDICINE

## 2022-05-10 PROCEDURE — 3288F FALL RISK ASSESSMENT DOCD: CPT | Performed by: FAMILY MEDICINE

## 2022-05-10 PROCEDURE — 1170F FXNL STATUS ASSESSED: CPT | Performed by: FAMILY MEDICINE

## 2022-05-10 PROCEDURE — 1160F RVW MEDS BY RX/DR IN RCRD: CPT | Performed by: FAMILY MEDICINE

## 2022-05-10 RX ORDER — LEVOTHYROXINE SODIUM 0.1 MG/1
100 TABLET ORAL DAILY
Qty: 90 TABLET | Refills: 1 | Status: SHIPPED | OUTPATIENT
Start: 2022-05-10

## 2022-05-10 NOTE — ASSESSMENT & PLAN NOTE
TSH improving since patient discontinued amiodarone  Continue levothyroxine 100 mcg  Continue monitoring

## 2022-05-10 NOTE — RESULT ENCOUNTER NOTE
Called to discuss with patient regarding blood work  Slightly elevated serum creatinine, decreased GFR, mild anemia  Recommending follow-up with PCP  CRP, RF and anti-CCP all negative  Other blood work pending

## 2022-05-10 NOTE — PROGRESS NOTES
Assessment and Plan:     Problem List Items Addressed This Visit     None           Preventive health issues were discussed with patient, and age appropriate screening tests were ordered as noted in patient's After Visit Summary  Personalized health advice and appropriate referrals for health education or preventive services given if needed, as noted in patient's After Visit Summary       History of Present Illness:     Patient presents for Medicare Annual Wellness visit    Patient Care Team:  Renetta Nolan MD as PCP - General (Family Medicine)     Problem List:     Patient Active Problem List   Diagnosis    Hypothyroidism    Myelodys[facility]elodysplastic syndrome) [facility]    Waldenstrom macroglobulinemia ([facility] )    Dyslipidemia    Paroxysmal atrial fibrillation (HCC)    History of Hodgkin's lymphoma    Rash    Chronic combined systolic (congestive) and diastolic (congestive) heart failure (HCC)    UIP (usual interstitial pneumonitis) ([facility] )    Stage 3a chronic kidney disease ([facility] )    Cough    Allergies    Pulmonary hypertension ([facility] )    ILD (interstitial lung disease) ([facility] )    Bronchiectasis without complication ([facility] )    Hard of hearing    Dilated cardiomyopathy ([facility] )      Past Medical and Surgical History:     Past Medical History:   Diagnosis Date    Abnormal CXR 1/14/2022    Cancer ([facility] )     Disease of thyroid gland     Dysuria 12/16/2021    Encounter for support and coordination of transition of care 5/6/2021     Past Surgical History:   Procedure Laterality Date    HYSTERECTOMY        Family History:     Family History   Problem Relation Age of Onset    No Known Problems Mother     No Known Problems Father     Cancer Maternal Aunt       Social History:     Social History     Socioeconomic History    Marital status:      Spouse name: Not on file    Number of children: Not on file    Years of education: Not on file    Highest education level: Not on file   Occupational History    Not on file   Tobacco Use    Smoking status: Former Smoker     Packs/day: 1 00     Years: 20 00     Pack years: 20 00     Types: Cigarettes     Start date:      Quit date:      Years since quittin 3    Smokeless tobacco: Never Used   Vaping Use    Vaping Use: Never used   Substance and Sexual Activity    Alcohol use: Not Currently    Drug use: Never    Sexual activity: Not on file   Other Topics Concern    Not on file   Social History Narrative    Most recent tobacco use screenin2018     Social Determinants of Health     Financial Resource Strain: Not on file   Food Insecurity: Not on file   Transportation Needs: No Transportation Needs    Lack of Transportation (Medical): No    Lack of Transportation (Non-Medical): No   Physical Activity: Not on file   Stress: Not on file   Social Connections: Not on file   Intimate Partner Violence: Not on file   Housing Stability: Unknown    Unable to Pay for Housing in the Last Year: No    Number of Places Lived in the Last Year: 1    Unstable Housing in the Last Year: Not on file      Medications and Allergies:     Current Outpatient Medications   Medication Sig Dispense Refill    apixaban (Eliquis) 5 mg Take 1 tablet (5 mg total) by mouth 2 (two) times a day 180 tablet 1    furosemide (LASIX) 20 mg tablet Take 1 tablet (20 mg total) by mouth 2 (two) times a day 180 tablet 1    levothyroxine 100 mcg tablet TAKE 1 TABLET BY MOUTH EVERY DAY 90 tablet 0    metoprolol succinate (TOPROL-XL) 25 mg 24 hr tablet Take 1 tablet (25 mg total) by mouth daily 180 tablet 1    sacubitril-valsartan (ENTRESTO) 24-26 MG TABS Take 1 tablet by mouth 2 (two) times a day 180 tablet 1     No current facility-administered medications for this visit       Allergies   Allergen Reactions    Penicillins Rash      Immunizations:     Immunization History   Administered Date(s) Administered    COVID-19 MODERNA VACC 0 5 ML IM 2021, 2021    Influenza, high dose seasonal 0 7 mL 10/07/2021      Health Maintenance:         Topic Date Due    Hepatitis C Screening  Never done    Colorectal Cancer Screening  Never done    Breast Cancer Screening: Mammogram  09/27/2022         Topic Date Due    COVID-19 Vaccine (3 - Booster for Moderna series) 07/23/2021      Medicare Health Risk Assessment:     There were no vitals taken for this visit  Wylie Krabbe is here for her Subsequent Wellness visit  Health Risk Assessment:   Patient rates overall health as good  Patient feels that their physical health rating is same  Patient is satisfied with their life  Eyesight was rated as same  Hearing was rated as same  Patient feels that their emotional and mental health rating is same  Patients states they are never, rarely angry  Patient states they are sometimes unusually tired/fatigued  Pain experienced in the last 7 days has been none  Patient states that she has experienced no weight loss or gain in last 6 months  Fall Risk Screening: In the past year, patient has experienced: no history of falling in past year      Urinary Incontinence Screening:   Patient has not leaked urine accidently in the last six months  Home Safety:  Patient does not have trouble with stairs inside or outside of their home  Patient has working smoke alarms and has no working carbon monoxide detector  Home safety hazards include: none  Nutrition:   Current diet is Regular and No Added Salt  Medications:   Patient is not currently taking any over-the-counter supplements  Patient is able to manage medications  Activities of Daily Living (ADLs)/Instrumental Activities of Daily Living (IADLs):   Walk and transfer into and out of bed and chair?: Yes  Dress and groom yourself?: Yes    Bathe or shower yourself?: Yes    Feed yourself?  Yes  Do your laundry/housekeeping?: Yes  Manage your money, pay your bills and track your expenses?: Yes  Make your own meals?: Yes    Do your own shopping?: Yes    Previous Hospitalizations:   Any hospitalizations or ED visits within the last 12 months?: Yes    How many hospitalizations have you had in the last year?: 1-2    Advance Care Planning:   Living will: No    Durable POA for healthcare: No    Advanced directive: No      Cognitive Screening:   Provider or family/friend/caregiver concerned regarding cognition?: No    PREVENTIVE SCREENINGS      Cardiovascular Screening:    General: Screening Not Indicated and History Lipid Disorder      Diabetes Screening:     General: Screening Current      Colorectal Cancer Screening:     General: Risks and Benefits Discussed    Due for: Cologuard      Breast Cancer Screening:     General: Screening Current      Cervical Cancer Screening:    General: Screening Not Indicated      Abdominal Aortic Aneurysm (AAA) Screening:        General: Screening Not Indicated      Lung Cancer Screening:     General: Screening Not Indicated      Hepatitis C Screening:    General: Risks and Benefits Discussed    Hep C Screening Accepted: Yes      Screening, Brief Intervention, and Referral to Treatment (SBIRT)    Screening  Typical number of drinks in a day: 0  Typical number of drinks in a week: 0  Interpretation: Low risk drinking behavior      AUDIT-C Screenin) How often did you have a drink containing alcohol in the past year? never  2) How many drinks did you have on a typical day when you were drinking in the past year? 0  3) How often did you have 6 or more drinks on one occasion in the past year? never    AUDIT-C Score: 0  Interpretation: Score 0-2 (female): Negative screen for alcohol misuse    Single Item Drug Screening:  How often have you used an illegal drug (including marijuana) or a prescription medication for non-medical reasons in the past year? never    Single Item Drug Screen Score: 0  Interpretation: Negative screen for possible drug use disorder    Other Counseling Topics:   Car/seat belt/driving safety, skin self-exam, sunscreen and calcium and vitamin D intake and regular weightbearing exercise         Balaji Krause MD

## 2022-05-10 NOTE — PROGRESS NOTES
Subjective:      Patient ID: Fan Arguello is a 66 y o  female  Thyroid Problem  Presents for follow-up visit  Patient reports no palpitations or tremors  The symptoms have been improving (LEVOTHYROXINE 100 MICROGRAM)  Her past medical history is significant for hyperlipidemia  Hyperlipidemia  This is a chronic problem  Lipid results: LDL borderline high  Exacerbating diseases include hypothyroidism  Pertinent negatives include no chest pain, myalgias or shortness of breath  Current antihyperlipidemic treatment includes diet change  The current treatment provides mild improvement of lipids  There are no compliance problems  Past Medical History:   Diagnosis Date    Abnormal CXR 1/14/2022    Cancer ([address] )     Disease of thyroid gland     Dysuria 12/16/2021    Encounter for support and coordination of transition of care 5/6/2021       Family History   Problem Relation Age of Onset    No Known Problems Mother     No Known Problems Father     Cancer Maternal Aunt        Past Surgical History:   Procedure Laterality Date    HYSTERECTOMY          reports that she quit smoking about 29 years ago  Her smoking use included cigarettes  She started smoking about 49 years ago  She has a 20 00 pack-year smoking history  She has never used smokeless tobacco  She reports previous alcohol use  She reports that she does not use drugs        Current Outpatient Medications:     apixaban (Eliquis) 5 mg, Take 1 tablet (5 mg total) by mouth 2 (two) times a day, Disp: 180 tablet, Rfl: 1    furosemide (LASIX) 20 mg tablet, Take 1 tablet (20 mg total) by mouth 2 (two) times a day, Disp: 180 tablet, Rfl: 1    levothyroxine 100 mcg tablet, Take 1 tablet (100 mcg total) by mouth daily, Disp: 90 tablet, Rfl: 1    metoprolol succinate (TOPROL-XL) 25 mg 24 hr tablet, Take 1 tablet (25 mg total) by mouth daily, Disp: 180 tablet, Rfl: 1    sacubitril-valsartan (ENTRESTO) 24-26 MG TABS, Take 1 tablet by mouth 2 (two) times a day, Disp: 180 tablet, Rfl: 1    The following portions of the patient's history were reviewed and updated as appropriate: allergies, current medications, past family history, past medical history, past social history, past surgical history and problem list     Review of Systems   Constitutional: Negative  Respiratory: Negative  Negative for shortness of breath  Cardiovascular: Negative  Negative for chest pain and palpitations  Musculoskeletal: Negative for myalgias  Neurological: Negative  Negative for tremors  Psychiatric/Behavioral: Negative  Objective:    /78   Pulse 70   Ht 5' 4" (1 626 m)   Wt 61 1 kg (134 lb 9 6 oz)   SpO2 99%   BMI 23 10 kg/m²      Physical Exam  Constitutional:       Appearance: She is well-developed  Cardiovascular:      Rate and Rhythm: Normal rate and regular rhythm  Heart sounds: Normal heart sounds  Pulmonary:      Effort: Pulmonary effort is normal       Breath sounds: Normal breath sounds  Abdominal:      General: Bowel sounds are normal       Palpations: Abdomen is soft  Neurological:      Mental Status: She is alert and oriented to person, place, and time     Psychiatric:         Behavior: Behavior normal          Judgment: Judgment normal            Recent Results (from the past 1008 hour(s))   T4, free    Collection Time: 04/09/22  7:25 AM   Result Value Ref Range    Free t4 1 7 0 8 - 1 8 ng/dL   TSH, 3rd generation    Collection Time: 04/09/22  7:25 AM   Result Value Ref Range    TSH 4 70 (H) 0 40 - 4 50 mIU/L   T3, free    Collection Time: 04/09/22  7:25 AM   Result Value Ref Range    Free T3 1 6 (L) 2 3 - 4 2 pg/mL   Echo complete w/ contrast if indicated    Collection Time: 04/11/22  9:35 AM   Result Value Ref Range    A4C EF 51 %    LVIDd 4 00 3 86 - 5 74 cm    LVIDS 3 00 2 37 - 3 59 cm    IVSd 1 30 cm    LVPWd 0 90 0 49 - 0 92 cm    FS 25 28 - 44    LA Volume Index (BP) 20 4     E/A ratio 0 56     E wave deceleration time 134 ms    MV Peak E Sandeep 61 cm/s    MV Peak A Sandeep 1 09 m/s    AV LVOT peak gradient 2 mmHg    LVOT peak VTI 18 95 cm    LVOT peak sandeep 0 75 m/s    RVID d 3 2 cm    LA size 3 4 cm    MIKE A4C 18 8 cm2    RA 2D Volume 24 0 mL    RAA A4C 12 5 cm2    LVOT mn grad 1 0 mmHg    AV peak gradient 93 mmHg    AV Deceleration Time 3,642 ms    AV regurgitation pressure 1/2 time 1,056 ms    MV stenosis pressure 1/2 time 39 ms    MV valve area p 1/2 method 5 60     TR Peak Sandeep 3 3 m/s    Triscuspid Valve Regurgitation Peak Gradient 43 0 mmHg    Ao root 4 10 cm    Asc Ao 3 9 1 86 - 2 79 cm    Tricuspid valve peak regurgitation velocity 3 27 m/s    Left ventricular stroke volume (2D) 37 00 mL    IVS 1 3 0 50 - 0 93 cm    LEFT VENTRICLE SYSTOLIC VOLUME (MOD BIPLANE) 2D 34 mL    LV DIASTOLIC VOLUME (MOD BIPLANE) 2D 71 mL    Left Atrium Area-systolic Four Chamber 17 cm2    LVSV, 2D 37 mL    Ao asc z-score 6 72     ZLVPWD 1 77     ZLVIDS 0 23     ZLVIDD -1 63     ZIVSD 5 27     PASP 48 0 mmHg    LV EF 50    Hypersensitivity Pneumonitis Evaluation    Collection Time: 05/09/22  7:07 AM   Result Value Ref Range    ASPERGILLUS FUMIGATUS      S  RECTIVIRGULA      Thermoactinomyces Candidus Ab      T   SACCHARI      T  VULGARIS      SACCHAROMONOSPORA VIRIDIS      AUREOBASIDIUM PULLULANS (M12) IGG      Alternaria alternata (M6) IgG      CLADOSPORIUM HERBARUM (M2) IGG      PENICILLIUM NOTATUM (M1) IGG      PHOMA SPP IGG      TRICHODERMA VIRIDE (M15) IGG     Comprehensive metabolic panel    Collection Time: 05/09/22  7:07 AM   Result Value Ref Range    Glucose, Random 94 65 - 99 mg/dL    BUN 18 7 - 25 mg/dL    Creatinine 1 38 (H) 0 60 - 0 93 mg/dL    eGFR Non  38 (L) > OR = 60 mL/min/1 73m2    eGFR  44 (L) > OR = 60 mL/min/1 73m2    SL AMB BUN/CREATININE RATIO 13 6 - 22 (calc)    Sodium 143 135 - 146 mmol/L    Potassium 3 6 3 5 - 5 3 mmol/L    Chloride 100 98 - 110 mmol/L    CO2 37 (H) 20 - 32 mmol/L    Calcium 9 3 8 6 - 10 4 mg/dL    Protein, Total 6 5 6 1 - 8 1 g/dL    Albumin 4 1 3 6 - 5 1 g/dL    Globulin 2 4 1 9 - 3 7 g/dL (calc)    Albumin/Globulin Ratio 1 7 1 0 - 2 5 (calc)    TOTAL BILIRUBIN 0 6 0 2 - 1 2 mg/dL    Alkaline Phosphatase 68 37 - 153 U/L    AST 16 10 - 35 U/L    ALT 10 6 - 29 U/L   Aldolase    Collection Time: 05/09/22  7:07 AM   Result Value Ref Range    Aldolase     CBC and differential    Collection Time: 05/09/22  7:07 AM   Result Value Ref Range    White Blood Cell Count 4 6 3 8 - 10 8 Thousand/uL    Red Blood Cell Count 3 71 (L) 3 80 - 5 10 Million/uL    Hemoglobin 11 4 (L) 11 7 - 15 5 g/dL    HCT 34 2 (L) 35 0 - 45 0 %    MCV 92 2 80 0 - 100 0 fL    MCH 30 7 27 0 - 33 0 pg    MCHC 33 3 32 0 - 36 0 g/dL    RDW 13 6 11 0 - 15 0 %    Platelet Count 834 (L) 140 - 400 Thousand/uL    SL AMB MPV 12 2 7 5 - 12 5 fL    Neutrophils (Absolute) 2,263 1,500 - 7,800 cells/uL    Lymphocytes (Absolute) 1,808 850 - 3,900 cells/uL    Monocytes (Absolute) 340 200 - 950 cells/uL    Eosinophils (Absolute) 170 15 - 500 cells/uL    Basophils ABS 18 0 - 200 cells/uL    Neutrophils 49 2 %    Lymphocytes 39 3 %    Monocytes 7 4 %    Eosinophils 3 7 %    Basophils PCT 0 4 %   ANAT Screen w/ Reflex to Titer/Pattern    Collection Time: 05/09/22  7:07 AM   Result Value Ref Range    ANAT Screen, IFA     Rheumatoid Factor    Collection Time: 05/09/22  7:07 AM   Result Value Ref Range    Rheumatoid Factor <14 <14 IU/mL   C-reactive protein    Collection Time: 05/09/22  7:07 AM   Result Value Ref Range    C-Reactive Protein, Quant 1 1 <6 5 mg/L   Cyclic citrul peptide antibody, IgG    Collection Time: 05/09/22  7:07 AM   Result Value Ref Range    Cyclic Citrullinated Peptide (CCP) Ab (IgG)         Assessment/Plan:             Problem List Items Addressed This Visit        Endocrine    Hypothyroidism - Primary     TSH improving since patient discontinued amiodarone  Continue levothyroxine 100 mcg  Continue monitoring            Relevant Medications    levothyroxine 100 mcg tablet    Other Relevant Orders    TSH, 3rd generation with Free T4 reflex       Cardiovascular and Mediastinum    Paroxysmal atrial fibrillation (HCC)     Continue BB, Eliquis per cardiology  Genitourinary    Stage 3a chronic kidney disease [facility])     Lab Results   Component Value Date    EGFR 41 01/15/2022    EGFR 35 01/14/2022    EGFR 53 01/13/2022    CREATININE 1 38 (H) 05/09/2022    CREATININE 1 17 (H) 03/05/2022    CREATININE 1 54 (H) 01/20/2022   Stable  Continue monitoring  Other    Screen for colon cancer    Relevant Orders    Cologuard    Waldenstrom macroglobulinemia ([facility] [address] )     Monitoring per oncology  Dyslipidemia    Relevant Orders    Lipid panel    Elevated blood sugar     Check A1c to r/o diabetes            Relevant Orders    Comprehensive metabolic panel    Hemoglobin A1C    Menopause    Relevant Orders    DXA bone density spine hip and pelvis      Other Visit Diagnoses     Need for hepatitis C screening test        Relevant Orders    Hepatitis C antibody          Answers for HPI/ROS submitted by the patient on 5/3/2022  How would you rate your overall health?: good  Compared to last year, how is your physical health?: same  In general, how satisfied are you with your life?: satisfied  Compared to last year, how is your eyesight?: same  Compared to last year, how is your hearing?: same  Compared to last year, how is your emotional/mental health?: same  How often is anger a problem for you?: never, rarely  How often do you feel unusually tired/fatigued?: sometimes  In the past 7 days, how much pain have you experienced?: none  In the past 6 months, have you lost or gained 10 pounds without trying?: No  One or more falls in the last year: No  In the past 6 months, have you accidentally leaked urine?: No  Do you have trouble with the stairs inside or outside your home?: No  Does your home have working smoke alarms?: Yes  Does your home have a carbon monoxide monitor?: No  Which safety hazards (if any) have you experienced in your home? Please select all that apply : none  How would you describe your current diet?  Please select all that apply : Regular, No Added Salt  In addition to prescription medications, are you taking any over-the-counter supplements?: No  Can you manage your medications?: Yes  Are you currently taking any opioid medications?: No  Can you walk and transfer into and out of your bed and chair?: Yes  Can you dress and groom yourself?: Yes  Can you bathe or shower yourself?: Yes  Can you feed yourself?: Yes  Can you do your laundry/ housekeeping?: Yes  Can you manage your money, pay your bills, and track your expenses?: Yes  Can you make your own meals?: Yes  Can you do your own shopping?: Yes  Within the last 12 months, have you had any hospitalizations or Emergency Department visits?: Yes  If yes, how many times have you been hospitalized within the past year?: 1-2  Do you have a living will?: No  Do you have a Durable POA (Power of ) for healthcare decisions?: No  Do you have an Advanced Directive for end of life decisions?: No  How often have you used an illegal drug (including marijuana) or a prescription medication for non-medical reasons in the past year?: never  What is the typical number of drinks you consume in a day?: 0  What is the typical number of drinks you consume in a week?: 0  How often did you have a drink containing alcohol in the past year?: never  How many drinks did you have on a typical day  when you were drinking in the past year?: 0  How often did you have 6 or more drinks on one occasion in the past year?: never

## 2022-05-10 NOTE — ASSESSMENT & PLAN NOTE
Lab Results   Component Value Date    EGFR 41 01/15/2022    EGFR 35 01/14/2022    EGFR 53 01/13/2022    CREATININE 1 38 (H) 05/09/2022    CREATININE 1 17 (H) 03/05/2022    CREATININE 1 54 (H) 01/20/2022   Stable  Continue monitoring

## 2022-05-13 ENCOUNTER — TELEPHONE (OUTPATIENT)
Dept: PULMONOLOGY | Facility: CLINIC | Age: 72
End: 2022-05-13

## 2022-05-13 DIAGNOSIS — J84.9 ILD (INTERSTITIAL LUNG DISEASE) (HCC): Primary | ICD-10-CM

## 2022-05-13 DIAGNOSIS — J84.112 UIP (USUAL INTERSTITIAL PNEUMONITIS) (HCC): ICD-10-CM

## 2022-05-13 LAB
A ALTERNATA IGG SER-MCNC: <2 MCG/ML
A FUMIGATUS AB SER QL ID: NOT DETECTED
A PULLULANS IGG SER-MCNC: <2 MCG/ML
ALBUMIN SERPL-MCNC: 4.1 G/DL (ref 3.6–5.1)
ALBUMIN/GLOB SERPL: 1.7 (CALC) (ref 1–2.5)
ALDOLASE SERPL-CCNC: 2.7 U/L
ALP SERPL-CCNC: 68 U/L (ref 37–153)
ALT SERPL-CCNC: 10 U/L (ref 6–29)
ANA PAT SER IF-IMP: ABNORMAL
ANA SER QL IF: POSITIVE
ANA TITR SER IF: ABNORMAL TITER
AST SERPL-CCNC: 16 U/L (ref 10–35)
BASOPHILS # BLD AUTO: 18 CELLS/UL (ref 0–200)
BASOPHILS NFR BLD AUTO: 0.4 %
BILIRUB SERPL-MCNC: 0.6 MG/DL (ref 0.2–1.2)
BUN SERPL-MCNC: 18 MG/DL (ref 7–25)
BUN/CREAT SERPL: 13 (CALC) (ref 6–22)
C HERBARUM IGG SER-MCNC: <2 MCG/ML
CALCIUM SERPL-MCNC: 9.3 MG/DL (ref 8.6–10.4)
CCP IGG SERPL-ACNC: <16 UNITS
CHLORIDE SERPL-SCNC: 100 MMOL/L (ref 98–110)
CO2 SERPL-SCNC: 37 MMOL/L (ref 20–32)
CREAT SERPL-MCNC: 1.38 MG/DL (ref 0.6–0.93)
CRP SERPL-MCNC: 1.1 MG/L
EOSINOPHIL # BLD AUTO: 170 CELLS/UL (ref 15–500)
EOSINOPHIL NFR BLD AUTO: 3.7 %
ERYTHROCYTE [DISTWIDTH] IN BLOOD BY AUTOMATED COUNT: 13.6 % (ref 11–15)
GLOBULIN SER CALC-MCNC: 2.4 G/DL (CALC) (ref 1.9–3.7)
GLUCOSE SERPL-MCNC: 94 MG/DL (ref 65–99)
HCT VFR BLD AUTO: 34.2 % (ref 35–45)
HGB BLD-MCNC: 11.4 G/DL (ref 11.7–15.5)
LACEYELLA SACCHARI AB SER QL ID: NOT DETECTED
LYMPHOCYTES # BLD AUTO: 1808 CELLS/UL (ref 850–3900)
LYMPHOCYTES NFR BLD AUTO: 39.3 %
Lab: <2 MCG/ML
MCH RBC QN AUTO: 30.7 PG (ref 27–33)
MCHC RBC AUTO-ENTMCNC: 33.3 G/DL (ref 32–36)
MCV RBC AUTO: 92.2 FL (ref 80–100)
MONOCYTES # BLD AUTO: 340 CELLS/UL (ref 200–950)
MONOCYTES NFR BLD AUTO: 7.4 %
NEUTROPHILS # BLD AUTO: 2263 CELLS/UL (ref 1500–7800)
NEUTROPHILS NFR BLD AUTO: 49.2 %
P NOTATUM IGG SER-MCNC: 2.2 MCG/ML
PLATELET # BLD AUTO: 109 THOUSAND/UL (ref 140–400)
PMV BLD REES-ECKER: 12.2 FL (ref 7.5–12.5)
POTASSIUM SERPL-SCNC: 3.6 MMOL/L (ref 3.5–5.3)
PROT SERPL-MCNC: 6.5 G/DL (ref 6.1–8.1)
RBC # BLD AUTO: 3.71 MILLION/UL (ref 3.8–5.1)
RHEUMATOID FACT SERPL-ACNC: <14 IU/ML
S RECTIVIRGULA AB SER QL ID: NOT DETECTED
S VIRIDIS AB SER QL ID: NOT DETECTED
SL AMB EGFR AFRICAN AMERICAN: 44 ML/MIN/1.73M2
SL AMB EGFR NON AFRICAN AMERICAN: 38 ML/MIN/1.73M2
SODIUM SERPL-SCNC: 143 MMOL/L (ref 135–146)
T CANDIDUS AB SER QL: NOT DETECTED
T VIRIDE IGG SER-MCNC: <2 MCG/ML
T VULGARIS AB SER QL ID: NOT DETECTED
WBC # BLD AUTO: 4.6 THOUSAND/UL (ref 3.8–10.8)

## 2022-05-13 NOTE — RESULT ENCOUNTER NOTE
Attempted to discuss further labwork with patient  No answer      Aldolase normal  ANAT abnormal concerning for some autoimmune process  Will follow up with additional blood work  Orders placed

## 2022-05-18 ENCOUNTER — TELEPHONE (OUTPATIENT)
Dept: CARDIOLOGY CLINIC | Facility: CLINIC | Age: 72
End: 2022-05-18

## 2022-05-18 NOTE — TELEPHONE ENCOUNTER
----- Message from Mason Merchant DO sent at 5/4/2022  1:39 PM EDT -----  Can somebody please arrange for loop recorder implantation for this patient  She has known atrial fibrillation and a dilated cardiomyopathy from the same  Thanks      Rodney Francisco

## 2022-05-25 ENCOUNTER — TELEPHONE (OUTPATIENT)
Dept: CARDIOLOGY CLINIC | Facility: CLINIC | Age: 72
End: 2022-05-25

## 2022-05-25 NOTE — TELEPHONE ENCOUNTER
Tony Andrews, I see you tried to reach this patient to schedule her for a loop recorder  Can you please return her call   Thanks [place]

## 2022-06-03 ENCOUNTER — HOSPITAL ENCOUNTER (OUTPATIENT)
Dept: CT IMAGING | Facility: HOSPITAL | Age: 72
Discharge: HOME | End: 2022-06-03
Payer: COMMERCIAL

## 2022-06-03 DIAGNOSIS — J47.9 BRONCHIECTASIS WITHOUT COMPLICATION (HCC): ICD-10-CM

## 2022-06-03 DIAGNOSIS — J84.9 ILD (INTERSTITIAL LUNG DISEASE) (HCC): ICD-10-CM

## 2022-06-03 PROCEDURE — 71250 CT THORAX DX C-: CPT

## 2022-06-03 PROCEDURE — G1004 CDSM NDSC: HCPCS

## 2022-06-10 NOTE — RESULT ENCOUNTER NOTE
Called and discussed with patient results of HRCT  Unchanged findings of fibrosis, bronchiectasis  Stable  7 mm nodule  Recommended repeat CT in 1 year  Possible pulmonary hypertension

## 2022-06-22 ENCOUNTER — OFFICE VISIT (OUTPATIENT)
Dept: PULMONOLOGY | Facility: CLINIC | Age: 72
End: 2022-06-22
Payer: COMMERCIAL

## 2022-06-22 VITALS
WEIGHT: 134 LBS | HEART RATE: 111 BPM | RESPIRATION RATE: 18 BRPM | HEIGHT: 64 IN | SYSTOLIC BLOOD PRESSURE: 124 MMHG | BODY MASS INDEX: 22.88 KG/M2 | TEMPERATURE: 97.1 F | OXYGEN SATURATION: 95 % | DIASTOLIC BLOOD PRESSURE: 72 MMHG

## 2022-06-22 DIAGNOSIS — J84.9 ILD (INTERSTITIAL LUNG DISEASE) (HCC): Primary | ICD-10-CM

## 2022-06-22 PROBLEM — J84.112 UIP (USUAL INTERSTITIAL PNEUMONITIS) (HCC): Status: RESOLVED | Noted: 2022-01-25 | Resolved: 2022-06-22

## 2022-06-22 PROBLEM — J47.9 BRONCHIECTASIS WITHOUT COMPLICATION (HCC): Status: RESOLVED | Noted: 2022-05-03 | Resolved: 2022-06-22

## 2022-06-22 PROCEDURE — 99214 OFFICE O/P EST MOD 30 MIN: CPT | Performed by: INTERNAL MEDICINE

## 2022-06-22 PROCEDURE — 1036F TOBACCO NON-USER: CPT | Performed by: INTERNAL MEDICINE

## 2022-06-22 PROCEDURE — 1160F RVW MEDS BY RX/DR IN RCRD: CPT | Performed by: INTERNAL MEDICINE

## 2022-06-22 PROCEDURE — 3008F BODY MASS INDEX DOCD: CPT | Performed by: INTERNAL MEDICINE

## 2022-06-22 NOTE — PROGRESS NOTES
Pulmonary Follow Up Note   Reji Rubi 66 y o  female MRN: [id number]  6/22/2022    Assessment:    ILD (interstitial lung disease) ([facility] )  She remains without symptoms, and there is no increase in disease on imaging  My suspicion is that this is most likely related to prior bleomycin exposure, more likely than amiodarone-related disease, more likely than an idiopathic form of fibrotic NSIP  · Repeat plain chest CT and PFT in 1 year  · Follow up PFT next week  · Patient counseled on potentially concerning symptoms including shortness of breath and cough, which would prompt us to repeat studies sooner  · If follow up scan confirms no significant change in disease burden we may be able to follow up PRN    Follow up in 1 year  Plan:    Diagnoses and all orders for this visit:    ILD (interstitial lung disease) ([facility] )  -     CT chest without contrast; Future  -     Complete PFT with post Bronchodilator and Six Minute walk; Future    Return in about 1 year (around 6/22/2023)  History of Present Illness   HPI:  Reji Rubi is a 66 y o  female who presents for follow up and to review testing  Unfortunately the PFTs are still pending, and are set to be done next week  However, 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 which lends to the possibility that this is drug induced  She is feeling well overall, no respiratory complaints, no cough, and she does keep herself fairly active such that she would notice a change in her respiratory status  She does not feel as though her breathing is getting more difficult with time        Answers for HPI/ROS submitted by the patient on 6/21/2022  Do you have shortness of breath that occurs with effort or exertion?: No  Do you have ear congestion?: No  Do you have heartburn?: No  Do you have fatigue?: No  Do you have nasal congestion?: No  Do you have shortness of breath when lying flat?: No  Do you have shortness of breath when you wake up?: No  Do you have sweats?: No  Have you experienced weight loss?: No  Which of the following makes your symptoms worse?: nothing  Which of the following makes your symptoms better?: nothing    Review of Systems   Constitutional: Negative for appetite change and fever  HENT: Negative for ear pain, postnasal drip, rhinorrhea, sneezing, sore throat and trouble swallowing  Respiratory: Negative for cough and shortness of breath  Cardiovascular: Negative for chest pain  Musculoskeletal: Negative for myalgias  Neurological: Negative for headaches  All other systems reviewed and are negative  Historical Information   Past Medical History:   Diagnosis Date    Abnormal CXR 1/14/2022    [facility] [address] [address] )     Disease of thyroid gland     Dysuria 12/16/2021    Encounter for support and coordination of transition of care 5/6/2021     Past Surgical History:   Procedure Laterality Date    HYSTERECTOMY       Family History   Problem Relation Age of Onset    No Known Problems Mother     No Known Problems Father     Cancer Maternal Aunt        Meds/Allergies     Current Outpatient Medications:     apixaban (Eliquis) 5 mg, Take 1 tablet (5 mg total) by mouth 2 (two) times a day, Disp: 180 tablet, Rfl: 1    furosemide (LASIX) 20 mg tablet, Take 1 tablet (20 mg total) by mouth 2 (two) times a day, Disp: 180 tablet, Rfl: 1    levothyroxine 100 mcg tablet, Take 1 tablet (100 mcg total) by mouth daily, Disp: 90 tablet, Rfl: 1    metoprolol succinate (TOPROL-XL) 25 mg 24 hr tablet, Take 1 tablet (25 mg total) by mouth daily, Disp: 180 tablet, Rfl: 1    sacubitril-valsartan (ENTRESTO) 24-26 MG TABS, Take 1 tablet by mouth 2 (two) times a day, Disp: 180 tablet, Rfl: 1  Allergies   Allergen Reactions    Penicillins Rash       Vitals: Blood pressure 124/72, pulse (!) 111, temperature (!) 97 1 °F (36 2 °C), temperature source Tympanic, resp  rate 18, height 5' 4" (1 626 m), weight 60 8 kg (134 lb), SpO2 95 %  Body mass index is 23 kg/m²   Oxygen Therapy  SpO2: 95 %    Physical Exam  Physical Exam  Vitals reviewed  Constitutional:       General: She is not in acute distress  Appearance: Normal appearance  She is well-developed  She is not ill-appearing  HENT:      Head: Normocephalic and atraumatic  Eyes:      General: No scleral icterus  Conjunctiva/sclera: Conjunctivae normal    Neck:      Vascular: No JVD  Cardiovascular:      Rate and Rhythm: Normal rate and regular rhythm  Heart sounds: Normal heart sounds  No murmur heard  No friction rub  No gallop  Pulmonary:      Effort: Pulmonary effort is normal  No respiratory distress  Breath sounds: No wheezing or rhonchi  Comments: Some dry, inspiratory crackles at bilateral bases  Musculoskeletal:      Cervical back: Neck supple  Right lower leg: No edema  Left lower leg: No edema  Skin:     General: Skin is warm and dry  Findings: No rash  Neurological:      General: No focal deficit present  Mental Status: She is alert and oriented to person, place, and time  Mental status is at baseline  Psychiatric:         Mood and Affect: Mood normal          Behavior: Behavior normal      Labs: I have personally reviewed pertinent lab results  Lab Results   Component Value Date    WBC 4 6 05/09/2022    HGB 11 4 (L) 05/09/2022    HCT 34 2 (L) 05/09/2022    MCV 92 2 05/09/2022     (L) 05/09/2022     Lab Results   Component Value Date    CALCIUM 9 3 05/09/2022    K 3 6 05/09/2022    CO2 37 (H) 05/09/2022     05/09/2022    BUN 18 05/09/2022    CREATININE 1 38 (H) 05/09/2022     No results found for: IGE  Lab Results   Component Value Date    ALT 10 05/09/2022    AST 16 05/09/2022    ALKPHOS 68 05/09/2022     Imaging and other studies: I have personally reviewed pertinent films in PACS    EKG, Pathology, and Other Studies: I have personally reviewed pertinent reports  EL Rubinegro [facility]

## 2022-06-22 NOTE — ASSESSMENT & PLAN NOTE
She remains without symptoms, and there is no increase in disease on imaging  My suspicion is that this is most likely related to prior bleomycin exposure, more likely than amiodarone-related disease, more likely than an idiopathic form of fibrotic NSIP  · Repeat plain chest CT and PFT in 1 year  · Follow up PFT next week  · Patient counseled on potentially concerning symptoms including shortness of breath and cough, which would prompt us to repeat studies sooner  · If follow up scan confirms no significant change in disease burden we may be able to follow up PRN    Follow up in 1 year

## 2022-06-24 ENCOUNTER — VBI (OUTPATIENT)
Dept: ADMINISTRATIVE | Facility: OTHER | Age: 72
End: 2022-06-24

## 2022-06-29 ENCOUNTER — HOSPITAL ENCOUNTER (OUTPATIENT)
Dept: PULMONOLOGY | Facility: HOSPITAL | Age: 72
Discharge: HOME | End: 2022-06-29
Payer: COMMERCIAL

## 2022-06-29 DIAGNOSIS — J47.9 BRONCHIECTASIS WITHOUT COMPLICATION (HCC): ICD-10-CM

## 2022-06-29 DIAGNOSIS — J84.9 ILD (INTERSTITIAL LUNG DISEASE) (HCC): ICD-10-CM

## 2022-06-29 PROCEDURE — 94060 EVALUATION OF WHEEZING: CPT

## 2022-06-29 PROCEDURE — 94060 EVALUATION OF WHEEZING: CPT | Performed by: INTERNAL MEDICINE

## 2022-06-29 PROCEDURE — 94761 N-INVAS EAR/PLS OXIMETRY MLT: CPT

## 2022-06-29 PROCEDURE — 94726 PLETHYSMOGRAPHY LUNG VOLUMES: CPT | Performed by: INTERNAL MEDICINE

## 2022-06-29 PROCEDURE — 94729 DIFFUSING CAPACITY: CPT

## 2022-06-29 PROCEDURE — 94618 PULMONARY STRESS TESTING: CPT | Performed by: INTERNAL MEDICINE

## 2022-06-29 PROCEDURE — 94726 PLETHYSMOGRAPHY LUNG VOLUMES: CPT

## 2022-06-29 RX ORDER — ALBUTEROL SULFATE 2.5 MG/3ML
2.5 SOLUTION RESPIRATORY (INHALATION) ONCE
Status: COMPLETED | OUTPATIENT
Start: 2022-06-29 | End: 2022-06-29

## 2022-06-29 RX ADMIN — ALBUTEROL SULFATE 2.5 MG: 2.5 SOLUTION RESPIRATORY (INHALATION) at 12:53

## 2022-07-12 NOTE — RESULT ENCOUNTER NOTE
Attempted to call patient with results of PFTs  No answer  VM left to call back  PFTs unreliable due to difficulty with testing  Possible severe obstructive defect and air trappingbut likely would need to have repeat testing performed  Follows with Dr Chato Collins, to discuss further regarding need for repeat testing

## 2022-07-20 ENCOUNTER — HOSPITAL ENCOUNTER (OUTPATIENT)
Dept: NON INVASIVE DIAGNOSTICS | Facility: CLINIC | Age: 72
Discharge: HOME | End: 2022-07-20
Payer: COMMERCIAL

## 2022-07-20 DIAGNOSIS — I42.0 DILATED CARDIOMYOPATHY (HCC): ICD-10-CM

## 2022-07-20 LAB
BASELINE ST DEPRESSION: 0 MM
CHEST PAIN STATEMENT: NORMAL
MAX DIASTOLIC BP: 80 MMHG
MAX HEART RATE: 146 BPM
MAX PREDICTED HEART RATE: 148 BPM
MAX. SYSTOLIC BP: 140 MMHG
NUC STRESS EJECTION FRACTION: 60 %
PROTOCOL NAME: NORMAL
RATE PRESSURE PRODUCT: NORMAL
REASON FOR TERMINATION: NORMAL
SL CV REST NUCLEAR ISOTOPE DOSE: 11 MCI
SL CV STRESS NUCLEAR ISOTOPE DOSE: 31.8 MCI
SL CV STRESS RECOVERY BP: NORMAL MMHG
SL CV STRESS RECOVERY HR: 134 BPM
SL CV STRESS RECOVERY O2 SAT: 99 %
STRESS ANGINA INDEX: 0
STRESS BASELINE BP: NORMAL MMHG
STRESS BASELINE HR: 78 BPM
STRESS O2 SAT REST: 99 %
STRESS PEAK HR: 146 BPM
STRESS POST O2 SAT PEAK: 99 %
STRESS POST PEAK BP: 114 MMHG
STRESS/REST PERFUSION RATIO: 1.01
TARGET HR FORMULA: NORMAL
TEST INDICATION: NORMAL
TIME IN EXERCISE PHASE: NORMAL

## 2022-07-20 PROCEDURE — 78452 HT MUSCLE IMAGE SPECT MULT: CPT

## 2022-07-20 PROCEDURE — G1004 CDSM NDSC: HCPCS

## 2022-07-20 PROCEDURE — 93016 CV STRESS TEST SUPVJ ONLY: CPT | Performed by: INTERNAL MEDICINE

## 2022-07-20 PROCEDURE — 78452 HT MUSCLE IMAGE SPECT MULT: CPT | Performed by: INTERNAL MEDICINE

## 2022-07-20 PROCEDURE — 93018 CV STRESS TEST I&R ONLY: CPT | Performed by: INTERNAL MEDICINE

## 2022-07-20 PROCEDURE — A9502 TC99M TETROFOSMIN: HCPCS

## 2022-07-20 PROCEDURE — 93017 CV STRESS TEST TRACING ONLY: CPT

## 2022-07-20 RX ADMIN — REGADENOSON 0.4 MG: 0.08 INJECTION, SOLUTION INTRAVENOUS at 13:40

## 2022-07-22 DIAGNOSIS — I50.9 ACUTE CONGESTIVE HEART FAILURE, UNSPECIFIED HEART FAILURE TYPE (HCC): ICD-10-CM

## 2022-07-22 RX ORDER — FUROSEMIDE 20 MG/1
20 TABLET ORAL 2 TIMES DAILY
Qty: 180 TABLET | Refills: 3 | Status: SHIPPED | OUTPATIENT
Start: 2022-07-22

## 2022-07-22 NOTE — TELEPHONE ENCOUNTER
Requested medication(s) are due for refill today: yes  Patient has already received a courtesy refill: no  Other reason request has been forwarded to provider: script failed

## 2022-08-05 DIAGNOSIS — I48.91 NEW ONSET A-FIB (HCC): ICD-10-CM

## 2022-08-05 DIAGNOSIS — I50.9 CHF (CONGESTIVE HEART FAILURE) (HCC): ICD-10-CM

## 2022-08-05 DIAGNOSIS — R00.0 IRREGULAR TACHYCARDIA: ICD-10-CM

## 2022-08-10 ENCOUNTER — CLINICAL SUPPORT (OUTPATIENT)
Dept: CARDIOLOGY CLINIC | Facility: CLINIC | Age: 72
End: 2022-08-10
Payer: COMMERCIAL

## 2022-08-10 VITALS
WEIGHT: 129.5 LBS | SYSTOLIC BLOOD PRESSURE: 120 MMHG | OXYGEN SATURATION: 99 % | HEIGHT: 64 IN | HEART RATE: 118 BPM | BODY MASS INDEX: 22.11 KG/M2 | RESPIRATION RATE: 18 BRPM | DIASTOLIC BLOOD PRESSURE: 74 MMHG

## 2022-08-10 DIAGNOSIS — I48.0 PAROXYSMAL ATRIAL FIBRILLATION (HCC): Primary | ICD-10-CM

## 2022-08-10 DIAGNOSIS — I50.9 CHF (CONGESTIVE HEART FAILURE) (HCC): ICD-10-CM

## 2022-08-10 DIAGNOSIS — I48.91 NEW ONSET A-FIB (HCC): ICD-10-CM

## 2022-08-10 PROCEDURE — 93000 ELECTROCARDIOGRAM COMPLETE: CPT

## 2022-08-10 NOTE — PROGRESS NOTES
Patient is aware of her heart beating at times this lasts for a few minutes when she is nervous or anxious

## 2022-08-12 ENCOUNTER — TELEPHONE (OUTPATIENT)
Dept: CARDIOLOGY CLINIC | Facility: CLINIC | Age: 72
End: 2022-08-12

## 2022-08-12 NOTE — TELEPHONE ENCOUNTER
Pt called to update you on how she is feeling   08/10: Pox: 98% P:126  08/11 AM: Pox:99% P: 60  08/11 12pm: Pox:97% P: 67  08/11: 5pm: Pox: 96% P: 105  08/11:8pm: Pox: 97% P:64  08/12 AM: Pox: 86% P:57     This was on Metoprolol 25mg BID, Eliquis 5mg BID , Furosemide 20mg BID   Stopped Entresto

## 2022-08-23 ENCOUNTER — CONSULT (OUTPATIENT)
Dept: CARDIOLOGY CLINIC | Facility: CLINIC | Age: 72
End: 2022-08-23
Payer: COMMERCIAL

## 2022-08-23 VITALS
HEIGHT: 64 IN | HEART RATE: 119 BPM | SYSTOLIC BLOOD PRESSURE: 136 MMHG | BODY MASS INDEX: 22.67 KG/M2 | DIASTOLIC BLOOD PRESSURE: 82 MMHG | WEIGHT: 132.8 LBS

## 2022-08-23 DIAGNOSIS — I48.0 PAROXYSMAL ATRIAL FIBRILLATION (HCC): Primary | ICD-10-CM

## 2022-08-23 DIAGNOSIS — I48.91 NEW ONSET A-FIB (HCC): ICD-10-CM

## 2022-08-23 DIAGNOSIS — I50.9 CONGESTIVE HEART FAILURE, UNSPECIFIED HF CHRONICITY, UNSPECIFIED HEART FAILURE TYPE (HCC): ICD-10-CM

## 2022-08-23 PROCEDURE — 99204 OFFICE O/P NEW MOD 45 MIN: CPT | Performed by: INTERNAL MEDICINE

## 2022-08-23 PROCEDURE — 99214 OFFICE O/P EST MOD 30 MIN: CPT | Performed by: INTERNAL MEDICINE

## 2022-08-23 PROCEDURE — 93000 ELECTROCARDIOGRAM COMPLETE: CPT | Performed by: INTERNAL MEDICINE

## 2022-08-23 NOTE — PROGRESS NOTES
EPS Consultation/New Patient Evaluation - Norvin Duverney 66 y o  female MRN: [id number]       Referring:Dr Alessandro Roche    CC/HPI:   It was a pleasure to see Norvin Duverney in our arrhythmia clinic at [place] [address]  As you know she is a 66 y o   woman with hypothyroid, chronic systolic congestive heart failure now recovered EF on Entresto, metoprolol who presents to discuss management of atrial arrhythmias  Patient was diagnosed with atrial fibrillation in January 2022  She was placed on amiodarone and converted to sinus rhythm  She had an echocardiogram showing systolic congestive heart failure with ejection fraction of 25%  She was started on metoprolol 25 mg twice daily and Eliquis for anticoagulation  She had repeat echocardiogram in April showing ejection fraction improvement to 50%  She had stress test which was negative but during the stress test she had frequent episodes of atrial flutter         Past Medical History:  Past Medical History:   Diagnosis Date    Abnormal CXR 1/14/2022    Cancer ([facility] [address] )     Disease of thyroid gland     Dysuria 12/16/2021    Encounter for support and coordination of transition of care 5/6/2021       Medications:      Current Outpatient Medications:     furosemide (LASIX) 20 mg tablet, Take 1 tablet (20 mg total) by mouth 2 (two) times a day, Disp: 180 tablet, Rfl: 3    levothyroxine 100 mcg tablet, Take 1 tablet (100 mcg total) by mouth daily, Disp: 90 tablet, Rfl: 1    metoprolol succinate (TOPROL-XL) 25 mg 24 hr tablet, Take 1 tablet (25 mg total) by mouth daily, Disp: 180 tablet, Rfl: 1    sacubitril-valsartan (ENTRESTO) 24-26 MG TABS, Take 1 tablet by mouth 2 (two) times a day, Disp: 180 tablet, Rfl: 3    apixaban (Eliquis) 5 mg, Take 1 tablet (5 mg total) by mouth 2 (two) times a day, Disp: 180 tablet, Rfl: 0     Family History   Problem Relation Age of Onset    No Known Problems Mother     No Known Problems Father     Cancer Maternal Aunt      Social History     Socioeconomic History    Marital status:      Spouse name: Not on file    Number of children: Not on file    Years of education: Not on file    Highest education level: Not on file   Occupational History    Not on file   Tobacco Use    Smoking status: Former Smoker     Packs/day: 1 00     Years: 10 00     Pack years: 10 00     Types: Cigarettes     Start date:      Quit date: 1993     Years since quittin 6    Smokeless tobacco: Never Used   Vaping Use    Vaping Use: Never used   Substance and Sexual Activity    Alcohol use: Never    Drug use: Never    Sexual activity: Never   Other Topics Concern    Not on file   Social History Narrative    Most recent tobacco use screenin2018     Social Determinants of Health     Financial Resource Strain: Not on file   Food Insecurity: Not on file   Transportation Needs: No Transportation Needs    Lack of Transportation (Medical): No    Lack of Transportation (Non-Medical): No   Physical Activity: Not on file   Stress: Not on file   Social Connections: Not on file   Intimate Partner Violence: Not on file   Housing Stability: Unknown    Unable to Pay for Housing in the Last Year: No    Number of Places Lived in the Last Year: 1    Unstable Housing in the Last Year: Not on file     Social History     Tobacco Use   Smoking Status Former Smoker    Packs/day: 1 00    Years: 10 00    Pack years: 10 00    Types: Cigarettes    Start date:     Quit date: 1993    Years since quittin 6   Smokeless Tobacco Never Used     Social History     Substance and Sexual Activity   Alcohol Use Never       Review of Systems   Constitutional: Negative for chills and fever  HENT: Negative  Eyes: Negative for blurred vision and double vision  Cardiovascular: Positive for palpitations   Negative for chest pain, dyspnea on exertion, leg swelling, near-syncope, orthopnea, paroxysmal nocturnal dyspnea and syncope  Respiratory: Negative for cough and sputum production  Endocrine: Negative  Skin: Negative  Negative for rash  Musculoskeletal: Negative  Negative for arthritis and joint pain  Gastrointestinal: Negative for abdominal pain, nausea and vomiting  Genitourinary: Negative  Neurological: Negative  Negative for dizziness and light-headedness  Psychiatric/Behavioral: Negative  The patient is not nervous/anxious  Objective:     Vitals: Blood pressure 136/82, pulse (!) 119, height 5' 4" (1 626 m), weight 60 2 kg (132 lb 12 8 oz)  , Body mass index is 22 8 kg/m²  ,        Physical Exam:    GEN: Hardy Gan appears well, alert and oriented x 3, pleasant and cooperative   HEENT: pupils equal, round, and reactive to light; extraocular muscles intact  NECK: supple, no carotid bruits   HEART: regular rhythm, normal S1 and S2, no murmurs, clicks, gallops or rubs   LUNGS: clear to auscultation bilaterally; no wheezes, rales, or rhonchi   ABDOMEN: normal bowel sounds, soft, no tenderness, no distention  EXTREMITIES: peripheral pulses normal; no clubbing, cyanosis, or edema  NEURO: no focal findings   SKIN: normal without suspicious lesions on exposed skin      Labs & Results:  Below is the patient's most recent value for Albumin, ALT, AST, BUN, Calcium, Chloride, Cholesterol, CO2, Creatinine, GFR, Glucose, HDL, Hematocrit, Hemoglobin, Hemoglobin A1C, LDL, Magnesium, Phosphorus, Platelets, Potassium, PSA, Sodium, Triglycerides, and WBC     Lab Results   Component Value Date    ALT 10 05/09/2022    AST 16 05/09/2022    BUN 18 05/09/2022    CALCIUM 9 3 05/09/2022     05/09/2022    CO2 37 (H) 05/09/2022    CREATININE 1 38 (H) 05/09/2022    HDL 58 03/05/2022    HCT 34 2 (L) 05/09/2022    HGB 11 4 (L) 05/09/2022    MG 1 9 01/20/2022     (L) 05/09/2022    K 3 6 05/09/2022    TRIG 101 03/05/2022    WBC 4 6 05/09/2022     Note: for a comprehensive list of the patient's lab results, access the Results Review activity  Cardiac testing:     I personally reviewed the ECG performed in the clinic on 08/23/22  It reveals atrial flutter with RVR at 119 beats per minute    Echocardiograms:  No results found for this or any previous visit  No results found for this or any previous visit  Catheterizations:   No results found for this or any previous visit  Stress Tests:  No results found for this or any previous visit  Holter monitor -   No results found for this or any previous visit  No results found for this or any previous visit  ASSESSMENT/PLAN:  1  Atrial flutter   Patient having proximal episodes of atrial flutter  Currently with RVR at rate of 119 beats per minute   Patient was diagnosed with it in January 2022   At that time she had cardiomyopathy with ejection fraction 25%   Ejection fraction improved to 50% after being on amiodarone  Amiodarone discontinued due to young age  Currently in atrial flutter with rate of 119 beats per minute   We discuss next option including ablation procedure versus alternative antiarrhythmic therapy   Patient would like to think about both options and will call our office to set up ablation procedure versus antiarrhythmic therapy  In the meantime continue metoprolol 25 mg twice daily and Eliquis    2  Cardiomyopathy   Patient has distally congestive heart failure in January with echo in April showing EF he recovered to 50%   Maintained on metoprolol and Entresto   Also taking Lasix 20 mg twice daily   Continue current medication    3   Hypothyroid   Maintained on levothyroxine 100 mcg daily    Follow-up in 3 months

## 2022-08-23 NOTE — PATIENT INSTRUCTIONS
Please let us know which option do you prefer:    1- Take flecainide twice a day and possibly perform cardioversion (shock)    2  Take sotalol or Tikosyn in the hospital - spend two night - and then take them twice daily after    3   Perform ablation procedure

## 2022-09-04 DIAGNOSIS — I48.91 NEW ONSET A-FIB (HCC): ICD-10-CM

## 2022-09-04 DIAGNOSIS — I50.42 CHRONIC COMBINED SYSTOLIC (CONGESTIVE) AND DIASTOLIC (CONGESTIVE) HEART FAILURE (HCC): ICD-10-CM

## 2022-09-06 RX ORDER — METOPROLOL SUCCINATE 25 MG/1
25 TABLET, EXTENDED RELEASE ORAL DAILY
Qty: 90 TABLET | Refills: 0 | Status: SHIPPED | OUTPATIENT
Start: 2022-09-06 | End: 2022-10-22 | Stop reason: SDUPTHER

## 2022-10-05 PROBLEM — I48.92 PAROXYSMAL ATRIAL FLUTTER (HCC): Status: ACTIVE | Noted: 2022-10-05

## 2022-10-05 NOTE — PROGRESS NOTES
Cardiology Follow Up    Alicia Patel  1950  [id number]  [facility] [address]  [address]  [place] [address] [phone]    1  Paroxysmal atrial fibrillation (HCC)     2  Paroxysmal atrial flutter ([address] )     3  Chronic combined systolic (congestive) and diastolic (congestive) heart failure ([address] )     4  Dilated cardiomyopathy ([facility] [address] )     5  Dyslipidemia         Discussion/Summary:  Ms Paul Anne is a pleasant 76-year-old female who presents to the office today for hospital follow-up  Cardiac wise she has been feeling well  When she first arrived her heart rate was elevated  Upon my recheck it was in the [de-identified]  She continues to go in and out of atrial flutter  We again discussed further treatment options  She is agreeable to proceed with an ablation  I will contact our electrophysiology team   In the meantime I have asked that she increase her metoprolol succinate to 25 mg twice daily  I will request a loop recorder implantation of the time of her ablation given she is asymptomatic with her arrhythmia  She appears compensated on exam on her current diuretic regimen to which no changes were advised  A repeat echocardiogram post hospital stay revealed improvement of her EF to 50%  She is maintained on proper medical therapy for her cardiomyopathy in the form of Entresto and metoprolol succinate  Her metoprolol to b i d  dosing  I have asked that she resume Entresto which was previously discontinued when her AV may blocking agents were previously increased  Her blood pressure is well controlled in the office today  Based on her 10-year risk statin therapy is indicated  This was discussed at her last visit which she was contemplating  I will readdress this with her at her next visit  I will see her back in the office in a few months for re-evaluation      Interval History:   Ms Paul Anne is a pleasant 76-year-old female who presents to the office today for routine follow-up  After her last visit I had sent her for a stress test given a cardiomyopathy  This was unremarkable for ischemia but she did have paroxysmal atrial flutter during the test   She returned to the office at my request for an ECG  Her ECG revealed rapid atrial flutter which terminated when she was in the office  Her AV may blocking regimen was intensified  I had asked that she hold Entresto temporarily due to soft blood pressure readings  She was asked to follow-up with electrophysiology regarding more definitive management  She saw Dr Bhupendra Reilly who discussed an ablation versus antiarrhythmic therapy  She told him that she would contemplate these options  She has been feeling well  She does not participate in any formal physical activity  With the activity she performs she denies any exertional chest pain or shortness of breath  She denies any signs or symptoms of volume overload including lower extremity edema, paroxysmal nocturnal dyspnea, orthopnea, acute weight gain or increasing abdominal girth  She weighs herself at home anywhere between 130 and 134 lb  She abides by a salt restricted diet  She denies any sensation of  palpitations  She is maintained on systemic anticoagulation with Eliquis without any bleeding consequences or falls  She denies syncope or presyncope  She denies symptoms of claudication      Medical Problems             Problem List     Hypothyroidism    Myelodysplasia (myelodysplastic syndrome) (HCC)    Waldenstrom macroglobulinemia (HCC)    Mixed hyperlipidemia    Persistent atrial fibrillation (HCC)    History of Hodgkin's lymphoma    Rash    Chronic combined systolic (congestive) and diastolic (congestive) heart failure (HCC)    Wt Readings from Last 3 Encounters:   10/06/22 59 4 kg (131 lb)   08/23/22 60 2 kg (132 lb 12 8 oz)   08/10/22 58 7 kg (129 lb 8 oz)                 UIP (usual interstitial pneumonitis) (HCC)    Stage 3a chronic kidney disease ([place] [address] )    Lab Results   Component Value Date    EGFR 41 01/15/2022    EGFR 35 2022    EGFR 53 2022    CREATININE 1 38 (H) 2022    CREATININE 1 17 (H) 2022    CREATININE 1 54 (H) 2022         Cough    Allergies (Chronic)    Pulmonary hypertension (HCC)    ILD (interstitial lung disease) ([place] [address] )    Bronchiectasis without complication (HCC)    Hard of hearing              Past Medical History:   Diagnosis Date    Abnormal CXR 2022    Cancer ([place] [address] )     Disease of thyroid gland     Dysuria 2021    Encounter for support and coordination of transition of care 2021     Social History     Socioeconomic History    Marital status:      Spouse name: Not on file    Number of children: Not on file    Years of education: Not on file    Highest education level: Not on file   Occupational History    Not on file   Tobacco Use    Smoking status: Former Smoker     Packs/day: 1 00     Years: 10 00     Pack years: 10 00     Types: Cigarettes     Start date:      Quit date: 1993     Years since quittin 7    Smokeless tobacco: Never Used   Vaping Use    Vaping Use: Never used   Substance and Sexual Activity    Alcohol use: Never    Drug use: Never    Sexual activity: Never   Other Topics Concern    Not on file   Social History Narrative    Most recent tobacco use screenin2018     Social Determinants of Health     Financial Resource Strain: Not on file   Food Insecurity: Not on file   Transportation Needs: No Transportation Needs    Lack of Transportation (Medical): No    Lack of Transportation (Non-Medical):  No   Physical Activity: Not on file   Stress: Not on file   Social Connections: Not on file   Intimate Partner Violence: Not on file   Housing Stability: Unknown    Unable to Pay for Housing in the Last Year: No    Number of Places Lived in the Last Year: 1    Unstable Housing in the Last Year: Not on file      Family History   Problem Relation Age of Onset    No Known Problems Mother     No Known Problems Father     Cancer Maternal Aunt      Past Surgical History:   Procedure Laterality Date    HYSTERECTOMY         Current Outpatient Medications:     apixaban (Eliquis) 5 mg, Take 1 tablet (5 mg total) by mouth 2 (two) times a day, Disp: 180 tablet, Rfl: 0    furosemide (LASIX) 20 mg tablet, Take 1 tablet (20 mg total) by mouth 2 (two) times a day, Disp: 180 tablet, Rfl: 3    levothyroxine 100 mcg tablet, Take 1 tablet (100 mcg total) by mouth daily, Disp: 90 tablet, Rfl: 1    metoprolol succinate (TOPROL-XL) 25 mg 24 hr tablet, TAKE 1 TABLET (25 MG TOTAL) BY MOUTH DAILY  , Disp: 90 tablet, Rfl: 0    sacubitril-valsartan (ENTRESTO) 24-26 MG TABS, Take 1 tablet by mouth 2 (two) times a day, Disp: 180 tablet, Rfl: 3  Allergies   Allergen Reactions    Penicillins Rash       Labs:     Chemistry        Component Value Date/Time    K 3 6 05/09/2022 0707     05/09/2022 0707    CO2 37 (H) 05/09/2022 0707    BUN 18 05/09/2022 0707    CREATININE 1 38 (H) 05/09/2022 0707    CREATININE 1 30 01/15/2022 0854        Component Value Date/Time    CALCIUM 9 3 05/09/2022 0707    ALKPHOS 68 05/09/2022 0707    AST 16 05/09/2022 0707    ALT 10 05/09/2022 0707            No results found for: CHOL  Lab Results   Component Value Date    HDL 58 03/05/2022    HDL 58 09/29/2021    HDL 59 05/29/2021     Lab Results   Component Value Date    LDLCALC 131 (H) 03/05/2022    LDLCALC 126 (H) 09/29/2021    LDLCALC 157 (H) 05/29/2021     Lab Results   Component Value Date    TRIG 101 03/05/2022    TRIG 79 09/29/2021    TRIG 113 05/29/2021     No results found for: CHOLHDL    Imaging: Echo complete w/ contrast if indicated    Result Date: 4/11/2022  Narrative: Rodriguez Hernandez  Left Ventricle: Left ventricular cavity size is normal  Wall thickness is normal  The left ventricular ejection fraction is 50%   Systolic function is low normal  Wall motion is normal  Diastolic function is mildly abnormal, consistent with grade I (abnormal) relaxation    IVS: There is abnormal septal motion consistent with bundle branch block  There is sigmoid appearance of the septum    Right Ventricle: Right ventricular cavity size is normal  Systolic function is normal    Aortic Valve: There is mild regurgitation    Tricuspid Valve: There is mild regurgitation    Pulmonary Artery: The estimated pulmonary artery systolic pressure is 17 1 mmHg  The pulmonary artery systolic pressure is moderately increased  Review of Systems   Cardiovascular: Negative for chest pain, claudication, cyanosis, dyspnea on exertion, irregular heartbeat, orthopnea and palpitations  All other systems reviewed and are negative  Vitals:    10/06/22 0843   BP: 122/80   Pulse: (!) 115   SpO2: 95%     Vitals:    10/06/22 0843   Weight: 59 4 kg (131 lb)     Height: 5' 4" (162 6 cm)   Body mass index is 22 49 kg/m²      Physical Exam:  General:  Alert and cooperative, appears stated age  HEENT:  PERRLA, EOMI, no scleral icterus, no conjunctival pallor  Neck:  No lymphadenopathy, no thyromegaly, no carotid bruits, no elevated JVP  Heart:  Regular rate and rhythm, normal S1/S2, no S3/S4, no murmur  Lungs:  Clear to auscultation bilaterally   Abdomen:  Soft, non-tender, positive bowel sounds, no rebound or guarding,   no organomegaly   Extremities:  No clubbing, cyanosis or edema   Vascular:  2+ pedal pulses  Skin:  No rashes or lesions on exposed skin  Neurologic:  Cranial nerves II-XII grossly intact without focal deficits

## 2022-10-06 ENCOUNTER — OFFICE VISIT (OUTPATIENT)
Dept: CARDIOLOGY CLINIC | Facility: CLINIC | Age: 72
End: 2022-10-06
Payer: COMMERCIAL

## 2022-10-06 VITALS
DIASTOLIC BLOOD PRESSURE: 80 MMHG | SYSTOLIC BLOOD PRESSURE: 122 MMHG | WEIGHT: 131 LBS | HEART RATE: 115 BPM | HEIGHT: 64 IN | OXYGEN SATURATION: 95 % | BODY MASS INDEX: 22.36 KG/M2

## 2022-10-06 DIAGNOSIS — I48.92 PAROXYSMAL ATRIAL FLUTTER (HCC): ICD-10-CM

## 2022-10-06 DIAGNOSIS — E78.5 DYSLIPIDEMIA: ICD-10-CM

## 2022-10-06 DIAGNOSIS — I50.42 CHRONIC COMBINED SYSTOLIC (CONGESTIVE) AND DIASTOLIC (CONGESTIVE) HEART FAILURE (HCC): ICD-10-CM

## 2022-10-06 DIAGNOSIS — I42.0 DILATED CARDIOMYOPATHY (HCC): ICD-10-CM

## 2022-10-06 DIAGNOSIS — I48.0 PAROXYSMAL ATRIAL FIBRILLATION (HCC): Primary | ICD-10-CM

## 2022-10-06 PROCEDURE — 99214 OFFICE O/P EST MOD 30 MIN: CPT | Performed by: INTERNAL MEDICINE

## 2022-10-10 ENCOUNTER — TELEPHONE (OUTPATIENT)
Dept: CARDIOLOGY CLINIC | Facility: CLINIC | Age: 72
End: 2022-10-10

## 2022-10-10 ENCOUNTER — PREP FOR PROCEDURE (OUTPATIENT)
Dept: CARDIOLOGY CLINIC | Facility: CLINIC | Age: 72
End: 2022-10-10

## 2022-10-10 DIAGNOSIS — I48.0 PAROXYSMAL ATRIAL FIBRILLATION (HCC): Primary | ICD-10-CM

## 2022-10-10 NOTE — TELEPHONE ENCOUNTER
----- Message from Bj Ortiz MD sent at 10/6/2022  5:11 PM EDT -----  Deya Min,    Please schedule this patient for TANI/flutter ablation and loop monitor placement; any room in coming weeks? Routine labs    Hold medication: None    System/Device company: [employer] - please put in case request      Thank you    ----- Message -----  From: Gorden Bumpers, DO  Sent: 10/6/2022  12:49 PM EDT  To: Bj Ortiz MD    Hi Drew,    I saw our mutual patient in the office today  She is agreeable to proceed with the ablation  I was also hoping you could implant a loop recorder at the time of her procedure given she is asymptomatic  Her heart rate was elevated when she came to the office but at the time I saw her her rate was in the 80s  She is asymptomatic with her arrhythmias  Thanks so much for your help      Teresa Oseguera

## 2022-10-10 NOTE — TELEPHONE ENCOUNTER
Patient scheduled for TANI/Atrial flutter/loop implant at [facility] on 10/19/22 with Dr Fatima    Patient aware of general instructions, labs test required    Eliquis no holds    Lasix: to hold the Am of  Can we please check insurance for approval

## 2022-10-11 PROBLEM — Z12.11 SCREEN FOR COLON CANCER: Status: RESOLVED | Noted: 2021-05-06 | Resolved: 2022-10-11

## 2022-10-14 NOTE — TELEPHONE ENCOUNTER
Per Zahra Neigh is not required for 85082/TANI  [facility] approved auth # [id number] for 75090/Christen Abl @ [facility]    Valid dates: 10/14/22-4/12/23

## 2022-10-15 LAB
ALBUMIN SERPL-MCNC: 4.1 G/DL (ref 3.6–5.1)
ALBUMIN/GLOB SERPL: 1.9 (CALC) (ref 1–2.5)
ALP SERPL-CCNC: 63 U/L (ref 37–153)
ALT SERPL-CCNC: 7 U/L (ref 6–29)
AST SERPL-CCNC: 14 U/L (ref 10–35)
BASOPHILS # BLD AUTO: 19 CELLS/UL (ref 0–200)
BASOPHILS NFR BLD AUTO: 0.4 %
BILIRUB SERPL-MCNC: 0.6 MG/DL (ref 0.2–1.2)
BUN SERPL-MCNC: 14 MG/DL (ref 7–25)
BUN/CREAT SERPL: ABNORMAL (CALC) (ref 6–22)
CALCIUM SERPL-MCNC: 9.2 MG/DL (ref 8.6–10.4)
CHLORIDE SERPL-SCNC: 101 MMOL/L (ref 98–110)
CHOLEST SERPL-MCNC: 180 MG/DL
CHOLEST/HDLC SERPL: 3.7 (CALC)
CO2 SERPL-SCNC: 36 MMOL/L (ref 20–32)
CREAT SERPL-MCNC: 0.96 MG/DL (ref 0.6–1)
EOSINOPHIL # BLD AUTO: 108 CELLS/UL (ref 15–500)
EOSINOPHIL NFR BLD AUTO: 2.3 %
ERYTHROCYTE [DISTWIDTH] IN BLOOD BY AUTOMATED COUNT: 12.3 % (ref 11–15)
GFR/BSA.PRED SERPLBLD CYS-BASED-ARV: 62 ML/MIN/1.73M2
GLOBULIN SER CALC-MCNC: 2.2 G/DL (CALC) (ref 1.9–3.7)
GLUCOSE SERPL-MCNC: 105 MG/DL (ref 65–99)
HBA1C MFR BLD: 5.2 % OF TOTAL HGB
HCT VFR BLD AUTO: 35.5 % (ref 35–45)
HCV AB S/CO SERPL IA: 0.04
HCV AB SERPL QL IA: NORMAL
HDLC SERPL-MCNC: 49 MG/DL
HGB BLD-MCNC: 11.9 G/DL (ref 11.7–15.5)
LDLC SERPL CALC-MCNC: 109 MG/DL (CALC)
LYMPHOCYTES # BLD AUTO: 1767 CELLS/UL (ref 850–3900)
LYMPHOCYTES NFR BLD AUTO: 37.6 %
MCH RBC QN AUTO: 30.2 PG (ref 27–33)
MCHC RBC AUTO-ENTMCNC: 33.5 G/DL (ref 32–36)
MCV RBC AUTO: 90.1 FL (ref 80–100)
MONOCYTES # BLD AUTO: 376 CELLS/UL (ref 200–950)
MONOCYTES NFR BLD AUTO: 8 %
NEUTROPHILS # BLD AUTO: 2430 CELLS/UL (ref 1500–7800)
NEUTROPHILS NFR BLD AUTO: 51.7 %
NONHDLC SERPL-MCNC: 131 MG/DL (CALC)
PLATELET # BLD AUTO: 106 THOUSAND/UL (ref 140–400)
PMV BLD REES-ECKER: 11.8 FL (ref 7.5–12.5)
POTASSIUM SERPL-SCNC: 3.3 MMOL/L (ref 3.5–5.3)
PROT SERPL-MCNC: 6.3 G/DL (ref 6.1–8.1)
RBC # BLD AUTO: 3.94 MILLION/UL (ref 3.8–5.1)
SERVICE CMNT-IMP: ABNORMAL
SL AMB PLATELET ESTIMATION: ABNORMAL
SODIUM SERPL-SCNC: 141 MMOL/L (ref 135–146)
TRIGL SERPL-MCNC: 117 MG/DL
TSH SERPL-ACNC: 1.27 MIU/L (ref 0.4–4.5)
WBC # BLD AUTO: 4.7 THOUSAND/UL (ref 3.8–10.8)

## 2022-10-18 ENCOUNTER — TELEPHONE (OUTPATIENT)
Dept: CARDIOLOGY CLINIC | Facility: CLINIC | Age: 72
End: 2022-10-18

## 2022-10-18 NOTE — TELEPHONE ENCOUNTER
----- Message from Sandra Spence MD sent at 10/17/2022  6:47 PM EDT -----  Please call the patient about stable lab results  Thank you

## 2022-10-19 ENCOUNTER — APPOINTMENT (OUTPATIENT)
Dept: NON INVASIVE DIAGNOSTICS | Facility: HOSPITAL | Age: 72
End: 2022-10-19
Attending: INTERNAL MEDICINE

## 2022-10-22 DIAGNOSIS — I48.91 NEW ONSET A-FIB (HCC): ICD-10-CM

## 2022-10-22 DIAGNOSIS — I50.42 CHRONIC COMBINED SYSTOLIC (CONGESTIVE) AND DIASTOLIC (CONGESTIVE) HEART FAILURE (HCC): ICD-10-CM

## 2022-10-24 RX ORDER — METOPROLOL SUCCINATE 25 MG/1
25 TABLET, EXTENDED RELEASE ORAL DAILY
Qty: 90 TABLET | Refills: 0 | Status: SHIPPED | OUTPATIENT
Start: 2022-10-24 | End: 2022-10-26 | Stop reason: SDUPTHER

## 2022-10-24 NOTE — TELEPHONE ENCOUNTER
As per your last office note you increased to twice daily but pt had ablation on 10/19/22  Please advise, thank you!

## 2022-10-26 ENCOUNTER — TELEPHONE (OUTPATIENT)
Dept: ADMINISTRATIVE | Facility: OTHER | Age: 72
End: 2022-10-26

## 2022-10-26 ENCOUNTER — OFFICE VISIT (OUTPATIENT)
Dept: FAMILY MEDICINE CLINIC | Facility: CLINIC | Age: 72
End: 2022-10-26
Payer: COMMERCIAL

## 2022-10-26 VITALS
HEIGHT: 64 IN | HEART RATE: 100 BPM | RESPIRATION RATE: 16 BRPM | WEIGHT: 131 LBS | OXYGEN SATURATION: 98 % | BODY MASS INDEX: 22.36 KG/M2 | SYSTOLIC BLOOD PRESSURE: 122 MMHG | DIASTOLIC BLOOD PRESSURE: 78 MMHG

## 2022-10-26 DIAGNOSIS — I42.0 DILATED CARDIOMYOPATHY (HCC): ICD-10-CM

## 2022-10-26 DIAGNOSIS — R73.9 ELEVATED BLOOD SUGAR: ICD-10-CM

## 2022-10-26 DIAGNOSIS — Z23 NEED FOR VACCINATION: ICD-10-CM

## 2022-10-26 DIAGNOSIS — I50.42 CHRONIC COMBINED SYSTOLIC (CONGESTIVE) AND DIASTOLIC (CONGESTIVE) HEART FAILURE (HCC): ICD-10-CM

## 2022-10-26 DIAGNOSIS — I48.91 NEW ONSET A-FIB (HCC): ICD-10-CM

## 2022-10-26 DIAGNOSIS — E78.5 DYSLIPIDEMIA: ICD-10-CM

## 2022-10-26 DIAGNOSIS — E03.9 HYPOTHYROIDISM, UNSPECIFIED TYPE: Primary | ICD-10-CM

## 2022-10-26 PROCEDURE — G0008 ADMIN INFLUENZA VIRUS VAC: HCPCS | Performed by: FAMILY MEDICINE

## 2022-10-26 PROCEDURE — 99214 OFFICE O/P EST MOD 30 MIN: CPT | Performed by: FAMILY MEDICINE

## 2022-10-26 PROCEDURE — 90662 IIV NO PRSV INCREASED AG IM: CPT | Performed by: FAMILY MEDICINE

## 2022-10-26 RX ORDER — LEVOTHYROXINE SODIUM 0.1 MG/1
100 TABLET ORAL DAILY
Qty: 90 TABLET | Refills: 1 | Status: SHIPPED | OUTPATIENT
Start: 2022-10-26

## 2022-10-26 RX ORDER — METOPROLOL SUCCINATE 25 MG/1
25 TABLET, EXTENDED RELEASE ORAL 2 TIMES DAILY
Qty: 180 TABLET | Refills: 0 | Status: SHIPPED | OUTPATIENT
Start: 2022-10-26

## 2022-10-26 NOTE — TELEPHONE ENCOUNTER
----- Message from Donetta Gosselin, MA sent at 10/26/2022  8:52 AM EDT -----  Regarding: Mammogram  10/26/22 8:52 AM    Hello, our patient attached above has had Mammogram completed/performed  Please assist in updating the patient chart by pulling the Care Everywhere (CE) document  The date of service is 09/27/2021       Thank you,  Donetta Gosselin [facility]

## 2022-10-26 NOTE — ASSESSMENT & PLAN NOTE
Lab Results   Component Value Date    EGFR 62 10/14/2022    EGFR 41 01/15/2022    EGFR 35 01/14/2022    CREATININE 0 96 10/14/2022    CREATININE 1 38 (H) 05/09/2022    CREATININE 1 17 (H) 03/05/2022   Resolved

## 2022-10-26 NOTE — ASSESSMENT & PLAN NOTE
Patient denies any symptoms of chest pain or shortness of breath  Continue all medications per Cardiology

## 2022-10-26 NOTE — PROGRESS NOTES
Subjective:      Patient ID: Carolina Ashley is a 67 y o  female  Thyroid Problem  Presents for follow-up visit  Patient reports no palpitations or tremors  The symptoms have been stable (Levothyroxine 100 microgram)  Labs reviewed with patient today  Patient has mild dyslipidemia  Currently not on a statin  Patient is following up with Cardiology for paroxysmal AFib, chronic combined heart failure, dilated cardiomyopathy  Patient is on Lasix, metoprolol and Entresto  Labs revealed borderline low potassium of 3 3 on 10/14/2022  Patient advised to repeat CMP, and oral potassium supplement however patient declines both  Patient states that labs were reviewed by her cardiologist and she was not advised any potassium supplement  She has a follow-up with Cardiology on 11/01, would like to revisit this is with her cardiologist     Patient also follows up with Hematology for history of mild dysplasia, history of Hodgkin's lymphoma and waldenstrom's macroglobulinemia  Has borderline low platelets which have been stable  Patient denies any symptoms of chest pain or shortness of breath  Past Medical History:   Diagnosis Date   • Abnormal CXR 1/14/2022   • Cancer [facility])    • Disease of thyroid gland    • Dysuria 12/16/2021   • Encounter for support and coordination of transition of care 5/6/2021       Family History   Problem Relation Age of Onset   • No Known Problems Mother    • No Known Problems Father    • Cancer Maternal Aunt        Past Surgical History:   Procedure Laterality Date   • HYSTERECTOMY          reports that she quit smoking about 29 years ago  Her smoking use included cigarettes  She started smoking about 49 years ago  She has a 10 00 pack-year smoking history  She has never used smokeless tobacco  She reports that she does not drink alcohol and does not use drugs        Current Outpatient Medications:   •  apixaban (Eliquis) 5 mg, Take 1 tablet (5 mg total) by mouth 2 (two) times a day, Disp: 180 tablet, Rfl: 0  •  furosemide (LASIX) 20 mg tablet, Take 1 tablet (20 mg total) by mouth 2 (two) times a day, Disp: 180 tablet, Rfl: 3  •  levothyroxine 100 mcg tablet, Take 1 tablet (100 mcg total) by mouth daily, Disp: 90 tablet, Rfl: 1  •  metoprolol succinate (TOPROL-XL) 25 mg 24 hr tablet, Take 1 tablet (25 mg total) by mouth 2 (two) times a day, Disp: 180 tablet, Rfl: 0  •  sacubitril-valsartan (ENTRESTO) 24-26 MG TABS, Take 1 tablet by mouth 2 (two) times a day, Disp: 180 tablet, Rfl: 3    The following portions of the patient's history were reviewed and updated as appropriate: allergies, current medications, past family history, past medical history, past social history, past surgical history and problem list     Review of Systems   Constitutional: Negative  Respiratory: Negative  Negative for shortness of breath  Cardiovascular: Negative  Negative for chest pain and palpitations  Musculoskeletal: Negative for myalgias  Neurological: Negative  Negative for tremors  Psychiatric/Behavioral: Negative  Objective:    /78 (BP Location: Left arm, Patient Position: Sitting, Cuff Size: Large)   Pulse 100   Resp 16   Ht 5' 4" (1 626 m)   Wt 59 4 kg (131 lb)   SpO2 98%   BMI 22 49 kg/m²      Physical Exam  Constitutional:       Appearance: She is well-developed  Cardiovascular:      Rate and Rhythm: Normal rate and regular rhythm  Heart sounds: Normal heart sounds  Pulmonary:      Effort: Pulmonary effort is normal       Breath sounds: Normal breath sounds  Abdominal:      General: Bowel sounds are normal       Palpations: Abdomen is soft  Neurological:      Mental Status: She is alert and oriented to person, place, and time     Psychiatric:         Behavior: Behavior normal          Judgment: Judgment normal            Recent Results (from the past 1008 hour(s))   Lipid panel    Collection Time: 10/14/22  6:56 AM   Result Value Ref Range    Total Cholesterol 180 <200 mg/dL    HDL 49 (L) > OR = 50 mg/dL    Triglycerides 117 <150 mg/dL    LDL Calculated 109 (H) mg/dL (calc)    Chol HDLC Ratio 3 7 <5 0 (calc)    Non-HDL Cholesterol 131 (H) <130 mg/dL (calc)   Comprehensive metabolic panel    Collection Time: 10/14/22  6:56 AM   Result Value Ref Range    Glucose, Random 105 (H) 65 - 99 mg/dL    BUN 14 7 - 25 mg/dL    Creatinine 0 96 0 60 - 1 00 mg/dL    eGFR 62 > OR = 60 mL/min/1 73m2    SL AMB BUN/CREATININE RATIO NOT APPLICABLE 6 - 22 (calc)    Sodium 141 135 - 146 mmol/L    Potassium 3 3 (L) 3 5 - 5 3 mmol/L    Chloride 101 98 - 110 mmol/L    CO2 36 (H) 20 - 32 mmol/L    Calcium 9 2 8 6 - 10 4 mg/dL    Protein, Total 6 3 6 1 - 8 1 g/dL    Albumin 4 1 3 6 - 5 1 g/dL    Globulin 2 2 1 9 - 3 7 g/dL (calc)    Albumin/Globulin Ratio 1 9 1 0 - 2 5 (calc)    TOTAL BILIRUBIN 0 6 0 2 - 1 2 mg/dL    Alkaline Phosphatase 63 37 - 153 U/L    AST 14 10 - 35 U/L    ALT 7 6 - 29 U/L   Hepatitis C Ab W/Refl To HCV RNA, Qn, PCR    Collection Time: 10/14/22  6:56 AM   Result Value Ref Range    HEP C AB NON-REACTIVE NON-REACTIVE    Signal to Cut-Off 0 04 <1 00   TSH, 3rd generation with Free T4 reflex    Collection Time: 10/14/22  6:56 AM   Result Value Ref Range    TSH W/RFX TO FREE T4 1 27 0 40 - 4 50 mIU/L   Hemoglobin A1c (w/out EAG)    Collection Time: 10/14/22  6:56 AM   Result Value Ref Range    Hemoglobin A1C 5 2 <5 7 % of total Hgb   CBC and differential    Collection Time: 10/14/22  7:32 AM   Result Value Ref Range    White Blood Cell Count 4 7 3 8 - 10 8 Thousand/uL    Red Blood Cell Count 3 94 3 80 - 5 10 Million/uL    Hemoglobin 11 9 11 7 - 15 5 g/dL    HCT 35 5 35 0 - 45 0 %    MCV 90 1 80 0 - 100 0 fL    MCH 30 2 27 0 - 33 0 pg    MCHC 33 5 32 0 - 36 0 g/dL    RDW 12 3 11 0 - 15 0 %    Platelet Count 687 (L) 140 - 400 Thousand/uL    SL AMB MPV 11 8 7 5 - 12 5 fL    Neutrophils (Absolute) 2,430 1,500 - 7,800 cells/uL    Lymphocytes (Absolute) 1,767 850 - 3,900 cells/uL Monocytes (Absolute) 376 200 - 950 cells/uL    Eosinophils (Absolute) 108 15 - 500 cells/uL    Basophils ABS 19 0 - 200 cells/uL    Neutrophils 51 7 %    Lymphocytes 37 6 %    Monocytes 8 0 %    Eosinophils 2 3 %    Basophils PCT 0 4 %    Comment(s)       Few atypical lymphocytes noted Giant platelets noted  Platelet Estimation    Collection Time: 10/14/22  7:32 AM   Result Value Ref Range    Platelet Estimation DECREASED (A) ADEQUATE       Assessment/Plan:    No problem-specific Assessment & Plan notes found for this encounter  Problem List Items Addressed This Visit        Endocrine    Hypothyroidism - Primary     TSH improving since patient discontinued amiodarone  Continue levothyroxine 100 mcg  Continue monitoring  Relevant Medications    levothyroxine 100 mcg tablet    Other Relevant Orders    Comprehensive metabolic panel    TSH, 3rd generation with Free T4 reflex       Cardiovascular and Mediastinum    Dilated cardiomyopathy ([facility] [address] )     Patient denies any symptoms of chest pain or shortness of breath  Continue all medications per Cardiology  Other    Dyslipidemia       Patient is not on a statin  Elevated blood sugar     Patient noted to have borderline fasting blood sugar however A1c is normal   Patient encouraged to continue with low carb diet  Other Visit Diagnoses     Need for vaccination        Relevant Orders    influenza vaccine, high-dose, PF 0 7 mL (FLUZONE HIGH-DOSE) (Completed)        I have spent 25 minutes with Patient  today in which greater than 50% of this time was spent in counseling/coordination of care regarding Diagnostic results, Prognosis, Risks and benefits of tx options, Intructions for management, Patient and family education, Importance of tx compliance and Risk factor reductions

## 2022-10-26 NOTE — ASSESSMENT & PLAN NOTE
Patient noted to have borderline fasting blood sugar however A1c is normal   Patient encouraged to continue with low carb diet

## 2022-10-26 NOTE — TELEPHONE ENCOUNTER
----- Message from Isabelle Snellen, MA sent at 10/26/2022  8:52 AM EDT -----  Regarding: Mammogram  10/26/22 8:52 AM    Hello, our patient attached above has had Mammogram completed/performed  Please assist in updating the patient chart by pulling the Care Everywhere (CE) document  The date of service is 09/27/2021       Thank you,  Isabelle Snellen PG [facility]

## 2022-10-27 NOTE — TELEPHONE ENCOUNTER
Upon review of the In Basket request we Hm is updated    Any additional questions or concerns should be emailed to the Practice Liaisons via the appropriate education email address, please do not reply via In Basket      Thank you  Abilio Chance

## 2022-11-21 DIAGNOSIS — I48.91 NEW ONSET A-FIB (HCC): ICD-10-CM

## 2022-11-21 DIAGNOSIS — R00.0 IRREGULAR TACHYCARDIA: ICD-10-CM

## 2022-12-16 LAB
ALBUMIN SERPL-MCNC: 4 G/DL (ref 3.6–5.1)
ALBUMIN/GLOB SERPL: 1.7 (CALC) (ref 1–2.5)
ALP SERPL-CCNC: 76 U/L (ref 37–153)
ALT SERPL-CCNC: 7 U/L (ref 6–29)
AST SERPL-CCNC: 14 U/L (ref 10–35)
BASOPHILS # BLD AUTO: 19 CELLS/UL (ref 0–200)
BASOPHILS NFR BLD AUTO: 0.5 %
BILIRUB SERPL-MCNC: 0.5 MG/DL (ref 0.2–1.2)
BUN SERPL-MCNC: 20 MG/DL (ref 7–25)
BUN/CREAT SERPL: 18 (CALC) (ref 6–22)
CALCIUM SERPL-MCNC: 9.3 MG/DL (ref 8.6–10.4)
CHLORIDE SERPL-SCNC: 103 MMOL/L (ref 98–110)
CO2 SERPL-SCNC: 37 MMOL/L (ref 20–32)
CREAT SERPL-MCNC: 1.09 MG/DL (ref 0.6–1)
EOSINOPHIL # BLD AUTO: 68 CELLS/UL (ref 15–500)
EOSINOPHIL NFR BLD AUTO: 1.8 %
ERYTHROCYTE [DISTWIDTH] IN BLOOD BY AUTOMATED COUNT: 12.2 % (ref 11–15)
GFR/BSA.PRED SERPLBLD CYS-BASED-ARV: 54 ML/MIN/1.73M2
GLOBULIN SER CALC-MCNC: 2.4 G/DL (CALC) (ref 1.9–3.7)
GLUCOSE SERPL-MCNC: 102 MG/DL (ref 65–99)
HCT VFR BLD AUTO: 35.7 % (ref 35–45)
HGB BLD-MCNC: 12.1 G/DL (ref 11.7–15.5)
LYMPHOCYTES # BLD AUTO: 1588 CELLS/UL (ref 850–3900)
LYMPHOCYTES NFR BLD AUTO: 41.8 %
MCH RBC QN AUTO: 30.1 PG (ref 27–33)
MCHC RBC AUTO-ENTMCNC: 33.9 G/DL (ref 32–36)
MCV RBC AUTO: 88.8 FL (ref 80–100)
MONOCYTES # BLD AUTO: 289 CELLS/UL (ref 200–950)
MONOCYTES NFR BLD AUTO: 7.6 %
NEUTROPHILS # BLD AUTO: 1835 CELLS/UL (ref 1500–7800)
NEUTROPHILS NFR BLD AUTO: 48.3 %
PLATELET # BLD AUTO: 96 THOUSAND/UL (ref 140–400)
PMV BLD REES-ECKER: 12 FL (ref 7.5–12.5)
POTASSIUM SERPL-SCNC: 3.8 MMOL/L (ref 3.5–5.3)
PROT SERPL-MCNC: 6.4 G/DL (ref 6.1–8.1)
RBC # BLD AUTO: 4.02 MILLION/UL (ref 3.8–5.1)
SODIUM SERPL-SCNC: 144 MMOL/L (ref 135–146)
WBC # BLD AUTO: 3.8 THOUSAND/UL (ref 3.8–10.8)

## 2022-12-19 ENCOUNTER — TELEPHONE (OUTPATIENT)
Dept: CARDIOLOGY CLINIC | Facility: CLINIC | Age: 72
End: 2022-12-19

## 2022-12-19 NOTE — TELEPHONE ENCOUNTER
----- Message from Maricruz Dunn MD sent at 12/19/2022  8:31 AM EST -----  Please call the patient regarding her normal result      Thank you

## 2022-12-30 ENCOUNTER — HOSPITAL ENCOUNTER (OUTPATIENT)
Dept: NON INVASIVE DIAGNOSTICS | Facility: HOSPITAL | Age: 72
Discharge: HOME | End: 2022-12-30
Attending: INTERNAL MEDICINE

## 2022-12-30 ENCOUNTER — HOSPITAL ENCOUNTER (OUTPATIENT)
Facility: HOSPITAL | Age: 72
Setting detail: OUTPATIENT SURGERY
Discharge: HOME | End: 2022-12-30
Attending: INTERNAL MEDICINE | Admitting: INTERNAL MEDICINE

## 2022-12-30 ENCOUNTER — ANESTHESIA (OUTPATIENT)
Dept: NON INVASIVE DIAGNOSTICS | Facility: HOSPITAL | Age: 72
End: 2022-12-30

## 2022-12-30 ENCOUNTER — ANESTHESIA EVENT (OUTPATIENT)
Dept: NON INVASIVE DIAGNOSTICS | Facility: HOSPITAL | Age: 72
End: 2022-12-30

## 2022-12-30 VITALS
HEIGHT: 63 IN | WEIGHT: 130 LBS | SYSTOLIC BLOOD PRESSURE: 145 MMHG | DIASTOLIC BLOOD PRESSURE: 84 MMHG | BODY MASS INDEX: 23.04 KG/M2 | HEART RATE: 115 BPM

## 2022-12-30 VITALS
SYSTOLIC BLOOD PRESSURE: 151 MMHG | OXYGEN SATURATION: 100 % | RESPIRATION RATE: 14 BRPM | DIASTOLIC BLOOD PRESSURE: 67 MMHG | HEART RATE: 89 BPM | BODY MASS INDEX: 23.04 KG/M2 | TEMPERATURE: 98.1 F | WEIGHT: 130 LBS | HEIGHT: 63 IN

## 2022-12-30 DIAGNOSIS — I48.92 ATRIAL FLUTTER (HCC): ICD-10-CM

## 2022-12-30 DIAGNOSIS — I48.92 PAROXYSMAL ATRIAL FLUTTER (HCC): Primary | ICD-10-CM

## 2022-12-30 DIAGNOSIS — I48.0 PAROXYSMAL ATRIAL FIBRILLATION (HCC): ICD-10-CM

## 2022-12-30 LAB
ANION GAP SERPL CALCULATED.3IONS-SCNC: 5 MMOL/L (ref 4–13)
AORTIC ROOT: 4.2 CM
ASCENDING AORTA: 3.7 CM
ATRIAL RATE: 127 BPM
BASOPHILS # BLD AUTO: 0.03 THOUSANDS/ÂΜL (ref 0–0.1)
BASOPHILS NFR BLD AUTO: 1 % (ref 0–1)
BUN SERPL-MCNC: 23 MG/DL (ref 5–25)
CALCIUM SERPL-MCNC: 9.7 MG/DL (ref 8.3–10.1)
CHLORIDE SERPL-SCNC: 105 MMOL/L (ref 96–108)
CO2 SERPL-SCNC: 32 MMOL/L (ref 21–32)
CREAT SERPL-MCNC: 1.1 MG/DL (ref 0.6–1.3)
EOSINOPHIL # BLD AUTO: 0.07 THOUSAND/ÂΜL (ref 0–0.61)
EOSINOPHIL NFR BLD AUTO: 1 % (ref 0–6)
ERYTHROCYTE [DISTWIDTH] IN BLOOD BY AUTOMATED COUNT: 13.2 % (ref 11.6–15.1)
GFR SERPL CREATININE-BSD FRML MDRD: 49 ML/MIN/1.73SQ M
GLUCOSE P FAST SERPL-MCNC: 112 MG/DL (ref 65–99)
GLUCOSE SERPL-MCNC: 112 MG/DL (ref 65–140)
HCT VFR BLD AUTO: 41.2 % (ref 34.8–46.1)
HGB BLD-MCNC: 13.2 G/DL (ref 11.5–15.4)
IMM GRANULOCYTES # BLD AUTO: 0.01 THOUSAND/UL (ref 0–0.2)
IMM GRANULOCYTES NFR BLD AUTO: 0 % (ref 0–2)
INR PPP: 1.31 (ref 0.84–1.19)
KCT BLD-ACNC: 334 SEC (ref 89–137)
KCT BLD-ACNC: 391 SEC (ref 89–137)
KCT BLD-ACNC: 398 SEC (ref 89–137)
KCT BLD-ACNC: 490 SEC (ref 89–137)
LYMPHOCYTES # BLD AUTO: 1.72 THOUSANDS/ÂΜL (ref 0.6–4.47)
LYMPHOCYTES NFR BLD AUTO: 32 % (ref 14–44)
MAGNESIUM SERPL-MCNC: 2.3 MG/DL (ref 1.6–2.6)
MCH RBC QN AUTO: 29.4 PG (ref 26.8–34.3)
MCHC RBC AUTO-ENTMCNC: 32 G/DL (ref 31.4–37.4)
MCV RBC AUTO: 92 FL (ref 82–98)
MONOCYTES # BLD AUTO: 0.38 THOUSAND/ÂΜL (ref 0.17–1.22)
MONOCYTES NFR BLD AUTO: 7 % (ref 4–12)
NEUTROPHILS # BLD AUTO: 3.21 THOUSANDS/ÂΜL (ref 1.85–7.62)
NEUTS SEG NFR BLD AUTO: 59 % (ref 43–75)
NRBC BLD AUTO-RTO: 0 /100 WBCS
PLATELET # BLD AUTO: 108 THOUSANDS/UL (ref 149–390)
PMV BLD AUTO: 11.2 FL (ref 8.9–12.7)
POTASSIUM SERPL-SCNC: 3.9 MMOL/L (ref 3.5–5.3)
PR INTERVAL: 160 MS
PROTHROMBIN TIME: 16.5 SECONDS (ref 11.6–14.5)
QRS AXIS: -42 DEGREES
QRSD INTERVAL: 100 MS
QT INTERVAL: 294 MS
QTC INTERVAL: 427 MS
RBC # BLD AUTO: 4.49 MILLION/UL (ref 3.81–5.12)
SL CV LV EF: 60
SODIUM SERPL-SCNC: 142 MMOL/L (ref 135–147)
SPECIMEN SOURCE: ABNORMAL
T WAVE AXIS: 31 DEGREES
VENTRICULAR RATE: 127 BPM
WBC # BLD AUTO: 5.42 THOUSAND/UL (ref 4.31–10.16)

## 2022-12-30 DEVICE — ICM LNQ22 LINQ II USA
Type: IMPLANTABLE DEVICE | Site: CHEST | Status: FUNCTIONAL
Brand: LINQ II™

## 2022-12-30 RX ORDER — GLYCOPYRROLATE 0.2 MG/ML
INJECTION INTRAMUSCULAR; INTRAVENOUS AS NEEDED
Status: DISCONTINUED | OUTPATIENT
Start: 2022-12-30 | End: 2022-12-30

## 2022-12-30 RX ORDER — FENTANYL CITRATE 50 UG/ML
INJECTION, SOLUTION INTRAMUSCULAR; INTRAVENOUS AS NEEDED
Status: DISCONTINUED | OUTPATIENT
Start: 2022-12-30 | End: 2022-12-30

## 2022-12-30 RX ORDER — FENTANYL CITRATE/PF 50 MCG/ML
50 SYRINGE (ML) INJECTION
Status: DISCONTINUED | OUTPATIENT
Start: 2022-12-30 | End: 2022-12-30 | Stop reason: HOSPADM

## 2022-12-30 RX ORDER — ACETAMINOPHEN 325 MG/1
650 TABLET ORAL EVERY 4 HOURS PRN
Status: CANCELLED | OUTPATIENT
Start: 2022-12-30

## 2022-12-30 RX ORDER — HEPARIN SODIUM 1000 [USP'U]/ML
INJECTION, SOLUTION INTRAVENOUS; SUBCUTANEOUS CODE/TRAUMA/SEDATION MEDICATION
Status: DISCONTINUED | OUTPATIENT
Start: 2022-12-30 | End: 2022-12-30 | Stop reason: HOSPADM

## 2022-12-30 RX ORDER — LIDOCAINE HYDROCHLORIDE 10 MG/ML
INJECTION, SOLUTION EPIDURAL; INFILTRATION; INTRACAUDAL; PERINEURAL CODE/TRAUMA/SEDATION MEDICATION
Status: DISCONTINUED | OUTPATIENT
Start: 2022-12-30 | End: 2022-12-30 | Stop reason: HOSPADM

## 2022-12-30 RX ORDER — DEXAMETHASONE SODIUM PHOSPHATE 10 MG/ML
INJECTION, SOLUTION INTRAMUSCULAR; INTRAVENOUS AS NEEDED
Status: DISCONTINUED | OUTPATIENT
Start: 2022-12-30 | End: 2022-12-30

## 2022-12-30 RX ORDER — ROCURONIUM BROMIDE 10 MG/ML
INJECTION, SOLUTION INTRAVENOUS AS NEEDED
Status: DISCONTINUED | OUTPATIENT
Start: 2022-12-30 | End: 2022-12-30

## 2022-12-30 RX ORDER — PROTAMINE SULFATE 10 MG/ML
INJECTION, SOLUTION INTRAVENOUS AS NEEDED
Status: DISCONTINUED | OUTPATIENT
Start: 2022-12-30 | End: 2022-12-30

## 2022-12-30 RX ORDER — PROPOFOL 10 MG/ML
INJECTION, EMULSION INTRAVENOUS AS NEEDED
Status: DISCONTINUED | OUTPATIENT
Start: 2022-12-30 | End: 2022-12-30

## 2022-12-30 RX ORDER — ONDANSETRON 2 MG/ML
INJECTION INTRAMUSCULAR; INTRAVENOUS AS NEEDED
Status: DISCONTINUED | OUTPATIENT
Start: 2022-12-30 | End: 2022-12-30

## 2022-12-30 RX ORDER — SODIUM CHLORIDE 9 MG/ML
50 INJECTION, SOLUTION INTRAVENOUS CONTINUOUS
Status: DISCONTINUED | OUTPATIENT
Start: 2022-12-30 | End: 2022-12-30 | Stop reason: HOSPADM

## 2022-12-30 RX ORDER — METOPROLOL TARTRATE 5 MG/5ML
5 INJECTION INTRAVENOUS ONCE
Status: COMPLETED | OUTPATIENT
Start: 2022-12-30 | End: 2022-12-30

## 2022-12-30 RX ORDER — PROPOFOL 10 MG/ML
INJECTION, EMULSION INTRAVENOUS CONTINUOUS PRN
Status: DISCONTINUED | OUTPATIENT
Start: 2022-12-30 | End: 2022-12-30

## 2022-12-30 RX ORDER — ONDANSETRON 2 MG/ML
4 INJECTION INTRAMUSCULAR; INTRAVENOUS ONCE AS NEEDED
Status: DISCONTINUED | OUTPATIENT
Start: 2022-12-30 | End: 2022-12-30 | Stop reason: HOSPADM

## 2022-12-30 RX ORDER — NEOSTIGMINE METHYLSULFATE 1 MG/ML
INJECTION INTRAVENOUS AS NEEDED
Status: DISCONTINUED | OUTPATIENT
Start: 2022-12-30 | End: 2022-12-30

## 2022-12-30 RX ORDER — SODIUM CHLORIDE 9 MG/ML
INJECTION, SOLUTION INTRAVENOUS CONTINUOUS PRN
Status: DISCONTINUED | OUTPATIENT
Start: 2022-12-30 | End: 2022-12-30

## 2022-12-30 RX ADMIN — GLYCOPYRROLATE 0.4 MG: 0.2 INJECTION, SOLUTION INTRAMUSCULAR; INTRAVENOUS at 12:17

## 2022-12-30 RX ADMIN — VASOPRESSIN 1 UNITS: 20 INJECTION, SOLUTION INTRAMUSCULAR; SUBCUTANEOUS at 14:06

## 2022-12-30 RX ADMIN — SODIUM CHLORIDE: 0.9 INJECTION, SOLUTION INTRAVENOUS at 13:34

## 2022-12-30 RX ADMIN — SODIUM CHLORIDE: 0.9 INJECTION, SOLUTION INTRAVENOUS at 10:42

## 2022-12-30 RX ADMIN — NEOSTIGMINE METHYLSULFATE 2 MG: 1 INJECTION INTRAVENOUS at 12:17

## 2022-12-30 RX ADMIN — FENTANYL CITRATE 100 MCG: 50 INJECTION INTRAMUSCULAR; INTRAVENOUS at 12:07

## 2022-12-30 RX ADMIN — PROTAMINE SULFATE 40 MG: 10 INJECTION, SOLUTION INTRAVENOUS at 14:08

## 2022-12-30 RX ADMIN — METOROPROLOL TARTRATE 5 MG: 5 INJECTION, SOLUTION INTRAVENOUS at 16:27

## 2022-12-30 RX ADMIN — VASOPRESSIN 1 UNITS: 20 INJECTION, SOLUTION INTRAMUSCULAR; SUBCUTANEOUS at 14:20

## 2022-12-30 RX ADMIN — PROPOFOL 20 MG: 10 INJECTION, EMULSION INTRAVENOUS at 10:52

## 2022-12-30 RX ADMIN — VASOPRESSIN 1 UNITS: 20 INJECTION, SOLUTION INTRAMUSCULAR; SUBCUTANEOUS at 12:35

## 2022-12-30 RX ADMIN — VASOPRESSIN 1 UNITS: 20 INJECTION, SOLUTION INTRAMUSCULAR; SUBCUTANEOUS at 13:28

## 2022-12-30 RX ADMIN — PROPOFOL 50 MCG/KG/MIN: 10 INJECTION, EMULSION INTRAVENOUS at 13:54

## 2022-12-30 RX ADMIN — FENTANYL CITRATE 50 MCG: 50 INJECTION INTRAMUSCULAR; INTRAVENOUS at 13:54

## 2022-12-30 RX ADMIN — ROCURONIUM BROMIDE 50 MG: 50 INJECTION INTRAVENOUS at 10:52

## 2022-12-30 RX ADMIN — PROTAMINE SULFATE 20 MG: 10 INJECTION, SOLUTION INTRAVENOUS at 14:05

## 2022-12-30 RX ADMIN — PROPOFOL 50 MG: 10 INJECTION, EMULSION INTRAVENOUS at 10:50

## 2022-12-30 RX ADMIN — PHENYLEPHRINE HYDROCHLORIDE 50 MCG/MIN: 10 INJECTION INTRAVENOUS at 10:56

## 2022-12-30 RX ADMIN — FENTANYL CITRATE 150 MCG: 50 INJECTION INTRAMUSCULAR; INTRAVENOUS at 10:50

## 2022-12-30 RX ADMIN — ONDANSETRON 4 MG: 2 INJECTION INTRAMUSCULAR; INTRAVENOUS at 11:05

## 2022-12-30 RX ADMIN — SODIUM CHLORIDE 250 ML: 0.9 INJECTION, SOLUTION INTRAVENOUS at 16:25

## 2022-12-30 RX ADMIN — DEXAMETHASONE SODIUM PHOSPHATE 10 MG: 10 INJECTION, SOLUTION INTRAMUSCULAR; INTRAVENOUS at 10:50

## 2022-12-30 RX ADMIN — VASOPRESSIN 1 UNITS: 20 INJECTION, SOLUTION INTRAMUSCULAR; SUBCUTANEOUS at 13:12

## 2022-12-30 NOTE — ANESTHESIA POSTPROCEDURE EVALUATION
Post-Op Assessment Note    Pre-Metop ECG showed Sinus Tachycardia in the 120s with and anterior fascicle block  P-waves are notable with each QRS and there is no evidence of A flutter  After 250 cc bolus and 5 mg metop push, patient now in the 80s and still in sinus rhythm  Continues asymptomatic and comfortable  OK for discharge from PACU

## 2022-12-30 NOTE — ANESTHESIA PROCEDURE NOTES
Arterial Line Insertion  Performed by: Kim Jackson MD PhD  Authorized by: Kim Jackson MD PhD   Consent: Verbal consent obtained  Written consent obtained  Risks and benefits: risks, benefits and alternatives were discussed  Consent given by: patient  Procedure consent: procedure consent matches procedure scheduled  Relevant documents: relevant documents present and verified  Test results: test results available and properly labeled  Patient identity confirmed: arm band  Time out: Immediately prior to procedure a "time out" was called to verify the correct patient, procedure, equipment, support staff and site/side marked as required  Preparation: Patient was prepped and draped in the usual sterile fashion    Indications: hemodynamic monitoring  Orientation:  Right  Location: radial artery  Sedation:  Patient sedated: GA     Procedure Details:  [facility] test normal: yes  Needle gauge: 20  Seldinger technique: Seldinger technique used (US guided)  Number of attempts: 1    Post-procedure:  Post-procedure: dressing applied  Waveform: good waveform  Patient tolerance: Patient tolerated the procedure well with no immediate complications

## 2022-12-30 NOTE — H&P
H&P Exam - Cardiology   Joe Dennison 67 y o  female MRN: [id number]  Unit/Bed#: BE CATH LAB ROOM Encounter: [id number]    Assessment/Plan     Assessment:  1  Paroxysmal atrial flutter versus atrial tachycardia with rapid ventricular response   A )  Initially noted 1/2022, converted on amiodarone   B )  Amiodarone discontinued 5/2022 due to young age   C )  Rate controlled on Toprol-XL, maintained on Eliquis anticoagulation  2  Nonischemic cardiomyopathy, suspected tachycardia mediated, with now improved LVEF 50% per echo 4/2022   A )  Initially 25% per echo 1/2022 in the setting of rapid atrial flutter   B )  Maintained on Toprol-XL and Entresto   C )  Nonischemic nuclear stress test 7/2022  3  Chronic HFpEF  4  Hypothyroidism, maintained on Synthroid  5  Myelodysplastic syndrome      Plan:  EPS/atrial flutter ablation      History of Present Illness   HPI:  Joe Dennison is a 67y o  year old female with paroxysmal atrial flutter versus atrial tachycardia, nonischemic cardiomyopathy with now improved LVEF 50%, chronic HFpEF, hypothyroidism maintained on Synthroid, and myelodysplastic syndrome  She initially presented to the hospital 1/2022 with new onset atrial fibrillation versus flutter with rapid ventricular response  Echocardiogram at that time showed reduced LVEF 25%  She was started on amiodarone antiarrhythmic therapy, and per reports she chemically converted back to normal sinus rhythm  She was started on optimal medical therapy with beta-blocker and Entresto, and she was discharged with a LifeVest   Repeat echocardiogram 4/2022 showed improvement in LVEF to 50%  Shortly thereafter, she was seen in outpatient follow up by Dr Frieda Austin and her amiodarone was discontinued due to her young age  For ongoing work-up of her cardiomyopathy, she underwent a nuclear stress test 7/2022  While there is no evidence of ischemia, no suggest that she had paroxysmal atrial flutter throughout the study    She was thus seen in EP consultation by Dr Alonzo Smoker 2022, at which time she was in what appeared to be an atypical flutter versus atrial tachycardia with rapid ventricular response  An ablation was recommended, however she initially wanted to think about all options before proceeding  Ultimately, she did agree to undergo EP study/ablation and she presented today to undergo that procedure  Review of Systems  ROS as noted above, otherwise 12 point review of systems was performed and is negative  Historical Information   Past Medical History:   Diagnosis Date   • Abnormal CXR 2022   • Cancer [facility])    • Disease of thyroid gland    • Dysuria 2021   • Encounter for support and coordination of transition of care 2021     Past Surgical History:   Procedure Laterality Date   • HYSTERECTOMY       Family History:   Family History   Problem Relation Age of Onset   • No Known Problems Mother    • No Known Problems Father    • Cancer Maternal Aunt        Social History   Social History     Substance and Sexual Activity   Alcohol Use Never     Social History     Substance and Sexual Activity   Drug Use Never     Social History     Tobacco Use   Smoking Status Former   • Packs/day: 1 00   • Years: 10 00   • Pack years: 10 00   • Types: Cigarettes   • Start date: 80   • Quit date: 1993   • Years since quittin 0   Smokeless Tobacco Never         Meds/Allergies   all medications and allergies reviewed  Home Medications:   Medications Prior to Admission   Medication   • apixaban (Eliquis) 5 mg   • furosemide (LASIX) 20 mg tablet   • levothyroxine 100 mcg tablet   • metoprolol succinate (TOPROL-XL) 25 mg 24 hr tablet   • sacubitril-valsartan (ENTRESTO) 24-26 MG TABS       Allergies   Allergen Reactions   • Penicillins Rash       Objective   Vitals: Blood pressure 145/84, pulse (!) 130, temperature 97 8 °F (36 6 °C), temperature source Oral, resp   rate 18, height 5' 3" (1 6 m), weight 59 kg (130 lb), SpO2 97 %  Orthostatic Blood Pressures    Flowsheet Row Most Recent Value   Blood Pressure 145/84 filed at 12/30/2022 1733   Patient Position - Orthostatic VS Lying filed at 12/30/2022 8400          No intake or output data in the 24 hours ending 12/30/22 0959    Invasive Devices     Peripheral Intravenous Line  Duration           Peripheral IV 12/30/22 Distal;Left;Lateral Wrist <1 day                Physical Exam  GEN: NAD, alert and oriented x 3, well appearing  SKIN: dry without significant lesions or rashes  HEENT: NCAT, PERRL, EOMs intact  NECK: No JVD appreciated  CARDIOVASCULAR: RRR, normal S1, S2 without murmurs, rubs, or gallops appreciated  LUNGS: Clear to auscultation bilaterally without wheezes, rhonchi, or rales  ABDOMEN: Soft, nontender, nondistended  EXTREMITIES/VASCULAR: perfused without clubbing, cyanosis, or LE edema b/l  PSYCH: Normal mood and affect  NEURO: CN ll-Xll grossly intact      Lab Results: I have personally reviewed pertinent lab results  Invalid input(s): LABGLOM              Imaging: I have personally reviewed pertinent reports  No results found for this or any previous visit        EKG: pending        Code Status: Prior

## 2022-12-30 NOTE — ANESTHESIA PREPROCEDURE EVALUATION
Procedure:  Cardiac eps/aflutter ablation (Chest)  CARDIAC EPS/AFIB ABLATION (Chest)    Relevant Problems   CARDIO   (+) Paroxysmal atrial fibrillation (HCC)   (+) Paroxysmal atrial flutter (HCC)   (+) Pulmonary hypertension (HCC)      ENDO   (+) Hypothyroidism      /RENAL   (+) Stage 3a chronic kidney disease (HCC)      HEMATOLOGY   (+) Waldenstrom macroglobulinemia (HCC)      NEURO/PSYCH   (+) History of Hodgkin's lymphoma      Cardiovascular and Mediastinum   (+) Chronic combined systolic (congestive) and diastolic (congestive) heart failure (HCC)   (+) Dilated cardiomyopathy (HCC)      Respiratory   (+) ILD (interstitial lung disease) (HCC)      Other   (+) Myelodysplasia (myelodysplastic syndrome) (HCC)        Physical Exam    Airway    Mallampati score: III  TM Distance: >3 FB  Neck ROM: limited     Dental       Cardiovascular      Pulmonary      Other Findings        Anesthesia Plan  ASA Score- 3     Anesthesia Type- general with ASA Monitors  Additional Monitors:   Airway Plan: ETT  Comment: Consider a line  Plan Factors-    Chart reviewed  Existing labs reviewed  Patient summary reviewed  Induction- intravenous  Postoperative Plan-   Planned trial extubation    Informed Consent- Anesthetic plan and risks discussed with patient  I personally reviewed this patient with the CRNA  Discussed and agreed on the Anesthesia Plan with the CRNA  Tiffanie Barreto VITALS  There were no vitals taken for this visit    BP Readings from Last 3 Encounters:   10/26/22 122/78   10/06/22 122/80   08/23/22 136/82     LABS  Results from Last 12 Months   Lab Units 12/16/22  0703 10/14/22  0656 03/05/22  0731 01/20/22  0814 01/15/22  0854 01/14/22  1014 01/13/22  0651   SODIUM mmol/L 144 141   < > 138 136 137 140   POTASSIUM mmol/L 3 8 3 3*   < > 3 8 4 1 3 9 4 3   CHLORIDE mmol/L 103 101   < > 92* 99* 99* 102   CO2 mmol/L 37* 36*   < > 36* 32 28 30   ANION GAP mmol/L  --   --   --   --  5 10 8   BUN mg/dL 20 14   < > 36* 31* 35* 28*   CREATININE mg/dL 1 09* 0 96   < > 1 54* 1 30 1 48* 1 04   CALCIUM mg/dL 9 3 9 2   < > 9 7 9 4 9 1 9 6   GLUCOSE RANDOM mg/dL 102* 105*   < > 104 111 166* 110   MAGNESIUM mg/dL  --   --   --  1 9  --   --  1 9   AST U/L 14 14   < >  --   --   --   --    ALT U/L 7 7   < >  --   --   --   --    ALK PHOS U/L 76 63   < >  --   --   --   --    TOTAL BILIRUBIN mg/dL 0 5 0 6   < >  --   --   --   --    ALBUMIN g/dL 4 0 4 1   < >  --   --   --   --     < > = values in this interval not displayed  Results from Last 12 Months   Lab Units 12/16/22  0703 10/14/22  0732   WHITE BLOOD CELL COUNT  Thousand/uL 3 8 4 7   HEMOGLOBIN  g/dL 12 1 11 9   HEMATOCRIT  % 35 7 35 5   PLATELETS  Thousand/uL 96* 106*     Results from Last 12 Months   Lab Units 01/11/22  0952 01/10/22  2319 01/10/22  1508 01/10/22  0735   INR   --   --  1 03 1 03   PTT seconds 61* 56* 31 30       ANESTHESIA RISK-BENEFIT DISCUSSION  • BENEFITS INCLUDE ([facility] [id number], PMID [id number]): (1) The anesthesia team reduces mortality for major surgeries, (2) The team provides as much sedation/amnesia as is reasonably possible, and (3) The team strives to reduce pain and discomfort  • RISKS INCLUDE THE FOLLOWING (PMID [id number]):  Neurologic Risks: IntraOp awareness (Risk is ~1:1,000 - 1:14,000; PMID [id number]), Stroke (Risk ~<0 1-2% for most cases; PMID [id number]), and POCD  Airway and Pulmonary Risks: Dental or mouth injury, throat pain, critical hypoxia, pneumothorax, prolonged intubation, post-op respiratory compromise  • Airway/Intubation factors: No prior advanced airway notes in [facility]  • Risk of pulmonary complications based on a simplified ARISCAT score (Major RFs: Case surgical time estimated at >2hrs: 1pt): Score 0-2= Low, 1 6%  Cardiovascular Risks: Maria D-op cardiac injury (MACE)  If specialized vascular access is needed, risk of bleeding, infection, or injury to adjacent structures   If a bypass circuit is needed, risk of stroke, blood clot, vasoplegia  • EK  Normal sinus rhythm, nonspecific intraventricular conduction delay, nonspecific ST and T-wave abnormality  • Echo or other cardiac testing: Stress ECG 2022  •  Stress ECG: The ECG was not diagnostic due to pharmacological (vasodilator) stress  •  Perfusion: There are no perfusion defects  •  Stress Function: Left ventricular function post-stress is normal  Post-stress ejection fraction is 60 %      No ischemia or scar  Pt was in and out of Atrial flutter during the testing  Needs outpt monitor to evaluate burden        2022 TTE  •  Left Ventricle: Left ventricular cavity size is normal  Wall thickness is normal  The left ventricular ejection fraction is 50%  Systolic function is low normal  Wall motion is normal  Diastolic function is mildly abnormal, consistent with grade I (abnormal) relaxation  •  IVS: There is abnormal septal motion consistent with bundle branch block  There is sigmoid appearance of the septum  •  Right Ventricle: Right ventricular cavity size is normal  Systolic function is normal   •  Aortic Valve: There is mild regurgitation  •  Tricuspid Valve: There is mild regurgitation  •  Pulmonary Artery: The estimated pulmonary artery systolic pressure is 73 8 mmHg  The pulmonary artery systolic pressure is moderately increased  • Signs of severe cardiac instability: Not applicable given that this is a necessary cardiac procedure  • Prashanth's RCRI score and estimate risk of MACE (Major RFs: CHF): Not relevant given that this is a necessary cardiac procedure     • Are denise-op or intra-op beta blockers indicated?: home metop indicated   FEN/GI Risks: Aspiration (Approximately 0 5% risk per the IRIS trial) and PONV (10-80% depending on Apfel criteria)  • Patient meets ASA NPO guidelines: yes   Adverse drug reaction risks:  Allergic reaction, overdose,risk of injury or accident if using machinery or performing physically or mentally hazardous tasks for 24 hrs after anesthesia    • Recent notable medications (including AC medications): see MAR  • Personal or family history of anesthesia complications: no  • Pregnancy Status: Not applicable   Mortality risks associated with anesthesia based on ASA-PS (PMID [id number])  - ASA-PS III: Estimated risk 1:3,500

## 2022-12-30 NOTE — ANESTHESIA POSTPROCEDURE EVALUATION
Post-Op Assessment Note  Post Op Interval Evaluation    • Patient hemodynamics are stable  • Current medical need: Tachycardia  • Plan: 12-lead EKG for AF or AFL   Will consider metoprolol    Demetra Garcia MD PhD  [phone]  Anesthesiology and Critical Care  Staff Physician, [facility] [address] [place] [address]  December 30, 2022

## 2022-12-30 NOTE — DISCHARGE INSTR - AVS FIRST PAGE
Please continue all medications as previously instructed  RESTRICTIONS:  No heavy lifting or strenuous activity for one week  No soaking in a bath tub/hot tub/swimming pool for one week or until groin heals  You may shower - please let soap and water run over the groins, no scrubbing, and pat the area dry  Please place band-aid on groins daily for up to five days, but you may remove sooner if no issues are noted  If you notice ongoing bleeding, swelling, or firm lumps in groin near ablation incision, please contact Dr Landry [facility] office - [phone]  Cardiac Loop Recorder Instructions:  Keep loop recorder incision dry for one week  Do not use lotions/powders/creams on incision  Remove outer bandage 48 hours after procedure - if present, leave underlying steri-strips in place, they will either fall off on their own or will be removed at 2 week follow up appointment  Please call the office [phone] if you notice redness, swelling, bleeding, or drainage from incision or if you develop fevers  WHAT YOU SHOULD KNOW:    A cardiac loop recorder is a device used to diagnose heart rhythm problems, such as a fast or irregular heartbeat  It is implanted in your left chest, just under the skin  The device records a pattern of your heart's rhythm, called an EKG  Your device records automatic EKGs, depending on how your caregiver programs it  You may also receive a handheld controller  You press a button on the controller when you have symptoms, such as dizziness, lightheadedness, or palpitations  The device will record an EKG at that moment  The recording can help your caregiver see if your symptoms may be caused by heart rhythm problems  Your caregiver will remove the device after it has collected enough data  You may need the device for up to 3 years  The procedure to remove the device is similar to the procedure used to implant it        AFTER YOU LEAVE:    Follow up with your cardiologist as directed: You will need to return in 1 to 2 weeks  Your cardiologist will check your incision  He may also program your device settings again  He will retrieve data from the device every 1 to 3 months with a monitor held over your skin  You may be able to transmit data from your device from home as well  You will do this by calling a number provided by your cardiologist, or as they have instructed you  Ask for information about this process  Write down your questions so you remember to ask them during your visits  Wound care: Keep loop recorder incision dry for one week  Do not use lotions/powders/creams on incision  Remove outer bandage 48 hours after procedure - leave underlying steri-strips in place, they will either fall off on their own or will be removed at 2 week follow up appointment  Please call the office if you notice redness, swelling, bleeding, or drainage from incision or if you develop fevers  After that first week, carefully wash your incision with soap and water  Keep the area clean and dry until it heals  Return to activity: If you received anesthesia, you will not be able to drive for 24 hours  Otherwise, most people can return to normal activities soon after the procedure  Your cardiologist may want to know if your work involves electrical current or high-voltage equipment  Ask about other electrical items that could interfere with your cardiac loop recorder  Contact your cardiologist if:   You have a fever or chills  Your wound is red, swollen, or draining pus  You have questions or concerns about your condition or care  Seek care immediately or call 911 if: You feel weak, dizzy, or faint  You lose consciousness  © 2014 [address] is for End User's use only and may not be sold, redistributed or otherwise used for commercial purposes   All illustrations and images included in CareNotes® are the copyrighted property of KEVEN GALLEGOS Cecilio  or David James  The above information is an  only  It is not intended as medical advice for individual conditions or treatments  Talk to your doctor, nurse or pharmacist before following any medical regimen to see if it is safe and effective for you

## 2022-12-30 NOTE — Clinical Note
Glidewire advantage inserted into short 9fr sheath  10cm 9fr sheath exchanged for 25cm length 9fr sheath

## 2022-12-30 NOTE — ANESTHESIA POSTPROCEDURE EVALUATION
Post-Op Assessment Note    CV Status:  Stable    Pain management: adequate     Mental Status:  Awake   Hydration Status:  Stable   PONV Controlled:  None   Airway Patency:  Patent      Post Op Vitals Reviewed: Yes      Staff: Anesthesiologist         No notable events documented      BP   101/67   Temp   97 5   Pulse 117   Resp   10   SpO2   98% on blowby

## 2022-12-31 ENCOUNTER — NURSE TRIAGE (OUTPATIENT)
Dept: OTHER | Facility: OTHER | Age: 72
End: 2022-12-31

## 2022-12-31 NOTE — TELEPHONE ENCOUNTER
Regarding: post procedure/fever 102 4  ----- Message from Dar Jeff sent at 12/31/2022  5:36 PM EST -----  "My Mom had a loop put in and a heart ablation and she has a little headache and a fever 102 4 and I took it ten minutes ago   I was told to call if she had a fever

## 2022-12-31 NOTE — TELEPHONE ENCOUNTER
Reason for Disposition  • Fever > 100 4 F (38 0 C)    Answer Assessment - Initial Assessment Questions  1  SYMPTOM: "What's the main symptom you're concerned about?" (e g , pain, fever, vomiting)      Fever     2  ONSET: "When did S/S start?"      One hour ago     3  SURGERY: "What surgery was performed?"     Cardiac loop recorder ,a flutter ablation     4  DATE of SURGERY: "When was surgery performed?"       12/30/22    5  ANESTHESIA: " What type of anesthesia did you have?" (e g , general, spinal, epidural, local)     General     6  PAIN: "Is there any pain?" If Yes, ask: "How bad is it?"  (Scale 1-10; or mild, moderate, severe)      Yes    7  FEVER: "Do you have a fever?" If Yes, ask: "What is your temperature, how was it measured, and when did it start?"      Yes, 102 4 orally    8  VOMITING: "Is there any vomiting?" If yes, ask: "How many times?"    Denies    9  BLEEDING: "Is there any bleeding?" If Yes, ask: "How much?" and "Where?"      Denies    10   OTHER SYMPTOMS: "Do you have any other symptoms?" (e g , drainage from wound, painful urination, constipation)        Headache    Protocols used: POST-OP SYMPTOMS AND QUESTIONS-[facility]

## 2023-01-03 LAB
ATRIAL RATE: 121 BPM
ATRIAL RATE: 94 BPM
P AXIS: 253 DEGREES
P AXIS: 47 DEGREES
PR INTERVAL: 104 MS
PR INTERVAL: 204 MS
QRS AXIS: -29 DEGREES
QRS AXIS: -32 DEGREES
QRSD INTERVAL: 104 MS
QRSD INTERVAL: 108 MS
QT INTERVAL: 342 MS
QT INTERVAL: 400 MS
QTC INTERVAL: 486 MS
QTC INTERVAL: 501 MS
T WAVE AXIS: 27 DEGREES
T WAVE AXIS: 36 DEGREES
VENTRICULAR RATE: 121 BPM
VENTRICULAR RATE: 94 BPM

## 2023-01-04 LAB
ATRIAL RATE: 119 BPM
PR INTERVAL: 0 MS
QRS AXIS: -51 DEGREES
QRSD INTERVAL: 133 MS
QT INTERVAL: 400 MS
QTC INTERVAL: 563 MS
T WAVE AXIS: 72 DEGREES
VENTRICULAR RATE: 119 BPM

## 2023-01-10 ENCOUNTER — IN-CLINIC DEVICE VISIT (OUTPATIENT)
Dept: CARDIOLOGY CLINIC | Facility: CLINIC | Age: 73
End: 2023-01-10

## 2023-01-10 DIAGNOSIS — Z95.818 PRESENCE OF OTHER CARDIAC IMPLANTS AND GRAFTS: Primary | ICD-10-CM

## 2023-01-10 NOTE — PROGRESS NOTES
Results for orders placed or performed in visit on 01/10/23   Cardiac EP device report    Narrative    OMAR Mosqueda IS MRI CONDITIONAL  DEVICE INTERROGATED IN THE [facility] OFFICE: (ILR) WOUND CHECK: INCISION CLEAN AND DRY WITH EDGES APPROXIMATED; SUTURES REMOVED; WOUND CARE AND RESTRICTIONS REVIEWED WITH PATIENT  BATTERY VOLTAGE "OK"  PRESENTING ECG SHOWS ST, 115 BPM  R WAVES MEASURED 0 57 MV  NO PATIENT OR DEVICE ACTIVATED EPISODES  PVC <1%; NO PROGRAMMING CHANGES MADE TO DEVICE PARAMETERS  NORMAL DEVICE FUNCTION    ES

## 2023-01-15 DIAGNOSIS — I50.42 CHRONIC COMBINED SYSTOLIC (CONGESTIVE) AND DIASTOLIC (CONGESTIVE) HEART FAILURE (HCC): ICD-10-CM

## 2023-01-15 DIAGNOSIS — I48.91 NEW ONSET A-FIB (HCC): ICD-10-CM

## 2023-01-16 RX ORDER — METOPROLOL SUCCINATE 25 MG/1
TABLET, EXTENDED RELEASE ORAL
Qty: 180 TABLET | Refills: 0 | Status: SHIPPED | OUTPATIENT
Start: 2023-01-16

## 2023-02-03 ENCOUNTER — TELEPHONE (OUTPATIENT)
Dept: CARDIOLOGY CLINIC | Facility: CLINIC | Age: 73
End: 2023-02-03

## 2023-02-03 ENCOUNTER — OFFICE VISIT (OUTPATIENT)
Dept: CARDIOLOGY CLINIC | Facility: CLINIC | Age: 73
End: 2023-02-03

## 2023-02-03 ENCOUNTER — HOSPITAL ENCOUNTER (INPATIENT)
Facility: HOSPITAL | Age: 73
LOS: 5 days | Discharge: HOME | End: 2023-02-08
Attending: EMERGENCY MEDICINE | Admitting: INTERNAL MEDICINE

## 2023-02-03 ENCOUNTER — APPOINTMENT (EMERGENCY)
Dept: RADIOLOGY | Facility: HOSPITAL | Age: 73
End: 2023-02-03

## 2023-02-03 VITALS
SYSTOLIC BLOOD PRESSURE: 110 MMHG | HEIGHT: 63 IN | BODY MASS INDEX: 23.6 KG/M2 | DIASTOLIC BLOOD PRESSURE: 78 MMHG | HEART RATE: 126 BPM | WEIGHT: 133.2 LBS

## 2023-02-03 DIAGNOSIS — I47.1 ATRIAL TACHYCARDIA (HCC): Primary | ICD-10-CM

## 2023-02-03 DIAGNOSIS — I47.1 ATRIAL TACHYCARDIA (HCC): ICD-10-CM

## 2023-02-03 DIAGNOSIS — I48.0 PAROXYSMAL ATRIAL FIBRILLATION (HCC): Primary | ICD-10-CM

## 2023-02-03 DIAGNOSIS — I50.9 CONGESTIVE HEART FAILURE (CHF) (HCC): ICD-10-CM

## 2023-02-03 DIAGNOSIS — I48.92 PAROXYSMAL ATRIAL FLUTTER (HCC): Primary | ICD-10-CM

## 2023-02-03 DIAGNOSIS — R73.9 ELEVATED BLOOD SUGAR: ICD-10-CM

## 2023-02-03 PROBLEM — I47.19 ATRIAL TACHYCARDIA: Status: ACTIVE | Noted: 2023-02-03

## 2023-02-03 LAB
2HR DELTA HS TROPONIN: 1 NG/L
ALBUMIN SERPL BCP-MCNC: 3.9 G/DL (ref 3.5–5)
ALP SERPL-CCNC: 100 U/L (ref 46–116)
ALT SERPL W P-5'-P-CCNC: 13 U/L (ref 12–78)
ANION GAP SERPL CALCULATED.3IONS-SCNC: 3 MMOL/L (ref 4–13)
AST SERPL W P-5'-P-CCNC: 15 U/L (ref 5–45)
BASOPHILS # BLD AUTO: 0.04 THOUSANDS/ÂΜL (ref 0–0.1)
BASOPHILS NFR BLD AUTO: 1 % (ref 0–1)
BILIRUB SERPL-MCNC: 0.34 MG/DL (ref 0.2–1)
BUN SERPL-MCNC: 17 MG/DL (ref 5–25)
CALCIUM SERPL-MCNC: 9.6 MG/DL (ref 8.3–10.1)
CARDIAC TROPONIN I PNL SERPL HS: 5 NG/L
CARDIAC TROPONIN I PNL SERPL HS: 6 NG/L
CHLORIDE SERPL-SCNC: 103 MMOL/L (ref 96–108)
CO2 SERPL-SCNC: 32 MMOL/L (ref 21–32)
CREAT SERPL-MCNC: 0.99 MG/DL (ref 0.6–1.3)
EOSINOPHIL # BLD AUTO: 0.05 THOUSAND/ÂΜL (ref 0–0.61)
EOSINOPHIL NFR BLD AUTO: 1 % (ref 0–6)
ERYTHROCYTE [DISTWIDTH] IN BLOOD BY AUTOMATED COUNT: 13.3 % (ref 11.6–15.1)
GFR SERPL CREATININE-BSD FRML MDRD: 56 ML/MIN/1.73SQ M
GLUCOSE SERPL-MCNC: 111 MG/DL (ref 65–140)
HCT VFR BLD AUTO: 41.5 % (ref 34.8–46.1)
HGB BLD-MCNC: 12.8 G/DL (ref 11.5–15.4)
IMM GRANULOCYTES # BLD AUTO: 0.02 THOUSAND/UL (ref 0–0.2)
IMM GRANULOCYTES NFR BLD AUTO: 0 % (ref 0–2)
LYMPHOCYTES # BLD AUTO: 2.33 THOUSANDS/ÂΜL (ref 0.6–4.47)
LYMPHOCYTES NFR BLD AUTO: 32 % (ref 14–44)
MCH RBC QN AUTO: 28.6 PG (ref 26.8–34.3)
MCHC RBC AUTO-ENTMCNC: 30.8 G/DL (ref 31.4–37.4)
MCV RBC AUTO: 93 FL (ref 82–98)
MONOCYTES # BLD AUTO: 0.42 THOUSAND/ÂΜL (ref 0.17–1.22)
MONOCYTES NFR BLD AUTO: 6 % (ref 4–12)
NEUTROPHILS # BLD AUTO: 4.53 THOUSANDS/ÂΜL (ref 1.85–7.62)
NEUTS SEG NFR BLD AUTO: 60 % (ref 43–75)
NRBC BLD AUTO-RTO: 0 /100 WBCS
PLATELET # BLD AUTO: 171 THOUSANDS/UL (ref 149–390)
PMV BLD AUTO: 11.3 FL (ref 8.9–12.7)
POTASSIUM SERPL-SCNC: 3.4 MMOL/L (ref 3.5–5.3)
PROT SERPL-MCNC: 8.4 G/DL (ref 6.4–8.4)
RBC # BLD AUTO: 4.48 MILLION/UL (ref 3.81–5.12)
SODIUM SERPL-SCNC: 138 MMOL/L (ref 135–147)
TSH SERPL DL<=0.05 MIU/L-ACNC: 3.47 UIU/ML (ref 0.45–4.5)
WBC # BLD AUTO: 7.39 THOUSAND/UL (ref 4.31–10.16)

## 2023-02-03 RX ORDER — FUROSEMIDE 20 MG/1
20 TABLET ORAL 2 TIMES DAILY
Status: DISCONTINUED | OUTPATIENT
Start: 2023-02-04 | End: 2023-02-03

## 2023-02-03 RX ORDER — FUROSEMIDE 10 MG/ML
20 INJECTION INTRAMUSCULAR; INTRAVENOUS
Status: DISCONTINUED | OUTPATIENT
Start: 2023-02-04 | End: 2023-02-05

## 2023-02-03 RX ORDER — POTASSIUM CHLORIDE 14.9 MG/ML
20 INJECTION INTRAVENOUS ONCE
Status: COMPLETED | OUTPATIENT
Start: 2023-02-03 | End: 2023-02-03

## 2023-02-03 RX ORDER — MAGNESIUM SULFATE 1 G/100ML
1 INJECTION INTRAVENOUS ONCE
Status: COMPLETED | OUTPATIENT
Start: 2023-02-03 | End: 2023-02-04

## 2023-02-03 RX ORDER — METOPROLOL SUCCINATE 25 MG/1
25 TABLET, EXTENDED RELEASE ORAL 2 TIMES DAILY
Status: DISCONTINUED | OUTPATIENT
Start: 2023-02-03 | End: 2023-02-04

## 2023-02-03 RX ORDER — ACETAMINOPHEN 325 MG/1
650 TABLET ORAL EVERY 6 HOURS PRN
Status: DISCONTINUED | OUTPATIENT
Start: 2023-02-03 | End: 2023-02-08 | Stop reason: HOSPADM

## 2023-02-03 RX ORDER — LEVOTHYROXINE SODIUM 0.1 MG/1
100 TABLET ORAL DAILY
Status: DISCONTINUED | OUTPATIENT
Start: 2023-02-04 | End: 2023-02-08 | Stop reason: HOSPADM

## 2023-02-03 RX ADMIN — METOPROLOL SUCCINATE 25 MG: 25 TABLET, FILM COATED, EXTENDED RELEASE ORAL at 22:10

## 2023-02-03 RX ADMIN — SODIUM CHLORIDE 250 ML: 0.9 INJECTION, SOLUTION INTRAVENOUS at 21:35

## 2023-02-03 RX ADMIN — POTASSIUM CHLORIDE 20 MEQ: 14.9 INJECTION, SOLUTION INTRAVENOUS at 21:33

## 2023-02-03 RX ADMIN — APIXABAN 5 MG: 5 TABLET, FILM COATED ORAL at 22:10

## 2023-02-03 NOTE — TELEPHONE ENCOUNTER
Discussed case with Dr Hodan Nicole, he agrees with inpatient admission  Called patient, went to voicemail, left message recommending to come to [facility] AND [facility] ED  Will place ADT 21

## 2023-02-03 NOTE — PROGRESS NOTES
Electrophysiology Office Note    Sissy Eye  1950  [id number]  [facility] [phone]        Assessment/Plan     Primary diagnosis:   1  Persistent atrial tachycardia, asymptomatic    * patient presents to [facility] EP office today for post ablation follow up  She is now s/p RFA of left atrial inferoseptal region tachycardia + MDT LINQ 2 by Dr Adrian Panda    * today ECG showing AT with HR of 126bpm  ILR interrogated showing only few episodes of AT BUT LR on ILR was 130, reviewing ECG's during AT episodes HR in 120's, HR below detection zone  HR graph on loop showing HR above 100bpm since Jan 1     * on exam she has b/l rales  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%    * with her cough, rales, loop recorder and ECG findings she likely has been stuck in atrial flutter w/ RVR for weeks and is going back into acute HF  Discussed best option of sending back to [facility] ED for inpatient EP evaluation  She really does not want to go back to the hospital but understands  Will discuss with Dr Adrian Panda     Secondary diagnosis:   1  Hypothyroidism   2  Hx 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% now resolved    * was on amiodarone for short time after diagnosis in Jan 2022 but discontinued               Rhythm History:   Atrial fibrillation:     Atrial flutter:     SVT:     VT/VF/PVC:     Device history:   Pacemaker:    Defibrillator:    BIV PPM:    BIV ICD:    ILR:      Cardiac Testing:     ECHO: No results found for this or any previous visit  History of Present Illness     HPI/INTERVAL HISTORY:  For the past several weeks patient has been concerned with a wet cough  She is not having any palpitations, lightheadedness, dizziness, orthopnea, weight gain, lower extremity edema  actually feels very well outside of the cough        Review of Systems  ROS as noted above, otherwise 12 point review of systems was performed and is negative  Historical Information   Social History     Socioeconomic History   • Marital status:      Spouse name: Not on file   • Number of children: Not on file   • Years of education: Not on file   • Highest education level: Not on file   Occupational History   • Not on file   Tobacco Use   • Smoking status: Former     Packs/day: 1 00     Years: 10 00     Pack years: 10 00     Types: Cigarettes     Start date:      Quit date: 1993     Years since quittin 1   • Smokeless tobacco: Never   Vaping Use   • Vaping Use: Never used   Substance and Sexual Activity   • Alcohol use: Never   • Drug use: Never   • Sexual activity: Never   Other Topics Concern   • Not on file   Social History Narrative    Most recent tobacco use screenin2018     Social Determinants of Health     Financial Resource Strain: Not on file   Food Insecurity: Not on file   Transportation Needs: Not on file   Physical Activity: Not on file   Stress: Not on file   Social Connections: Not on file   Intimate Partner Violence: Not on file   Housing Stability: Not on file     Past Medical History:   Diagnosis Date   • Abnormal CXR 2022   • Cancer [facility])    • Disease of thyroid gland    • Dysuria 2021   • Encounter for support and coordination of transition of care 2021     Past Surgical History:   Procedure Laterality Date   • CARDIAC ELECTROPHYSIOLOGY PROCEDURE N/A 2022    Procedure: Cardiac eps/aflutter ablation;  Surgeon: Kavin Gambino MD;  Location: [facility]; Service: Cardiology   • CARDIAC ELECTROPHYSIOLOGY PROCEDURE N/A 2022    Procedure: Cardiac loop recorder implant;  Surgeon: Kavin Gambino MD;  Location: [facility];   Service: Cardiology   • HYSTERECTOMY       Social History     Substance and Sexual Activity   Alcohol Use Never     Social History     Substance and Sexual Activity   Drug Use Never     Social History     Tobacco Use Smoking Status Former   • Packs/day: 1 00   • Years: 10 00   • Pack years: 10 00   • Types: Cigarettes   • Start date: 79   • Quit date: 1993   • Years since quittin 1   Smokeless Tobacco Never     Family History   Problem Relation Age of Onset   • No Known Problems Mother    • No Known Problems Father    • Cancer Maternal Aunt        Meds/Allergies       Current Outpatient Medications:   •  apixaban (Eliquis) 5 mg, Take 1 tablet (5 mg total) by mouth 2 (two) times a day, Disp: 180 tablet, Rfl: 3  •  furosemide (LASIX) 20 mg tablet, Take 1 tablet (20 mg total) by mouth 2 (two) times a day, Disp: 180 tablet, Rfl: 3  •  levothyroxine 100 mcg tablet, Take 1 tablet (100 mcg total) by mouth daily, Disp: 90 tablet, Rfl: 1  •  metoprolol succinate (TOPROL-XL) 25 mg 24 hr tablet, TAKE 1 TABLET BY MOUTH TWICE A DAY, Disp: 180 tablet, Rfl: 0  •  sacubitril-valsartan (ENTRESTO) 24-26 MG TABS, Take 1 tablet by mouth 2 (two) times a day, Disp: 180 tablet, Rfl: 3    Allergies   Allergen Reactions   • Penicillins Rash       Objective   Vitals: Blood pressure 110/78, pulse (!) 126, height 5' 3" (1 6 m), weight 60 4 kg (133 lb 3 2 oz)          Physical Exam      Labs:  Admission on 2022, Discharged on 2022   Component Date Value   • Sodium 2022 142    • Potassium 2022 3 9    • Chloride 2022 105    • CO2 2022 32    • ANION GAP 2022 5    • BUN 2022 23    • Creatinine 2022 1 10    • Glucose 2022 112    • Glucose, Fasting 2022 112 (H)    • Calcium 2022 9 7    • eGFR 2022 49    • Magnesium 2022 2 3    • Protime 2022 16 5 (H)    • INR 2022 1 31 (H)    • WBC 2022 5 42    • RBC 2022 4 49    • Hemoglobin 2022 13 2    • Hematocrit 2022 41 2    • MCV 2022 92    • MCH 2022 29 4    • MCHC 2022 32 0    • RDW 2022 13 2    • MPV 2022 11 2    • Platelets  108 (L)    • nRBC 12/30/2022 0    • Neutrophils Relative 12/30/2022 59    • Immat GRANS % 12/30/2022 0    • Lymphocytes Relative 12/30/2022 32    • Monocytes Relative 12/30/2022 7    • Eosinophils Relative 12/30/2022 1    • Basophils Relative 12/30/2022 1    • Neutrophils Absolute 12/30/2022 3 21    • Immature Grans Absolute 12/30/2022 0 01    • Lymphocytes Absolute 12/30/2022 1 72    • Monocytes Absolute 12/30/2022 0 38    • Eosinophils Absolute 12/30/2022 0 07    • Basophils Absolute 12/30/2022 0 03    • Activated Clotting Time,* 12/30/2022 398 (H)    • Specimen Type 12/30/2022 VENOUS    • Activated Clotting Time,* 12/30/2022 334 (H)    • Specimen Type 12/30/2022 ARTERIAL    • Activated Clotting Time,* 12/30/2022 490 (H)    • Specimen Type 12/30/2022 ARTERIAL    • Activated Clotting Time,* 12/30/2022 391 (H)    • Specimen Type 12/30/2022 VENOUS    • Ventricular Rate 12/30/2022 127    • Atrial Rate 12/30/2022 127    • WI Interval 12/30/2022 160    • QRSD Interval 12/30/2022 100    • QT Interval 12/30/2022 294    • QTC Interval 12/30/2022 427    • QRS Axis 12/30/2022 -42    • T Wave Axis 12/30/2022 [address] [place] [address] Outpatient Visit on 12/30/2022   Component Date Value   • LV EF 12/30/2022 60    • Ao root 12/30/2022 4 20    • Asc Ao 12/30/2022 3 7    • Ventricular Rate 12/30/2022 121    • Atrial Rate 12/30/2022 121    • WI Interval 12/30/2022 104    • QRSD Interval 12/30/2022 104    • QT Interval 12/30/2022 342    • QTC Interval 12/30/2022 486    • P Axis 12/30/2022 253    • QRS Axis 12/30/2022 -32    • T Wave Axis 12/30/2022 36    • Ventricular Rate 12/30/2022 94    • Atrial Rate 12/30/2022 94    • WI Interval 12/30/2022 204    • QRSD Interval 12/30/2022 108    • QT Interval 12/30/2022 400    • QTC Interval 12/30/2022 501    • P Axis 12/30/2022 47    • QRS Axis 12/30/2022 -29    • T Wave Axis 12/30/2022 27    • Ventricular Rate 12/30/2022 119    • Atrial Rate 12/30/2022 119    • WI Interval 12/30/2022 0    • QRSD Interval 12/30/2022 133    • QT Interval 12/30/2022 400    • QTC Interval 12/30/2022 563    • QRS Axis 12/30/2022 -51    • T Wave [place] 12/30/2022 72    Orders Only on 12/16/2022   Component Date Value   • Glucose, Random 12/16/2022 102 (H)    • BUN 12/16/2022 20    • Creatinine 12/16/2022 1 09 (H)    • eGFR 12/16/2022 54 (L)    • SL AMB BUN/CREATININE RA* 12/16/2022 18    • Sodium 12/16/2022 144    • Potassium 12/16/2022 3 8    • Chloride 12/16/2022 103    • CO2 12/16/2022 37 (H)    • Calcium 12/16/2022 9 3    • Protein, Total 12/16/2022 6 4    • Albumin 12/16/2022 4 0    • Globulin 12/16/2022 2 4    • Albumin/Globulin Ratio 12/16/2022 1 7    • TOTAL BILIRUBIN 12/16/2022 0 5    • Alkaline Phosphatase 12/16/2022 76    • AST 12/16/2022 14    • ALT 12/16/2022 7    • White Blood Cell Count 12/16/2022 3 8    • Red Blood Cell Count 12/16/2022 4 02    • Hemoglobin 12/16/2022 12 1    • HCT 12/16/2022 35 7    • MCV 12/16/2022 88 8    • MCH 12/16/2022 30 1    • MCHC 12/16/2022 33 9    • RDW 12/16/2022 12 2    • Platelet Count 15/53/0828 96 (L)    • SL AMB MPV 12/16/2022 12 0    • Neutrophils (Absolute) 12/16/2022 1,835    • Lymphocytes (Absolute) 12/16/2022 1,588    • Monocytes (Absolute) 12/16/2022 289    • Eosinophils (Absolute) 12/16/2022 68    • Basophils ABS 12/16/2022 19    • Neutrophils 12/16/2022 48 3    • Lymphocytes 12/16/2022 41 8    • Monocytes 12/16/2022 7 6    • Eosinophils 12/16/2022 1 8    • Basophils PCT 12/16/2022 0 5        Imaging: I have personally reviewed pertinent reports

## 2023-02-04 ENCOUNTER — APPOINTMENT (INPATIENT)
Dept: NON INVASIVE DIAGNOSTICS | Facility: HOSPITAL | Age: 73
End: 2023-02-04

## 2023-02-04 LAB
4HR DELTA HS TROPONIN: 1 NG/L
ALBUMIN SERPL BCP-MCNC: 2.9 G/DL (ref 3.5–5)
ALP SERPL-CCNC: 72 U/L (ref 46–116)
ALT SERPL W P-5'-P-CCNC: 10 U/L (ref 12–78)
ANION GAP SERPL CALCULATED.3IONS-SCNC: 2 MMOL/L (ref 4–13)
AORTIC ROOT: 4.3 CM
APICAL FOUR CHAMBER EJECTION FRACTION: 56 %
APTT PPP: 37 SECONDS (ref 23–37)
AST SERPL W P-5'-P-CCNC: 13 U/L (ref 5–45)
ATRIAL RATE: 130 BPM
ATRIAL RATE: 131 BPM
BASOPHILS # BLD AUTO: 0.03 THOUSANDS/ÂΜL (ref 0–0.1)
BASOPHILS NFR BLD AUTO: 1 % (ref 0–1)
BILIRUB SERPL-MCNC: 0.38 MG/DL (ref 0.2–1)
BUN SERPL-MCNC: 15 MG/DL (ref 5–25)
CALCIUM ALBUM COR SERPL-MCNC: 9.6 MG/DL (ref 8.3–10.1)
CALCIUM SERPL-MCNC: 8.7 MG/DL (ref 8.3–10.1)
CARDIAC TROPONIN I PNL SERPL HS: 6 NG/L
CHLORIDE SERPL-SCNC: 106 MMOL/L (ref 96–108)
CHOLEST SERPL-MCNC: 146 MG/DL
CO2 SERPL-SCNC: 32 MMOL/L (ref 21–32)
CREAT SERPL-MCNC: 0.75 MG/DL (ref 0.6–1.3)
EOSINOPHIL # BLD AUTO: 0.08 THOUSAND/ÂΜL (ref 0–0.61)
EOSINOPHIL NFR BLD AUTO: 2 % (ref 0–6)
ERYTHROCYTE [DISTWIDTH] IN BLOOD BY AUTOMATED COUNT: 13.2 % (ref 11.6–15.1)
GFR SERPL CREATININE-BSD FRML MDRD: 79 ML/MIN/1.73SQ M
GLUCOSE SERPL-MCNC: 101 MG/DL (ref 65–140)
HCT VFR BLD AUTO: 34 % (ref 34.8–46.1)
HDLC SERPL-MCNC: 50 MG/DL
HGB BLD-MCNC: 10.9 G/DL (ref 11.5–15.4)
IMM GRANULOCYTES # BLD AUTO: 0.02 THOUSAND/UL (ref 0–0.2)
IMM GRANULOCYTES NFR BLD AUTO: 0 % (ref 0–2)
INR PPP: 1.42 (ref 0.84–1.19)
LDLC SERPL CALC-MCNC: 84 MG/DL (ref 0–100)
LYMPHOCYTES # BLD AUTO: 1.8 THOUSANDS/ÂΜL (ref 0.6–4.47)
LYMPHOCYTES NFR BLD AUTO: 40 % (ref 14–44)
MAGNESIUM SERPL-MCNC: 1.9 MG/DL (ref 1.6–2.6)
MAGNESIUM SERPL-MCNC: 2.3 MG/DL (ref 1.6–2.6)
MCH RBC QN AUTO: 28.9 PG (ref 26.8–34.3)
MCHC RBC AUTO-ENTMCNC: 32.1 G/DL (ref 31.4–37.4)
MCV RBC AUTO: 90 FL (ref 82–98)
MONOCYTES # BLD AUTO: 0.29 THOUSAND/ÂΜL (ref 0.17–1.22)
MONOCYTES NFR BLD AUTO: 7 % (ref 4–12)
MV E'TISSUE VEL-SEP: 7 CM/S
NEUTROPHILS # BLD AUTO: 2.24 THOUSANDS/ÂΜL (ref 1.85–7.62)
NEUTS SEG NFR BLD AUTO: 50 % (ref 43–75)
NRBC BLD AUTO-RTO: 0 /100 WBCS
NT-PROBNP SERPL-MCNC: 1799 PG/ML
P AXIS: 247 DEGREES
P AXIS: 42 DEGREES
PLATELET # BLD AUTO: 137 THOUSANDS/UL (ref 149–390)
PMV BLD AUTO: 11.3 FL (ref 8.9–12.7)
POTASSIUM SERPL-SCNC: 3.7 MMOL/L (ref 3.5–5.3)
PR INTERVAL: 82 MS
PR INTERVAL: 98 MS
PROT SERPL-MCNC: 6.3 G/DL (ref 6.4–8.4)
PROTHROMBIN TIME: 17.6 SECONDS (ref 11.6–14.5)
QRS AXIS: -12 DEGREES
QRS AXIS: -2 DEGREES
QRSD INTERVAL: 88 MS
QRSD INTERVAL: 94 MS
QT INTERVAL: 278 MS
QT INTERVAL: 336 MS
QTC INTERVAL: 410 MS
QTC INTERVAL: 494 MS
RBC # BLD AUTO: 3.77 MILLION/UL (ref 3.81–5.12)
SL CV LV EF: 55
SODIUM SERPL-SCNC: 140 MMOL/L (ref 135–147)
T WAVE AXIS: 54 DEGREES
T WAVE AXIS: 56 DEGREES
TR MAX PG: 27 MMHG
TR PEAK VELOCITY: 2.6 M/S
TRICUSPID VALVE PEAK REGURGITATION VELOCITY: 2.6 M/S
TRIGL SERPL-MCNC: 60 MG/DL
TSH SERPL DL<=0.05 MIU/L-ACNC: 3.95 UIU/ML (ref 0.45–4.5)
VENTRICULAR RATE: 130 BPM
VENTRICULAR RATE: 131 BPM
WBC # BLD AUTO: 4.46 THOUSAND/UL (ref 4.31–10.16)

## 2023-02-04 RX ADMIN — LEVOTHYROXINE SODIUM 100 MCG: 100 TABLET ORAL at 09:08

## 2023-02-04 RX ADMIN — MAGNESIUM SULFATE HEPTAHYDRATE 1 G: 1 INJECTION, SOLUTION INTRAVENOUS at 00:04

## 2023-02-04 RX ADMIN — SACUBITRIL AND VALSARTAN 1 TABLET: 24; 26 TABLET, FILM COATED ORAL at 11:18

## 2023-02-04 RX ADMIN — APIXABAN 5 MG: 5 TABLET, FILM COATED ORAL at 09:07

## 2023-02-04 RX ADMIN — APIXABAN 5 MG: 5 TABLET, FILM COATED ORAL at 17:26

## 2023-02-04 RX ADMIN — FUROSEMIDE 20 MG: 10 INJECTION, SOLUTION INTRAMUSCULAR; INTRAVENOUS at 09:08

## 2023-02-04 RX ADMIN — METOPROLOL SUCCINATE 25 MG: 25 TABLET, FILM COATED, EXTENDED RELEASE ORAL at 09:08

## 2023-02-04 NOTE — ASSESSMENT & PLAN NOTE
Wt Readings from Last 3 Encounters:   02/04/23 59 9 kg (132 lb)   02/03/23 60 4 kg (133 lb 3 2 oz)   12/30/22 59 kg (130 lb)       IV Lasix per cardiology  [facility]  Cardiology following

## 2023-02-04 NOTE — ED PROVIDER NOTES
History  Chief Complaint   Patient presents with   • Rapid Heart Rate     Atrial flutter hx- post ablation on 12/30/2022  Patient is a 56-year-old female, past medical history significant for ablation secondary to atrial tachycardia in December 2022, CHF,  interstitial lung disease, and Hodgkin's lymphoma, who presents to the emergency department after she was found to be in atrial tachycardia  Patient states that since her ablation she has had a loop recorder in  She states she has been told that she has intermittently gone in and out of atrial tachycardia  Today, she had a follow-up appointment with her cardiologist   She was found to be in atrial tachycardia there  It was ultimately recommended she come to the emergency department for EP evaluation  Currently, patient complains of "upper congestion", but denies any chest pain, shortness of breath, or palpitations  No fevers, chills, or any other new or concerning symptoms  Chart reviewed  Patient seen in the office earlier today  EKG in the office showed atrial tachycardia with a heart of 126  Her loop recorder was interrogated showing a few episodes of atrial tachycardia  Noted to have rales on exam           Prior to Admission Medications   Prescriptions Last Dose Informant Patient Reported? Taking?    apixaban (Eliquis) 5 mg 2/3/2023  No Yes   Sig: Take 1 tablet (5 mg total) by mouth 2 (two) times a day   furosemide (LASIX) 20 mg tablet 2/3/2023  No Yes   Sig: Take 1 tablet (20 mg total) by mouth 2 (two) times a day   levothyroxine 100 mcg tablet 2/3/2023  No Yes   Sig: Take 1 tablet (100 mcg total) by mouth daily   metoprolol succinate (TOPROL-XL) 25 mg 24 hr tablet 2/3/2023  No Yes   Sig: TAKE 1 TABLET BY MOUTH TWICE A DAY   sacubitril-valsartan (ENTRESTO) 24-26 MG TABS 2/3/2023  No Yes   Sig: Take 1 tablet by mouth 2 (two) times a day      Facility-Administered Medications: None       Past Medical History:   Diagnosis Date   • Abnormal CXR 2022   • Cancer [facility])    • Disease of thyroid gland    • Dysuria 2021   • Encounter for support and coordination of transition of care 2021       Past Surgical History:   Procedure Laterality Date   • CARDIAC ELECTROPHYSIOLOGY PROCEDURE N/A 2022    Procedure: Cardiac eps/aflutter ablation;  Surgeon: Kristyn Lewis MD;  Location: [facility]; Service: Cardiology   • CARDIAC ELECTROPHYSIOLOGY PROCEDURE N/A 2022    Procedure: Cardiac loop recorder implant;  Surgeon: Kristyn Lewis MD;  Location: [facility]; Service: Cardiology   • HYSTERECTOMY         Family History   Problem Relation Age of Onset   • No Known Problems Mother    • No Known Problems Father    • Cancer Maternal Aunt      I have reviewed and agree with the history as documented  E-Cigarette/Vaping   • E-Cigarette Use Never User      E-Cigarette/Vaping Substances   • Nicotine No    • THC No    • CBD No    • Flavoring No    • Other No    • Unknown No      Social History     Tobacco Use   • Smoking status: Former     Packs/day:      Years: 10 00     Pack years: 10 00     Types: Cigarettes     Start date:      Quit date: 1993     Years since quittin 1   • Smokeless tobacco: Never   Vaping Use   • Vaping Use: Never used   Substance Use Topics   • Alcohol use: Never   • Drug use: Never        Review of Systems   Constitutional: Negative for chills and fever  HENT: Positive for congestion  Respiratory: Negative for shortness of breath  Cardiovascular: Negative for chest pain  All other systems reviewed and are negative        Physical Exam  ED Triage Vitals   Temperature Pulse Respirations Blood Pressure SpO2   23   97 8 °F (36 6 °C) (!) 135 18 (!) 162/110 99 %      Temp Source Heart Rate Source Patient Position - Orthostatic VS BP Location FiO2 (%)   23 -- 23 --   Oral Monitor Left arm       Pain Score       02/03/23 2200       No Pain             Orthostatic Vital Signs  Vitals:    02/03/23 1853 02/03/23 2203   BP: (!) 162/110 156/93   Pulse: (!) 135 (!) 125       Physical Exam  Vitals and nursing note reviewed  Constitutional:       General: She is not in acute distress  Appearance: She is well-developed  She is not ill-appearing, toxic-appearing or diaphoretic  HENT:      Head: Normocephalic and atraumatic  Right Ear: External ear normal       Left Ear: External ear normal       Nose: Nose normal    Eyes:      General: Lids are normal  No scleral icterus  Cardiovascular:      Rate and Rhythm: Regular rhythm  Tachycardia present  Heart sounds: Normal heart sounds  No murmur heard  No friction rub  No gallop  Pulmonary:      Effort: Pulmonary effort is normal  No respiratory distress  Breath sounds: Rales present  No wheezing  Abdominal:      Palpations: Abdomen is soft  Tenderness: There is no abdominal tenderness  There is no guarding or rebound  Musculoskeletal:         General: No deformity  Normal range of motion  Cervical back: Normal range of motion and neck supple  Skin:     General: Skin is warm and dry  Neurological:      General: No focal deficit present  Mental Status: She is alert     Psychiatric:         Mood and Affect: Mood normal          Behavior: Behavior normal          ED Medications  Medications   metoprolol succinate (TOPROL-XL) 24 hr tablet 25 mg (25 mg Oral Given 2/3/23 2210)   apixaban (ELIQUIS) tablet 5 mg (5 mg Oral Given 2/3/23 2210)   levothyroxine tablet 100 mcg (has no administration in time range)   sacubitril-valsartan (ENTRESTO) 24-26 MG per tablet 1 tablet (has no administration in time range)   acetaminophen (TYLENOL) tablet 650 mg (has no administration in time range)   furosemide (LASIX) injection 20 mg (has no administration in time range)   potassium chloride 20 mEq IVPB (premix) (20 mEq Intravenous New Bag 2/3/23 2133)   magnesium sulfate IVPB (premix) SOLN 1 g (1 g Intravenous New Bag 2/4/23 0004)   sodium chloride 0 9 % bolus 250 mL (250 mL Intravenous New Bag 2/3/23 2135)       Diagnostic Studies  Results Reviewed     Procedure Component Value Units Date/Time    HS Troponin I 4hr [[id number]]  (Normal) Collected: 02/03/23 2322    Lab Status: Final result Specimen: Blood from Arm, Right Updated: 02/04/23 0102     hs TnI 4hr 6 ng/L      Delta 4hr hsTnI 1 ng/L     HS Troponin I 2hr [[id number]]  (Normal) Collected: 02/03/23 2145    Lab Status: Final result Specimen: Blood from Arm, Right Updated: 02/03/23 2242     hs TnI 2hr 6 ng/L      Delta 2hr hsTnI 1 ng/L     HS Troponin 0hr (reflex protocol) [[id number]]  (Normal) Collected: 02/03/23 1913    Lab Status: Final result Specimen: Blood from Arm, Left Updated: 02/03/23 1958     hs TnI 0hr 5 ng/L     TSH, 3rd generation with Free T4 reflex [[id number]]  (Normal) Collected: 02/03/23 1913    Lab Status: Final result Specimen: Blood from Arm, Left Updated: 02/03/23 1956     TSH 3RD GENERATON 3 470 uIU/mL     Narrative:      Patients undergoing fluorescein dye angiography may retain small amounts of fluorescein in the body for 48-72 hours post procedure  Samples containing fluorescein can produce falsely depressed TSH values  If the patient had this procedure,a specimen should be resubmitted post fluorescein clearance        Comprehensive metabolic panel [[id number]]  (Abnormal) Collected: 02/03/23 1913    Lab Status: Final result Specimen: Blood from Arm, Left Updated: 02/1950     Sodium 138 mmol/L      Potassium 3 4 mmol/L      Chloride 103 mmol/L      CO2 32 mmol/L      ANION GAP 3 mmol/L      BUN 17 mg/dL      Creatinine 0 99 mg/dL      Glucose 111 mg/dL      Calcium 9 6 mg/dL      AST 15 U/L      ALT 13 U/L      Alkaline Phosphatase 100 U/L      Total Protein 8 4 g/dL      Albumin 3 9 g/dL      Total Bilirubin 0 34 mg/dL      eGFR 56 ml/min/1 73sq m     Narrative:      National Kid[employer] Disease Foundation guidelines for Chronic Kidney Disease (CKD):   •  Stage 1 with normal or high GFR (GFR > 90 mL/min/1 73 square meters)  •  Stage 2 Mild CKD (GFR = 60-89 mL/min/1 73 square meters)  •  Stage 3A Moderate CKD (GFR = 45-59 mL/min/1 73 square meters)  •  Stage 3B Moderate CKD (GFR = 30-44 mL/min/1 73 square meters)  •  Stage 4 Severe CKD (GFR = 15-29 mL/min/1 73 square meters)  •  Stage 5 End Stage CKD (GFR <15 mL/min/1 73 square meters)  Note: GFR calculation is accurate only with a steady state creatinine    CBC and differential [[id number]]  (Abnormal) Collected: 02/03/23 1913    Lab Status: Final result Specimen: Blood from Arm, Left Updated: 02/03/23 1921     WBC 7 39 Thousand/uL      RBC 4 48 Million/uL      Hemoglobin 12 8 g/dL      Hematocrit 41 5 %      MCV 93 fL      MCH 28 6 pg      MCHC 30 8 g/dL      RDW 13 3 %      MPV 11 3 fL      Platelets 762 Thousands/uL      nRBC 0 /100 WBCs      Neutrophils Relative 60 %      Immat GRANS % 0 %      Lymphocytes Relative 32 %      Monocytes Relative 6 %      Eosinophils Relative 1 %      Basophils Relative 1 %      Neutrophils Absolute 4 53 Thousands/µL      Immature Grans Absolute 0 02 Thousand/uL      Lymphocytes Absolute 2 33 Thousands/µL      Monocytes Absolute 0 42 Thousand/µL      Eosinophils Absolute 0 05 Thousand/µL      Basophils Absolute 0 04 Thousands/µL                  XR chest 1 view portable    (Results Pending)         Procedures  ECG 12 Lead Documentation Only    Date/Time: 2/4/2023 1:12 AM  Performed by: Taisha Spencer DO  Authorized by: Taisha Spencer DO     ECG reviewed by me, the ED Provider: yes    Patient location:  ED  Interpretation:     Interpretation: abnormal    Rate:     ECG rate:  131    ECG rate assessment: tachycardic    Rhythm:     Rhythm: other rhythm      Rhythm comment:  AT  Ectopy:     Ectopy: none    QRS:     QRS axis:  Normal  Conduction:     Conduction: normal    ST segments:     ST segments:  Normal  T waves:     T waves: normal            ED Course  ED Course as of 02/04/23 0117   Fri Feb 03, 2023   1859 TT sent to [address] Patient now in sinus rhythm with a rate of 90   1913 Discussed with EP  Although now sinus, still recommends admission   2009 TT sent to [facility] for admission   2030 Spoke with [facility]  Accepts admission               Identification of Seniors at [facility] Most Recent Value   (ISAR) Identification of Seniors at Risk    Before the illness or injury that brought you to the Emergency, did you need someone to help you on a regular basis? 1 Filed at: 02/03/2023 1854   In the last 24 hours, have you needed more help than usual? 0 Filed at: 02/03/2023 6907   Have you been hospitalized for one or more nights during the past 6 months? 1 Filed at: 02/03/2023 1854   In general, do you see well? 0 Filed at: 02/03/2023 1854   In general, do you have serious problems with your memory? 0 Filed at: 02/03/2023 1854   Do you take more than three different medications every day? 1 Filed at: 02/03/2023 1854   ISAR Score 3 Filed at: 02/03/2023 Levy Gomez Making  Patient is a 67 y o  female who presents to the ED for evaluation of atrial tachycardia  Patient is nontoxic and well-appearing  She is tachycardic but otherwise vitals are stable  Does have rales on exam     Clinical impression is atrial tachycardia  Plan: EP consult, labs, admission                 Portions of the record may have been created with voice recognition software  Occasional wrong word or "sound a like" substitutions may have occurred due to the inherent limitations of voice recognition software  Read the chart carefully and recognize, using context, where substitutions have occurred  Atrial tachycardia ([facility] ): complicated acute illness or injury  Amount and/or Complexity of Data Reviewed  External Data Reviewed: notes  Labs: ordered    Radiology: ordered  ECG/medicine tests: ordered and independent interpretation performed  Details: AT      Risk  Decision regarding hospitalization  Disposition  Final diagnoses:   Atrial tachycardia ([facility] )   Congestive heart failure (CHF) ([facility] )     Time reflects when diagnosis was documented in both MDM as applicable and the Disposition within this note     Time User Action Codes Description Comment    2/3/2023  8:34 PM Mervin Bozrah Add [I48 92] Paroxysmal atrial flutter ([facility] )     2/3/2023  8:35 PM Mervin Bozrah Add [R73 9] Elevated blood sugar     2/3/2023  9:41 PM Mela Kyle Add [I47 1] Atrial tachycardia ([facility] )     2/4/2023  1:14 AM Mela Kyle Add [I50 9] Congestive heart failure (CHF) [facility])       ED Disposition     ED Disposition   Admit    Condition   Stable    Date/Time   Sat Feb 4, 2023  1:14 AM    Comment   Case was discussed with Dr Jean Paul Felipe and the patient's admission status was agreed to be Admission Status: inpatient status to the service of Dr Jean Paul Felipe   Follow-up Information    None         Current Discharge Medication List      CONTINUE these medications which have NOT CHANGED    Details   apixaban (Eliquis) 5 mg Take 1 tablet (5 mg total) by mouth 2 (two) times a day  Qty: 180 tablet, Refills: 3    Associated Diagnoses: Irregular tachycardia; New onset a-fib (HCC)      furosemide (LASIX) 20 mg tablet Take 1 tablet (20 mg total) by mouth 2 (two) times a day  Qty: 180 tablet, Refills: 3    Associated Diagnoses: Acute congestive heart failure, unspecified heart failure type (HCC)      levothyroxine 100 mcg tablet Take 1 tablet (100 mcg total) by mouth daily  Qty: 90 tablet, Refills: 1    Associated Diagnoses: Hypothyroidism, unspecified type      metoprolol succinate (TOPROL-XL) 25 mg 24 hr tablet TAKE 1 TABLET BY MOUTH TWICE A DAY  Qty: 180 tablet, Refills: 0    Associated Diagnoses: Chronic combined systolic (congestive) and diastolic (congestive) heart failure ([facility] );  New onset a-fib ([facility] )      sacubitril-valsartan (ENTRESTO) 24-26 MG TABS Take 1 tablet by mouth 2 (two) times a day  Qty: 180 tablet, Refills: 3    Associated Diagnoses: [facility]-fib ([facility] ); CHF (congestive heart failure) ([facility] )           No discharge procedures on file  PDMP Review     None           ED Provider  Attending physically available and evaluated Yves Smoker  I managed the patient along with the ED Attending      Electronically Signed by         Juan Hartley DO  02/04/23 0117

## 2023-02-04 NOTE — PROGRESS NOTES
[facility]  Progress Note - Waylon Alexander 1950, 67 y o  female MRN: [id number]  Unit/Bed#: [facility] 510-01 Encounter: [id number]  Primary Care Provider: Chu Bee MD   Date and time admitted to hospital: 2/3/2023  6:55 PM    * Atrial tachycardia [facility])  Assessment & Plan  Atrial tachycardia referred by cardiology for inpatient monitoring and management  History of prior ablation noted  Beta-blocker presently on hold per cardiology   Eliquis for anticoagulation  Cardiology plans for intervention noted  Cardiology following      ILD (interstitial lung disease) ([facility] )  Assessment & Plan  History of interstitial lung disease  Outpatient pulmonary follow-up    Chronic combined systolic (congestive) and diastolic (congestive) heart failure ([facility] )  Assessment & Plan  Wt Readings from Last 3 Encounters:   02/04/23 59 9 kg (132 lb)   02/03/23 60 4 kg (133 lb 3 2 oz)   12/30/22 59 kg (130 lb)       IV Lasix per cardiology  [facility]  Cardiology following      Hypothyroidism  Assessment & Plan  Continue levothyroxine              VTE Pharmacologic Prophylaxis: VTE Score: 3 Moderate Risk (Score 3-4) - Pharmacological DVT Prophylaxis Ordered: apixaban (Eliquis)  Patient Centered Rounds: I performed bedside rounds with nursing staff today  Discussions with Specialists or Other Care Team Provider:     Education and Discussions with Family / Patient: Discussed with the patient, daughter at bedside updated questions answered  Time Spent for Care: 30 minutes  More than 50% of total time spent on counseling and coordination of care as described above      Current Length of Stay: 1 day(s)  Current Patient Status: Inpatient   Certification Statement: The patient will continue to require additional inpatient hospital stay due to As outlined  Discharge Plan: Cardiology plans for interventional studies noted    Code Status: Level 1 - Full Code    Subjective:     Comfortably in bed  Denies chest pain shortness with palpitations  Daughter at bedside  History chart labs medications reviewed    Objective:     Vitals:   Temp (24hrs), Av 1 °F (36 7 °C), Min:97 6 °F (36 4 °C), Max:98 5 °F (36 9 °C)    Temp:  [97 6 °F (36 4 °C)-98 5 °F (36 9 °C)] 98 2 °F (36 8 °C)  HR:  [] 120  Resp:  [18-20] 20  BP: (107-162)/() 110/66  SpO2:  [95 %-99 %] 95 %  Body mass index is 23 38 kg/m²  Input and Output Summary (last 24 hours):      Intake/Output Summary (Last 24 hours) at 2023 1317  Last data filed at 2023 1215  Gross per 24 hour   Intake 240 ml   Output --   Net 240 ml       Physical Exam:   Physical Exam     Comfortably in bed  Features of protein calorie malnutrition noted  Neck supple  Lungs diminished breath sounds bilaterally  Heart sounds S1 and S2 noted  Abdomen soft nontender  Awake alert obey simple commands  No edema  No rash    Additional Data:     Labs:  Results from last 7 days   Lab Units 23  0445   WBC Thousand/uL 4 46   HEMOGLOBIN g/dL 10 9*   HEMATOCRIT % 34 0*   PLATELETS Thousands/uL 137*   NEUTROS PCT % 50   LYMPHS PCT % 40   MONOS PCT % 7   EOS PCT % 2     Results from last 7 days   Lab Units 23  0445   SODIUM mmol/L 140   POTASSIUM mmol/L 3 7   CHLORIDE mmol/L 106   CO2 mmol/L 32   BUN mg/dL 15   CREATININE mg/dL 0 75   ANION GAP mmol/L 2*   CALCIUM mg/dL 8 7   ALBUMIN g/dL 2 9*   TOTAL BILIRUBIN mg/dL 0 38   ALK PHOS U/L 72   ALT U/L 10*   AST U/L 13   GLUCOSE RANDOM mg/dL 101     Results from last 7 days   Lab Units 23  0445   INR  1 42*                   Lines/Drains:  Invasive Devices     Peripheral Intravenous Line  Duration           Peripheral IV 23 Left Antecubital <1 day    Peripheral IV 23 Right Antecubital <1 day                  Telemetry:  Telemetry Orders (From admission, onward)             48 Hour Telemetry Monitoring  Continuous x 48 hours        References:    Telemetry Guidelines   Question:  Reason for 48 Hour Telemetry  Answer:  Arrhythmias Requiring Medical Therapy (eg  SVT, Vtach/fib, Bradycardia, Uncontrolled A-fib)                 Telemetry Reviewed: Sinus rhythm  Indication for Continued Telemetry Use: Awaiting PCI/EP Study/CABG             Imaging: Reviewed radiology reports from this admission including: ECHO    Recent Cultures (last 7 days):         Last 24 Hours Medication List:   Current Facility-Administered Medications   Medication Dose Route Frequency Provider Last Rate   • acetaminophen  650 mg Oral Q6H PRN Cal Rogers DO     • apixaban  5 mg Oral BID Cal Rogers DO     • furosemide  20 mg Intravenous BID (diuretic) Cal Rogers DO     • levothyroxine  100 mcg Oral Daily Cal Rogers DO     • sacubitril-valsartan  1 tablet Oral BID Cal Rogers DO          Today, Patient Was Seen By: Laney Zurita MD    **Please Note: This note may have been constructed using a voice recognition system  **

## 2023-02-04 NOTE — CONSULTS
Consultation - Electrophysiology-Cardiology (EP)   Orestes Herr 67 y o  female MRN: [id number]  Unit/Bed#: [facility] 510-01 Encounter: [id number]      Inpatient consult to Electrophysiology  Consult performed by: Chula Amezcua PA-C  Consult ordered by: Darci Moran DO          Assessment/Plan   Primary diagnosis:   1  Persistent atrial tachycardia, asymptomatic               * patient presented to [facility] EP office today for post ablation follow up  She is now s/p RFA of left atrial inferoseptal region tachycardia + MDT LINQ 2 by Dr Rita Escobar               * office ECG showing AT with HR of 126bpm  ILR interrogated showing only few episodes of AT BUT LR on ILR was 130, reviewing ECG's during AT episodes HR in 120's, HR below detection zone  HR graph on loop showing HR above 100bpm since Jan 1  I did reprogram lower sensing rate on loop to 122bpm in office  May need to make even lower as current HR is 118bpm                * on exam she had b/l rales  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%               * recommended inpatient EP evaluation   * currently in AT with RVR, she did have a break in the rhythm this AM for short time but back in AT, will stop BB as we are planning ablation Monday and want her in AT during  * will continue IV lasix as she continues to have rales on exam    * f/u limited echo today       Secondary diagnosis:    1  Hypothyroidism   2  Hx 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% now resolved               * was on amiodarone for short time after diagnosis in Jan 2022 but discontinued          Cardiac Studies/Imaging:     Imaging: I have personally reviewed pertinent reports      ECHO: No results found for this or any previous visit    History of Present Illness   Physician Requesting Consult: Greg Bales MD  Reason for Consult / Principal Problem: AT     HPI: Orestes Herr is a 67y o  year old female with a history as above for the past several weeks patient has been concerned with a wet cough  She is not having any palpitations, lightheadedness, dizziness, orthopnea, weight gain, lower extremity edema  actually feels very well outside of the cough  Review of Systems  ROS as noted above, otherwise 12 point review of systems was performed and is negative  Historical Information   Past Medical History:   Diagnosis Date   • Abnormal CXR 2022   • Cancer [facility])    • Disease of thyroid gland    • Dysuria 2021   • Encounter for support and coordination of transition of care 2021     Past Surgical History:   Procedure Laterality Date   • CARDIAC ELECTROPHYSIOLOGY PROCEDURE N/A 2022    Procedure: Cardiac eps/aflutter ablation;  Surgeon: Malachi Chester MD;  Location: [facility] CARDIAC CATH LAB; Service: Cardiology   • CARDIAC ELECTROPHYSIOLOGY PROCEDURE N/A 2022    Procedure: Cardiac loop recorder implant;  Surgeon: Malachi Chester MD;  Location: [facility] CARDIAC CATH LAB;   Service: Cardiology   • HYSTERECTOMY       Social History     Substance and Sexual Activity   Alcohol Use Never     Social History     Substance and Sexual Activity   Drug Use Never     Social History     Tobacco Use   Smoking Status Former   • Packs/day: 1 00   • Years: 10 00   • Pack years: 10 00   • Types: Cigarettes   • Start date:    • Quit date: 1993   • Years since quittin 1   Smokeless Tobacco Never     Family History: non-contributory    Meds/Allergies   Hospital Medications:   Current Facility-Administered Medications   Medication Dose Route Frequency   • acetaminophen (TYLENOL) tablet 650 mg  650 mg Oral Q6H PRN   • apixaban (ELIQUIS) tablet 5 mg  5 mg Oral BID   • furosemide (LASIX) injection 20 mg  20 mg Intravenous BID (diuretic)   • levothyroxine tablet 100 mcg  100 mcg Oral Daily   • metoprolol succinate (TOPROL-XL) 24 hr tablet 25 mg  25 mg Oral BID   • sacubitril-valsartan (ENTRESTO) 24-26 MG per tablet 1 tablet  1 tablet Oral BID Home Medications:   Medications Prior to Admission   Medication   • apixaban (Eliquis) 5 mg   • furosemide (LASIX) 20 mg tablet   • levothyroxine 100 mcg tablet   • metoprolol succinate (TOPROL-XL) 25 mg 24 hr tablet   • sacubitril-valsartan (ENTRESTO) 24-26 MG TABS       Allergies   Allergen Reactions   • Penicillins Rash       Objective   Vitals: Blood pressure 107/63, pulse (!) 119, temperature 98 5 °F (36 9 °C), temperature source Oral, resp  rate 20, height 5' 3" (1 6 m), weight 59 9 kg (132 lb), SpO2 99 %  Orthostatic Blood Pressures    Flowsheet Row Most Recent Value   Blood Pressure 107/63 filed at 02/04/2023 1104   Patient Position - Orthostatic VS Lying filed at 02/04/2023 0736            Intake/Output Summary (Last 24 hours) at 2/4/2023 1114  Last data filed at 2/4/2023 0901  Gross per 24 hour   Intake 240 ml   Output --   Net 240 ml       Invasive Devices     Peripheral Intravenous Line  Duration           Peripheral IV 02/03/23 Left Antecubital <1 day    Peripheral IV 02/03/23 Right Antecubital <1 day                Physical Exam  Constitutional:       Appearance: She is well-developed  HENT:      Head: Normocephalic and atraumatic  Eyes:      Pupils: Pupils are equal, round, and reactive to light  Cardiovascular:      Rate and Rhythm: Regular rhythm  Tachycardia present  Pulmonary:      Effort: Pulmonary effort is normal       Breath sounds: Normal breath sounds  Abdominal:      General: Bowel sounds are normal       Palpations: Abdomen is soft  Musculoskeletal:         General: Normal range of motion  Cervical back: Normal range of motion and neck supple  Skin:     General: Skin is warm and dry  Neurological:      Mental Status: She is alert and oriented to person, place, and time  Lab Results: I have personally reviewed pertinent lab results      Results from last 7 days   Lab Units 02/04/23  0445 02/03/23  1913   WBC Thousand/uL 4 46 7 39   HEMOGLOBIN g/dL 10 9* 12 8 HEMATOCRIT % 34 0* 41 5   PLATELETS Thousands/uL 137* 171     Results from last 7 days   Lab Units 02/04/23  0445 02/03/23  1913   POTASSIUM mmol/L 3 7 3 4*   CHLORIDE mmol/L 106 103   CO2 mmol/L 32 32   BUN mg/dL 15 17   CREATININE mg/dL 0 75 0 99   CALCIUM mg/dL 8 7 9 6     Results from last 7 days   Lab Units 02/04/23  0445   INR  1 42*   PTT seconds 37     Results from last 7 days   Lab Units 02/04/23  0445 02/03/23  2322   MAGNESIUM mg/dL 2 3 1 9

## 2023-02-04 NOTE — UTILIZATION REVIEW
Initial Clinical Review    Admission: Date/Time/Statement:   Admission Orders (From admission, onward)     Ordered        02/03/23 2035  Inpatient Admission  Once                      Orders Placed This Encounter   Procedures   • Inpatient Admission     Standing Status:   Standing     Number of Occurrences:   1     Order Specific Question:   Level of Care     Answer:   Med Surg [16]     Order Specific Question:   Estimated length of stay     Answer:   More than 2 Midnights     Order Specific Question:   Certification     Answer:   I certify that inpatient services are medically necessary for this patient for a duration of greater than two midnights  See H&P and MD Progress Notes for additional information about the patient's course of treatment  ED Arrival Information     Expected   2/3/2023     Arrival   2/3/2023 18:42    Acuity   Emergent            Means of arrival   Walk-In    Escorted by   Self    Service   Hospitalist    Admission type   Emergency            Arrival complaint   Atrial tachycardia            Chief Complaint   Patient presents with   • Rapid Heart Rate     Atrial flutter hx- post ablation on 12/30/2022  Initial Presentation: 67 y o  female who presented to [facility] ED  Admitted Inpatient status med surg telemetry dt Atrial Tachycardia  PMHx: recent ablation for atrial tachycardia, CHF, nonischemic cardiomyopathy with LVEF 25% thought related to tachycardia induced cardiomyopathy, ILD, Hodgkin's lymphoma  Presented w/ after being seen in the outpatient cardiology office for her recent ablation and found to be back in an atrial tachycardia yesterday afternoon  Cardiology team recommended patient come to the ER for inpatient EP evaluation as well as IV diuresis  On exam, pt tachycardic, K 3 4 potassium repleted in ED  Patient reports that she has some "upper congestion", but denies any severe palpitations or chest pain currently   Patient reports that she understands the cardiology team would like for her to have another ablation, but she reports that she is not sure she would want this and that she would prefer medications currently  Plan: EP consult, continue BB, eliquis and entresto, IV Lasix bid, cardiac diet with fluid restriction, IO and daily wts  Date: 2/4   Day 2:   No new complaints today  BL lungs sound diminished  Continue telemetry, cardiology following with intervention on Monday 2/4 EP Consult:  Currently in AT with RVR, she did have a break in the rhythm this AM for short time but back in AT, will stop BB as we are planning ablation Monday and want her in AT during  Will continue IV lasix as she continues to have rales on exam, /u limited echo today      ED Triage Vitals   Temperature Pulse Respirations Blood Pressure SpO2   02/03/23 1853 02/03/23 1853 02/03/23 1853 02/03/23 1853 02/03/23 1853   97 8 °F (36 6 °C) (!) 135 18 (!) 162/110 99 %      Temp Source Heart Rate Source Patient Position - Orthostatic VS BP Location FiO2 (%)   02/03/23 1853 02/03/23 1853 02/04/23 0736 02/03/23 1853 --   Oral Monitor Lying Left arm       Pain Score       02/03/23 2200       No Pain          Wt Readings from Last 1 Encounters:   02/04/23 60 kg (132 lb 4 4 oz)     Additional Vital Signs:   Date/Time Temp Pulse Resp BP MAP (mmHg) SpO2 O2 Device Patient Position - Orthostatic VS   02/04/23 09:05:33 -- 75 -- 107/63 78 99 % -- --   02/04/23 07:36:11 98 5 °F (36 9 °C) 112 Abnormal  20 120/76 91 98 % None (Room air) Lying   02/04/23 07:35:55 98 5 °F (36 9 °C) 113 Abnormal  -- 120/76 91 99 % -- --   02/04/23 02:25:22 97 6 °F (36 4 °C) 111 Abnormal  20 115/73 87 98 % -- --   02/03/23 22:03:15 98 1 °F (36 7 °C) 125 Abnormal  20 156/93 114 98 % -- --   02/03/23 2200 -- -- -- -- -- -- None (Room air) --   02/03/23 1926 -- -- -- -- -- -- None (Room air) --   02/03/23 1853 97 8 °F (36 6 °C) 135 Abnormal  18 162/110 Abnormal  -- 99 % None (Room air) --     Pertinent Labs/Diagnostic Test Results:   2/3 EKG in the office showed atrial tachycardia with a heart of 126  Her loop recorder was interrogated showing a few episodes of atrial tachycardia      2/3 ECHO:      XR chest 1 view portable    (Results Pending)         Results from last 7 days   Lab Units 02/04/23 0445 02/03/23 1913   WBC Thousand/uL 4 46 7 39   HEMOGLOBIN g/dL 10 9* 12 8   HEMATOCRIT % 34 0* 41 5   PLATELETS Thousands/uL 137* 171   NEUTROS ABS Thousands/µL 2 24 4 53         Results from last 7 days   Lab Units 02/04/23 0445 02/03/23 2322 02/03/23 1913   SODIUM mmol/L 140  --  138   POTASSIUM mmol/L 3 7  --  3 4*   CHLORIDE mmol/L 106  --  103   CO2 mmol/L 32  --  32   ANION GAP mmol/L 2*  --  3*   BUN mg/dL 15  --  17   CREATININE mg/dL 0 75  --  0 99   EGFR ml/min/1 73sq m 79  --  56   CALCIUM mg/dL 8 7  --  9 6   MAGNESIUM mg/dL 2 3 1 9  --      Results from last 7 days   Lab Units 02/04/23 0445 02/03/23 1913   AST U/L 13 15   ALT U/L 10* 13   ALK PHOS U/L 72 100   TOTAL PROTEIN g/dL 6 3* 8 4   ALBUMIN g/dL 2 9* 3 9   TOTAL BILIRUBIN mg/dL 0 38 0 34         Results from last 7 days   Lab Units 02/04/23 0445 02/03/23 1913   GLUCOSE RANDOM mg/dL 101 111     Results from last 7 days   Lab Units 02/03/23 2322 02/03/23 2145 02/03/23 1913   HS TNI 0HR ng/L  --   --  5   HS TNI 2HR ng/L  --  6  --    HSTNI D2 ng/L  --  1  --    HS TNI 4HR ng/L 6  --   --    HSTNI D4 ng/L 1  --   --          Results from last 7 days   Lab Units 02/04/23 0445   PROTIME seconds 17 6*   INR  1 42*   PTT seconds 37     Results from last 7 days   Lab Units 02/04/23 0445 02/03/23 1913   TSH 3RD GENERATON uIU/mL 3 950 3 470     Results from last 7 days   Lab Units 02/04/23 0445   NT-PRO BNP pg/mL 1,799*     ED Treatment:   Medication Administration from 02/03/2023 1507 to 02/03/2023 2146       Date/Time Order Dose Route Action     02/03/2023 2133 EST potassium chloride 20 mEq IVPB (premix) 20 mEq Intravenous New Bag 02/03/2023 2135 EST sodium chloride 0 9 % bolus 250 mL 250 mL Intravenous New Bag        Past Medical History:   Diagnosis Date   • Abnormal CXR 1/14/2022   • Cancer [facility])    • Disease of thyroid gland    • Dysuria 12/16/2021   • Encounter for support and coordination of transition of care 5/6/2021     Present on Admission:  • Chronic combined systolic (congestive) and diastolic (congestive) heart failure (HCC)  • ILD (interstitial lung disease) ([facility] )      Admitting Diagnosis: Atrial tachycardia (HCC) [I47 1]  Elevated blood sugar [R73 9]  Paroxysmal atrial flutter ([facility] ) [I48 92]  Age/Sex: 67 y o  female  Admission Orders:  Scheduled Medications:  apixaban, 5 mg, Oral, BID  furosemide, 20 mg, Intravenous, BID (diuretic)  levothyroxine, 100 mcg, Oral, Daily  metoprolol succinate, 25 mg, Oral, BID  sacubitril-valsartan, 1 tablet, Oral, BID      Continuous IV Infusions: NONE     PRN Meds:  acetaminophen, 650 mg, Oral, Q6H PRN        IP CONSULT TO NUTRITION SERVICES  IP CONSULT TO ELECTROPHYSIOLOGY    Network Utilization Review Department  ATTENTION: Please call with any questions or concerns to [phone] and carefully listen to the prompts so that you are directed to the right person  All voicemails are confidential   Phyllis Bernal all requests for admission clinical reviews, approved or denied determinations and any other requests to dedicated fax number below belonging to the campus where the patient is receiving treatment   List of dedicated fax numbers for the Facilities:  [address] DENIALS (Administrative/Medical Necessity) [phone]   [address] (Maternity/NICU/Pediatrics) [phone]   [address] [phone]   [facility] [phone] [address] [facility] [address] [place] [address] Elen Drake [facility] [address] [facility] [place] [address] 845 [facility] [phone]

## 2023-02-04 NOTE — ASSESSMENT & PLAN NOTE
Atrial tachycardia referred by cardiology for inpatient monitoring and management  History of prior ablation noted  Beta-blocker presently on hold per cardiology   Eliquis for anticoagulation  Cardiology plans for intervention noted  Cardiology following

## 2023-02-04 NOTE — ASSESSMENT & PLAN NOTE
- History of recent ablation for persistent atrial tachycardia and went to outpatient cardiology follow-up appointment today where ECG showed atrial tachycardia with heart rate of 126 bpm; patient additionally had her loop recorder reviewed with episodes of atrial tachycardia with heart rates in the 120s  -Sent in to hospital by cardiology for further EP evaluation  -In the ER, atrial tachycardia with rates in the 120s  -Noted hypokalemia  -On PTA metoprolol as well as Eliquis  Plan  > Admit to medicine on telemetry  Continue PTA metoprolol as well as Eliquis  Consult EP for further assistance   > Patient additionally with rales on exam and cardiology team recommending IV diuresis inpatient    As such, will put on IV Lasix 20 mg twice daily (this is double the equivalent of her PTA Lasix 20mg po bid)  > Continue patient's PTA Annie Carter

## 2023-02-04 NOTE — ASSESSMENT & PLAN NOTE
Wt Readings from Last 3 Encounters:   02/03/23 60 4 kg (133 lb 3 2 oz)   12/30/22 59 kg (130 lb)   12/30/22 59 kg (130 lb)       -History of combined systolic and diastolic heart failure with LVEF 25% in January 2022 likely as result of tachycardia induced cardiomyopathy  -Has rales on exam  -Follows outpatient with cardiology team and cardiology team recommending IV diuresis in the hospital  -On PTA Entresto, metoprolol and Lasix 20 mg twice daily  Plan  > Start IV Lasix 20 mg IV twice daily (this would be double the dose of her PTA Lasix 20 mg p o  twice daily)  Cardiac diet, accurate I's and O's  Daily weights  Cardiology team consulted  Continue her Entresto as well as metoprolol

## 2023-02-04 NOTE — ASSESSMENT & PLAN NOTE
- History of ILD and follows with Dr Tiffanie Vaz outpatient with pulmonology; appears from notes that cause is thought to likely be from prior bleomycin exposure for patient's history of Hodgkin's lymphoma as opposed to previous amiodarone use

## 2023-02-04 NOTE — H&P
[address] [facility]  H&P- Epi Thomason 1950, 67 y o  female MRN: [id number]  Unit/Bed#: [facility] 510-01 Encounter: [id number]  Primary Care Provider: Cierra Miller MD   Date and time admitted to hospital: 2/3/2023  6:55 PM    * Atrial tachycardia [facility])  Assessment & Plan  - History of recent ablation for persistent atrial tachycardia and went to outpatient cardiology follow-up appointment today where ECG showed atrial tachycardia with heart rate of 126 bpm; patient additionally had her loop recorder reviewed with episodes of atrial tachycardia with heart rates in the 120s  -Sent in to hospital by cardiology for further EP evaluation  -In the ER, atrial tachycardia with rates in the 120s  -Noted hypokalemia  -On PTA metoprolol as well as Eliquis  Plan  > Admit to medicine on telemetry  Continue PTA metoprolol as well as Eliquis  Consult EP for further assistance   > Patient additionally with rales on exam and cardiology team recommending IV diuresis inpatient    As such, will put on IV Lasix 20 mg twice daily (this is double the equivalent of her PTA Lasix 20mg po bid)  > Continue patient's PTA Entresto    ILD (interstitial lung disease) ([facility] [address] )  Assessment & Plan  - History of ILD and follows with Dr Phylicia Pratt outpatient with pulmonology; appears from notes that cause is thought to likely be from prior bleomycin exposure for patient's history of Hodgkin's lymphoma as opposed to previous amiodarone use    Chronic combined systolic (congestive) and diastolic (congestive) heart failure ([facility] [address] )  Assessment & Plan  Wt Readings from Last 3 Encounters:   02/03/23 60 4 kg (133 lb 3 2 oz)   12/30/22 59 kg (130 lb)   12/30/22 59 kg (130 lb)       -History of combined systolic and diastolic heart failure with LVEF 25% in January 2022 likely as result of tachycardia induced cardiomyopathy  -Has rales on exam  -Follows outpatient with cardiology team and cardiology team recommending IV diuresis in the hospital  -On PTA Entresto, metoprolol and Lasix 20 mg twice daily  Plan  > Start IV Lasix 20 mg IV twice daily (this would be double the dose of her PTA Lasix 20 mg p o  twice daily)  Cardiac diet, accurate I's and O's  Daily weights  Cardiology team consulted  Continue her Entresto as well as metoprolol  VTE Prophylaxis: Apixaban (Eliquis)  / sequential compression device   Code Status: Level 1 - Full Code       Anticipated Length of Stay:  Patient will be admitted on an Inpatient basis with an anticipated length of stay of  > 2 midnights  Justification for Hospital Stay: Please see detailed plans noted above  Chief Complaint:     Atrial tachycardia  History of Present Illness:  Monalisa Holter is a 67 y o  female who has past medical history significant for recent ablation for atrial tachycardia, combined systolic and diastolic heart failure, nonischemic cardiomyopathy with LVEF 25% thought related to tachycardia induced cardiomyopathy, ILD, Hodgkin's lymphoma who presented to [facility] ER on the evening of 2/3 after being seen in the outpatient cardiology office for her recent ablation and found to be back in an atrial tachycardia yesterday afternoon  Patient recently underwent radiofrequency ablation for her persistent atrial tachycardia at the end of December 2022 and had a follow-up visit today with Dr Brittney Van and Nael Tesfaye  Cardiology team recommended patient come to the ER for inpatient EP evaluation as well as IV diuresis  On my exam, patient reports that she has some "upper congestion", but denies any severe palpitations or chest pain currently  Patient denies any lower extremity edema or abdominal distention  Patient denies any fevers  Patient reports that she understands the cardiology team would like for her to have another ablation, but she reports that she is not sure she would want this and that she would prefer medications currently    Spoke with her about speaking with EP team regarding these thoughts  Review of Systems:    Constitutional:  Denies fever or chills   Eyes:  Denies change in visual acuity   HENT:  Positive for congestion   Respiratory:  Denies cough   Cardiovascular:  Denies chest pain or edema   GI:  Denies abdominal pain or bloody stools  :  Denies dysuria   Musculoskeletal:  Denies back pain or joint pain   Integument:  Denies rash   Neurologic:  Denies headache or sensory changes   Endocrine:  Denies polyuria or polydipsia   Lymphatic:  Denies swollen glands   Psychiatric:  Denies depression or anxiety     Past Medical and Surgical History:   Past Medical History:   Diagnosis Date   • Abnormal CXR 1/14/2022   • Cancer [facility])    • Disease of thyroid gland    • Dysuria 12/16/2021   • Encounter for support and coordination of transition of care 5/6/2021     Past Surgical History:   Procedure Laterality Date   • CARDIAC ELECTROPHYSIOLOGY PROCEDURE N/A 12/30/2022    Procedure: Cardiac eps/aflutter ablation;  Surgeon: Ignacio Edmonds MD;  Location: [facility]; Service: Cardiology   • CARDIAC ELECTROPHYSIOLOGY PROCEDURE N/A 12/30/2022    Procedure: Cardiac loop recorder implant;  Surgeon: Ignacio Edmonds MD;  Location: [facility]; Service: Cardiology   • HYSTERECTOMY         Meds/Allergies:  Medications Prior to Admission   Medication Sig Dispense Refill Last Dose   • apixaban (Eliquis) 5 mg Take 1 tablet (5 mg total) by mouth 2 (two) times a day 180 tablet 3    • furosemide (LASIX) 20 mg tablet Take 1 tablet (20 mg total) by mouth 2 (two) times a day 180 tablet 3    • levothyroxine 100 mcg tablet Take 1 tablet (100 mcg total) by mouth daily 90 tablet 1    • metoprolol succinate (TOPROL-XL) 25 mg 24 hr tablet TAKE 1 TABLET BY MOUTH TWICE A  tablet 0    • sacubitril-valsartan (ENTRESTO) 24-26 MG TABS Take 1 tablet by mouth 2 (two) times a day 180 tablet 3        Allergies:    Allergies   Allergen Reactions • Penicillins Rash       History:  Marital Status:      Substance Use History:   Social History     Substance and Sexual Activity   Alcohol Use Never     Social History     Tobacco Use   Smoking Status Former   • Packs/day: 1 00   • Years: 10 00   • Pack years: 10    • Types: Cigarettes   • Start date: 80   • Quit date: 1993   • Years since quittin 1   Smokeless Tobacco Never     Social History     Substance and Sexual Activity   Drug Use Never       Family History:  Family History   Problem Relation Age of Onset   • No Known Problems Mother    • No Known Problems Father    • Cancer Maternal Aunt        Physical Exam:     Vitals:   Blood Pressure: (!) 162/110 (23)  Pulse: (!) 135 (23)  Temperature: 97 8 °F (36 6 °C) (23)  Temp Source: Oral (23)  Respirations: 18 (23)  Height: 5' 3" (160 cm) (23)  Weight - Scale: 60 kg (132 lb 4 4 oz) (23)  SpO2: 99 % (23)    Constitutional: Alert and Oriented  Eyes:  EOMI, No scleral icterus   HENT:   oropharynx moist, external ears normal, external nose normal   Respiratory:  Bilateral rales  Cardiovascular:  Tachycardia, no murmurs   GI:  Soft, nondistended, no guarding   :  No costovertebral angle tenderness   Musculoskeletal:  no tenderness, no deformities  Back- no tenderness  Integument:  no jaundice, no rash   Neurologic:  Alert &awake, communicative, CN 2-12 normal,  no focal deficits noted   Psychiatric:  Speech and behavior appropriate       Lab Results: I have personally reviewed pertinent reports        Results from last 7 days   Lab Units 23   WBC Thousand/uL 7 39   HEMOGLOBIN g/dL 12 8   HEMATOCRIT % 41 5   PLATELETS Thousands/uL 171   NEUTROS PCT % 60   LYMPHS PCT % 32   MONOS PCT % 6   EOS PCT % 1     Results from last 7 days   Lab Units 23   POTASSIUM mmol/L 3 4*   CHLORIDE mmol/L 103   CO2 mmol/L 32   BUN mg/dL 17   CREATININE mg/dL 0 99   CALCIUM mg/dL 9 6   ALK PHOS U/L 100   ALT U/L 13   AST U/L 15             Imaging: I have personally reviewed pertinent reports  Cardiac EP device report    Result Date: 1/10/2023  Narrative: OMAR [id number] CARDIAC LOOP MONITOR - ACTIVE SYSTEM IS MRI CONDITIONAL DEVICE INTERROGATED IN THE [place] OFFICE: (ILR) WOUND CHECK: INCISION CLEAN AND DRY WITH EDGES APPROXIMATED; SUTURES REMOVED; WOUND CARE AND RESTRICTIONS REVIEWED WITH PATIENT  BATTERY VOLTAGE "OK"  PRESENTING ECG SHOWS ST, 115 BPM  R WAVES MEASURED 0 57 MV  NO PATIENT OR DEVICE ACTIVATED EPISODES  PVC <1%; NO PROGRAMMING CHANGES MADE TO DEVICE PARAMETERS  NORMAL DEVICE FUNCTION  ES       Total time for visit, including counseling/coordination of care: 45 minutes  Greater than 50% of this total time spent on direct patient counseling and coorination of care  Epic Records Reviewed as well as Records in Care Everywhere    ** Please Note: Dragon 360 Dictation voice to text software was used in the creation of this document   **

## 2023-02-04 NOTE — PLAN OF CARE
Problem: INFECTION - ADULT  Goal: Absence or prevention of progression during hospitalization  Description: INTERVENTIONS:  - Assess and monitor for signs and symptoms of infection  - Monitor lab/diagnostic results  - Monitor all insertion sites, i e  indwelling lines, tubes, and drains  - Monitor endotracheal if appropriate and nasal secretions for changes in amount and color  - [place] appropriate cooling/warming therapies per order  - Administer medications as ordered  - Instruct and encourage patient and family to use good hand hygiene technique  - Identify and instruct in appropriate isolation precautions for identified infection/condition  Outcome: Progressing     Problem: INFECTION - ADULT  Goal: Absence of fever/infection during neutropenic period  Description: INTERVENTIONS:  - Monitor WBC    Outcome: Progressing     Problem: CARDIOVASCULAR - ADULT  Goal: Maintains optimal cardiac output and hemodynamic stability  Description: INTERVENTIONS:  - Monitor I/O, vital signs and rhythm  - Monitor for S/S and trends of decreased cardiac output  - Administer and titrate ordered vasoactive medications to optimize hemodynamic stability  - Assess quality of pulses, skin color and temperature  - Assess for signs of decreased coronary artery perfusion  - Instruct patient to report change in severity of symptoms  Outcome: Progressing     Problem: CARDIOVASCULAR - ADULT  Goal: Absence of cardiac dysrhythmias or at baseline rhythm  Description: INTERVENTIONS:  - Continuous cardiac monitoring, vital signs, obtain 12 lead EKG if ordered  - Administer antiarrhythmic and heart rate control medications as ordered  - Monitor electrolytes and administer replacement therapy as ordered  Outcome: Progressing

## 2023-02-04 NOTE — PLAN OF CARE
Problem: INFECTION - ADULT  Goal: Absence or prevention of progression during hospitalization  Description: INTERVENTIONS:  - Assess and monitor for signs and symptoms of infection  - Monitor lab/diagnostic results  - Monitor all insertion sites, i e  indwelling lines, tubes, and drains  - Monitor endotracheal if appropriate and nasal secretions for changes in amount and color  - [place] appropriate cooling/warming therapies per order  - Administer medications as ordered  - Instruct and encourage patient and family to use good hand hygiene technique  - Identify and instruct in appropriate isolation precautions for identified infection/condition  Outcome: Progressing  Goal: Absence of fever/infection during neutropenic period  Description: INTERVENTIONS:  - Monitor WBC    Outcome: Progressing     Problem: SAFETY ADULT  Goal: Patient will remain free of falls  Description: INTERVENTIONS:  - Educate patient/family on patient safety including physical limitations  - Instruct patient to call for assistance with activity   - Consult OT/PT to assist with strengthening/mobility   - Keep Call bell within reach  - Keep bed low and locked with side rails adjusted as appropriate  - Keep care items and personal belongings within reach  - Initiate and maintain comfort rounds  - Make Fall Risk Sign visible to staff  - Offer Toileting every Hours, in advance of need  - Initiate/Maintain alarm  - Obtain necessary fall risk management equipment:   - Apply yellow socks and bracelet for high fall risk patients  - Consider moving patient to room near nurses station  Outcome: Progressing  Goal: Maintain or return to baseline ADL function  Description: INTERVENTIONS:  -  Assess patient's ability to carry out ADLs; assess patient's baseline for ADL function and identify physical deficits which impact ability to perform ADLs (bathing, care of mouth/teeth, toileting, grooming, dressing, etc )  - Assess/evaluate cause of self-care deficits   - Assess range of motion  - Assess patient's mobility; develop plan if impaired  - Assess patient's need for assistive devices and provide as appropriate  - Encourage maximum independence but intervene and supervise when necessary  - Involve family in performance of ADLs  - Assess for home care needs following discharge   - Consider OT consult to assist with ADL evaluation and planning for discharge  - Provide patient education as appropriate  Outcome: Progressing  Goal: Maintains/Returns to pre admission functional level  Description: INTERVENTIONS:  - Perform BMAT or MOVE assessment daily    - Set and communicate daily mobility goal to care team and patient/family/caregiver  - Collaborate with rehabilitation services on mobility goals if consulted  - Perform Range of Motion  times a day  - Reposition patient every  hours  - Dangle patient  times a day  - Stand patient  times a day  - Ambulate patient  times a day  - Out of bed to chair  times a day   - Out of bed for meals times a day  - Out of bed for toileting  - Record patient progress and toleration of activity level   Outcome: Progressing     Problem: Knowledge Deficit  Goal: Patient/family/caregiver demonstrates understanding of disease process, treatment plan, medications, and discharge instructions  Description: Complete learning assessment and assess knowledge base    Interventions:  - Provide teaching at level of understanding  - Provide teaching via preferred learning methods  Outcome: Progressing     Problem: CARDIOVASCULAR - ADULT  Goal: Maintains optimal cardiac output and hemodynamic stability  Description: INTERVENTIONS:  - Monitor I/O, vital signs and rhythm  - Monitor for S/S and trends of decreased cardiac output  - Administer and titrate ordered vasoactive medications to optimize hemodynamic stability  - Assess quality of pulses, skin color and temperature  - Assess for signs of decreased coronary artery perfusion  - Instruct patient to report change in severity of symptoms  Outcome: Progressing  Goal: Absence of cardiac dysrhythmias or at baseline rhythm  Description: INTERVENTIONS:  - Continuous cardiac monitoring, vital signs, obtain 12 lead EKG if ordered  - Administer antiarrhythmic and heart rate control medications as ordered  - Monitor electrolytes and administer replacement therapy as ordered  Outcome: Progressing

## 2023-02-04 NOTE — ED ATTENDING ATTESTATION
2/3/2023  ILuis F MD, saw and evaluated the patient  I have discussed the patient with the resident/non-physician practitioner and agree with the resident's/non-physician practitioner's findings, Plan of Care, and MDM as documented in the resident's/non-physician practitioner's note, except where noted  All available labs and Radiology studies were reviewed  I was present for key portions of any procedure(s) performed by the resident/non-physician practitioner and I was immediately available to provide assistance  At this point I agree with the current assessment done in the Emergency Department  I have conducted an independent evaluation of this patient a history and physical is as follows:  Pt had ablation approx 1 month ago for aflutter   Pt had a loop recorder and she is having intermittent episodes of atach Pt occasionally feels palpitations No cp no sob Pt was sent for further mgt PE: alert heart reg tachy lungs few rhonchi noted bilat ext nad MDM: will do cardiac baumann and discuss with cards  ED Course         Critical Care Time  Procedures

## 2023-02-05 LAB
ANION GAP SERPL CALCULATED.3IONS-SCNC: 5 MMOL/L (ref 4–13)
BUN SERPL-MCNC: 16 MG/DL (ref 5–25)
CALCIUM SERPL-MCNC: 9.2 MG/DL (ref 8.3–10.1)
CHLORIDE SERPL-SCNC: 106 MMOL/L (ref 96–108)
CO2 SERPL-SCNC: 29 MMOL/L (ref 21–32)
CREAT SERPL-MCNC: 0.91 MG/DL (ref 0.6–1.3)
GFR SERPL CREATININE-BSD FRML MDRD: 62 ML/MIN/1.73SQ M
GLUCOSE SERPL-MCNC: 120 MG/DL (ref 65–140)
POTASSIUM SERPL-SCNC: 4.1 MMOL/L (ref 3.5–5.3)
SODIUM SERPL-SCNC: 140 MMOL/L (ref 135–147)

## 2023-02-05 RX ORDER — FUROSEMIDE 10 MG/ML
20 INJECTION INTRAMUSCULAR; INTRAVENOUS ONCE
Status: COMPLETED | OUTPATIENT
Start: 2023-02-05 | End: 2023-02-05

## 2023-02-05 RX ORDER — FUROSEMIDE 10 MG/ML
20 INJECTION INTRAMUSCULAR; INTRAVENOUS
Status: DISCONTINUED | OUTPATIENT
Start: 2023-02-05 | End: 2023-02-06

## 2023-02-05 RX ORDER — POTASSIUM CHLORIDE 20 MEQ/1
40 TABLET, EXTENDED RELEASE ORAL ONCE
Status: COMPLETED | OUTPATIENT
Start: 2023-02-05 | End: 2023-02-05

## 2023-02-05 RX ADMIN — APIXABAN 5 MG: 5 TABLET, FILM COATED ORAL at 09:03

## 2023-02-05 RX ADMIN — LEVOTHYROXINE SODIUM 100 MCG: 100 TABLET ORAL at 09:03

## 2023-02-05 RX ADMIN — SACUBITRIL AND VALSARTAN 1 TABLET: 24; 26 TABLET, FILM COATED ORAL at 09:03

## 2023-02-05 RX ADMIN — FUROSEMIDE 20 MG: 10 INJECTION, SOLUTION INTRAMUSCULAR; INTRAVENOUS at 10:30

## 2023-02-05 RX ADMIN — FUROSEMIDE 20 MG: 10 INJECTION, SOLUTION INTRAMUSCULAR; INTRAVENOUS at 09:03

## 2023-02-05 RX ADMIN — APIXABAN 5 MG: 5 TABLET, FILM COATED ORAL at 17:42

## 2023-02-05 RX ADMIN — POTASSIUM CHLORIDE 40 MEQ: 1500 TABLET, EXTENDED RELEASE ORAL at 10:30

## 2023-02-05 RX ADMIN — FUROSEMIDE 20 MG: 10 INJECTION, SOLUTION INTRAMUSCULAR; INTRAVENOUS at 17:29

## 2023-02-05 NOTE — UTILIZATION REVIEW
NOTIFICATION OF INPATIENT ADMISSION   AUTHORIZATION REQUEST   SERVICING FACILITY:   [facility]  Address: [address] [place] [address], [address] [place] [address]  Tax ID: [id number]  NPI: [id number] ATTENDING PROVIDER:  Attending Name and NPI#: Sheryl Candelaria Md [[id number]]  Address: [address] [place] [address]  Phone: [phone]   ADMISSION INFORMATION:  Place of Service: [place] [address]  Place of Service Code: 21  Inpatient Admission Date/Time: 2/3/23  8:35 PM  Discharge Date/Time: No discharge date for patient encounter  Admitting Diagnosis Code/Description:  Atrial tachycardia (HCC) [I47 1]  Elevated blood sugar [R73 9]  Paroxysmal atrial flutter ([address] ) [I48 92]     UTILIZATION REVIEW CONTACT:  H[employer]ctor Saha Utilization   Network Utilization Review Department  Phone: [phone]  Fax: [phone]  Email: [email or web address]  Contact for approvals/pending authorizations, clinical reviews, and discharge  PHYSICIAN ADVISORY SERVICES:  Medical Necessity Denial & [facility] Review  Phone: [phone]  Fax: [phone]  Email: [email or web address]

## 2023-02-05 NOTE — PLAN OF CARE
Problem: INFECTION - ADULT  Goal: Absence or prevention of progression during hospitalization  Description: INTERVENTIONS:  - Assess and monitor for signs and symptoms of infection  - Monitor lab/diagnostic results  - Monitor all insertion sites, i e  indwelling lines, tubes, and drains  - Monitor endotracheal if appropriate and nasal secretions for changes in amount and color  - [place] appropriate cooling/warming therapies per order  - Administer medications as ordered  - Instruct and encourage patient and family to use good hand hygiene technique  - Identify and instruct in appropriate isolation precautions for identified infection/condition  Outcome: Progressing     Problem: INFECTION - ADULT  Goal: Absence of fever/infection during neutropenic period  Description: INTERVENTIONS:  - Monitor WBC    Outcome: Progressing     Problem: CARDIOVASCULAR - ADULT  Goal: Maintains optimal cardiac output and hemodynamic stability  Description: INTERVENTIONS:  - Monitor I/O, vital signs and rhythm  - Monitor for S/S and trends of decreased cardiac output  - Administer and titrate ordered vasoactive medications to optimize hemodynamic stability  - Assess quality of pulses, skin color and temperature  - Assess for signs of decreased coronary artery perfusion  - Instruct patient to report change in severity of symptoms  Outcome: Progressing     Problem: CARDIOVASCULAR - ADULT  Goal: Absence of cardiac dysrhythmias or at baseline rhythm  Description: INTERVENTIONS:  - Continuous cardiac monitoring, vital signs, obtain 12 lead EKG if ordered  - Administer antiarrhythmic and heart rate control medications as ordered  - Monitor electrolytes and administer replacement therapy as ordered  Outcome: Progressing     Problem: Nutrition/Hydration-ADULT  Goal: Nutrient/Hydration intake appropriate for improving, restoring or maintaining nutritional needs  Description: Monitor and assess patient's nutrition/hydration status for malnutrition  Collaborate with interdisciplinary team and initiate plan and interventions as ordered  Monitor patient's weight and dietary intake as ordered or per policy  Utilize nutrition screening tool and intervene as necessary  Determine patient's food preferences and provide high-protein, high-caloric foods as appropriate       INTERVENTIONS:  - Monitor oral intake, urinary output, labs, and treatment plans  - Assess nutrition and hydration status and recommend course of action  - Evaluate amount of meals eaten  - Assist patient with eating if necessary   - Allow adequate time for meals  - Recommend/ encourage appropriate diets, oral nutritional supplements, and vitamin/mineral supplements  - Order, calculate, and assess calorie counts as needed  - Recommend, monitor, and adjust tube feedings and TPN/PPN based on assessed needs  - Assess need for intravenous fluids  - Provide specific nutrition/hydration education as appropriate  - Include patient/family/caregiver in decisions related to nutrition  Outcome: Progressing

## 2023-02-05 NOTE — ASSESSMENT & PLAN NOTE
Wt Readings from Last 3 Encounters:   02/05/23 59 kg (130 lb 1 1 oz)   02/03/23 60 4 kg (133 lb 3 2 oz)   12/30/22 59 kg (130 lb)       IV Lasix per cardiology  [facility]  Cardiology following

## 2023-02-05 NOTE — PROGRESS NOTES
[address] [facility]  Progress Note - Deanna Sanket 1950, 67 y o  female MRN: [id number]  [facility] Encounter: [id number]  Primary Care Provider: Mynor Goodman MD   Date and time admitted to hospital: 2/3/2023  6:55 PM    * Atrial tachycardia [facility])  Assessment & Plan  Atrial tachycardia referred by cardiology for inpatient monitoring and management  History of prior ablation noted  Beta-blocker presently on hold per cardiology   Eliquis for anticoagulation  Cardiology plans for intervention noted  Cardiology following      ILD (interstitial lung disease) ([facility] )  Assessment & Plan  History of interstitial lung disease  Outpatient pulmonary follow-up    Chronic combined systolic (congestive) and diastolic (congestive) heart failure ([facility] 75 )  Assessment & Plan  Wt Readings from Last 3 Encounters:   02/05/23 59 kg (130 lb 1 1 oz)   02/03/23 60 4 kg (133 lb 3 2 oz)   12/30/22 59 kg (130 lb)       IV Lasix per cardiology  [facility]  Cardiology following      Hypothyroidism  Assessment & Plan  Continue levothyroxine              VTE Pharmacologic Prophylaxis: VTE Score: 3 Moderate Risk (Score 3-4) - Pharmacological DVT Prophylaxis Ordered: apixaban (Eliquis)  Patient Centered Rounds: I performed bedside rounds with nursing staff today  Discussions with Specialists or Other Care Team Provider:     Education and Discussions with Family / Patient: Discussed with the patient, reports she is keeping her family updated  Time Spent for Care: 30 minutes  More than 50% of total time spent on counseling and coordination of care as described above      Current Length of Stay: 2 day(s)  Current Patient Status: Inpatient   Certification Statement: The patient will continue to require additional inpatient hospital stay due to As outlined  Discharge Plan: Cardiology plans for intervention noted    Code Status: Level 1 - Full Code    Subjective:     Comfortably sitting up in bed  Reports feeling better  Encouraged incentive spirometry    Objective:     Vitals:   Temp (24hrs), Av 3 °F (36 8 °C), Min:98 1 °F (36 7 °C), Max:98 5 °F (36 9 °C)    Temp:  [98 1 °F (36 7 °C)-98 5 °F (36 9 °C)] 98 3 °F (36 8 °C)  HR:  [] 133  Resp:  [20] 20  BP: ()/(62-69) 107/69  SpO2:  [94 %-97 %] 95 %  Body mass index is 23 04 kg/m²  Input and Output Summary (last 24 hours):      Intake/Output Summary (Last 24 hours) at 2023 1533  Last data filed at 2023 1351  Gross per 24 hour   Intake 780 ml   Output 800 ml   Net -20 ml       Physical Exam:   Physical Exam         Comfortably sitting up in bed  Features of protein calorie malnutrition noted  Neck supple Lungs diminished breath sounds bilaterally  Heart sounds S1-S2 noted  Tachycardia noted  Abdomen soft nontender  Awake alert obey simple commands  No rash    Additional Data:     Labs:  Results from last 7 days   Lab Units 23  0445   WBC Thousand/uL 4 46   HEMOGLOBIN g/dL 10 9*   HEMATOCRIT % 34 0*   PLATELETS Thousands/uL 137*   NEUTROS PCT % 50   LYMPHS PCT % 40   MONOS PCT % 7   EOS PCT % 2     Results from last 7 days   Lab Units 23  0902 23  0445   SODIUM mmol/L 140 140   POTASSIUM mmol/L 4 1 3 7   CHLORIDE mmol/L 106 106   CO2 mmol/L 29 32   BUN mg/dL 16 15   CREATININE mg/dL 0 91 0 75   ANION GAP mmol/L 5 2*   CALCIUM mg/dL 9 2 8 7   ALBUMIN g/dL  --  2 9*   TOTAL BILIRUBIN mg/dL  --  0 38   ALK PHOS U/L  --  72   ALT U/L  --  10*   AST U/L  --  13   GLUCOSE RANDOM mg/dL 120 101     Results from last 7 days   Lab Units 23  0445   INR  1 42*                   Lines/Drains:  Invasive Devices     Peripheral Intravenous Line  Duration           Peripheral IV 23 Left Antecubital 1 day    Peripheral IV 23 Right Antecubital 1 day                  Telemetry:  Telemetry Orders (From admission, onward)             48 Hour Telemetry Monitoring  Continuous x 48 hours        References:    Telemetry Guidelines   Question:  Reason for 48 Hour Telemetry  Answer:  Arrhythmias Requiring Medical Therapy (eg  SVT, Vtach/fib, Bradycardia, Uncontrolled A-fib)                 Telemetry Reviewed: Tachycardia  Indication for Continued Telemetry Use: Awaiting PCI/EP Study/CABG             Imaging: Reviewed radiology reports from this admission including: chest xray    Recent Cultures (last 7 days):         Last 24 Hours Medication List:   Current Facility-Administered Medications   Medication Dose Route Frequency Provider Last Rate   • acetaminophen  650 mg Oral Q6H PRN Marcene Brookline, DO     • apixaban  5 mg Oral BID Marcene John, DO     • furosemide  20 mg Intravenous BID (diuretic) Araceli Estrada PA-C     • levothyroxine  100 mcg Oral Daily Marcene Brookline, DO     • sacubitril-valsartan  1 tablet Oral BID Marcene Brookline, DO          Today, Patient Was Seen By: Li Costello MD    **Please Note: This note may have been constructed using a voice recognition system  **

## 2023-02-05 NOTE — PROGRESS NOTES
Progress Note - Electrophysiology-Cardiology (EP)   Wilson Welch 67 y o  female MRN: [id number]  Unit/Bed#: [facility] 510-01 Encounter: [id number]      Assessment/Plan:   Primary diagnosis:   1  Persistent atrial tachycardia, asymptomatic               * patient presented to \[facility]"" EP office today for post ablation follow up  She is now s/p RFA of left atrial inferoseptal region tachycardia + MDT LINQ 2 by Dr Contreras Mcgraw               * office ECG showing AT with HR of 126bpm  ILR interrogated showing only few episodes of AT BUT LR on ILR was 130, reviewing ECG's during AT episodes HR in 120's, HR below detection zone  HR graph on loop showing HR above 100bpm since Jan 1  I did reprogram lower sensing rate on loop to 122bpm in office  May need to make even lower as current HR is 118bpm                * on in office exam she had b/l rales  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%               * currently in AT with RVR, she did have a break in the rhythm for short time but back in AT since 2-4  BB's have been discontinued  * will continue IV lasix as she continues to have rales on exam  Will gve extra 20mg IV this AM as she is still net positive  Will dose klor con as well  * limited echo showing normal LVEF    * plan for NPO after midnight tonight and TANI/AT ablation tomorrow, no hold of eliquis per Dr Contreras Mcgraw     Secondary diagnosis:    1  Hypothyroidism   2  Hx 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% now resolved               * was on amiodarone for short time after diagnosis in Jan 2022 but discontinued       Imaging: I have personally reviewed pertinent reports  No results found for this or any previous visit        Subjective/Objective   Subjective: feels anxious for tomorrow     Vitals: /69 (BP Location: Right arm)   Pulse (!) 122   Temp 98 4 °F (36 9 °C) (Oral)   Resp 20   Ht 5' 3" (1 6 m)   Wt 59 kg (130 lb 1 1 oz)   SpO2 96% BMI 23 04 kg/m²   Vitals:    02/04/23 1104 02/05/23 0600   Weight: 59 9 kg (132 lb) 59 kg (130 lb 1 1 oz)     Orthostatic Blood Pressures    Flowsheet Row Most Recent Value   Blood Pressure 106/69 filed at 02/05/2023 6115   Patient Position - Orthostatic VS Lying filed at 02/05/2023 0398            Intake/Output Summary (Last 24 hours) at 2/5/2023 0931  Last data filed at 2/5/2023 0912  Gross per 24 hour   Intake 300 ml   Output --   Net 300 ml       Invasive Devices     Peripheral Intravenous Line  Duration           Peripheral IV 02/03/23 Left Antecubital 1 day    Peripheral IV 02/03/23 Right Antecubital 1 day                            Scheduled Meds:  Current Facility-Administered Medications   Medication Dose Route Frequency Provider Last Rate   • acetaminophen  650 mg Oral Q6H PRN Georgina Embs, DO     • apixaban  5 mg Oral BID Georgina Embs, DO     • furosemide  20 mg Intravenous BID (diuretic) Moira Olvera, HILDA     • furosemide  20 mg Intravenous Once Alex Duque PA-C     • levothyroxine  100 mcg Oral Daily Georgina Embs, DO     • potassium chloride  40 mEq Oral Once Alex Duque PA-C     • sacubitril-valsartan  1 tablet Oral BID Georgina Embs, DO       Continuous Infusions:   PRN Meds: •  acetaminophen    Physical Exam:   GEN: NAD, alert and oriented, well appearing  SKIN: dry without significant lesions or rashes  HEENT: NCAT, PERRL, EOMs intact  NECK: No JVD or carotid bruits appreciated  CARDIOVASCULAR: RRR, normal S1, S2 without murmurs, rubs, or gallops appreciated  LUNGS: rales at bases   ABDOMEN: Soft, nontender, nondistended  EXTREMITIES/VASCULAR: perfused without clubbing, cyanosis, or edema b/l  PSYCH: Normal mood and affect  NEURO: CN ll-Xll grossly intact                Lab Results: I have personally reviewed pertinent lab results      Results from last 7 days   Lab Units 02/04/23  0445 02/03/23  1913   WBC Thousand/uL 4 46 7 39   HEMOGLOBIN g/dL 10 9* 12 8   HEMATOCRIT % 34 0* 41 5   PLATELETS Thousands/uL 137* 171     Results from last 7 days   Lab Units 02/04/23  0445 02/03/23  1913   POTASSIUM mmol/L 3 7 3 4*   CHLORIDE mmol/L 106 103   CO2 mmol/L 32 32   BUN mg/dL 15 17   CREATININE mg/dL 0 75 0 99   CALCIUM mg/dL 8 7 9 6     Results from last 7 days   Lab Units 02/04/23  0445   INR  1 42*   PTT seconds 37     Results from last 7 days   Lab Units 02/04/23  0445 02/03/23  2322   MAGNESIUM mg/dL 2 3 1 9

## 2023-02-06 ENCOUNTER — ANESTHESIA EVENT (INPATIENT)
Dept: NON INVASIVE DIAGNOSTICS | Facility: HOSPITAL | Age: 73
End: 2023-02-06

## 2023-02-06 ENCOUNTER — ANESTHESIA (INPATIENT)
Dept: NON INVASIVE DIAGNOSTICS | Facility: HOSPITAL | Age: 73
End: 2023-02-06

## 2023-02-06 LAB
ANION GAP SERPL CALCULATED.3IONS-SCNC: 6 MMOL/L (ref 4–13)
BUN SERPL-MCNC: 23 MG/DL (ref 5–25)
CALCIUM SERPL-MCNC: 9.6 MG/DL (ref 8.3–10.1)
CHLORIDE SERPL-SCNC: 104 MMOL/L (ref 96–108)
CO2 SERPL-SCNC: 29 MMOL/L (ref 21–32)
CREAT SERPL-MCNC: 1.15 MG/DL (ref 0.6–1.3)
GFR SERPL CREATININE-BSD FRML MDRD: 47 ML/MIN/1.73SQ M
GLUCOSE SERPL-MCNC: 111 MG/DL (ref 65–140)
KCT BLD-ACNC: 320 SEC (ref 89–137)
KCT BLD-ACNC: 334 SEC (ref 89–137)
KCT BLD-ACNC: 341 SEC (ref 89–137)
KCT BLD-ACNC: 348 SEC (ref 89–137)
KCT BLD-ACNC: 384 SEC (ref 89–137)
KCT BLD-ACNC: 391 SEC (ref 89–137)
KCT BLD-ACNC: 391 SEC (ref 89–137)
KCT BLD-ACNC: 398 SEC (ref 89–137)
POTASSIUM SERPL-SCNC: 4 MMOL/L (ref 3.5–5.3)
SODIUM SERPL-SCNC: 139 MMOL/L (ref 135–147)
SPECIMEN SOURCE: ABNORMAL

## 2023-02-06 PROCEDURE — 4A0234Z MEASUREMENT OF CARDIAC ELECTRICAL ACTIVITY, PERCUTANEOUS APPROACH: ICD-10-PCS | Performed by: INTERNAL MEDICINE

## 2023-02-06 PROCEDURE — 02K83ZZ MAP CONDUCTION MECHANISM, PERCUTANEOUS APPROACH: ICD-10-PCS | Performed by: INTERNAL MEDICINE

## 2023-02-06 PROCEDURE — 4A023FZ MEASUREMENT OF CARDIAC RHYTHM, PERCUTANEOUS APPROACH: ICD-10-PCS | Performed by: INTERNAL MEDICINE

## 2023-02-06 PROCEDURE — 02583ZZ DESTRUCTION OF CONDUCTION MECHANISM, PERCUTANEOUS APPROACH: ICD-10-PCS | Performed by: INTERNAL MEDICINE

## 2023-02-06 RX ORDER — ONDANSETRON 2 MG/ML
4 INJECTION INTRAMUSCULAR; INTRAVENOUS ONCE AS NEEDED
Status: DISCONTINUED | OUTPATIENT
Start: 2023-02-06 | End: 2023-02-06

## 2023-02-06 RX ORDER — DOCUSATE SODIUM 100 MG/1
100 CAPSULE, LIQUID FILLED ORAL 2 TIMES DAILY PRN
Status: DISCONTINUED | OUTPATIENT
Start: 2023-02-06 | End: 2023-02-08 | Stop reason: HOSPADM

## 2023-02-06 RX ORDER — HEPARIN SODIUM 1000 [USP'U]/ML
INJECTION, SOLUTION INTRAVENOUS; SUBCUTANEOUS CODE/TRAUMA/SEDATION MEDICATION
Status: DISCONTINUED | OUTPATIENT
Start: 2023-02-06 | End: 2023-02-06 | Stop reason: HOSPADM

## 2023-02-06 RX ORDER — ONDANSETRON 2 MG/ML
4 INJECTION INTRAMUSCULAR; INTRAVENOUS EVERY 6 HOURS PRN
Status: DISCONTINUED | OUTPATIENT
Start: 2023-02-06 | End: 2023-02-08 | Stop reason: HOSPADM

## 2023-02-06 RX ORDER — VASOPRESSIN 20 U/ML
INJECTION PARENTERAL AS NEEDED
Status: DISCONTINUED | OUTPATIENT
Start: 2023-02-06 | End: 2023-02-06

## 2023-02-06 RX ORDER — ALBUMIN, HUMAN INJ 5% 5 %
SOLUTION INTRAVENOUS CONTINUOUS PRN
Status: DISCONTINUED | OUTPATIENT
Start: 2023-02-06 | End: 2023-02-06

## 2023-02-06 RX ORDER — DOCUSATE SODIUM 100 MG/1
100 CAPSULE, LIQUID FILLED ORAL 2 TIMES DAILY
Status: DISCONTINUED | OUTPATIENT
Start: 2023-02-06 | End: 2023-02-08 | Stop reason: HOSPADM

## 2023-02-06 RX ORDER — LIDOCAINE HYDROCHLORIDE 10 MG/ML
INJECTION, SOLUTION EPIDURAL; INFILTRATION; INTRACAUDAL; PERINEURAL CODE/TRAUMA/SEDATION MEDICATION
Status: DISCONTINUED | OUTPATIENT
Start: 2023-02-06 | End: 2023-02-06 | Stop reason: HOSPADM

## 2023-02-06 RX ORDER — FENTANYL CITRATE/PF 50 MCG/ML
25 SYRINGE (ML) INJECTION
Status: DISCONTINUED | OUTPATIENT
Start: 2023-02-06 | End: 2023-02-06 | Stop reason: HOSPADM

## 2023-02-06 RX ORDER — ALBUMIN, HUMAN INJ 5% 5 %
12.5 SOLUTION INTRAVENOUS ONCE
Status: COMPLETED | OUTPATIENT
Start: 2023-02-06 | End: 2023-02-06

## 2023-02-06 RX ORDER — SODIUM CHLORIDE 9 MG/ML
INJECTION, SOLUTION INTRAVENOUS CONTINUOUS PRN
Status: DISCONTINUED | OUTPATIENT
Start: 2023-02-06 | End: 2023-02-06

## 2023-02-06 RX ORDER — ACETAMINOPHEN 325 MG/1
650 TABLET ORAL EVERY 4 HOURS PRN
Status: CANCELLED | OUTPATIENT
Start: 2023-02-06

## 2023-02-06 RX ORDER — HEPARIN SODIUM 10000 [USP'U]/100ML
INJECTION, SOLUTION INTRAVENOUS
Status: DISCONTINUED | OUTPATIENT
Start: 2023-02-06 | End: 2023-02-06 | Stop reason: HOSPADM

## 2023-02-06 RX ORDER — EPHEDRINE SULFATE 50 MG/ML
INJECTION INTRAVENOUS AS NEEDED
Status: DISCONTINUED | OUTPATIENT
Start: 2023-02-06 | End: 2023-02-06

## 2023-02-06 RX ORDER — METOPROLOL SUCCINATE 25 MG/1
25 TABLET, EXTENDED RELEASE ORAL 2 TIMES DAILY
Status: DISCONTINUED | OUTPATIENT
Start: 2023-02-06 | End: 2023-02-07

## 2023-02-06 RX ORDER — FENTANYL CITRATE 50 UG/ML
INJECTION, SOLUTION INTRAMUSCULAR; INTRAVENOUS AS NEEDED
Status: DISCONTINUED | OUTPATIENT
Start: 2023-02-06 | End: 2023-02-06

## 2023-02-06 RX ORDER — METOPROLOL SUCCINATE 25 MG/1
25 TABLET, EXTENDED RELEASE ORAL 2 TIMES DAILY
Status: DISCONTINUED | OUTPATIENT
Start: 2023-02-06 | End: 2023-02-06

## 2023-02-06 RX ORDER — PANTOPRAZOLE SODIUM 40 MG/1
40 TABLET, DELAYED RELEASE ORAL DAILY
Status: DISCONTINUED | OUTPATIENT
Start: 2023-02-07 | End: 2023-02-08 | Stop reason: HOSPADM

## 2023-02-06 RX ORDER — PROTAMINE SULFATE 10 MG/ML
INJECTION, SOLUTION INTRAVENOUS AS NEEDED
Status: DISCONTINUED | OUTPATIENT
Start: 2023-02-06 | End: 2023-02-06

## 2023-02-06 RX ORDER — PROPOFOL 10 MG/ML
INJECTION, EMULSION INTRAVENOUS CONTINUOUS PRN
Status: DISCONTINUED | OUTPATIENT
Start: 2023-02-06 | End: 2023-02-06

## 2023-02-06 RX ORDER — PROPOFOL 10 MG/ML
INJECTION, EMULSION INTRAVENOUS AS NEEDED
Status: DISCONTINUED | OUTPATIENT
Start: 2023-02-06 | End: 2023-02-06

## 2023-02-06 RX ADMIN — PROPOFOL 20 MG: 10 INJECTION, EMULSION INTRAVENOUS at 13:21

## 2023-02-06 RX ADMIN — APIXABAN 5 MG: 5 TABLET, FILM COATED ORAL at 09:30

## 2023-02-06 RX ADMIN — VASOPRESSIN 1 UNITS: 20 INJECTION INTRAVENOUS at 16:32

## 2023-02-06 RX ADMIN — PHENYLEPHRINE HYDROCHLORIDE 50 MCG/MIN: 50 INJECTION INTRAVENOUS at 21:50

## 2023-02-06 RX ADMIN — SODIUM CHLORIDE: 0.9 INJECTION, SOLUTION INTRAVENOUS at 15:35

## 2023-02-06 RX ADMIN — FENTANYL CITRATE 50 MCG: 50 INJECTION, SOLUTION INTRAMUSCULAR; INTRAVENOUS at 14:48

## 2023-02-06 RX ADMIN — PROPOFOL 70 MCG/KG/MIN: 10 INJECTION, EMULSION INTRAVENOUS at 13:17

## 2023-02-06 RX ADMIN — VASOPRESSIN 1 UNITS: 20 INJECTION INTRAVENOUS at 15:34

## 2023-02-06 RX ADMIN — PROPOFOL 20 MG: 10 INJECTION, EMULSION INTRAVENOUS at 14:48

## 2023-02-06 RX ADMIN — VASOPRESSIN 1 UNITS: 20 INJECTION INTRAVENOUS at 15:46

## 2023-02-06 RX ADMIN — FENTANYL CITRATE 25 MCG: 50 INJECTION, SOLUTION INTRAMUSCULAR; INTRAVENOUS at 16:56

## 2023-02-06 RX ADMIN — VASOPRESSIN 1 UNITS: 20 INJECTION INTRAVENOUS at 14:34

## 2023-02-06 RX ADMIN — NOREPINEPHRINE BITARTRATE 4 MCG/MIN: 1 INJECTION INTRAVENOUS at 16:08

## 2023-02-06 RX ADMIN — SACUBITRIL AND VALSARTAN 1 TABLET: 24; 26 TABLET, FILM COATED ORAL at 09:36

## 2023-02-06 RX ADMIN — ALBUMIN (HUMAN) 12.5 G: 12.5 INJECTION, SOLUTION INTRAVENOUS at 17:45

## 2023-02-06 RX ADMIN — VASOPRESSIN 1 UNITS: 20 INJECTION INTRAVENOUS at 14:19

## 2023-02-06 RX ADMIN — VASOPRESSIN 1 UNITS: 20 INJECTION INTRAVENOUS at 13:59

## 2023-02-06 RX ADMIN — PROPOFOL 50 MG: 10 INJECTION, EMULSION INTRAVENOUS at 15:42

## 2023-02-06 RX ADMIN — PHENYLEPHRINE HYDROCHLORIDE 20 MCG/MIN: 10 INJECTION INTRAVENOUS at 13:23

## 2023-02-06 RX ADMIN — PROTAMINE SULFATE 50 MG: 10 INJECTION, SOLUTION INTRAVENOUS at 17:12

## 2023-02-06 RX ADMIN — FENTANYL CITRATE 25 MCG: 50 INJECTION, SOLUTION INTRAMUSCULAR; INTRAVENOUS at 13:17

## 2023-02-06 RX ADMIN — FUROSEMIDE 20 MG: 10 INJECTION, SOLUTION INTRAMUSCULAR; INTRAVENOUS at 09:31

## 2023-02-06 RX ADMIN — ALBUMIN (HUMAN): 12.5 INJECTION, SOLUTION INTRAVENOUS at 14:02

## 2023-02-06 RX ADMIN — PHENYLEPHRINE HYDROCHLORIDE 25 MCG/MIN: 10 INJECTION INTRAVENOUS at 18:25

## 2023-02-06 RX ADMIN — VASOPRESSIN 2 UNITS: 20 INJECTION INTRAVENOUS at 16:05

## 2023-02-06 RX ADMIN — PROPOFOL 20 MG: 10 INJECTION, EMULSION INTRAVENOUS at 16:18

## 2023-02-06 RX ADMIN — SODIUM CHLORIDE: 0.9 INJECTION, SOLUTION INTRAVENOUS at 13:02

## 2023-02-06 RX ADMIN — APIXABAN 5 MG: 5 TABLET, FILM COATED ORAL at 22:02

## 2023-02-06 RX ADMIN — EPHEDRINE SULFATE 10 MG: 50 INJECTION INTRAVENOUS at 13:48

## 2023-02-06 RX ADMIN — ALBUMIN (HUMAN): 12.5 INJECTION, SOLUTION INTRAVENOUS at 13:26

## 2023-02-06 RX ADMIN — LEVOTHYROXINE SODIUM 100 MCG: 100 TABLET ORAL at 09:30

## 2023-02-06 RX ADMIN — VASOPRESSIN 1 UNITS: 20 INJECTION INTRAVENOUS at 14:58

## 2023-02-06 RX ADMIN — PROTAMINE SULFATE 10 MG: 10 INJECTION, SOLUTION INTRAVENOUS at 17:06

## 2023-02-06 NOTE — PROGRESS NOTES
[facility]  Progress Note - Jeannett Councilman 1950, 67 y o  female MRN: [id number]  Unit/Bed#: BE CATH LAB ROOM Encounter: [id number]  Primary Care Provider: Mary Jones MD   Date and time admitted to hospital: 2/3/2023  6:55 PM    * Atrial tachycardia [facility])  Assessment & Plan  Atrial tachycardia referred by cardiology for inpatient monitoring and management  History of prior ablation noted  Beta-blocker presently on hold per cardiology   Eliquis for anticoagulation  Cardiology plans for intervention noted  Cardiology following      ILD (interstitial lung disease) ([facility] )  Assessment & Plan  History of interstitial lung disease  Outpatient pulmonary follow-up    Chronic combined systolic (congestive) and diastolic (congestive) heart failure ([facility] )  Assessment & Plan  Wt Readings from Last 3 Encounters:   02/06/23 57 9 kg (127 lb 10 3 oz)   02/03/23 60 4 kg (133 lb 3 2 oz)   12/30/22 59 kg (130 lb)       IV Lasix per cardiology  [facility]  Cardiology following      Hypothyroidism  Assessment & Plan  Continue levothyroxine              VTE Pharmacologic Prophylaxis: VTE Score: 3 Moderate Risk (Score 3-4) - Pharmacological DVT Prophylaxis Ordered: apixaban (Eliquis)  Patient Centered Rounds: I performed bedside rounds with nursing staff today  Discussions with Specialists or Other Care Team Provider:     Education and Discussions with Family / Patient: Patient, called and left a message for daughter Olivier Bajwa  Time Spent for Care: 30 minutes  More than 50% of total time spent on counseling and coordination of care as described above      Current Length of Stay: 3 day(s)  Current Patient Status: Inpatient   Certification Statement: The patient will continue to require additional inpatient hospital stay due to As outlined  Discharge Plan: Cardiac intervention with EP today    Code Status: Level 1 - Full Code    Subjective:     Comfortably sitting up in chair  Reports feeling lissy  Reports improving shortness of breath    Objective:     Vitals:   Temp (24hrs), Av 1 °F (36 7 °C), Min:98 °F (36 7 °C), Max:98 3 °F (36 8 °C)    Temp:  [98 °F (36 7 °C)-98 3 °F (36 8 °C)] 98 °F (36 7 °C)  HR:  [123-140] 140  Resp:  [16] 16  BP: ()/(69-78) 125/74  SpO2:  [95 %-99 %] 97 %  Body mass index is 22 61 kg/m²  Input and Output Summary (last 24 hours): Intake/Output Summary (Last 24 hours) at 2023 1406  Last data filed at 2023 1338  Gross per 24 hour   Intake 250 ml   Output 1252 ml   Net -1002 ml       Physical Exam:   Physical Exam     Comfortably sitting up in bed  Neck supple  Lungs diminished breath sounds bilaterally  Scattered crackles  Heart sounds S1-S2 noted  Tachycardia noted  Abdomen soft nontender  Awake alert of the simple commands  No pedal edema  No rash    Additional Data:     Labs:  Results from last 7 days   Lab Units 23  0445   WBC Thousand/uL 4 46   HEMOGLOBIN g/dL 10 9*   HEMATOCRIT % 34 0*   PLATELETS Thousands/uL 137*   NEUTROS PCT % 50   LYMPHS PCT % 40   MONOS PCT % 7   EOS PCT % 2     Results from last 7 days   Lab Units 23  0442 23  0902 23  0445   SODIUM mmol/L 139   < > 140   POTASSIUM mmol/L 4 0   < > 3 7   CHLORIDE mmol/L 104   < > 106   CO2 mmol/L 29   < > 32   BUN mg/dL 23   < > 15   CREATININE mg/dL 1 15   < > 0 75   ANION GAP mmol/L 6   < > 2*   CALCIUM mg/dL 9 6   < > 8 7   ALBUMIN g/dL  --   --  2 9*   TOTAL BILIRUBIN mg/dL  --   --  0 38   ALK PHOS U/L  --   --  72   ALT U/L  --   --  10*   AST U/L  --   --  13   GLUCOSE RANDOM mg/dL 111   < > 101    < > = values in this interval not displayed  Results from last 7 days   Lab Units 23  0445   INR  1 42*                   Lines/Drains:  Invasive Devices     Peripheral Intravenous Line  Duration           Peripheral IV 23 Left Antecubital 2 days    Peripheral IV 23 Right Antecubital 2 days          Line  Duration           Venous Sheath 5 Fr  Left Femoral <1 day    Venous Sheath 7 Fr  Right Femoral <1 day    Venous Sheath 8 Fr  Right Femoral <1 day    Venous Sheath Other (Comment) Left Femoral <1 day                  Telemetry:  Telemetry Orders (From admission, onward)             48 Hour Telemetry Monitoring  Continuous x 48 hours        References:    Telemetry Guidelines   Question:  Reason for 48 Hour Telemetry  Answer:  Arrhythmias Requiring Medical Therapy (eg  SVT, Vtach/fib, Bradycardia, Uncontrolled A-fib)                 Telemetry Reviewed:  Tachycardia  Indication for Continued Telemetry Use: Awaiting PCI/EP Study/CABG             Imaging: Reviewed radiology reports from this admission including: chest xray    Recent Cultures (last 7 days):         Last 24 Hours Medication List:   Current Facility-Administered Medications   Medication Dose Route Frequency Provider Last Rate   • acetaminophen  650 mg Oral Q6H PRN Cal Rogers DO     • apixaban  5 mg Oral BID Cal Rogers DO     • furosemide  20 mg Intravenous BID (diuretic) Carli Cochran PA-C     • heparin (porcine)   Intravenous Code/Trauma/Sedation Continuous Med Susan Gtz MD 1,200 Units/hr (02/06/23 1352)   • heparin (porcine)   Intravenous Code/Trauma/Sedation Med Susan Gtz MD     • isoproterenol (ISUPREL) 1000 mcg (STANDARD CONCENTRATION) IV in dextrose 5% 250 mL   Intravenous Code/Trauma/Sedation Continuous Med Susan Gtz MD 1 mcg/min (02/06/23 1350)   • levothyroxine  100 mcg Oral Daily Cal Rogers DO     • lidocaine (PF)   Infiltration Code/Trauma/Sedation Denny Gtz MD     • sacubitril-valsartan  1 tablet Oral BID Cal Rogers DO       Facility-Administered Medications Ordered in Other Encounters   Medication Dose Route Frequency Provider Last Rate   • albumin human   Intravenous Continuous PRN Ephriam Daylin, CRNA 0  (02/06/23 1338)   • ePHEDrine   Intravenous PRN Ephriam Daylin, CRNA     • fentanyl citrate (PF)   Intravenous PRN Mami Mora Joe Calvin, CRNA     • norepinephrine   Intravenous PRN Anisha Goodell, CRNA     • phenylephrine (SAMI-SYNEPHRINE) 50 mg (STANDARD CONCENTRATION) in sodium chloride 0 9% 250 mL   Intravenous Continuous PRN Anisha Goodell, CRNA 100 mcg/min (02/06/23 1355)   • phenylephrine   Intravenous PRN Anisha Goodell, CRNA     • propofol   Intravenous Continuous PRN Anisha Goodell, CRNA 40 mcg/kg/min (02/06/23 1341)   • propofol   Intravenous PRN Anisha Goodell, CRNA     • sodium chloride   Intravenous Continuous PRN Anisha Goodell, CRNA     • vasopressin   Intravenous PRN Anisha Goodell, CRNA          Today, Patient Was Seen By: Iza Wu MD    **Please Note: This note may have been constructed using a voice recognition system  **

## 2023-02-06 NOTE — ANESTHESIA PREPROCEDURE EVALUATION
Procedure:  CARDIAC EPS/SVT ABLATION (Chest)    Relevant Problems   CARDIO   (+) Atrial tachycardia (HCC)   (+) Paroxysmal atrial fibrillation (HCC)   (+) Paroxysmal atrial flutter (HCC)      ENDO   (+) Hypothyroidism      /RENAL   (+) Stage 3a chronic kidney disease (HCC)      HEMATOLOGY   (+) Waldenstrom macroglobulinemia (HCC)      NEURO/PSYCH   (+) History of Hodgkin's lymphoma      Other   (+) Myelodysplasia (myelodysplastic syndrome) (HCC)        Physical Exam    Airway    Mallampati score: III  TM Distance: >3 FB  Neck ROM: limited     Dental       Cardiovascular      Pulmonary      Other Findings        Anesthesia Plan  ASA Score- 3     Anesthesia Type- IV sedation with anesthesia with ASA Monitors  Additional Monitors:   Airway Plan:           Plan Factors-    Chart reviewed  Existing labs reviewed  Patient summary reviewed  Induction- intravenous  Postoperative Plan-   Planned trial extubation    Informed Consent- Anesthetic plan and risks discussed with patient  I personally reviewed this patient with the CRNA  Discussed and agreed on the Anesthesia Plan with the CRNA  Rory Stephen VITALS  There were no vitals taken for this visit    BP Readings from Last 3 Encounters:   02/06/23 125/74   02/03/23 110/78   12/30/22 151/67     LABS  Results from Last 12 Months   Lab Units 02/06/23  0442 02/05/23  0902 02/04/23  0445 02/03/23  2322 02/03/23  1913   SODIUM mmol/L 139 140 140  --  138   POTASSIUM mmol/L 4 0 4 1 3 7  --  3 4*   CHLORIDE mmol/L 104 106 106  --  103   CO2 mmol/L 29 29 32  --  32   ANION GAP mmol/L 6 5 2*  --  3*   BUN mg/dL 23 16 15  --  17   CREATININE mg/dL 1 15 0 91 0 75  --  0 99   CALCIUM mg/dL 9 6 9 2 8 7  --  9 6   GLUCOSE RANDOM mg/dL 111 120 101  --  111   MAGNESIUM mg/dL  --   --  2 3 1 9  --    AST U/L  --   --  13  --  15   ALT U/L  --   --  10*  --  13   ALK PHOS U/L  --   --  72  --  100   TOTAL BILIRUBIN mg/dL  --   --  0 38  --  0 34   ALBUMIN g/dL  --   -- 2 9*  --  3 9     Results from Last 12 Months   Lab Units 02/04/23  0445 02/03/23  1913   WBC Thousand/uL 4 46 7 39   HEMOGLOBIN g/dL 10 9* 12 8   HEMATOCRIT % 34 0* 41 5   PLATELETS Thousands/uL 137* 171     Results from Last 12 Months   Lab Units 02/04/23  0445 12/30/22  0955   INR  1 42* 1 31*   PTT seconds 37  --

## 2023-02-06 NOTE — ASSESSMENT & PLAN NOTE
Wt Readings from Last 3 Encounters:   02/06/23 57 9 kg (127 lb 10 3 oz)   02/03/23 60 4 kg (133 lb 3 2 oz)   12/30/22 59 kg (130 lb)       IV Lasix per cardiology  [facility]  Cardiology following

## 2023-02-06 NOTE — ANESTHESIA POSTPROCEDURE EVALUATION
Post-Op Assessment Note    CV Status:  Stable  Pain Score: 0    Pain management: adequate     Mental Status:  Alert and awake   Hydration Status:  Stable   PONV Controlled:  None   Airway Patency:  Patent      Post Op Vitals Reviewed: Yes      Staff: CRNA         No notable events documented      BP (!) 85/53 (02/06/23 1737)    Temp 98 3 °F (36 8 °C) (02/06/23 1737)    Pulse (!) 116 (02/06/23 1737)   Resp (!) 23 (02/06/23 1737)    SpO2 99 % (02/06/23 1737)

## 2023-02-06 NOTE — PLAN OF CARE
Problem: INFECTION - ADULT  Goal: Absence or prevention of progression during hospitalization  Description: INTERVENTIONS:  - Assess and monitor for signs and symptoms of infection  - Monitor lab/diagnostic results  - Monitor all insertion sites, i e  indwelling lines, tubes, and drains  - Monitor endotracheal if appropriate and nasal secretions for changes in amount and color  - [place] appropriate cooling/warming therapies per order  - Administer medications as ordered  - Instruct and encourage patient and family to use good hand hygiene technique  - Identify and instruct in appropriate isolation precautions for identified infection/condition  Outcome: Progressing  Goal: Absence of fever/infection during neutropenic period  Description: INTERVENTIONS:  - Monitor WBC    Outcome: Progressing     Problem: SAFETY ADULT  Goal: Patient will remain free of falls  Description: INTERVENTIONS:  - Educate patient/family on patient safety including physical limitations  - Instruct patient to call for assistance with activity   - Consult OT/PT to assist with strengthening/mobility   - Keep Call bell within reach  - Keep bed low and locked with side rails adjusted as appropriate  - Keep care items and personal belongings within reach  - Initiate and maintain comfort rounds  - Make Fall Risk Sign visible to staff  - Offer Toileting every  Hours, in advance of need  - Initiate/Maintain alarm  - Obtain necessary fall risk management equipment:   - Apply yellow socks and bracelet for high fall risk patients  - Consider moving patient to room near nurses station  Outcome: Progressing  Goal: Maintain or return to baseline ADL function  Description: INTERVENTIONS:  -  Assess patient's ability to carry out ADLs; assess patient's baseline for ADL function and identify physical deficits which impact ability to perform ADLs (bathing, care of mouth/teeth, toileting, grooming, dressing, etc )  - Assess/evaluate cause of self-care deficits   - Assess range of motion  - Assess patient's mobility; develop plan if impaired  - Assess patient's need for assistive devices and provide as appropriate  - Encourage maximum independence but intervene and supervise when necessary  - Involve family in performance of ADLs  - Assess for home care needs following discharge   - Consider OT consult to assist with ADL evaluation and planning for discharge  - Provide patient education as appropriate  Outcome: Progressing  Goal: Maintains/Returns to pre admission functional level  Description: INTERVENTIONS:  - Perform BMAT or MOVE assessment daily    - Set and communicate daily mobility goal to care team and patient/family/caregiver  - Collaborate with rehabilitation services on mobility goals if consulted  - Perform Range of Motion  times a day  - Reposition patient every  hours  - Dangle patient  times a day  - Stand patient  times a day  - Ambulate patient  times a day  - Out of bed to chair  times a day   - Out of bed for meals times a day  - Out of bed for toileting  - Record patient progress and toleration of activity level   Outcome: Progressing     Problem: Knowledge Deficit  Goal: Patient/family/caregiver demonstrates understanding of disease process, treatment plan, medications, and discharge instructions  Description: Complete learning assessment and assess knowledge base    Interventions:  - Provide teaching at level of understanding  - Provide teaching via preferred learning methods  Outcome: Progressing     Problem: CARDIOVASCULAR - ADULT  Goal: Maintains optimal cardiac output and hemodynamic stability  Description: INTERVENTIONS:  - Monitor I/O, vital signs and rhythm  - Monitor for S/S and trends of decreased cardiac output  - Administer and titrate ordered vasoactive medications to optimize hemodynamic stability  - Assess quality of pulses, skin color and temperature  - Assess for signs of decreased coronary artery perfusion  - Instruct patient to report change in severity of symptoms  Outcome: Progressing  Goal: Absence of cardiac dysrhythmias or at baseline rhythm  Description: INTERVENTIONS:  - Continuous cardiac monitoring, vital signs, obtain 12 lead EKG if ordered  - Administer antiarrhythmic and heart rate control medications as ordered  - Monitor electrolytes and administer replacement therapy as ordered  Outcome: Progressing     Problem: Nutrition/Hydration-ADULT  Goal: Nutrient/Hydration intake appropriate for improving, restoring or maintaining nutritional needs  Description: Monitor and assess patient's nutrition/hydration status for malnutrition  Collaborate with interdisciplinary team and initiate plan and interventions as ordered  Monitor patient's weight and dietary intake as ordered or per policy  Utilize nutrition screening tool and intervene as necessary  Determine patient's food preferences and provide high-protein, high-caloric foods as appropriate       INTERVENTIONS:  - Monitor oral intake, urinary output, labs, and treatment plans  - Assess nutrition and hydration status and recommend course of action  - Evaluate amount of meals eaten  - Assist patient with eating if necessary   - Allow adequate time for meals  - Recommend/ encourage appropriate diets, oral nutritional supplements, and vitamin/mineral supplements  - Order, calculate, and assess calorie counts as needed  - Recommend, monitor, and adjust tube feedings and TPN/PPN based on assessed needs  - Assess need for intravenous fluids  - Provide specific nutrition/hydration education as appropriate  - Include patient/family/caregiver in decisions related to nutrition  Outcome: Progressing

## 2023-02-07 LAB
ALBUMIN SERPL BCP-MCNC: 3.8 G/DL (ref 3.5–5)
ALP SERPL-CCNC: 55 U/L (ref 46–116)
ALT SERPL W P-5'-P-CCNC: 10 U/L (ref 12–78)
ANION GAP SERPL CALCULATED.3IONS-SCNC: 8 MMOL/L (ref 4–13)
AST SERPL W P-5'-P-CCNC: 12 U/L (ref 5–45)
ATRIAL RATE: 116 BPM
ATRIAL RATE: 51 BPM
ATRIAL RATE: 93 BPM
BASOPHILS # BLD AUTO: 0.01 THOUSANDS/ÂΜL (ref 0–0.1)
BASOPHILS NFR BLD AUTO: 0 % (ref 0–1)
BILIRUB SERPL-MCNC: 0.83 MG/DL (ref 0.2–1)
BUN SERPL-MCNC: 17 MG/DL (ref 5–25)
CALCIUM SERPL-MCNC: 8.7 MG/DL (ref 8.3–10.1)
CHLORIDE SERPL-SCNC: 112 MMOL/L (ref 96–108)
CO2 SERPL-SCNC: 25 MMOL/L (ref 21–32)
CREAT SERPL-MCNC: 0.81 MG/DL (ref 0.6–1.3)
EOSINOPHIL # BLD AUTO: 0.03 THOUSAND/ÂΜL (ref 0–0.61)
EOSINOPHIL NFR BLD AUTO: 1 % (ref 0–6)
ERYTHROCYTE [DISTWIDTH] IN BLOOD BY AUTOMATED COUNT: 13.5 % (ref 11.6–15.1)
GFR SERPL CREATININE-BSD FRML MDRD: 72 ML/MIN/1.73SQ M
GLUCOSE SERPL-MCNC: 89 MG/DL (ref 65–140)
HCT VFR BLD AUTO: 29.7 % (ref 34.8–46.1)
HCT VFR BLD AUTO: 30.2 % (ref 34.8–46.1)
HGB BLD-MCNC: 9.1 G/DL (ref 11.5–15.4)
HGB BLD-MCNC: 9.6 G/DL (ref 11.5–15.4)
IMM GRANULOCYTES # BLD AUTO: 0.01 THOUSAND/UL (ref 0–0.2)
IMM GRANULOCYTES NFR BLD AUTO: 0 % (ref 0–2)
LACTATE SERPL-SCNC: 1.2 MMOL/L (ref 0.5–2)
LYMPHOCYTES # BLD AUTO: 1.27 THOUSANDS/ÂΜL (ref 0.6–4.47)
LYMPHOCYTES NFR BLD AUTO: 29 % (ref 14–44)
MCH RBC QN AUTO: 28.6 PG (ref 26.8–34.3)
MCHC RBC AUTO-ENTMCNC: 30.6 G/DL (ref 31.4–37.4)
MCV RBC AUTO: 93 FL (ref 82–98)
MONOCYTES # BLD AUTO: 0.38 THOUSAND/ÂΜL (ref 0.17–1.22)
MONOCYTES NFR BLD AUTO: 9 % (ref 4–12)
NEUTROPHILS # BLD AUTO: 2.64 THOUSANDS/ÂΜL (ref 1.85–7.62)
NEUTS SEG NFR BLD AUTO: 61 % (ref 43–75)
NRBC BLD AUTO-RTO: 0 /100 WBCS
P AXIS: 36 DEGREES
P AXIS: 73 DEGREES
P AXIS: 96 DEGREES
PLATELET # BLD AUTO: 91 THOUSANDS/UL (ref 149–390)
PMV BLD AUTO: 11.6 FL (ref 8.9–12.7)
POTASSIUM SERPL-SCNC: 3.9 MMOL/L (ref 3.5–5.3)
PR INTERVAL: 283 MS
PR INTERVAL: 536 MS
PROT SERPL-MCNC: 6.4 G/DL (ref 6.4–8.4)
QRS AXIS: -42 DEGREES
QRS AXIS: -52 DEGREES
QRS AXIS: -54 DEGREES
QRSD INTERVAL: 104 MS
QRSD INTERVAL: 106 MS
QRSD INTERVAL: 108 MS
QT INTERVAL: 296 MS
QT INTERVAL: 304 MS
QT INTERVAL: 484 MS
QTC INTERVAL: 342 MS
QTC INTERVAL: 412 MS
QTC INTERVAL: 446 MS
RBC # BLD AUTO: 3.18 MILLION/UL (ref 3.81–5.12)
SODIUM SERPL-SCNC: 145 MMOL/L (ref 135–147)
T WAVE AXIS: 177 DEGREES
T WAVE AXIS: 193 DEGREES
T WAVE AXIS: 28 DEGREES
VENTRICULAR RATE: 116 BPM
VENTRICULAR RATE: 51 BPM
VENTRICULAR RATE: 76 BPM
WBC # BLD AUTO: 4.34 THOUSAND/UL (ref 4.31–10.16)

## 2023-02-07 RX ORDER — ALBUMIN, HUMAN INJ 5% 5 %
25 SOLUTION INTRAVENOUS ONCE
Status: COMPLETED | OUTPATIENT
Start: 2023-02-07 | End: 2023-02-07

## 2023-02-07 RX ORDER — ALBUMIN, HUMAN INJ 5% 5 %
SOLUTION INTRAVENOUS
Status: COMPLETED
Start: 2023-02-07 | End: 2023-02-07

## 2023-02-07 RX ORDER — ALBUMIN (HUMAN) 12.5 G/50ML
25 SOLUTION INTRAVENOUS ONCE
Status: COMPLETED | OUTPATIENT
Start: 2023-02-07 | End: 2023-02-07

## 2023-02-07 RX ADMIN — APIXABAN 5 MG: 5 TABLET, FILM COATED ORAL at 17:29

## 2023-02-07 RX ADMIN — APIXABAN 5 MG: 5 TABLET, FILM COATED ORAL at 08:38

## 2023-02-07 RX ADMIN — ALBUMIN (HUMAN) 25 G: 0.25 INJECTION, SOLUTION INTRAVENOUS at 00:27

## 2023-02-07 RX ADMIN — LEVOTHYROXINE SODIUM 100 MCG: 100 TABLET ORAL at 08:38

## 2023-02-07 RX ADMIN — ALBUMIN (HUMAN) 12.5 G: 12.5 INJECTION, SOLUTION INTRAVENOUS at 04:15

## 2023-02-07 RX ADMIN — ALBUMIN (HUMAN) 12.5 G: 12.5 INJECTION, SOLUTION INTRAVENOUS at 04:41

## 2023-02-07 RX ADMIN — PANTOPRAZOLE SODIUM 40 MG: 40 TABLET, DELAYED RELEASE ORAL at 05:56

## 2023-02-07 NOTE — UTILIZATION REVIEW
Continued Stay Review    Date: 02/07                          Current Patient Class: IP Current Level of Care: Level 2 stepdown/HOT    HPI:72 y o  female initially admitted on 02/03    Assessment/Plan: Patient was hypotensive overnight  Entresto, Lasix, and metoprolol discontinued last night  Given IV fluids and maintained on esperanza drip until about 4 AM  BP this morning 94/52    Throughout the night, CA interval remained prolonged but did fluctuate in duration  At times, appeared to have Mobitz type1 intermittently  Will continue to monitor on telemetry and repeat EKG this afternoon   Encouraged ambulation w/ portable tele box      Vital Signs: BP (!) 105/49   Pulse (!) 50   Temp 97 8 °F (36 6 °C)   Resp 16   Ht 5' 3" (1 6 m)   Wt 60 9 kg (134 lb 4 2 oz)   SpO2 97%   BMI 23 78 kg/m²       Pertinent Labs/Diagnostic Results:   020/7 EKG result: Sinus rhythm with variable A-V block  Left axis deviation  Nonspecific ST and T wave abnormality      Results from last 7 days   Lab Units 02/07/23  1132 02/07/23 0444 02/04/23 0445 02/03/23  1913   WBC Thousand/uL  --  4 34 4 46 7 39   HEMOGLOBIN g/dL 9 6* 9 1* 10 9* 12 8   HEMATOCRIT % 30 2* 29 7* 34 0* 41 5   PLATELETS Thousands/uL  --  91* 137* 171   NEUTROS ABS Thousands/µL  --  2 64 2 24 4 53         Results from last 7 days   Lab Units 02/07/23  0444 02/06/23  0442 02/05/23  0902 02/04/23 0445 02/03/23 2322 02/03/23 1913   SODIUM mmol/L 145 139 140 140  --  138   POTASSIUM mmol/L 3 9 4 0 4 1 3 7  --  3 4*   CHLORIDE mmol/L 112* 104 106 106  --  103   CO2 mmol/L 25 29 29 32  --  32   ANION GAP mmol/L 8 6 5 2*  --  3*   BUN mg/dL 17 23 16 15  --  17   CREATININE mg/dL 0 81 1 15 0 91 0 75  --  0 99   EGFR ml/min/1 73sq m 72 47 62 79  --  56   CALCIUM mg/dL 8 7 9 6 9 2 8 7  --  9 6   MAGNESIUM mg/dL  --   --   --  2 3 1 9  --      Results from last 7 days   Lab Units 02/07/23  0444 02/04/23  0445 02/03/23  1913   AST U/L 12 13 15   ALT U/L 10* 10* 13   ALK PHOS U/L 55 72 100   TOTAL PROTEIN g/dL 6 4 6 3* 8 4   ALBUMIN g/dL 3 8 2 9* 3 9   TOTAL BILIRUBIN mg/dL 0 83 0 38 0 34         Results from last 7 days   Lab Units 02/07/23 0444 02/06/23 0442 02/05/23  0902 02/04/23 0445 02/03/23 1913   GLUCOSE RANDOM mg/dL 89 111 120 101 111           Results from last 7 days   Lab Units 02/03/23 2322 02/03/23 2145 02/03/23 1913   HS TNI 0HR ng/L  --   --  5   HS TNI 2HR ng/L  --  6  --    HSTNI D2 ng/L  --  1  --    HS TNI 4HR ng/L 6  --   --    HSTNI D4 ng/L 1  --   --          Results from last 7 days   Lab Units 02/04/23 0445   PROTIME seconds 17 6*   INR  1 42*   PTT seconds 37     Results from last 7 days   Lab Units 02/04/23 0445 02/03/23 1913   TSH 3RD GENERATON uIU/mL 3 950 3 470         Results from last 7 days   Lab Units 02/07/23 0444   LACTIC ACID mmol/L 1 2             Results from last 7 days   Lab Units 02/04/23 0445   NT-PRO BNP pg/mL 1,799*     Medications:   Scheduled Medications:  apixaban, 5 mg, Oral, BID  docusate sodium, 100 mg, Oral, BID  levothyroxine, 100 mcg, Oral, Daily  pantoprazole, 40 mg, Oral, Daily  albumin human (FLEXBUMIN) 5 % injection 25 g IV 02/07 x 2  albumin human (FLEXBUMIN) 5 % injection 12 5 g IV once 02/06    Continuous IV Infusions:  phenylephrine (SAMI-SYNEPHRINE) 50 mg (STANDARD CONCENTRATION) in sodium chloride 0 9% 250 mL  Rate: 7 5-54 mL/hr Dose:  mcg/min  Freq: Titrated Route: IV  Last Dose: Stopped (02/07/23 0337)  Start: 02/06/23 1845 End: 02/07/23 0416     PRN Meds:  acetaminophen, 650 mg, Oral, Q6H PRN  docusate sodium, 100 mg, Oral, BID PRN  ondansetron, 4 mg, Intravenous, Q6H PRN        Discharge Plan: TBD    Network Utilization Review Department  ATTENTION: Please call with any questions or concerns to [phone] and carefully listen to the prompts so that you are directed to the right person   All voicemails are confidential   Carolyne Werner all requests for admission clinical reviews, approved or denied determinations and any other requests to dedicated fax number below belonging to the campus where the patient is receiving treatment   List of dedicated fax numbers for the Facilities:  [address] [place] [address] DENIALS (Administrative/Medical Necessity) [phone]   [address] [place] [address] (Maternity/NICU/Pediatrics) [phone]   [address] [phone]   [address] [phone]   [address] [address] [address] [phone]   [address] [facility] [address] [facility] [phone]

## 2023-02-07 NOTE — PROGRESS NOTES
Progress Note - Electrophysiology-Cardiology (EP)   Sandra Rivas 67 y o  female MRN: [id number]  Unit/Bed#: [facility] 519-01 Encounter: [id number]      Assessment/Plan:  1  Atrial tachycardia  a  S/p RFA of left atrial inferoseptal region 12/30/2022  b  S/p RFA of inferoseptal region, AVNRT, anterior left atrial wall 2/6/2023  c  Prior to admission, on Toprol XL 25 mg QD - currently held for hypotension  2  History of tachy-induced cardiomyopathy  a  2/2023 TTE - EF 55%  b  1/2022 TTE - EF 25%  c  Prior to admission, on Entresto and Lasix - currently held for hypotension  3  Presence of loop recorder, placed 12/30/22  4  Hypothyroidism      Patient hypotensive overnight  Entresto, Lasix, and metoprolol discontinued last night  Patient given IV fluids and maintained on esperanza drip until about 4 AM  BP this morning 94/52  Throughout the night, CT interval remained prolonged but did fluctuate in duration  At times, patient appeared to have Mobitz type1 intermittently  Will continue to monitor on telemetry and repeat EKG this afternoon  Patient encouraged to ambulate today  OK to do this with portable telemetry box  Subjective/Objective     Subjective: Upon evaluation, patient is resting comfortably in her chair  She complaints of discomfort at groin incision sites  Patient denies chest pain/heaviness/tightness/pressure, palpitations, shortness of breath, orthopnea, lightheadedness, presyncope, syncope or N/V             Objective:     Vitals: /61   Pulse 70   Temp 98 3 °F (36 8 °C) (Oral)   Resp 18   Ht 5' 3" (1 6 m)   Wt 60 9 kg (134 lb 4 2 oz)   SpO2 100%   BMI 23 78 kg/m²   Vitals:    02/06/23 0442 02/07/23 0500   Weight: 57 9 kg (127 lb 10 3 oz) 60 9 kg (134 lb 4 2 oz)     Orthostatic Blood Pressures    Flowsheet Row Most Recent Value   Blood Pressure 103/61 filed at 02/07/2023 1101   Patient Position - Orthostatic VS Sitting filed at 02/06/2023 2200            Intake/Output Summary (Last 24 hours) at 2/7/2023 1142  Last data filed at 2/7/2023 1101  Gross per 24 hour   Intake 2716 33 ml   Output 600 ml   Net 2116 33 ml       Invasive Devices     Peripheral Intravenous Line  Duration           Peripheral IV 02/07/23 Distal;Dorsal (posterior); Right Wrist <1 day                            Scheduled Meds:  Current Facility-Administered Medications   Medication Dose Route Frequency Provider Last Rate   • acetaminophen  650 mg Oral Q6H PRN Rodo Flores, DO     • apixaban  5 mg Oral BID Rodo Flores, DO     • docusate sodium  100 mg Oral BID Avila Pruitt PA-C     • docusate sodium  100 mg Oral BID PRN Avila Pruitt PA-C     • levothyroxine  100 mcg Oral Daily Rodo Flores DO     • ondansetron  4 mg Intravenous Q6H PRN Trupti Watkins PA-C     • pantoprazole  40 mg Oral Daily Trupti Watkins PA-C       Continuous Infusions:   PRN Meds: •  acetaminophen  •  docusate sodium  •  ondansetron    Review of Systems:  ROS as noted above, otherwise 12 point review of systems was performed and is negative  Physical Exam:   Physical Exam  Vitals reviewed  Constitutional:       General: She is not in acute distress  Appearance: She is not ill-appearing or diaphoretic  HENT:      Head: Normocephalic and atraumatic  Right Ear: External ear normal       Left Ear: External ear normal       Nose: Nose normal    Eyes:      General:         Right eye: No discharge  Left eye: No discharge  Cardiovascular:      Rate and Rhythm: Normal rate and regular rhythm  Heart sounds: No murmur heard  No friction rub  Pulmonary:      Effort: Pulmonary effort is normal       Breath sounds: Normal breath sounds  No wheezing, rhonchi or rales  Abdominal:      General: There is no distension  Palpations: Abdomen is soft  Tenderness: There is no abdominal tenderness  Musculoskeletal:         General: No deformity or signs of injury  Cervical back: No rigidity   No muscular tenderness  Right lower leg: No edema  Left lower leg: No edema  Skin:     General: Skin is warm and dry  Capillary Refill: Capillary refill takes less than 2 seconds  Coloration: Skin is not jaundiced or pale  Neurological:      General: No focal deficit present  Mental Status: She is alert and oriented to person, place, and time  Mental status is at baseline  Psychiatric:         Mood and Affect: Mood normal          Behavior: Behavior normal          Thought Content: Thought content normal                      Lab Results: I have personally reviewed pertinent lab results  Results from last 7 days   Lab Units 02/07/23 0444 02/04/23 0445 02/03/23  1913   WBC Thousand/uL 4 34 4 46 7 39   HEMOGLOBIN g/dL 9 1* 10 9* 12 8   HEMATOCRIT % 29 7* 34 0* 41 5   PLATELETS Thousands/uL 91* 137* 171     Results from last 7 days   Lab Units 02/07/23 0444 02/06/23  0442 02/05/23  0902   POTASSIUM mmol/L 3 9 4 0 4 1   CHLORIDE mmol/L 112* 104 106   CO2 mmol/L 25 29 29   BUN mg/dL 17 23 16   CREATININE mg/dL 0 81 1 15 0 91   CALCIUM mg/dL 8 7 9 6 9 2     Results from last 7 days   Lab Units 02/04/23  0445   INR  1 42*   PTT seconds 37     Results from last 7 days   Lab Units 02/04/23 0445 02/03/23  2322   MAGNESIUM mg/dL 2 3 1 9         Imaging: I have personally reviewed pertinent films in PACS  ECHO: 2/4/23  •  Left Ventricle: Left ventricular cavity size is normal  Wall thickness is mildly increased  The left ventricular ejection fraction is 55%  Systolic function is normal  Wall motion is normal   •  IVS: There is abnormal septal motion consistent with bundle branch block  There is sigmoid appearance of the septum  •  Right Ventricle: Right ventricular cavity size is normal  Systolic function is normal   •  Aortic Valve: There is mild regurgitation  •  Mitral Valve: There is mild regurgitation  •  Tricuspid Valve: There is mild regurgitation  •  Aorta:  The aortic root is moderately dilated  •  Pulmonary Artery: The pulmonary artery systolic pressure is moderately increased  STRESS TEST: 7/2022  •  Stress ECG: The ECG was not diagnostic due to pharmacological (vasodilator) stress  •  Perfusion: There are no perfusion defects  •  Stress Function: Left ventricular function post-stress is normal  Post-stress ejection fraction is 60 %      No ischemia or scar  Pt was in and out of Atrial flutter during the testing  Needs outpt monitor to evaluate burden            VTE Pharmacologic Prophylaxis: Eliquis  VTE Mechanical Prophylaxis: sequential compression device

## 2023-02-08 VITALS
HEIGHT: 63 IN | WEIGHT: 133.38 LBS | SYSTOLIC BLOOD PRESSURE: 134 MMHG | RESPIRATION RATE: 21 BRPM | TEMPERATURE: 98.8 F | BODY MASS INDEX: 23.63 KG/M2 | OXYGEN SATURATION: 96 % | HEART RATE: 51 BPM | DIASTOLIC BLOOD PRESSURE: 70 MMHG

## 2023-02-08 LAB
ANION GAP SERPL CALCULATED.3IONS-SCNC: 7 MMOL/L (ref 4–13)
BASOPHILS # BLD AUTO: 0.02 THOUSANDS/ÂΜL (ref 0–0.1)
BASOPHILS NFR BLD AUTO: 1 % (ref 0–1)
BUN SERPL-MCNC: 13 MG/DL (ref 5–25)
CALCIUM SERPL-MCNC: 9.4 MG/DL (ref 8.3–10.1)
CHLORIDE SERPL-SCNC: 111 MMOL/L (ref 96–108)
CO2 SERPL-SCNC: 26 MMOL/L (ref 21–32)
CREAT SERPL-MCNC: 0.7 MG/DL (ref 0.6–1.3)
EOSINOPHIL # BLD AUTO: 0.03 THOUSAND/ÂΜL (ref 0–0.61)
EOSINOPHIL NFR BLD AUTO: 1 % (ref 0–6)
ERYTHROCYTE [DISTWIDTH] IN BLOOD BY AUTOMATED COUNT: 13.8 % (ref 11.6–15.1)
GFR SERPL CREATININE-BSD FRML MDRD: 86 ML/MIN/1.73SQ M
GLUCOSE SERPL-MCNC: 109 MG/DL (ref 65–140)
HCT VFR BLD AUTO: 29.6 % (ref 34.8–46.1)
HGB BLD-MCNC: 9.4 G/DL (ref 11.5–15.4)
IMM GRANULOCYTES # BLD AUTO: 0.01 THOUSAND/UL (ref 0–0.2)
IMM GRANULOCYTES NFR BLD AUTO: 0 % (ref 0–2)
LYMPHOCYTES # BLD AUTO: 1.23 THOUSANDS/ÂΜL (ref 0.6–4.47)
LYMPHOCYTES NFR BLD AUTO: 32 % (ref 14–44)
MCH RBC QN AUTO: 29.3 PG (ref 26.8–34.3)
MCHC RBC AUTO-ENTMCNC: 31.8 G/DL (ref 31.4–37.4)
MCV RBC AUTO: 92 FL (ref 82–98)
MONOCYTES # BLD AUTO: 0.3 THOUSAND/ÂΜL (ref 0.17–1.22)
MONOCYTES NFR BLD AUTO: 8 % (ref 4–12)
NEUTROPHILS # BLD AUTO: 2.32 THOUSANDS/ÂΜL (ref 1.85–7.62)
NEUTS SEG NFR BLD AUTO: 58 % (ref 43–75)
NRBC BLD AUTO-RTO: 0 /100 WBCS
PLATELET # BLD AUTO: 85 THOUSANDS/UL (ref 149–390)
PMV BLD AUTO: 12 FL (ref 8.9–12.7)
POTASSIUM SERPL-SCNC: 3.9 MMOL/L (ref 3.5–5.3)
RBC # BLD AUTO: 3.21 MILLION/UL (ref 3.81–5.12)
SODIUM SERPL-SCNC: 144 MMOL/L (ref 135–147)
WBC # BLD AUTO: 3.91 THOUSAND/UL (ref 4.31–10.16)

## 2023-02-08 RX ADMIN — PANTOPRAZOLE SODIUM 40 MG: 40 TABLET, DELAYED RELEASE ORAL at 05:18

## 2023-02-08 RX ADMIN — LEVOTHYROXINE SODIUM 100 MCG: 100 TABLET ORAL at 08:39

## 2023-02-08 RX ADMIN — DOCUSATE SODIUM 100 MG: 100 CAPSULE, LIQUID FILLED ORAL at 08:39

## 2023-02-08 RX ADMIN — APIXABAN 5 MG: 5 TABLET, FILM COATED ORAL at 08:39

## 2023-02-08 NOTE — RESTORATIVE TECHNICIAN NOTE
Restorative Technician Note      Patient Name: Lunda Friday     Note Type: Mobility  Patient Position Upon Consult: Bedside chair  Activity Performed: Ambulated  Patient Position at End of Consult: Bedside chair;  All needs within reach

## 2023-02-08 NOTE — ASSESSMENT & PLAN NOTE
· Atrial tachycardia referred by cardiology for inpatient monitoring and management  · History of prior ablation noted  · Eliquis for anticoagulation  · Underwent ablation on 2/6, afterwards patient with some hypotension, home Entresto, metoprolol, and Lasix on hold for now  Patient will follow-up outpatient with EP in 1 week and they will assess her blood pressure and consider restarting medications then

## 2023-02-08 NOTE — DISCHARGE SUMMARY
[address] [facility]  Discharge- Antoinette Hover 1950, 67 y o  female MRN: [id number]  Unit/Bed#: [facility] 524-32 Encounter: [id number]  Primary Care Provider: Le Singh MD   Date and time admitted to hospital: 2/3/2023  6:55 PM    ILD (interstitial lung disease) ([address] )  Assessment & Plan  · History of interstitial lung disease  · Outpatient pulmonary follow-up    Chronic combined systolic (congestive) and diastolic (congestive) heart failure (HCC)  Assessment & Plan  Wt Readings from Last 3 Encounters:   02/08/23 60 5 kg (133 lb 6 1 oz)   02/03/23 60 4 kg (133 lb 3 2 oz)   12/30/22 59 kg (130 lb)           · On discharge, continue to hold home metoprolol, Entresto, Lasix as discussed above  Patient will see EP in the office in 1 week and they will evaluate whether or not she should restart these medications  Euvolemic on day of discharge  Hypothyroidism  Assessment & Plan  Continue levothyroxine    * Atrial tachycardia (HCC)  Assessment & Plan  · Atrial tachycardia referred by cardiology for inpatient monitoring and management  · History of prior ablation noted  · Eliquis for anticoagulation  · Underwent ablation on 2/6, afterwards patient with some hypotension, home Entresto, metoprolol, and Lasix on hold for now  Patient will follow-up outpatient with EP in 1 week and they will assess her blood pressure and consider restarting medications then        Medical Problems     Resolved Problems  Date Reviewed: 2/6/2023   None       Discharging Physician / Practitioner: Marcella Otoole MD  PCP: Le Singh MD  Admission Date:   Admission Orders (From admission, onward)     Ordered        02/03/23 2035  Inpatient Admission  Once                      Discharge Date: 02/08/23    Consultations During Hospital Stay:  · EP    Procedures Performed:   · AVNRT ablation on 2/6    Significant Findings / Test Results:     · TTE 2/4/2023   •  Left Ventricle: Left ventricular cavity size is normal  Wall thickness is mildly increased  The left ventricular ejection fraction is 55%  Systolic function is normal  Wall motion is normal   •  IVS: There is abnormal septal motion consistent with bundle branch block  There is sigmoid appearance of the septum  •  Right Ventricle: Right ventricular cavity size is normal  Systolic function is normal   •  Aortic Valve: There is mild regurgitation  •  Mitral Valve: There is mild regurgitation  •  Tricuspid Valve: There is mild regurgitation  •  Aorta: The aortic root is moderately dilated  •  Pulmonary Artery: The pulmonary artery systolic pressure is moderately increased  Incidental Findings:   · None      Test Results Pending at Discharge (will require follow up): · None     Outpatient Tests Requested:  · Recommend follow-up CBC with primary care doctor several weeks after discharge to ensure anemia and leukopenia resolving    Complications: None    Reason for Admission: Ablation    Hospital Course:   Malou Andrew is a 67 y o  female patient who originally presented to the hospital on 2/3/2023 due to AVNRT ablation  She had some hypotension afterwards which was thought to be due to sedation  Her home medications that could lower her blood pressure were held and will be continued to held on discharge per EP  She will follow-up outpatient with the EP team in 1 week  Please see above list of diagnoses and related plan for additional information  Condition at Discharge: good    Discharge Day Visit / Exam:   Subjective: No acute concerns  Ambulating without issue  Would like to go home    Vitals: Blood Pressure: 132/70 (02/08/23 0711)  Pulse: 58 (02/08/23 0711)  Temperature: 98 2 °F (36 8 °C) (02/08/23 0711)  Temp Source: Oral (02/08/23 0711)  Respirations: 16 (02/08/23 0711)  Height: 5' 3" (160 cm) (02/04/23 1104)  Weight - Scale: 60 5 kg (133 lb 6 1 oz) (02/08/23 0519)  SpO2: 96 % (02/08/23 0711)  Exam:   Physical Exam  Vitals reviewed  Constitutional:       General: She is not in acute distress  Appearance: She is not ill-appearing or toxic-appearing  HENT:      Mouth/Throat:      Mouth: Mucous membranes are moist    Eyes:      General: No scleral icterus  Cardiovascular:      Rate and Rhythm: Normal rate and regular rhythm  Pulmonary:      Effort: Pulmonary effort is normal  No respiratory distress  Breath sounds: Normal breath sounds  Musculoskeletal:      Right lower leg: No edema  Left lower leg: No edema  Skin:     General: Skin is warm and dry  Neurological:      Mental Status: She is alert and oriented to person, place, and time  Psychiatric:         Mood and Affect: Mood normal          Behavior: Behavior normal             Discussion with Family: Updated yesterday  Discharge instructions/Information to patient and family:   See after visit summary for information provided to patient and family  Provisions for Follow-Up Care:  See after visit summary for information related to follow-up care and any pertinent home health orders  Disposition:   Home    Planned Readmission: None     Discharge Statement:  I spent 35 minutes discharging the patient  This time was spent on the day of discharge  I had direct contact with the patient on the day of discharge  Greater than 50% of the total time was spent examining patient, answering all patient questions, arranging and discussing plan of care with patient as well as directly providing post-discharge instructions  Additional time then spent on discharge activities  Discharge Medications:  See after visit summary for reconciled discharge medications provided to patient and/or family        **Please Note: This note may have been constructed using a voice recognition system**

## 2023-02-08 NOTE — DISCHARGE INSTR - AVS FIRST PAGE
MEDICATIONS:  Please hold Entresto, Metoprolol, and Lasix until cleared to resume after your blood pressure check in one week  RESTRICTIONS:  No lifting more than 10 lbs and no strenuous activity for one week  No soaking in a bath tub, hot tub, swimming pool, or any water for at least one week or until groins heal  You may shower  Please let soap and water run over the groins  No scrubbing  Pat the area dry  You may place band-aids on groins daily for up to five days, but you may remove sooner if no issues are noted  If you notice ongoing bleeding, swelling, or large firm lumps in groin near ablation incision, please contact the office of  Dr Abhi Melton at [phone]

## 2023-02-08 NOTE — PROGRESS NOTES
[address] [facility]  Progress Note - Nunu Ovalle 1950, 67 y o  female MRN: [id number]  Unit/Bed#: [facility] 619-01 Encounter: [id number]  Primary Care Provider: Cody Kruase MD   Date and time admitted to hospital: 2/3/2023  6:55 PM    ILD (interstitial lung disease) ([address] )  Assessment & Plan  · History of interstitial lung disease  · Outpatient pulmonary follow-up    Chronic combined systolic (congestive) and diastolic (congestive) heart failure (HCC)  Assessment & Plan  Wt Readings from Last 3 Encounters:   02/07/23 60 9 kg (134 lb 4 2 oz)   02/03/23 60 4 kg (133 lb 3 2 oz)   12/30/22 59 kg (130 lb)       · IV Lasix per cardiology  · Entresto  · Cardiology following      Hypothyroidism  Assessment & Plan  Continue levothyroxine    * Atrial tachycardia (HCC)  Assessment & Plan  · Atrial tachycardia referred by cardiology for inpatient monitoring and management  · History of prior ablation noted  · Beta-blocker presently on hold per cardiology   · Eliquis for anticoagulation  · Cardiology plans for intervention noted  · Cardiology following          VTE Pharmacologic Prophylaxis: VTE Score: 3 Moderate Risk (Score 3-4) - Pharmacological DVT Prophylaxis Ordered: apixaban (Eliquis)  Patient Centered Rounds: d/w RN  Discussions with Specialists or Other Care Team Provider: EP    Education and Discussions with Family / Patient: Updated  (daughter) via phone  Time Spent for Care: 20 minutes  More than 50% of total time spent on counseling and coordination of care as described above  Current Length of Stay: 4 day(s)  Current Patient Status: Inpatient   Certification Statement: The patient will continue to require additional inpatient hospital stay due to As above  Discharge Plan: Anticipate discharge in 24-48 hrs to home  Code Status: Level 1 - Full Code    Subjective:   Feels overall dismayed at lack of progress    Does feel tired, notes she was woken up quite a bit last night for management of hypotension  Has been ambulating  Objective:     Vitals:   Temp (24hrs), Av °F (36 7 °C), Min:97 6 °F (36 4 °C), Max:98 5 °F (36 9 °C)    Temp:  [97 6 °F (36 4 °C)-98 5 °F (36 9 °C)] 98 5 °F (36 9 °C)  HR:  [] 61  Resp:  [16-24] 16  BP: ()/(49-70) 124/66  SpO2:  [94 %-100 %] 96 %  Body mass index is 23 78 kg/m²  Input and Output Summary (last 24 hours): Intake/Output Summary (Last 24 hours) at 2023 192  Last data filed at 2023 1301  Gross per 24 hour   Intake 916 33 ml   Output 600 ml   Net 316 33 ml       Physical Exam:   Physical Exam  Vitals reviewed  Constitutional:       General: She is not in acute distress  Appearance: She is not ill-appearing or toxic-appearing  HENT:      Mouth/Throat:      Mouth: Mucous membranes are moist    Eyes:      General: No scleral icterus  Cardiovascular:      Rate and Rhythm: Normal rate and regular rhythm  Pulmonary:      Effort: Pulmonary effort is normal  No respiratory distress  Breath sounds: Normal breath sounds  Abdominal:      General: Bowel sounds are normal       Palpations: Abdomen is soft  Tenderness: There is no abdominal tenderness  Musculoskeletal:      Right lower leg: No edema  Left lower leg: No edema  Skin:     General: Skin is warm and dry  Neurological:      Mental Status: She is alert and oriented to person, place, and time     Psychiatric:         Mood and Affect: Mood normal          Behavior: Behavior normal             Additional Data:     Labs:  Results from last 7 days   Lab Units 23  1132 23  0444   WBC Thousand/uL  --  4 34   HEMOGLOBIN g/dL 9 6* 9 1*   HEMATOCRIT % 30 2* 29 7*   PLATELETS Thousands/uL  --  91*   NEUTROS PCT %  --  61   LYMPHS PCT %  --  29   MONOS PCT %  --  9   EOS PCT %  --  1     Results from last 7 days   Lab Units 23  0444   SODIUM mmol/L 145   POTASSIUM mmol/L 3 9   CHLORIDE mmol/L 112*   CO2 mmol/L 25 BUN mg/dL 17   CREATININE mg/dL 0 81   ANION GAP mmol/L 8   CALCIUM mg/dL 8 7   ALBUMIN g/dL 3 8   TOTAL BILIRUBIN mg/dL 0 83   ALK PHOS U/L 55   ALT U/L 10*   AST U/L 12   GLUCOSE RANDOM mg/dL 89     Results from last 7 days   Lab Units 02/04/23  0445   INR  1 42*             Results from last 7 days   Lab Units 02/07/23  0444   LACTIC ACID mmol/L 1 2       Lines/Drains:  Invasive Devices     Peripheral Intravenous Line  Duration           Peripheral IV 02/07/23 Distal;Dorsal (posterior); Right Wrist <1 day                  Telemetry:  Telemetry Orders (From admission, onward)             24 Hour Telemetry Monitoring  Continuous x 24 Hours (Telem)        References:    Telemetry Guidelines   Question:  Reason for 24 Hour Telemetry  Answer:  Pre or Post EP Study/PCI                 Telemetry Reviewed: per EP  Indication for Continued Telemetry Use: Post PCI/EP Study             Imaging: No pertinent imaging reviewed  Recent Cultures (last 7 days):         Last 24 Hours Medication List:   Current Facility-Administered Medications   Medication Dose Route Frequency Provider Last Rate   • acetaminophen  650 mg Oral Q6H PRN Harlene Guise, DO     • apixaban  5 mg Oral BID Harlene Guise, DO     • docusate sodium  100 mg Oral BID Carmelina Carter PA-C     • docusate sodium  100 mg Oral BID PRN Carmelina Carter PA-C     • levothyroxine  100 mcg Oral Daily Ceciliolene Gukirby, DO     • ondansetron  4 mg Intravenous Q6H PRN Carmelina Carter PA-C     • pantoprazole  40 mg Oral Daily Carmelina Carter PA-C          Today, Patient Was Seen By: Jackie Montilla MD    **Please Note: This note may have been constructed using a voice recognition system  **

## 2023-02-08 NOTE — ASSESSMENT & PLAN NOTE
Wt Readings from Last 3 Encounters:   02/07/23 60 9 kg (134 lb 4 2 oz)   02/03/23 60 4 kg (133 lb 3 2 oz)   12/30/22 59 kg (130 lb)       · IV Lasix per cardiology  · [facility]  · Cardiology following

## 2023-02-08 NOTE — PROGRESS NOTES
Progress Note - Electrophysiology-Cardiology (EP)   Sisstephanie Eye 67 y o  female MRN: [id number]  Unit/Bed#: [facility] 519-01 Encounter: [id number]      Assessment/Plan:  1  Atrial tachycardia  a  S/p RFA of left atrial inferoseptal region 12/30/2022  b  S/p RFA of inferoseptal region, AVNRT, anterior left atrial wall 2/6/2023  c  Prior to admission, on Toprol XL 25 mg QD - currently held for hypotension  2  History of tachy-induced cardiomyopathy  a  2/2023 TTE - EF 55%  b  1/2022 TTE - EF 25%  c  Prior to admission, on Entresto and Lasix - currently held for hypotension  3  Presence of loop recorder, placed 12/30/22  4  Hypothyroidism        Blood pressures have improved  Heart rate/rhythm remains stable  Will plan for EKG and blood pressure check in one week  Loop bradycardia threshold set to 50 bpm  Will obtain loop interrogation at one week check  Continue to hold Entresto, metoprolol, and lasix until cleared to resume at one week check  Patient is stable from an EP standpoint  Patient has no further inpatient EP needs  Outpatient follow up has been arranged  Subjective/Objective     Subjective: Upon evaluation, patient is resting comfortably in her chair  She states the groin discomfort has subsided  Patient denies chest pain/heaviness/tightness/pressure, palpitations, shortness of breath, orthopnea, lightheadedness, presyncope, syncope or N/V             Objective:     Vitals: /70 (BP Location: Right arm)   Pulse 58   Temp 98 2 °F (36 8 °C) (Oral)   Resp 16   Ht 5' 3" (1 6 m)   Wt 60 5 kg (133 lb 6 1 oz)   SpO2 96%   BMI 23 63 kg/m²   Vitals:    02/07/23 0500 02/08/23 0519   Weight: 60 9 kg (134 lb 4 2 oz) 60 5 kg (133 lb 6 1 oz)     Orthostatic Blood Pressures    Flowsheet Row Most Recent Value   Blood Pressure 132/70 filed at 02/08/2023 3452   Patient Position - Orthostatic VS Lying filed at 02/08/2023 0994            Intake/Output Summary (Last 24 hours) at 2/8/2023 1120  Last data filed at 2/8/2023 0611  Gross per 24 hour   Intake 0 ml   Output 150 ml   Net -150 ml       Invasive Devices     Peripheral Intravenous Line  Duration           Peripheral IV 02/07/23 Distal;Dorsal (posterior); Right Wrist 1 day                            Scheduled Meds:  Current Facility-Administered Medications   Medication Dose Route Frequency Provider Last Rate   • acetaminophen  650 mg Oral Q6H PRN Hermelinda Zambrano DO     • apixaban  5 mg Oral BID Hermelinda Zambrano DO     • docusate sodium  100 mg Oral BID Alex Cordova PA-C     • docusate sodium  100 mg Oral BID PRN Alex Cordova PA-C     • levothyroxine  100 mcg Oral Daily Hermelinda Zambrano DO     • ondansetron  4 mg Intravenous Q6H PRN Trupti Watkins PA-C     • pantoprazole  40 mg Oral Daily Trupti Watkins PA-C       Continuous Infusions:   PRN Meds: •  acetaminophen  •  docusate sodium  •  ondansetron    Review of Systems:  ROS as noted above, otherwise 12 point review of systems was performed and is negative  Physical Exam:   Physical Exam  Vitals reviewed  Constitutional:       General: She is not in acute distress  Appearance: She is not ill-appearing or diaphoretic  HENT:      Head: Normocephalic and atraumatic  Right Ear: External ear normal       Left Ear: External ear normal       Nose: Nose normal    Eyes:      General:         Right eye: No discharge  Left eye: No discharge  Cardiovascular:      Rate and Rhythm: Normal rate and regular rhythm  Heart sounds: No murmur heard  No friction rub  Pulmonary:      Effort: Pulmonary effort is normal       Breath sounds: Normal breath sounds  No wheezing, rhonchi or rales  Abdominal:      General: There is no distension  Palpations: Abdomen is soft  Tenderness: There is no abdominal tenderness  Musculoskeletal:         General: No deformity or signs of injury  Cervical back: No rigidity  No muscular tenderness        Right lower leg: No edema  Left lower leg: No edema  Skin:     General: Skin is warm and dry  Capillary Refill: Capillary refill takes less than 2 seconds  Coloration: Skin is not jaundiced or pale  Neurological:      General: No focal deficit present  Mental Status: She is alert and oriented to person, place, and time  Mental status is at baseline  Psychiatric:         Mood and Affect: Mood normal          Behavior: Behavior normal          Thought Content: Thought content normal                      Lab Results: I have personally reviewed pertinent lab results  Results from last 7 days   Lab Units 02/08/23  0508 02/07/23  1132 02/07/23  0444 02/04/23  0445   WBC Thousand/uL 3 91*  --  4 34 4 46   HEMOGLOBIN g/dL 9 4* 9 6* 9 1* 10 9*   HEMATOCRIT % 29 6* 30 2* 29 7* 34 0*   PLATELETS Thousands/uL 85*  --  91* 137*     Results from last 7 days   Lab Units 02/08/23  0508 02/07/23  0444 02/06/23  0442   POTASSIUM mmol/L 3 9 3 9 4 0   CHLORIDE mmol/L 111* 112* 104   CO2 mmol/L 26 25 29   BUN mg/dL 13 17 23   CREATININE mg/dL 0 70 0 81 1 15   CALCIUM mg/dL 9 4 8 7 9 6     Results from last 7 days   Lab Units 02/04/23  0445   INR  1 42*   PTT seconds 37     Results from last 7 days   Lab Units 02/04/23  0445 02/03/23  2322   MAGNESIUM mg/dL 2 3 1 9         Imaging: I have personally reviewed pertinent films in PACS  ECHO: 2/4/23  •  Left Ventricle: Left ventricular cavity size is normal  Wall thickness is mildly increased  The left ventricular ejection fraction is 55%  Systolic function is normal  Wall motion is normal   •  IVS: There is abnormal septal motion consistent with bundle branch block  There is sigmoid appearance of the septum  •  Right Ventricle: Right ventricular cavity size is normal  Systolic function is normal   •  Aortic Valve: There is mild regurgitation  •  Mitral Valve: There is mild regurgitation  •  Tricuspid Valve: There is mild regurgitation  •  Aorta:  The aortic root is moderately dilated  •  Pulmonary Artery: The pulmonary artery systolic pressure is moderately increased  STRESS TEST: 7/2022  •  Stress ECG: The ECG was not diagnostic due to pharmacological (vasodilator) stress  •  Perfusion: There are no perfusion defects  •  Stress Function: Left ventricular function post-stress is normal  Post-stress ejection fraction is 60 %      No ischemia or scar  Pt was in and out of Atrial flutter during the testing  Needs outpt monitor to evaluate burden            VTE Pharmacologic Prophylaxis: Eliquis  VTE Mechanical Prophylaxis: sequential compression device

## 2023-02-08 NOTE — ASSESSMENT & PLAN NOTE
· Atrial tachycardia referred by cardiology for inpatient monitoring and management  · History of prior ablation noted  · Beta-blocker presently on hold per cardiology   · Eliquis for anticoagulation  · Cardiology plans for intervention noted  · Cardiology following

## 2023-02-08 NOTE — ASSESSMENT & PLAN NOTE
Wt Readings from Last 3 Encounters:   02/08/23 60 5 kg (133 lb 6 1 oz)   02/03/23 60 4 kg (133 lb 3 2 oz)   12/30/22 59 kg (130 lb)           · On discharge, continue to hold home metoprolol, Entresto, Lasix as discussed above  Patient will see EP in the office in 1 week and they will evaluate whether or not she should restart these medications  Euvolemic on day of discharge

## 2023-02-09 ENCOUNTER — TELEPHONE (OUTPATIENT)
Dept: FAMILY MEDICINE CLINIC | Facility: CLINIC | Age: 73
End: 2023-02-09

## 2023-02-09 ENCOUNTER — TRANSITIONAL CARE MANAGEMENT (OUTPATIENT)
Dept: FAMILY MEDICINE CLINIC | Facility: CLINIC | Age: 73
End: 2023-02-09

## 2023-02-09 NOTE — UTILIZATION REVIEW
NOTIFICATION OF ADMISSION DISCHARGE   This is a Notification of Discharge from [address]  Please be advised that this patient has been discharge from our facility  Below you will find the admission and discharge date and time including the patient’s disposition  UTILIZATION REVIEW CONTACT:  [facility] [place]  Utilization   Network Utilization Review Department  Phone: [phone] x carefully listen to the prompts  All voicemails are confidential   Email: [email or web address]     ADMISSION INFORMATION  PRESENTATION DATE: 2/3/2023  6:55 PM  OBERVATION ADMISSION DATE:  INPATIENT ADMISSION DATE: 2/3/23  8:35 PM   DISCHARGE DATE: 2/8/2023  2:33 PM   DISPOSITION:Home/Self Care    IMPORTANT INFORMATION:  Send all requests for admission clinical reviews, approved or denied determinations and any other requests to dedicated fax number below belonging to the campus where the patient is receiving treatment   List of dedicated fax numbers:  [address] DENIALS (Administrative/Medical Necessity) [phone]   [address] (Maternity/NICU/Pediatrics) [phone]   [facility] [phone]   [facility] 87 [phone]   [address] [phone]   [address] [facility] [phone] 90 [facility] [phone]   [address] [place] [address] 119 [phone]   [facility]  [phone] 4058 [facility] [phone] 412 [facility] [address] [facility] [phone]

## 2023-02-09 NOTE — TELEPHONE ENCOUNTER
----- Message from Le Rodriguez MD sent at 2/8/2023 12:15 PM EST -----  Please offer a follow-up appointment to the patient     ----- Message -----  From: Abhishek Banegas MD  Sent: 2/8/2023  11:33 AM EST  To: Le Rodriguez MD    Thank you for allowing us to participate in the care of your patient, Terry Gunter, who was hospitalized from 2/3/2023 through 2/8/2023 with the admitting diagnosis of AVNRT ablation  She had some postprocedural hypotension, and was monitored for a day by electrophysiology  Her home medications that could cause hypotension are being held, and they will follow-up with her in the office in a week  She was also noted to have mild anemia and leukopenia on labs, recommend checking these at a follow-up appointment with your office 2 to 4 weeks after discharge to ensure they are improving and further work-up if not  If you have any additional questions or would like to discuss further, please feel free to contact me      Abhishek Banegas MD  [place] [address] Internal Medicine, Hospitalist  [phone]

## 2023-02-13 ENCOUNTER — RA CDI HCC (OUTPATIENT)
Dept: OTHER | Facility: HOSPITAL | Age: 73
End: 2023-02-13

## 2023-02-13 NOTE — PROGRESS NOTES
[facility]  coding opportunities       Chart reviewed, no opportunity found:   [address]        Patients Insurance     Medicare Insurance: [employer]

## 2023-02-14 ENCOUNTER — HOSPITAL ENCOUNTER (INPATIENT)
Facility: HOSPITAL | Age: 73
LOS: 3 days | Discharge: HOME | End: 2023-02-17
Attending: INTERNAL MEDICINE | Admitting: INTERNAL MEDICINE

## 2023-02-14 ENCOUNTER — APPOINTMENT (EMERGENCY)
Dept: RADIOLOGY | Facility: HOSPITAL | Age: 73
End: 2023-02-14

## 2023-02-14 ENCOUNTER — TELEPHONE (OUTPATIENT)
Dept: CARDIOLOGY CLINIC | Facility: CLINIC | Age: 73
End: 2023-02-14

## 2023-02-14 DIAGNOSIS — R00.1 SYMPTOMATIC BRADYCARDIA: ICD-10-CM

## 2023-02-14 DIAGNOSIS — R53.1 WEAKNESS: ICD-10-CM

## 2023-02-14 DIAGNOSIS — I50.43 ACUTE ON CHRONIC COMBINED SYSTOLIC (CONGESTIVE) AND DIASTOLIC (CONGESTIVE) HEART FAILURE (HCC): ICD-10-CM

## 2023-02-14 DIAGNOSIS — R06.02 SOB (SHORTNESS OF BREATH): Primary | ICD-10-CM

## 2023-02-14 DIAGNOSIS — I48.91 A-FIB (HCC): ICD-10-CM

## 2023-02-14 DIAGNOSIS — I50.9 HEART FAILURE (HCC): ICD-10-CM

## 2023-02-14 DIAGNOSIS — R25.1 SHAKINESS: ICD-10-CM

## 2023-02-14 DIAGNOSIS — I47.1 ATRIAL TACHYCARDIA (HCC): ICD-10-CM

## 2023-02-14 DIAGNOSIS — I49.5 TACHY-BRADY SYNDROME (HCC): ICD-10-CM

## 2023-02-14 LAB
2HR DELTA HS TROPONIN: 14 NG/L
4HR DELTA HS TROPONIN: 30 NG/L
ALBUMIN SERPL BCP-MCNC: 3.7 G/DL (ref 3.5–5)
ALP SERPL-CCNC: 105 U/L (ref 46–116)
ALT SERPL W P-5'-P-CCNC: 35 U/L (ref 12–78)
ANION GAP SERPL CALCULATED.3IONS-SCNC: 4 MMOL/L (ref 4–13)
AST SERPL W P-5'-P-CCNC: 42 U/L (ref 5–45)
ATRIAL RATE: 182 BPM
BASOPHILS # BLD AUTO: 0.03 THOUSANDS/ÂΜL (ref 0–0.1)
BASOPHILS NFR BLD AUTO: 1 % (ref 0–1)
BILIRUB SERPL-MCNC: 0.57 MG/DL (ref 0.2–1)
BUN SERPL-MCNC: 9 MG/DL (ref 5–25)
CALCIUM SERPL-MCNC: 9.4 MG/DL (ref 8.3–10.1)
CARDIAC TROPONIN I PNL SERPL HS: 55 NG/L
CARDIAC TROPONIN I PNL SERPL HS: 69 NG/L
CARDIAC TROPONIN I PNL SERPL HS: 85 NG/L
CHLORIDE SERPL-SCNC: 109 MMOL/L (ref 96–108)
CO2 SERPL-SCNC: 25 MMOL/L (ref 21–32)
CREAT SERPL-MCNC: 0.93 MG/DL (ref 0.6–1.3)
EOSINOPHIL # BLD AUTO: 0.04 THOUSAND/ÂΜL (ref 0–0.61)
EOSINOPHIL NFR BLD AUTO: 1 % (ref 0–6)
ERYTHROCYTE [DISTWIDTH] IN BLOOD BY AUTOMATED COUNT: 14.6 % (ref 11.6–15.1)
GFR SERPL CREATININE-BSD FRML MDRD: 61 ML/MIN/1.73SQ M
GLUCOSE SERPL-MCNC: 126 MG/DL (ref 65–140)
HCT VFR BLD AUTO: 33.6 % (ref 34.8–46.1)
HGB BLD-MCNC: 10.6 G/DL (ref 11.5–15.4)
IMM GRANULOCYTES # BLD AUTO: 0.02 THOUSAND/UL (ref 0–0.2)
IMM GRANULOCYTES NFR BLD AUTO: 0 % (ref 0–2)
LYMPHOCYTES # BLD AUTO: 1.48 THOUSANDS/ÂΜL (ref 0.6–4.47)
LYMPHOCYTES NFR BLD AUTO: 23 % (ref 14–44)
MCH RBC QN AUTO: 29 PG (ref 26.8–34.3)
MCHC RBC AUTO-ENTMCNC: 31.5 G/DL (ref 31.4–37.4)
MCV RBC AUTO: 92 FL (ref 82–98)
MONOCYTES # BLD AUTO: 0.49 THOUSAND/ÂΜL (ref 0.17–1.22)
MONOCYTES NFR BLD AUTO: 8 % (ref 4–12)
NEUTROPHILS # BLD AUTO: 4.36 THOUSANDS/ÂΜL (ref 1.85–7.62)
NEUTS SEG NFR BLD AUTO: 67 % (ref 43–75)
NRBC BLD AUTO-RTO: 0 /100 WBCS
NT-PROBNP SERPL-MCNC: ABNORMAL PG/ML
PLATELET # BLD AUTO: 144 THOUSANDS/UL (ref 149–390)
PMV BLD AUTO: 11.8 FL (ref 8.9–12.7)
POTASSIUM SERPL-SCNC: 3.7 MMOL/L (ref 3.5–5.3)
PROT SERPL-MCNC: 6.8 G/DL (ref 6.4–8.4)
QRS AXIS: -88 DEGREES
QRSD INTERVAL: 112 MS
QT INTERVAL: 558 MS
QTC INTERVAL: 584 MS
RBC # BLD AUTO: 3.65 MILLION/UL (ref 3.81–5.12)
SODIUM SERPL-SCNC: 138 MMOL/L (ref 135–147)
T WAVE AXIS: 209 DEGREES
VENTRICULAR RATE: 66 BPM
WBC # BLD AUTO: 6.42 THOUSAND/UL (ref 4.31–10.16)

## 2023-02-14 RX ORDER — LEVOTHYROXINE SODIUM 0.1 MG/1
100 TABLET ORAL
Status: DISCONTINUED | OUTPATIENT
Start: 2023-02-15 | End: 2023-02-17 | Stop reason: HOSPADM

## 2023-02-14 RX ORDER — FUROSEMIDE 20 MG/1
20 TABLET ORAL
Status: DISCONTINUED | OUTPATIENT
Start: 2023-02-15 | End: 2023-02-17 | Stop reason: HOSPADM

## 2023-02-14 RX ORDER — ACETAMINOPHEN 325 MG/1
650 TABLET ORAL EVERY 6 HOURS PRN
Status: DISCONTINUED | OUTPATIENT
Start: 2023-02-14 | End: 2023-02-17 | Stop reason: HOSPADM

## 2023-02-14 RX ORDER — FUROSEMIDE 10 MG/ML
20 INJECTION INTRAMUSCULAR; INTRAVENOUS ONCE
Status: COMPLETED | OUTPATIENT
Start: 2023-02-14 | End: 2023-02-14

## 2023-02-14 RX ORDER — POTASSIUM CHLORIDE 20 MEQ/1
40 TABLET, EXTENDED RELEASE ORAL ONCE
Status: COMPLETED | OUTPATIENT
Start: 2023-02-14 | End: 2023-02-14

## 2023-02-14 RX ADMIN — APIXABAN 5 MG: 5 TABLET, FILM COATED ORAL at 19:44

## 2023-02-14 RX ADMIN — SACUBITRIL AND VALSARTAN 1 TABLET: 24; 26 TABLET, FILM COATED ORAL at 19:44

## 2023-02-14 RX ADMIN — POTASSIUM CHLORIDE 40 MEQ: 1500 TABLET, EXTENDED RELEASE ORAL at 16:46

## 2023-02-14 RX ADMIN — FUROSEMIDE 20 MG: 10 INJECTION, SOLUTION INTRAMUSCULAR; INTRAVENOUS at 16:46

## 2023-02-14 NOTE — ASSESSMENT & PLAN NOTE
· History of atrial tachycardia, status post prior ablation and ablation 2/6/2023  After the procedure noted to have bradycardia, intermittent 2 through 1 AV block and severe first-degree block  Pacemaker was offered but patient wanted to monitor with loop recorder  Due to bradycardia and hypotension metoprolol, Lasix and Entresto were held for 1 week she was scheduled for loop recorder interrogation, BP check and EKG on 2/15  Patient called EP office complaining of lower extremity edema and shortness of breath  Loop interrogation showed bradycardic events in the 40s, 221 AV block    · Per cardiology okay to restart Entresto  · Continue to hold metoprolol  · Keep n p o  after midnight for possible pacemaker tomorrow or Thursday  · Hold morning dose of Eliquis  · Telemetry

## 2023-02-14 NOTE — CONSULTS
Consultation - Electrophysiology - Cardiology  Alicia Patel 67 y o  female MRN: [id number]  Unit/Bed#: ED 15 Encounter: [id number]      Consults    History of Present Illness   Physician Requesting Consult: Brisa Arias MD  Reason for Consult / Principal Problem: Intermittent 2:1 block    Assessment/Plan   1  Intermittent 2:1 block  2  1st degree AVB  3  Atrial tachycardia    a  S/p RFA of left atrial inferoseptal region 12/30/2022  b  S/p RFA of inferoseptal region, AVNRT, anterior left atrial wall 2/6/2023  4  History of tachy-induced cardiomyopathy  a  2/2023 TTE - EF 55%  b  1/2022 TTE - EF 25%  2  Presence of loop recorder, placed 12/30/22  3  Hypothyroidism      Patient has indication for permanent pacemaker  She is agreeble to undergo this procedure  Due to scheduling constraints, she will likely not have pacemaker placed tomorrow  She is more likely to get pacemaker implant on Thursday  We will make her NPO at midnight and will hold her morning dose of Eliquis tomorrow  We will reevaluate tomorrow morning to determine timing of the procedure  Patient has LE edema and volume overload  We have ordered IV lasix to be given now and she will resume her home dose of Lasix 20 mg BID tomorrow morning  She was previously on Entresto and metoprolol  OK to resume Entresto but continue to hold AV may blockers until pacer has been placed  HPI: Alicia Patel is a 67y o  year old female with PMH as stated above, who presents to [facility] ED today with complaints of SOB and fatigue  She underwent atach ablation on 2/6/23 and had significant hypotension following the procedure  She was also noted to have bradycardia, intermittent 2:1 AVB, and severe 1st degree block  Pacemaker was offered but she opted to wait and continue monitoring with loop recorder  Due to her hypotension and bradycardia, she was instructed to hold metoprolol, lasix, and entresto for one week   She was scheduled for a loop interrogation, BP check, and EKG on 2/15  Patient called the office yesterday stating she noticed LE edema and SOB  Loop interrogation showed bradycardic events with HR in the 40s during waking hours, consistent with 2:1 AVB  Exact rhythm is difficult to discern due to her significantly prolonged IA interval     Upon evaluation, patient is resting in bed with daughter bedside  She states she is SOB and has noticed LE edema  Patient denies chest pain/heaviness/tightness/pressure, palpitations, lightheadedness, presyncope, syncope or N/V  Review of Systems   All other systems reviewed and are negative  Historical Information   Past Medical History:   Diagnosis Date   • Abnormal CXR 2022   • Cancer [facility])    • Disease of thyroid gland    • Dysuria 2021   • Encounter for support and coordination of transition of care 2021       Past Surgical History:   Procedure Laterality Date   • CARDIAC ELECTROPHYSIOLOGY PROCEDURE N/A 2022    Procedure: Cardiac eps/aflutter ablation;  Surgeon: Gladys Shields MD;  [facility]: [facility]; Service: Cardiology   • CARDIAC ELECTROPHYSIOLOGY PROCEDURE N/A 2022    Procedure: Cardiac loop recorder implant;  Surgeon: Gladys Shields MD;  [facility]; Service: Cardiology   • CARDIAC ELECTROPHYSIOLOGY PROCEDURE N/A 2023    Procedure: CARDIAC EPS/SVT ABLATION;  Surgeon: Gladys Shields MD;  [facility];   Service: Cardiology   • HYSTERECTOMY         Social History     Substance and Sexual Activity   Alcohol Use Never     Social History     Substance and Sexual Activity   Drug Use Never     Social History     Tobacco Use   Smoking Status Former   • Packs/day: 1 00   • Years: 10 00   • Pack years: 10 00   • Types: Cigarettes   • Start date: 80   • Quit date: 1993   • Years since quittin 1   Smokeless Tobacco Never       Family History   Problem Relation Age of Onset   • No Known Problems Mother    • No Known Problems Father    • Cancer Maternal Aunt          Meds/Allergies   Hospital Medications:   No current facility-administered medications for this encounter  Home Medications: (Not in a hospital admission)        Allergies   Allergen Reactions   • Penicillins Rash         Objective   Vitals: Blood pressure 133/80, pulse 62, temperature 98 7 °F (37 1 °C), temperature source Oral, resp  rate 20, SpO2 96 %  Orthostatic Blood Pressures    Flowsheet Row Most Recent Value   Blood Pressure 133/80 filed at 02/14/2023 1600   Patient Position - Orthostatic VS Sitting filed at 02/14/2023 1600          No intake or output data in the 24 hours ending 02/14/23 1649    Invasive Devices     Peripheral Intravenous Line  Duration           Peripheral IV 02/07/23 Distal;Dorsal (posterior); Right Wrist 7 days                  Physical Exam  Vitals reviewed  Constitutional:       General: She is not in acute distress  Appearance: She is not ill-appearing or diaphoretic  HENT:      Head: Normocephalic and atraumatic  Right Ear: External ear normal       Left Ear: External ear normal       Nose: Nose normal    Eyes:      General:         Right eye: No discharge  Left eye: No discharge  Cardiovascular:      Rate and Rhythm: Regular rhythm  Bradycardia present  Heart sounds: No murmur heard  No friction rub  Pulmonary:      Effort: Pulmonary effort is normal       Breath sounds: Normal breath sounds  No wheezing, rhonchi or rales  Comments: Crackles present  Abdominal:      General: There is no distension  Palpations: Abdomen is soft  Tenderness: There is no abdominal tenderness  Musculoskeletal:         General: No deformity or signs of injury  Cervical back: No rigidity  No muscular tenderness  Right lower leg: No edema  Left lower leg: No edema  Skin:     General: Skin is warm and dry  Capillary Refill: Capillary refill takes less than 2 seconds  Coloration: Skin is not jaundiced or pale  Neurological:      General: No focal deficit present  Mental Status: She is alert and oriented to person, place, and time  Mental status is at baseline  Psychiatric:         Mood and Affect: Mood normal          Behavior: Behavior normal          Thought Content: Thought content normal               Lab Results: I have personally reviewed pertinent lab results  Results from last 7 days   Lab Units 02/14/23  1510 02/08/23  0508   WBC Thousand/uL 6 42 3 91*   HEMOGLOBIN g/dL 10 6* 9 4*   HEMATOCRIT % 33 6* 29 6*   PLATELETS Thousands/uL 144* 85*     Results from last 7 days   Lab Units 02/14/23  1510 02/08/23  0508   POTASSIUM mmol/L 3 7 3 9   CHLORIDE mmol/L 109* 111*   CO2 mmol/L 25 26   BUN mg/dL 9 13   CREATININE mg/dL 0 93 0 70   CALCIUM mg/dL 9 4 9 4                 Imaging: I have personally reviewed pertinent films in PACS    Cardiac testing:  ECHO: 2/4/23  •  Left Ventricle: Left ventricular cavity size is normal  Wall thickness is mildly increased  The left ventricular ejection fraction is 55%  Systolic function is normal  Wall motion is normal   •  IVS: There is abnormal septal motion consistent with bundle branch block  There is sigmoid appearance of the septum  •  Right Ventricle: Right ventricular cavity size is normal  Systolic function is normal   •  Aortic Valve: There is mild regurgitation  •  Mitral Valve: There is mild regurgitation  •  Tricuspid Valve: There is mild regurgitation  •  Aorta: The aortic root is moderately dilated  •  Pulmonary Artery: The pulmonary artery systolic pressure is moderately increased          VTE Prophylaxis: Eliquis

## 2023-02-14 NOTE — H&P
[address] [facility]  H&P- Russ Olmedo 1950, 67 y o  female MRN: [id number]  Unit/Bed#: ED 15 Encounter: [id number]  Primary Care Provider: Pavel Cassidy MD   Date and time admitted to hospital: 2/14/2023  2:41 PM    * Acute on chronic combined systolic (congestive) and diastolic (congestive) heart failure ([facility] [address] )  Assessment & Plan  Wt Readings from Last 3 Encounters:   02/08/23 60 5 kg (133 lb 6 1 oz)   02/03/23 60 4 kg (133 lb 3 2 oz)   12/30/22 59 kg (130 lb)     · Patient was admitted for atrial tachycardia, underwent ablation 2/6, after the procedure she became hypotensive, home Entresto, metoprolol and Lasix were held on discharge 2/8/2023  · Patient has a history of tachycardia induced cardiomyopathy, most recent echo in February 2023 showed ejection fraction of 55%  · Patient returns with shortness of breath, lower extremity swelling suspect due to holding medications  On evaluation patient is in no distress  · Chest x-ray with vascular congestion, elevated BNP  · Received Lasix 20 mg IV in the ED  · Per cardiology restart Lasix 20 mg p o  twice daily tomorrow  · Daily weights, intake and output        Atrial tachycardia (HCC)  Assessment & Plan  · History of atrial tachycardia, status post prior ablation and ablation 2/6/2023  After the procedure noted to have bradycardia, intermittent 2 through 1 AV block and severe first-degree block  Pacemaker was offered but patient wanted to monitor with loop recorder  Due to bradycardia and hypotension metoprolol, Lasix and Entresto were held for 1 week she was scheduled for loop recorder interrogation, BP check and EKG on 2/15  Patient called EP office complaining of lower extremity edema and shortness of breath  Loop interrogation showed bradycardic events in the 40s, 221 AV block    · Per cardiology okay to restart Entresto  · Continue to hold metoprolol  · Keep n p o  after midnight for possible pacemaker tomorrow or Thursday  · Hold morning dose of Eliquis  · Telemetry    Hypothyroidism  Assessment & Plan  · Continue levothyroxine    VTE Pharmacologic Prophylaxis:   Moderate Risk (Score 3-4) - Pharmacological DVT Prophylaxis Ordered: apixaban (Eliquis)  Code Status: Level 1 - Full Code   Discussion with family: Updated  (daughter) at bedside  Anticipated Length of Stay: Patient will be admitted on an inpatient basis with an anticipated length of stay of greater than 2 midnights secondary to Atrial tachycardia, fluid overload, needs pacemaker  Total Time Spent on Date of Encounter in care of patient: 55 minutes This time was spent on one or more of the following: performing physical exam; counseling and coordination of care; obtaining or reviewing history; documenting in the medical record; reviewing/ordering tests, medications or procedures; communicating with other healthcare professionals and discussing with patient's family/caregivers  Chief Complaint: Shortness of breath, lower extremity swelling    History of Present Illness:  Fozia Godwin is a 67 y o  female with a PMH of hypothyroidism, combined systolic and diastolic heart failure, tachycardia induced, atrial tachycardia who presents with shortness of breath and lower extremity swelling  Patient has a history of atrial tachycardia for ablation  2/6/2023 she underwent ablation, hypotension after the procedure  Noted to have bradycardia, intermittent 2 through 1 AV block and severe first-degree block, pacemaker was offered but patient refused and wished to continue monitoring with a loop recorder  Due to postprocedure hypotension bradycardia metoprolol, Lasix and Entresto were held on discharge for 1 week after she follows up with cardiology  Patient returned to the hospital with shortness of breath and lower extremity swelling  Review of Systems:  Review of Systems   Constitutional: Negative for activity change, chills and fever  HENT: Negative  Eyes: Negative  Respiratory: Positive for shortness of breath  Negative for chest tightness  Cardiovascular: Positive for leg swelling  Negative for chest pain and palpitations  Gastrointestinal: Negative for abdominal pain, diarrhea, nausea and vomiting  Endocrine: Negative  Genitourinary: Negative for dysuria  Musculoskeletal: Negative  Skin: Negative  Neurological: Negative for dizziness  Psychiatric/Behavioral: Negative  Past Medical and Surgical History:   Past Medical History:   Diagnosis Date   • Abnormal CXR 1/14/2022   • Cancer [facility])    • Disease of thyroid gland    • Dysuria 12/16/2021   • Encounter for support and coordination of transition of care 5/6/2021       Past Surgical History:   Procedure Laterality Date   • CARDIAC ELECTROPHYSIOLOGY PROCEDURE N/A 12/30/2022    Procedure: Cardiac eps/aflutter ablation;  Surgeon: Snehal Guerrero MD;  [facility]; Service: Cardiology   • CARDIAC ELECTROPHYSIOLOGY PROCEDURE N/A 12/30/2022    Procedure: Cardiac loop recorder implant;  Surgeon: Snehal Guerrero MD;  [facility]; Service: Cardiology   • CARDIAC ELECTROPHYSIOLOGY PROCEDURE N/A 2/6/2023    Procedure: CARDIAC EPS/SVT ABLATION;  Surgeon: Snehal Guerrero MD;  [facility]; Service: Cardiology   • HYSTERECTOMY         Meds/Allergies:  Prior to Admission medications    Medication Sig Start Date End Date Taking? Authorizing Provider   apixaban (Eliquis) 5 mg Take 1 tablet (5 mg total) by mouth 2 (two) times a day 11/21/22   BAO Lofton   levothyroxine 100 mcg tablet Take 1 tablet (100 mcg total) by mouth daily 10/26/22   Cristian Trujillo MD   amiodarone 200 mg tablet Take 1 tablet (200 mg total) by mouth daily 1/25/22 5/4/22  BAO Lofton     I have reviewed home medications with patient personally  Allergies:    Allergies   Allergen Reactions   • Penicillins Rash       Social History:  Marital Status:      Substance Use History:   Social History     Substance and Sexual Activity   Alcohol Use Never     Social History     Tobacco Use   Smoking Status Former   • Packs/day: 1 00   • Years: 10 00   • Pack years: 10 00   • Types: Cigarettes   • Start date: 80   • Quit date: 1993   • Years since quittin 1   Smokeless Tobacco Never     Social History     Substance and Sexual Activity   Drug Use Never       Family History:  Family History   Problem Relation Age of Onset   • No Known Problems Mother    • No Known Problems Father    • Cancer Maternal Aunt        Physical Exam:     Vitals:   Blood Pressure: 133/80 (23 1600)  Pulse: 62 (23)  Temperature: 98 7 °F (37 1 °C) (23)  Temp Source: Oral (23)  Respirations: 20 (23)  SpO2: 96 % (23)    Physical Exam  Constitutional:       General: She is not in acute distress  HENT:      Head: Atraumatic  Cardiovascular:      Rate and Rhythm: Bradycardia present  Rhythm irregular  Heart sounds: No murmur heard  No friction rub  No gallop  Pulmonary:      Effort: Pulmonary effort is normal  No respiratory distress  Comments: Bibasilar crackles  Abdominal:      General: Bowel sounds are normal  There is no distension  Palpations: Abdomen is soft  Tenderness: There is no abdominal tenderness  Musculoskeletal:      Cervical back: Neck supple  Right lower leg: Edema present  Left lower leg: Edema present  Skin:     General: Skin is warm and dry  Neurological:      General: No focal deficit present  Mental Status: She is alert     Psychiatric:         Mood and Affect: Mood normal           Additional Data:     Lab Results:  Results from last 7 days   Lab Units 23  1510   WBC Thousand/uL 6 42   HEMOGLOBIN g/dL 10 6*   HEMATOCRIT % 33 6*   PLATELETS Thousands/uL 144*   NEUTROS PCT % 67   LYMPHS PCT % 23   MONOS PCT % 8   EOS PCT % 1 Results from last 7 days   Lab Units 02/14/23  1510   SODIUM mmol/L 138   POTASSIUM mmol/L 3 7   CHLORIDE mmol/L 109*   CO2 mmol/L 25   BUN mg/dL 9   CREATININE mg/dL 0 93   ANION GAP mmol/L 4   CALCIUM mg/dL 9 4   ALBUMIN g/dL 3 7   TOTAL BILIRUBIN mg/dL 0 57   ALK PHOS U/L 105   ALT U/L 35   AST U/L 42   GLUCOSE RANDOM mg/dL 126                       Lines/Drains:  Invasive Devices     Peripheral Intravenous Line  Duration           Peripheral IV 02/07/23 Distal;Dorsal (posterior); Right Wrist 7 days                    Imaging: Reviewed radiology reports from this admission including: chest xray  XR chest 2 views    (Results Pending)       EKG and Other Studies Reviewed on Admission:   · EKG: Normal sinus rhythm, nonspecific ST-T changes  ** Please Note: This note has been constructed using a voice recognition system   **

## 2023-02-14 NOTE — ED PROVIDER NOTES
History  Chief Complaint   Patient presents with   • Leg Swelling     Patient had recent ablasion on 2/6/23 and provider d/c'd metoprolol, entresto and lasix  Patient started yesterday noticing increased sob with exertion, b/l lower leg swelling and fast heart rate in the middle of the night last night  72F PMH Afib, dilated cardiomyopathy, s/p ablation 2/6 presented to the emergency department for fatigue, shortness of breath, and leg swelling  She reports for the past few days she has noticed increased swelling around the bilateral feet and ankles  Since yesterday she has also noted shortness of breath that is worse with exertion and when lying flat  She has felt intermittent palpitations but denies chest pain, lightheadedness, or syncope  Her cardiologist stopped her metoprolol, Lasix, and Entresto after the ablation, her only current medications are Eliquis and levothyroxine  Prior to Admission Medications   Prescriptions Last Dose Informant Patient Reported? Taking? apixaban (Eliquis) 5 mg   No No   Sig: Take 1 tablet (5 mg total) by mouth 2 (two) times a day   levothyroxine 100 mcg tablet   No No   Sig: Take 1 tablet (100 mcg total) by mouth daily      Facility-Administered Medications: None       Past Medical History:   Diagnosis Date   • Abnormal CXR 1/14/2022   • Cancer [facility])    • Disease of thyroid gland    • Dysuria 12/16/2021   • Encounter for support and coordination of transition of care 5/6/2021       Past Surgical History:   Procedure Laterality Date   • CARDIAC ELECTROPHYSIOLOGY PROCEDURE N/A 12/30/2022    Procedure: Cardiac eps/aflutter ablation;  Surgeon: Humera Abbasi MD;  Location: [facility]; Service: Cardiology   • CARDIAC ELECTROPHYSIOLOGY PROCEDURE N/A 12/30/2022    Procedure: Cardiac loop recorder implant;  Surgeon: Humera Abbasi MD;  Location: [facility];   Service: Cardiology   • CARDIAC ELECTROPHYSIOLOGY PROCEDURE N/A 2/6/2023    Procedure: CARDIAC EPS/SVT ABLATION;  Surgeon: Librado Jorgensen MD;  Location: [facility]; Service: Cardiology   • HYSTERECTOMY         Family History   Problem Relation Age of Onset   • No Known Problems Mother    • No Known Problems Father    • Cancer Maternal Aunt      I have reviewed and agree with the history as documented  E-Cigarette/Vaping   • E-Cigarette Use Never User      E-Cigarette/Vaping Substances   • Nicotine No    • THC No    • CBD No    • Flavoring No    • Other No    • Unknown No      Social History     Tobacco Use   • Smoking status: Former     Packs/day:      Years: 10 00     Pack years: 10 00     Types: Cigarettes     Start date:      Quit date: 1993     Years since quittin 1   • Smokeless tobacco: Never   Vaping Use   • Vaping Use: Never used   Substance Use Topics   • Alcohol use: Never   • Drug use: Never        Review of Systems   Constitutional: Negative for fever  Respiratory: Positive for shortness of breath  Cardiovascular: Positive for palpitations and leg swelling  Negative for chest pain  Gastrointestinal: Negative for abdominal pain, nausea and vomiting  Genitourinary: Negative for dysuria  Neurological: Negative for syncope and light-headedness  All other systems reviewed and are negative  Physical Exam  ED Triage Vitals [23 1436]   Temperature Pulse Respirations Blood Pressure SpO2   98 7 °F (37 1 °C) 69 18 170/98 97 %      Temp Source Heart Rate Source Patient Position - Orthostatic VS BP Location FiO2 (%)   Oral Monitor Sitting Left arm --      Pain Score       No Pain             Orthostatic Vital Signs  Vitals:    23 1600 23 1930 23 2030 23 2100   BP: 133/80 130/80 124/74 117/81   Pulse: 62 66 72 76   Patient Position - Orthostatic VS: Sitting Lying Sitting        Physical Exam  Vitals and nursing note reviewed  Constitutional:       General: She is not in acute distress       Appearance: She is not ill-appearing or diaphoretic  HENT:      Head: Normocephalic  Mouth/Throat:      Mouth: Mucous membranes are moist       Pharynx: Oropharynx is clear  Eyes:      Pupils: Pupils are equal, round, and reactive to light  Cardiovascular:      Rate and Rhythm: Normal rate and regular rhythm  Heart sounds: No murmur heard  Pulmonary:      Effort: No respiratory distress  Breath sounds: Rales (diffuse bilateral) present  No wheezing  Abdominal:      General: Abdomen is flat  There is no distension  Palpations: Abdomen is soft  Tenderness: There is no abdominal tenderness  There is no guarding or rebound  Musculoskeletal:      Right lower leg: Edema (trace) present  Left lower leg: Edema (trace) present  Skin:     General: Skin is warm and dry  Neurological:      Mental Status: She is alert           ED Medications  Medications   sacubitril-valsartan (ENTRESTO) 24-26 MG per tablet 1 tablet (1 tablet Oral Given 2/14/23 1944)   furosemide (LASIX) tablet 20 mg (has no administration in time range)   levothyroxine tablet 100 mcg (has no administration in time range)   acetaminophen (TYLENOL) tablet 650 mg (has no administration in time range)   furosemide (LASIX) injection 20 mg (20 mg Intravenous Given 2/14/23 1646)   potassium chloride (K-DUR,KLOR-CON) CR tablet 40 mEq (40 mEq Oral Given 2/14/23 1646)   apixaban (ELIQUIS) tablet 5 mg (5 mg Oral Given 2/14/23 1944)       Diagnostic Studies  Results Reviewed     Procedure Component Value Units Date/Time    HS Troponin I 4hr [[id number]]  (Abnormal) Collected: 02/14/23 1941    Lab Status: Final result Specimen: Blood from Arm, Right Updated: 02/14/23 2034     hs TnI 4hr 85 ng/L      Delta 4hr hsTnI 30 ng/L     HS Troponin I 2hr [[id number]]  (Abnormal) Resulted: 02/14/23 1815    Lab Status: Final result Specimen: Blood Updated: 02/14/23 1815     hs TnI 2hr 69 ng/L      Delta 2hr hsTnI 14 ng/L     NT-BNP PRO-BE,[facility],[place] campuses only [[id number]]  (Abnormal) Collected: 02/14/23 1510    Lab Status: Final result Specimen: Blood from Arm, Right Updated: 02/14/23 1637     NT-proBNP 45,371 pg/mL     HS Troponin 0hr (reflex protocol) [[id number]]  (Abnormal) Collected: 02/14/23 1510    Lab Status: Final result Specimen: Blood from Arm, Right Updated: 02/14/23 1617     hs TnI 0hr 55 ng/L     Comprehensive metabolic panel [[id number]]  (Abnormal) Collected: 02/14/23 1510    Lab Status: Final result Specimen: Blood from Arm, Right Updated: 02/14/23 1550     Sodium 138 mmol/L      Potassium 3 7 mmol/L      Chloride 109 mmol/L      CO2 25 mmol/L      ANION GAP 4 mmol/L      BUN 9 mg/dL      Creatinine 0 93 mg/dL      Glucose 126 mg/dL      Calcium 9 4 mg/dL      AST 42 U/L      ALT 35 U/L      Alkaline Phosphatase 105 U/L      Total Protein 6 8 g/dL      Albumin 3 7 g/dL      Total Bilirubin 0 57 mg/dL      eGFR 61 ml/min/1 73sq m     Narrative:      [facility] guidelines for Chronic Kidney Disease (CKD):   •  Stage 1 with normal or high GFR (GFR > 90 mL/min/1 73 square meters)  •  Stage 2 Mild CKD (GFR = 60-89 mL/min/1 73 square meters)  •  Stage 3A Moderate CKD (GFR = 45-59 mL/min/1 73 square meters)  •  Stage 3B Moderate CKD (GFR = 30-44 mL/min/1 73 square meters)  •  Stage 4 Severe CKD (GFR = 15-29 mL/min/1 73 square meters)  •  Stage 5 End Stage CKD (GFR <15 mL/min/1 73 square meters)  Note: GFR calculation is accurate only with a steady state creatinine    CBC and differential [[id number]]  (Abnormal) Collected: 02/14/23 1510    Lab Status: Final result Specimen: Blood from Arm, Right Updated: 02/14/23 1525     WBC 6 42 Thousand/uL      RBC 3 65 Million/uL      Hemoglobin 10 6 g/dL      Hematocrit 33 6 %      MCV 92 fL      MCH 29 0 pg      MCHC 31 5 g/dL      RDW 14 6 %      MPV 11 8 fL      Platelets 441 Thousands/uL      nRBC 0 /100 WBCs      Neutrophils Relative 67 %      Immat GRANS % 0 %      Lymphocytes Relative 23 % Monocytes Relative 8 %      Eosinophils Relative 1 %      Basophils Relative 1 %      Neutrophils Absolute 4 36 Thousands/µL      Immature Grans Absolute 0 02 Thousand/uL      Lymphocytes Absolute 1 48 Thousands/µL      Monocytes Absolute 0 49 Thousand/µL      Eosinophils Absolute 0 04 Thousand/µL      Basophils Absolute 0 03 Thousands/µL                  XR chest 2 views    (Results Pending)         Procedures  Procedures      ED Course                                       Medical Decision Making  72F PMH Afib, dilated cardiomyopathy, s/p ablation 2/6 presented to the emergency department for fatigue, shortness of breath, and leg swelling  Work-up including vital signs, physical exam, labs, EKG, chest x-ray  Differential including arrhythmia, heart failure exacerbation  Unlikely PE while currently on anticoagulation  Loop recorder with abnormalities, EP consulted  Mild volume overload on clinical exam, lungs with crackles, chest x-ray with increase in pulmonary edema  Plan for admission to medicine with EP consult for pacemaker placement  A-fib [facility]): acute illness or injury  Heart failure [facility]): acute illness or injury  SOB (shortness of breath): acute illness or injury  Amount and/or Complexity of Data Reviewed  External Data Reviewed: labs, radiology and notes  Labs: ordered  Radiology: ordered  ECG/medicine tests: ordered  Risk  Decision regarding hospitalization              Disposition  Final diagnoses:   SOB (shortness of breath)   A-fib ([facility])   Heart failure ([facility] [address] )     Time reflects when diagnosis was documented in both MDM as applicable and the Disposition within this note     Time User Action Codes Description Comment    2/14/2023  4:57 PM Addi Cordova Add [R06 02] SOB (shortness of breath)     2/14/2023  4:58 PM Addi Cordova Add [I48 91] A-fib ([facility] [address] )     2/14/2023  4:58 PM Addi Cordova Add [I50 9] Heart failure [facility])       ED Disposition     ED Disposition Admit    Condition   Stable    Date/Time   Tue Feb 14, 2023  4:59 PM    Comment   Case was discussed with internal medicine and the patient's admission status was agreed to be Admission Status: inpatient status to the service of Dr Jet Kowalski   Follow-up Information    None         Patient's Medications   Discharge Prescriptions    No medications on file     No discharge procedures on file  PDMP Review     None           ED Provider  Attending physically available and evaluated Mihaela Ingram  FREDDY managed the patient along with the ED Attending      Electronically Signed by         Jordy Fuentes MD  02/14/23 7644

## 2023-02-14 NOTE — TELEPHONE ENCOUNTER
Patient called with symptoms of shakiness, weakness, SOB, L ankle edema  She is unable to check HR or BP at home but states she feels "really terrible "  Her daughter is bringing her to ER at [facility]  I did notify EP RHETT Carvajal and will place an ADT21  Patient is s/p AVNRT and inferoseptal  AT and LA anterior AT ablation  She was due tomorrow for nurse visit but feels she needs medical attention at the ER because of her symptoms  Discharged OFF metoprolol, Entresto and Lasix

## 2023-02-14 NOTE — Clinical Note
The PACER GENERATOR NINOSKA XT DR MRI SURESCAN device was inserted  The leads were placed into the connector and visually verified to be in correct position  Injury current obtained

## 2023-02-14 NOTE — ED ATTENDING ATTESTATION
2/14/2023  IBrisa MD, saw and evaluated the patient  I have discussed the patient with the resident/non-physician practitioner and agree with the resident's/non-physician practitioner's findings, Plan of Care, and MDM as documented in the resident's/non-physician practitioner's note, except where noted  All available labs and Radiology studies were reviewed  I was present for key portions of any procedure(s) performed by the resident/non-physician practitioner and I was immediately available to provide assistance  At this point I agree with the current assessment done in the Emergency Department  I have conducted an independent evaluation of this patient a history and physical is as follows:    ED Course   76-year-old woman with loop recorder, congestive heart failure, atrial tachycardia, interstitial lung disease presents to ED for evaluation of shortness of breath, bilateral leg swelling, weakness, shakiness  Symptoms started this morning  Patient recently was admitted for an ablation on 2/6/2023  After her procedure patient stopped Lasix, metoprolol and Entresto  On exam the patient is awake, alert, and comfortable  Mucous membranes are moist   Neck supple and nontender  The patient's heart is regular without murmurs, rubs, or gallops  Lungs scattered crackles  Abdomen soft, no voluntary guarding, rebound or rigidity  Moves all extremities  Pitting edema trace bilaterally  Patient neurologically nonfocal  No skin rashes  Back without deformities  Medical decision making: CHF exacerbation, cardiac effusion, pleural effusion, arrhythmia, side effect of medication changes will, metabolic derangements  Will obtain cardiac work-up and consult EP, will likely need admission for further management       Critical Care Time  Procedures

## 2023-02-14 NOTE — ASSESSMENT & PLAN NOTE
Wt Readings from Last 3 Encounters:   02/08/23 60 5 kg (133 lb 6 1 oz)   02/03/23 60 4 kg (133 lb 3 2 oz)   12/30/22 59 kg (130 lb)     · Patient was admitted for atrial tachycardia, underwent ablation 2/6, after the procedure she became hypotensive, home Entresto, metoprolol and Lasix were held on discharge 2/8/2023  · Patient has a history of tachycardia induced cardiomyopathy, most recent echo in February 2023 showed ejection fraction of 55%  · Patient returns with shortness of breath, lower extremity swelling suspect due to holding medications    On evaluation patient is in no distress  · Chest x-ray with vascular congestion, elevated BNP  · Received Lasix 20 mg IV in the ED  · Per cardiology restart Lasix 20 mg p o  twice daily tomorrow  · Daily weights, intake and output

## 2023-02-15 PROBLEM — I97.89 AV BLOCK, POSTOPERATIVE: Status: ACTIVE | Noted: 2023-02-15

## 2023-02-15 PROBLEM — I44.30 AV BLOCK, POSTOPERATIVE: Status: ACTIVE | Noted: 2023-02-15

## 2023-02-15 LAB
ANION GAP SERPL CALCULATED.3IONS-SCNC: 10 MMOL/L (ref 4–13)
BUN SERPL-MCNC: 11 MG/DL (ref 5–25)
CALCIUM SERPL-MCNC: 9.3 MG/DL (ref 8.3–10.1)
CHLORIDE SERPL-SCNC: 108 MMOL/L (ref 96–108)
CO2 SERPL-SCNC: 22 MMOL/L (ref 21–32)
CREAT SERPL-MCNC: 0.91 MG/DL (ref 0.6–1.3)
GFR SERPL CREATININE-BSD FRML MDRD: 62 ML/MIN/1.73SQ M
GLUCOSE SERPL-MCNC: 134 MG/DL (ref 65–140)
MAGNESIUM SERPL-MCNC: 1.9 MG/DL (ref 1.6–2.6)
POTASSIUM SERPL-SCNC: 4.3 MMOL/L (ref 3.5–5.3)
SODIUM SERPL-SCNC: 140 MMOL/L (ref 135–147)

## 2023-02-15 PROCEDURE — 02HK3JZ INSERTION OF PACEMAKER LEAD INTO RIGHT VENTRICLE, PERCUTANEOUS APPROACH: ICD-10-PCS | Performed by: INTERNAL MEDICINE

## 2023-02-15 PROCEDURE — 0JH606Z INSERTION OF PACEMAKER, DUAL CHAMBER INTO CHEST SUBCUTANEOUS TISSUE AND FASCIA, OPEN APPROACH: ICD-10-PCS | Performed by: INTERNAL MEDICINE

## 2023-02-15 PROCEDURE — 02H63JZ INSERTION OF PACEMAKER LEAD INTO RIGHT ATRIUM, PERCUTANEOUS APPROACH: ICD-10-PCS | Performed by: INTERNAL MEDICINE

## 2023-02-15 RX ORDER — MAGNESIUM SULFATE 1 G/100ML
1 INJECTION INTRAVENOUS ONCE
Status: COMPLETED | OUTPATIENT
Start: 2023-02-15 | End: 2023-02-15

## 2023-02-15 RX ORDER — FUROSEMIDE 10 MG/ML
20 INJECTION INTRAMUSCULAR; INTRAVENOUS ONCE
Status: COMPLETED | OUTPATIENT
Start: 2023-02-15 | End: 2023-02-15

## 2023-02-15 RX ORDER — POTASSIUM CHLORIDE 20 MEQ/1
20 TABLET, EXTENDED RELEASE ORAL ONCE
Status: COMPLETED | OUTPATIENT
Start: 2023-02-15 | End: 2023-02-15

## 2023-02-15 RX ORDER — VANCOMYCIN HYDROCHLORIDE 1 G/200ML
1000 INJECTION, SOLUTION INTRAVENOUS ONCE
Status: COMPLETED | OUTPATIENT
Start: 2023-02-16 | End: 2023-02-16

## 2023-02-15 RX ADMIN — SACUBITRIL AND VALSARTAN 1 TABLET: 24; 26 TABLET, FILM COATED ORAL at 17:12

## 2023-02-15 RX ADMIN — APIXABAN 5 MG: 5 TABLET, FILM COATED ORAL at 17:12

## 2023-02-15 RX ADMIN — MAGNESIUM SULFATE HEPTAHYDRATE 1 G: 1 INJECTION, SOLUTION INTRAVENOUS at 01:39

## 2023-02-15 RX ADMIN — SACUBITRIL AND VALSARTAN 1 TABLET: 24; 26 TABLET, FILM COATED ORAL at 12:09

## 2023-02-15 RX ADMIN — FUROSEMIDE 20 MG: 10 INJECTION, SOLUTION INTRAMUSCULAR; INTRAVENOUS at 12:09

## 2023-02-15 RX ADMIN — APIXABAN 5 MG: 5 TABLET, FILM COATED ORAL at 12:09

## 2023-02-15 RX ADMIN — FUROSEMIDE 20 MG: 20 TABLET ORAL at 17:12

## 2023-02-15 RX ADMIN — FUROSEMIDE 20 MG: 20 TABLET ORAL at 08:56

## 2023-02-15 RX ADMIN — POTASSIUM CHLORIDE 20 MEQ: 1500 TABLET, EXTENDED RELEASE ORAL at 12:09

## 2023-02-15 RX ADMIN — LEVOTHYROXINE SODIUM 100 MCG: 100 TABLET ORAL at 05:18

## 2023-02-15 NOTE — ASSESSMENT & PLAN NOTE
Bradycardia and AV block post ablation  Patient refused PPM during last hospitalization  Now presenting with acute CHF  Loop interrogation showed bradycardic events in the 40s, 221 AV block      · EP consulted, appreciate recs  · NPO at midnight for PPM placement tomorrow  · Hold morning dose Eliquis

## 2023-02-15 NOTE — CASE MANAGEMENT
Case Management Assessment & Discharge Planning Note    Patient name Irasema Mancia  Location [address] [place] [address] [id number] [id number]  : 1950 Date 2/15/2023       Current Admission Date: 2/3/2023  Current Admission Diagnosis:Atrial tachycardia [facility])   Patient Active Problem List    Diagnosis Date Noted   • AV block, postoperative 02/15/2023   • Atrial tachycardia ([address] ) 2023   • Paroxysmal atrial flutter ([address] ) 10/05/2022   • Elevated blood sugar 05/10/2022   • Menopause 05/10/2022   • Dilated cardiomyopathy ([facility] [address] ) 2022   • Pulmonary hypertension ([address] ) 2022   • ILD (interstitial lung disease) ([facility] [address] ) 2022   • Hard of hearing 2022   • Allergies 2022   • Stage 3a chronic kidney disease ([facility] [address] ) 2022   • Acute on chronic combined systolic (congestive) and diastolic (congestive) heart failure ([address] ) 2022   • Paroxysmal atrial fibrillation ([facility] [address] ) 01/10/2022   • Dyslipidemia 10/07/2021   • Hypothyroidism 2021   • Myelodysplasia (myelodysplastic syndrome) ([facility] [address] ) 2017   • Waldenstrom macroglobulinemia ([facility] [address] ) 2017   • History of Hodgkin's lymphoma 2017   • Rash 2017      LOS (days): 5  Geometric Mean LOS (GMLOS) (days): 1 30  Days to GMLOS:-3 4     OBJECTIVE:    Risk of Unplanned Readmission Score: 11 94         Current admission status: Inpatient       Preferred Pharmacy:   [facility] #[id number]- [place] - [address] [address]  Phone: [phone] Fax: [phone]    Primary Care Provider: Warner Hawley MD    Primary Insurance: Sidney PALMER  Secondary Insurance:     ASSESSMENT:  Leonardo Rosenthal Proxies    There are no active Health Care Proxies on file         Advance Directives  Does patient have a [address]?: No  Was patient offered paperwork?: Yes (paperwork provided)  Does patient currently have a Health Care decision maker?: Yes, please see Health Care Proxy section (both daughters)  Does patient have Advance Directives?: Yes (paperwork provided)  Primary Contact: daughterKevin Son [phone]         Readmission Root Cause  30 Day Readmission: Yes  Who directed you to return to the hospital?: Specialist  Did you get your prescriptions before you left the hospital?: Yes  Were you able to get your prescriptions filled when you left the hospital?: Yes  Did you take your medications as prescribed?: Yes  Were you able to get to your follow-up appointments?: Yes  During previous admission, was a post-acute recommendation made?: No  Action Plan: pacemaker insertion    Patient Information  Admitted from[de-identified] Home  Mental Status: Alert  During Assessment patient was accompanied by: Not accompanied during assessment  Assessment information provided by[de-identified] Patient  Primary Caregiver: Self  Support Systems: Self, Children, Family members  [place] of Residence: [address] [place] [address] do you live in?: [place] entry access options   Select all that apply : No steps to enter home  Type of Current Residence: [facility]  In the last 12 months, was there a time when you were not able to pay the mortgage or rent on time?: No  In the last 12 months, how many places have you lived?: 2  In the last 12 months, was there a time when you did not have a steady place to sleep or slept in a shelter (including now)?: No  Homeless/housing insecurity resource given?: N/A  Living Arrangements: Lives Alone    Activities of Daily Living Prior to Admission  Functional Status: Independent  Completes ADLs independently?: Yes  Ambulates independently?: Yes  Does patient use assisted devices?: No  Does patient currently own DME?: No  Does patient have a history of Outpatient Therapy (PT/OT)?: No  Does the patient have a history of Short-Term Rehab?: No  Does patient have a history of HHC?: No  Does patient currently have [address]?: No         Patient Information Continued  Income Source: Employed  Does patient have prescription coverage?: Yes  Within the past 12 months, you worried that your food would run out before you got the money to buy more : Never true  Within the past 12 months, the food you bought just didn't last and you didn't have money to get more : Never true  Food insecurity resource given?: N/A  Does patient receive dialysis treatments?: No  Does patient have a history of substance abuse?: No  Does patient have a history of Mental Health Diagnosis?: No         Means of Transportation  Means of Transport to Appts[de-identified] Drives Self  In the past 12 months, has lack of transportation kept you from medical appointments or from getting medications?: No  In the past 12 months, has lack of transportation kept you from meetings, work, or from getting things needed for daily living?: No  Was application for public transport provided?: N/A        DISCHARGE DETAILS:    Discharge planning discussed with[de-identified] patient        CM contacted family/caregiver?: No- see comments (declined)             Contacts  Patient Contacts: daughter, francois So  Relationship to Patient[de-identified] Family  Contact Method: Phone  Phone Number: [phone]  Reason/Outcome: Emergency [address]         Is the patient interested in [facility] at discharge?: No    DME Referral Provided  Referral made for DME?: No         Discharge Destination Plan[de-identified] Home  Transport at Discharge : Family              Additional Comments: No d/c needs evaluated at this time   CM to follow

## 2023-02-15 NOTE — ASSESSMENT & PLAN NOTE
· History of atrial tachycardia, status post prior ablation and ablation 2/6/2023  After the procedure noted to have bradycardia, intermittent 2 through 1 AV block and severe first-degree block  Pacemaker was offered but patient wanted to monitor with loop recorder  Due to bradycardia and hypotension metoprolol, Lasix and Entresto were held for 1 week she was scheduled for loop recorder interrogation, BP check and EKG on 2/15  Patient called EP office complaining of lower extremity edema and shortness of breath  Loop interrogation showed bradycardic events in the 40s, 221 AV block    · Per cardiology okay to restart Entresto  · Continue to hold metoprolol  · NPO after midnight for PPM tomorrow  · Hold Eliquis  · Telemetry

## 2023-02-15 NOTE — PROGRESS NOTES
Pastoral Care Progress Note    2/15/2023  Patient: Ishmael Santana : 1950  Admission Date & Time: 2023 1441  MRN: [id number] CSN: [id number]           02/15/23 1500   Clinical Encounter Type   Visited With Patient   [facility] Encounters   [facility] Needs Prayer   Sacramental Encounters   Sacrament of Sick-Anointing Patient declined anointing     Hoda Moran visited with the pt and provided prayers and blessings  The pt declined Fr's offer for anointing  No further needs were expressed at this time  Chaplains still remain available

## 2023-02-15 NOTE — PROGRESS NOTES
1425 [facility]  Progress Note - Hardy Gan 1950, 67 y o  female MRN: [id number]  Unit/Bed#: [facility] 421-01 Encounter: [id number]  Primary Care Provider: Bryan Robles MD   Date and time admitted to hospital: 2/14/2023  2:41 PM    AV block, postoperative  Assessment & Plan  Bradycardia and AV block post ablation  Patient refused PPM during last hospitalization  Now presenting with acute CHF  Loop interrogation showed bradycardic events in the 40s, 221 AV block  · EP consulted, appreciate recs  · NPO at midnight for PPM placement tomorrow  · Hold morning dose Eliquis    Atrial tachycardia ([facility] [address] )  Assessment & Plan  · History of atrial tachycardia, status post prior ablation and ablation 2/6/2023  After the procedure noted to have bradycardia, intermittent 2 through 1 AV block and severe first-degree block  Pacemaker was offered but patient wanted to monitor with loop recorder  Due to bradycardia and hypotension metoprolol, Lasix and Entresto were held for 1 week she was scheduled for loop recorder interrogation, BP check and EKG on 2/15  Patient called EP office complaining of lower extremity edema and shortness of breath  Loop interrogation showed bradycardic events in the 40s, 221 AV block    · Per cardiology okay to restart Entresto  · Continue to hold metoprolol  · NPO after midnight for PPM tomorrow  · Hold Eliquis  · Telemetry    Hypothyroidism  Assessment & Plan  · Continue levothyroxine    * Acute on chronic combined systolic (congestive) and diastolic (congestive) heart failure (HCC)  Assessment & Plan  Wt Readings from Last 3 Encounters:   02/15/23 62 9 kg (138 lb 10 7 oz)   02/08/23 60 5 kg (133 lb 6 1 oz)   02/03/23 60 4 kg (133 lb 3 2 oz)     · Patient was admitted for atrial tachycardia, underwent ablation 2/6, after the procedure she became hypotensive, home Entresto, metoprolol and Lasix were held on discharge 2/8/2023  · Patient has a history of tachycardia induced cardiomyopathy, most recent echo in 2023 showed ejection fraction of 55%  · Patient returns with shortness of breath, lower extremity swelling suspect due to holding medications  On evaluation patient is in no distress  · Chest x-ray with vascular congestion, elevated BNP  · Received Lasix 20 mg IV in the ED - I/Os not documented, per patient increased UOP  · Restarted Lasix 20 mg PO BID today  · Daily weights, intake and output              VTE Pharmacologic Prophylaxis:   High Risk (Score >/= 5) - Pharmacological DVT Prophylaxis Ordered: Eliquis  Sequential Compression Devices Ordered  Patient Centered Rounds: I performed bedside rounds with nursing staff today  Discussions with Specialists or Other Care Team Provider: EP    Education and Discussions with Family / Patient: Patient declined call to   Total Time Spent on Date of Encounter in care of patient: 35 minutes This time was spent on one or more of the following: performing physical exam; counseling and coordination of care; obtaining or reviewing history; documenting in the medical record; reviewing/ordering tests, medications or procedures; communicating with other healthcare professionals and discussing with patient's family/caregivers  Current Length of Stay: 1 day(s)  Current Patient Status: Inpatient   Certification Statement: The patient will continue to require additional inpatient hospital stay due to [facility] tomorrow  Discharge Plan: Anticipate discharge in 24-48 hrs to home with home services  Code Status: Level 1 - Full Code    Subjective:   Patient reports doing well this morning  Reports no chest pain and breathing is better  She is on 1L NC for comfort  Aware that PPM will be placed tomorrow  Has no other complaints today      Objective:     Vitals:   Temp (24hrs), Av 9 °F (36 6 °C), Min:97 3 °F (36 3 °C), Max:98 7 °F (37 1 °C)    Temp:  [97 3 °F (36 3 °C)-98 7 °F (37 1 °C)] 98 °F (36 7 °C)  HR:  [47-76] 47  Resp:  [13-24] 16  BP: (115-170)/(65-98) 115/72  SpO2:  [94 %-99 %] 98 %  Body mass index is 24 56 kg/m²  Input and Output Summary (last 24 hours): Intake/Output Summary (Last 24 hours) at 2/15/2023 1343  Last data filed at 2/15/2023 0859  Gross per 24 hour   Intake 118 ml   Output 300 ml   Net -182 ml       Physical Exam:   Physical Exam  Vitals and nursing note reviewed  Constitutional:       General: She is not in acute distress  Appearance: She is well-developed  HENT:      Head: Normocephalic and atraumatic  Eyes:      Conjunctiva/sclera: Conjunctivae normal    Cardiovascular:      Rate and Rhythm: Normal rate and regular rhythm  Pulses: Normal pulses  Heart sounds: Normal heart sounds  No murmur heard  Pulmonary:      Effort: Pulmonary effort is normal  No respiratory distress  Breath sounds: Normal breath sounds  Comments: On 1L NC  Abdominal:      Palpations: Abdomen is soft  Tenderness: There is no abdominal tenderness  Musculoskeletal:         General: No swelling  Cervical back: Neck supple  Right lower leg: No edema  Left lower leg: No edema  Skin:     General: Skin is warm and dry  Capillary Refill: Capillary refill takes less than 2 seconds  Neurological:      Mental Status: She is alert     Psychiatric:         Mood and Affect: Mood normal          Additional Data:     Labs:  Results from last 7 days   Lab Units 02/14/23  1510   WBC Thousand/uL 6 42   HEMOGLOBIN g/dL 10 6*   HEMATOCRIT % 33 6*   PLATELETS Thousands/uL 144*   NEUTROS PCT % 67   LYMPHS PCT % 23   MONOS PCT % 8   EOS PCT % 1     Results from last 7 days   Lab Units 02/15/23  0019 02/14/23  1510   SODIUM mmol/L 140 138   POTASSIUM mmol/L 4 3 3 7   CHLORIDE mmol/L 108 109*   CO2 mmol/L 22 25   BUN mg/dL 11 9   CREATININE mg/dL 0 91 0 93   ANION GAP mmol/L 10 4   CALCIUM mg/dL 9 3 9 4   ALBUMIN g/dL  --  3 7   TOTAL BILIRUBIN mg/dL  --  0 57   ALK PHOS U/L  --  105   ALT U/L  --  35   AST U/L  --  42   GLUCOSE RANDOM mg/dL 134 126                       Lines/Drains:  Invasive Devices     Peripheral Intravenous Line  Duration           Peripheral IV 02/14/23 Right Antecubital <1 day                  Telemetry:  Telemetry Orders (From admission, onward)             48 Hour Telemetry Monitoring  Continuous x 48 hours        References:    Telemetry Guidelines   Question:  Reason for 48 Hour Telemetry  Answer:  Arrhythmias Requiring Medical Therapy (eg  SVT, Vtach/fib, Bradycardia, Uncontrolled A-fib)                 Telemetry Reviewed: Normal Sinus Rhythm  Indication for Continued Telemetry Use: Arrthymias requiring medical therapy             Imaging: Reviewed radiology reports from this admission including: chest xray    Recent Cultures (last 7 days):         Last 24 Hours Medication List:   Current Facility-Administered Medications   Medication Dose Route Frequency Provider Last Rate   • acetaminophen  650 mg Oral Q6H PRN Hussain Foote MD     • apixaban  5 mg Oral BID Loly Sanders PA-C     • furosemide  20 mg Oral BID (diuretic) Trupti Watkins PA-C     • levothyroxine  100 mcg Oral Early Morning Hussain Foote MD     • sacubitril-valsartan  1 tablet Oral BID Trupti Watkins PA-C     • [START ON 2/16/2023] vancomycin  1,000 mg Intravenous Once Juana Carvajal PA-C          Today, Patient Was Seen By: Terry Chamberlain MD    **Please Note: This note may have been constructed using a voice recognition system  **

## 2023-02-15 NOTE — CASE MANAGEMENT
Case Management Assessment & Discharge Planning Note    Patient name Adonay Morales  Location [facility] 421/[facility] 484-76 MRN [id number]  : 1950 Date 2/15/2023       Current Admission Date: 2023  Current Admission Diagnosis:Acute on chronic combined systolic (congestive) and diastolic (congestive) heart failure [facility])   Patient Active Problem List    Diagnosis Date Noted   • AV block, postoperative 02/15/2023   • Atrial tachycardia ([address] ) 2023   • Paroxysmal atrial flutter ([address] ) 10/05/2022   • Elevated blood sugar 05/10/2022   • Menopause 05/10/2022   • Dilated cardiomyopathy ([address] ) 2022   • Pulmonary hypertension ([address] ) 2022   • ILD (interstitial lung disease) ([facility] [address] ) 2022   • Hard of hearing 2022   • Allergies 2022   • Stage 3a chronic kidney disease ([address] ) 2022   • Acute on chronic combined systolic (congestive) and diastolic (congestive) heart failure ([address] ) 2022   • Paroxysmal atrial fibrillation ([address] ) 01/10/2022   • Dyslipidemia 10/07/2021   • Hypothyroidism 2021   • Myelodysplasia (myelodysplastic syndrome) ([facility] [address] ) 2017   • Waldenstrom macroglobulinemia ([facility] [address] ) 2017   • History of Hodgkin's lymphoma 2017   • Rash 2017      LOS (days): 1  Geometric Mean LOS (GMLOS) (days): 3 40  Days to GMLOS:2 4     OBJECTIVE:  PATIENT READMITTED TO HOSPITAL  Risk of Unplanned Readmission Score: 12 8         Current admission status: Inpatient       Preferred Pharmacy:   [facility]/pharmacy #[id number] [place] [id number] [address]  Phone: [phone] Fax: [phone]    Primary Care Provider: Jayson Cockayne, MD    Primary Insurance: THE [facility]  Secondary Insurance:     ASSESSMENT:  [address] Proxies    There are no active Health Care Proxies on file         Advance Directives  Does patient have a Health Care POA?: No  Was patient offered paperwork?: Yes (provided paperwork)  Does patient currently have a Health Care decision maker?: Yes, please see Health Care Proxy section (both daughters)  Does patient have Advance Directives?: No  Was patient offered paperwork?: Yes (provided paperwork)  Primary Contact: Nicko munoz [phone]         Readmission Root Cause  30 Day Readmission: Yes  Who directed you to return to the hospital?: Specialist  Did you understand whom to contact if you had questions or problems?: Yes  Did you get your prescriptions before you left the hospital?: Yes  Were you able to get your prescriptions filled when you left the hospital?: Yes  Did you take your medications as prescribed?: Yes  Were you able to get to your follow-up appointments?: Yes  During previous admission, was a post-acute recommendation made?: No  Patient was readmitted due to: CHF  Action Plan: pacemaker placement    Patient Information  Admitted from[de-identified] Home  Mental Status: Alert  During Assessment patient was accompanied by: Not accompanied during assessment  Assessment information provided by[de-identified] Patient  Primary Caregiver: Self  Support Systems: Self, Children, Family members  [place] of Residence: [address] [place] [address] do you live in?: [place] entry access options   Select all that apply : No steps to enter home  Type of Current Residence: [place]  In the last 12 months, was there a time when you were not able to pay the mortgage or rent on time?: No  In the last 12 months, how many places have you lived?: 2  In the last 12 months, was there a time when you did not have a steady place to sleep or slept in a shelter (including now)?: No  Homeless/housing insecurity resource given?: N/A  Living Arrangements: Lives Alone  Is patient a ?: No    Activities of Daily Living Prior to Admission  Functional Status: Independent  Completes ADLs independently?: Yes  Ambulates independently?: Yes  Does patient use assisted devices?: No  Does patient currently own DME?: No  Does patient have a history of Outpatient Therapy (PT/OT)?: No  Does the patient have a history of Short-Term Rehab?: No  Does patient currently have [address]?: No         Patient Information Continued  Income Source: Employed (part time)  Does patient have prescription coverage?: Yes  Within the past 12 months, you worried that your food would run out before you got the money to buy more : Never true  Within the past 12 months, the food you bought just didn't last and you didn't have money to get more : Never true  Food insecurity resource given?: N/A  Does patient receive dialysis treatments?: No  Does patient have a history of substance abuse?: No  Does patient have a history of Mental Health Diagnosis?: No         Means of Transportation  Means of Transport to Appts[de-identified] Drives Self  In the past 12 months, has lack of transportation kept you from medical appointments or from getting medications?: No  In the past 12 months, has lack of transportation kept you from meetings, work, or from getting things needed for daily living?: No  Was application for public transport provided?: N/A        DISCHARGE DETAILS:    Discharge planning discussed with[de-identified] patient at bedside        CM contacted family/caregiver?: No- see comments (declined)             Contacts  Patient Contacts: daughter, francois So  Relationship to Patient[de-identified] Family  Contact Method: Phone  Phone Number: [phone]  Reason/Outcome: Emergency Contact, Continuity of Care, Referral, Discharge [address]         Is the patient interested in [address] at discharge?: No              Would you like to participate in our [address] service program?  : Yes       Discharge Destination Plan[de-identified] Home  Transport at Discharge : Family     Additional Comments: No d/c needs evaluated   CM will follow

## 2023-02-15 NOTE — ASSESSMENT & PLAN NOTE
Wt Readings from Last 3 Encounters:   02/15/23 62 9 kg (138 lb 10 7 oz)   02/08/23 60 5 kg (133 lb 6 1 oz)   02/03/23 60 4 kg (133 lb 3 2 oz)     · Patient was admitted for atrial tachycardia, underwent ablation 2/6, after the procedure she became hypotensive, home Entresto, metoprolol and Lasix were held on discharge 2/8/2023  · Patient has a history of tachycardia induced cardiomyopathy, most recent echo in February 2023 showed ejection fraction of 55%  · Patient returns with shortness of breath, lower extremity swelling suspect due to holding medications    On evaluation patient is in no distress  · Chest x-ray with vascular congestion, elevated BNP  · Received Lasix 20 mg IV in the ED - I/Os not documented, per patient increased UOP  · Restarted Lasix 20 mg PO BID today  · Daily weights, intake and output

## 2023-02-15 NOTE — UTILIZATION REVIEW
Initial Clinical Review    Admission: Date/Time/Statement:   Admission Orders (From admission, onward)     Ordered        02/14/23 1659  INPATIENT ADMISSION  Once                      Orders Placed This Encounter   Procedures   • INPATIENT ADMISSION     Standing Status:   Standing     Number of Occurrences:   1     Order Specific Question:   Level of Care     Answer:   Med Surg [16]     Order Specific Question:   Estimated length of stay     Answer:   More than 2 Midnights     Order Specific Question:   Certification     Answer:   I certify that inpatient services are medically necessary for this patient for a duration of greater than two midnights  See H&P and MD Progress Notes for additional information about the patient's course of treatment  ED Arrival Information     Expected   2/14/2023     Arrival   2/14/2023 14:09    Acuity   Urgent            Means of arrival   Walk-In    Escorted by   Self    Service   Hospitalist    Admission type   Emergency            Arrival complaint   SOB (shortness of breath)           Chief Complaint   Patient presents with   • Leg Swelling     Patient had recent ablasion on 2/6/23 and provider d/c'd metoprolol, entresto and lasix  Patient started yesterday noticing increased sob with exertion, b/l lower leg swelling and fast heart rate in the middle of the night last night  Initial Presentation: 67 y o  female presented to ED as a walk-in ADMITTED INPATIENT with ACUTE on [address] [facility] and DIASTOLIC HF  Pt with c/o sob and LE edema  In ED pt bradycardia, bibasilar crackles, + LE b/l edema  H H 10 6/33 6, plts 144  EKG NSR with nonspecific ST-T changes  CXR with vascular congestion  Trop elev  BNP 45,371  Lasix 20 IV given in ED  PMHx of hypothyroidism, CHF, tachycardia induced, atrial tachycardia s/p ablation 2/6 with hypotension post procedure with bradycardic, intermittent 2 through 1 AV block and severe 1st degree block   PPM was offered at that time but pt declined and wished to continue with a loop recorder  Metoprolol, lasix and Entresto also held since 2/8  Telemetry  Cardiology consulted  Restart lasix 20 mg po BID tomorrow  Monitor I/Os, daily wts  Continue to hold metoprolol  Restart Entresto  Npo after MN, hold AM dose of Entresto for possible pacemaker tomorrow or Thursday  Continue pta levothyroxine  EP consult 2/14 -- Intermittent 2:1 block, 1st degree AVB, Atrial tachycardia, s/p RFA of left atrial inferoseptal region 12/30/2022, s/p RFA of inferoseptal region, AVNRT, anterior left atrial wall 2/6/2023  Plan: Pt has indication for permanent pacemaker  She is agreeble to undergo this procedure  D/t scheduling constraints, she will likely not have pacemaker placed tomorrow  She is more likely to get pacemaker implant on Thursday  NPO at midnight, hold her morning dose of Eliquis tomorrow     Pt has LE edema and volume overload -- ordered IV lasix to be given now and she will resume her home dose of Lasix 20 mg BID tomorrow morning  She was previously on Entresto and metoprolol  OK to resume Entresto but continue to hold AV may blockers until pacer has been placed  Date: 2/15   Day 2:  pt states she feels better today after receiving IV diuretics  Continues to have 2-1 block on telemetry at times  Plan for pacemaker placement and loop monitor explantation tomorrow  Pt requires another day of diuretics and improvement in respiratory status prior to undergoing procedure  Restarted Lasix 20 mg PO BID today  continue daily weights, I/Os  Continue to hold home beta blocker  Hold Eliquis for PPM tomorrow  Cardiac diet today, npo after MN  Continue levothyroxine SCDs         ED Triage Vitals [02/14/23 1436]   Temperature Pulse Respirations Blood Pressure SpO2   98 7 °F (37 1 °C) 69 18 170/98 97 %      Temp Source Heart Rate Source Patient Position - Orthostatic VS BP Location FiO2 (%)   Oral Monitor Sitting Left arm --      Pain Score       No Pain Wt Readings from Last 1 Encounters:   02/15/23 62 9 kg (138 lb 10 7 oz)     Additional Vital Signs:   Date/Time Temp Pulse Resp BP MAP (mmHg) SpO2 Calculated FIO2 (%) - Nasal Cannula Nasal Cannula O2 Flow Rate (L/min) O2 Device O2 Interface Device Patient Position - Orthostatic VS   02/15/23 07:15:09 98 1 °F (36 7 °C) 61 14 120/71 87 99 % -- -- -- -- --   02/15/23 02:55:03 97 3 °F (36 3 °C) Abnormal  50 Abnormal  -- 116/65 82 98 % -- -- -- -- --   02/15/23 00:04:41 -- 60 -- 119/73 88 94 % 32 3 L/min Nasal cannula -- Lying   02/14/23 22:51:12 97 8 °F (36 6 °C) 51 Abnormal  -- 117/74 88 99 % -- -- BiPAP -- --   02/14/23 2251 -- -- -- -- -- 99 % -- -- -- -- --   02/14/23 2150 -- -- -- -- -- 98 % -- -- -- Face mask --   02/14/23 2118 -- -- 23 Abnormal  -- -- -- -- -- -- -- --   02/14/23 2100 -- 76 13 117/81 96 96 % -- -- -- -- --   02/14/23 2030 97 7 °F (36 5 °C) 72 24 Abnormal  124/74 94 98 % -- -- None (Room air) -- Sitting   02/14/23 1930 -- 66 20 130/80 100 95 % -- -- None (Room air) -- Lying   02/14/23 1600 -- 62 20 133/80 102 96 % -- -- None (Room air) -- Sitting   02/14/23 1511 -- -- -- -- -- -- -- -- None (Room air) -- --   02/14/23 1436 98 7 °F (37 1 °C) 69 18 170/98 -- 97 % -- -- None (Room air) -- Sitting     Pertinent Labs/Diagnostic Test Results:   EKG 2/14: Normal sinus rhythm  Left axis deviation  Incomplete right bundle branch block  Possible Anterior infarct , age undetermined  Nonspecific ST and T wave abnormality  Prolonged QT  Abnormal ECG  When compared with ECG of 07-FEB-2023 13:18,  No significant change since prior tracing    XR chest 2 views   Final Result by Sujata Mckeon MD (02/15 1088)      Increasing pulmonary opacities  Cannot exclude an acute process such as pulmonary edema superimposed upon chronic interstitial changes  Small pleural effusions and cardiomegaly               Results from last 7 days   Lab Units 02/14/23  1510   WBC Thousand/uL 6 42   HEMOGLOBIN g/dL 10 6* HEMATOCRIT % 33 6*   PLATELETS Thousands/uL 144*   NEUTROS ABS Thousands/µL 4 36     Results from last 7 days   Lab Units 02/15/23  0019 02/14/23  1510   SODIUM mmol/L 140 138   POTASSIUM mmol/L 4 3 3 7   CHLORIDE mmol/L 108 109*   CO2 mmol/L 22 25   ANION GAP mmol/L 10 4   BUN mg/dL 11 9   CREATININE mg/dL 0 91 0 93   EGFR ml/min/1 73sq m 62 61   CALCIUM mg/dL 9 3 9 4   MAGNESIUM mg/dL 1 9  --      Results from last 7 days   Lab Units 02/14/23  1510   AST U/L 42   ALT U/L 35   ALK PHOS U/L 105   TOTAL PROTEIN g/dL 6 8   ALBUMIN g/dL 3 7   TOTAL BILIRUBIN mg/dL 0 57     Results from last 7 days   Lab Units 02/15/23  0019 02/14/23  1510   GLUCOSE RANDOM mg/dL 134 126     Results from last 7 days   Lab Units 02/14/23  1941 02/14/23  1815 02/14/23  1510   HS TNI 0HR ng/L  --   --  55*   HS TNI 2HR ng/L  --  69*  --    HSTNI D2 ng/L  --  14  --    HS TNI 4HR ng/L 85*  --   --    HSTNI D4 ng/L 30*  --   --      Results from last 7 days   Lab Units 02/14/23  1510   NT-PRO BNP pg/mL 45,371*         ED Treatment:   Medication Administration from 02/14/2023 1348 to 02/14/2023 2144       Date/Time Order Dose Route Action     02/14/2023 1646 EST furosemide (LASIX) injection 20 mg 20 mg Intravenous Given     02/14/2023 1646 EST potassium chloride (K-DUR,KLOR-CON) CR tablet 40 mEq 40 mEq Oral Given     02/14/2023 1944 EST apixaban (ELIQUIS) tablet 5 mg 5 mg Oral Given     02/14/2023 1944 EST sacubitril-valsartan (ENTRESTO) 24-26 MG per tablet 1 tablet 1 tablet Oral Given       Past Medical History:   Diagnosis Date   • Abnormal CXR 1/14/2022   • Cancer [facility])    • Disease of thyroid gland    • Dysuria 12/16/2021   • Encounter for support and coordination of transition of care 5/6/2021     Present on Admission:  • Acute on chronic combined systolic (congestive) and diastolic (congestive) heart failure (HCC)  • Atrial tachycardia (HCC)  • Hypothyroidism      Admitting Diagnosis: A-fib (HCC) [I48 91]  SOB (shortness of breath) [R06 02]  Heart failure ([facility] [address] ) [I50 9]  Age/Sex: 67 y o  female  Admission Orders:  Scheduled Medications:  furosemide, 20 mg, Oral, BID (diuretic)  levothyroxine, 100 mcg, Oral, Early Morning  sacubitril-valsartan, 1 tablet, Oral, BID    PRN Meds:  acetaminophen, 650 mg, Oral, Q6H PRN        Network Utilization Review Department  ATTENTION: Please call with any questions or concerns to [phone] and carefully listen to the prompts so that you are directed to the right person  All voicemails are confidential   [place] all requests for admission clinical reviews, approved or denied determinations and any other requests to dedicated fax number below belonging to the campus where the patient is receiving treatment   List of dedicated fax numbers for the Facilities:  [address] DENIALS (Administrative/Medical Necessity) [phone]   [address] (Maternity/NICU/Pediatrics) [phone]   [address] [place] [phone]   [place] [address] [phone]   [facility] [address] [place] [address] Elen Drake [facility] 28 [phone]   [address] [place] Thaddeus Lechuga [facility] 134 815 [facility] [phone]

## 2023-02-15 NOTE — PROGRESS NOTES
Progress Note - Electrophysiology-Cardiology (EP)   Autumn Mccauley 67 y o  female MRN: [id number]  Unit/Bed#: [facility] 421-01 Encounter: [id number]      Assessment/Plan:  1  Intermittent 2:1 block  2  1st degree AVB  3  Atrial tachycardia    a  S/p RFA of left atrial inferoseptal region 12/30/2022  b  S/p RFA of inferoseptal region, AVNRT, anterior left atrial wall 2/6/2023  4  History of tachy-induced cardiomyopathy  a  2/2023 TTE - EF 55%  b  1/2022 TTE - EF 25%  1  Presence of loop recorder, placed 12/30/22  2  Hypothyroidism      Patient requires another day of diuretics and improvement in respiratory status prior to undergoing procedure  Will plan for pacemaker implant tomorrow  Continue to hold home beta blocker  She will be NPO at midnight  Hold morning dose of Eliquis  Antibiotics will be given in the EP lab at time of procedure  Subjective/Objective     Subjective: Upon evaluation, patient is resting in bed  She remains on supplemental oxygen  She states her breathing has improved since yesterday  Patient denies chest pain/heaviness/tightness/pressure, palpitations, presyncope, syncope or N/V  Telemetry shows HR from 40-60s         Objective:     Vitals: /72   Pulse (!) 47   Temp 98 °F (36 7 °C)   Resp 16   Wt 62 9 kg (138 lb 10 7 oz)   SpO2 98%   BMI 24 56 kg/m²   Vitals:    02/15/23 0535   Weight: 62 9 kg (138 lb 10 7 oz)     Orthostatic Blood Pressures    Flowsheet Row Most Recent Value   Blood Pressure 115/72 filed at 02/15/2023 1100   Patient Position - Orthostatic VS Lying filed at 02/15/2023 0004            Intake/Output Summary (Last 24 hours) at 2/15/2023 1131  Last data filed at 2/15/2023 0859  Gross per 24 hour   Intake 118 ml   Output 300 ml   Net -182 ml       Invasive Devices     Peripheral Intravenous Line  Duration           Peripheral IV 02/14/23 Right Antecubital <1 day                            Scheduled Meds:  Current Facility-Administered Medications   Medication Dose Route Frequency Provider Last Rate   • acetaminophen  650 mg Oral Q6H PRN Flori Sahu MD     • apixaban  5 mg Oral BID Loly Sanders PA-C     • furosemide  20 mg Intravenous Once AUDREY Palacios PA-C     • furosemide  20 mg Oral BID (diuretic) Trupti Watkins PA-C     • levothyroxine  100 mcg Oral Early Morning Flori Sahu MD     • potassium chloride  20 mEq Oral Once [employer], PA-C     • sacubitril-valsartan  1 tablet Oral BID ADUREY Palacios PA-C     • [START ON 2/16/2023] vancomycin  1,000 mg Intravenous Once Juana Carvajal PA-C       Continuous Infusions:   PRN Meds: •  acetaminophen    Review of Systems:  ROS as noted above, otherwise 12 point review of systems was performed and is negative  Physical Exam:   Physical Exam  Vitals reviewed  Constitutional:       General: She is not in acute distress  Appearance: She is not ill-appearing or diaphoretic  HENT:      Head: Normocephalic and atraumatic  Right Ear: External ear normal       Left Ear: External ear normal       Nose: Nose normal    Eyes:      General:         Right eye: No discharge  Left eye: No discharge  Cardiovascular:      Rate and Rhythm: Regular rhythm  Bradycardia present  Heart sounds: No murmur heard  No friction rub  Pulmonary:      Effort: Pulmonary effort is normal       Breath sounds: Normal breath sounds  No wheezing, rhonchi or rales  Abdominal:      General: There is no distension  Palpations: Abdomen is soft  Tenderness: There is no abdominal tenderness  Musculoskeletal:         General: No deformity or signs of injury  Cervical back: No rigidity  No muscular tenderness  Right lower leg: No edema  Left lower leg: No edema  Skin:     General: Skin is warm and dry  Capillary Refill: Capillary refill takes less than 2 seconds  Coloration: Skin is not jaundiced or pale  Neurological:      General: No focal deficit present  Mental Status: She is alert and oriented to person, place, and time  Mental status is at baseline  Psychiatric:         Mood and Affect: Mood normal          Behavior: Behavior normal          Thought Content: Thought content normal                      Lab Results: I have personally reviewed pertinent lab results  Results from last 7 days   Lab Units 02/14/23  1510   WBC Thousand/uL 6 42   HEMOGLOBIN g/dL 10 6*   HEMATOCRIT % 33 6*   PLATELETS Thousands/uL 144*     Results from last 7 days   Lab Units 02/15/23  0019 02/14/23  1510   POTASSIUM mmol/L 4 3 3 7   CHLORIDE mmol/L 108 109*   CO2 mmol/L 22 25   BUN mg/dL 11 9   CREATININE mg/dL 0 91 0 93   CALCIUM mg/dL 9 3 9 4         Results from last 7 days   Lab Units 02/15/23  0019   MAGNESIUM mg/dL 1 9         Imaging: I have personally reviewed pertinent films in PACS  ECHO: 2/4/23  •  Left Ventricle: Left ventricular cavity size is normal  Wall thickness is mildly increased  The left ventricular ejection fraction is 55%  Systolic function is normal  Wall motion is normal   •  IVS: There is abnormal septal motion consistent with bundle branch block  There is sigmoid appearance of the septum  •  Right Ventricle: Right ventricular cavity size is normal  Systolic function is normal   •  Aortic Valve: There is mild regurgitation  •  Mitral Valve: There is mild regurgitation  •  Tricuspid Valve: There is mild regurgitation  •  Aorta: The aortic root is moderately dilated    •  Pulmonary Artery: The pulmonary artery systolic pressure is moderately increased            VTE Pharmacologic Prophylaxis: Eliquis  VTE Mechanical Prophylaxis: sequential compression device

## 2023-02-15 NOTE — UTILIZATION REVIEW
NOTIFICATION OF INPATIENT ADMISSION   AUTHORIZATION REQUEST   SERVICING FACILITY:   [facility]  Address: [address] [place] [address]  Tax ID: [id number]  NPI: [id number] ATTENDING PROVIDER:  Attending Name and NPI#: Ravi Zhu [[id number]]  Address: [address] [place] [id number]  Phone: [phone]   ADMISSION INFORMATION:  Place of Service: Inpatient [facility]  Place of Service Code: 21  Inpatient Admission Date/Time: 2/14/23  5:00 PM  Discharge Date/Time: No discharge date for patient encounter  Admitting Diagnosis Code/Description:  A-fib ([address] ) [I48 91]  SOB (shortness of breath) [R06 02]  Heart failure ([address] ) [I50 9]     UTILIZATION REVIEW CONTACT:  Randal Templeton Utilization   Network Utilization Review Department  Phone: [phone]  Fax: [phone]  Email: [email or web address]  Contact for approvals/pending authorizations, clinical reviews, and discharge  PHYSICIAN ADVISORY SERVICES:  Medical Necessity Denial & [employer] Review  Phone: [phone]  Fax: [phone]  Email: [email or web address]

## 2023-02-16 ENCOUNTER — ANESTHESIA EVENT (INPATIENT)
Dept: NON INVASIVE DIAGNOSTICS | Facility: HOSPITAL | Age: 73
End: 2023-02-16

## 2023-02-16 ENCOUNTER — ANESTHESIA (INPATIENT)
Dept: NON INVASIVE DIAGNOSTICS | Facility: HOSPITAL | Age: 73
End: 2023-02-16

## 2023-02-16 ENCOUNTER — APPOINTMENT (INPATIENT)
Dept: RADIOLOGY | Facility: HOSPITAL | Age: 73
End: 2023-02-16

## 2023-02-16 LAB
ANION GAP SERPL CALCULATED.3IONS-SCNC: 4 MMOL/L (ref 4–13)
BASOPHILS # BLD AUTO: 0.03 THOUSANDS/ÂΜL (ref 0–0.1)
BASOPHILS NFR BLD AUTO: 1 % (ref 0–1)
BUN SERPL-MCNC: 15 MG/DL (ref 5–25)
CALCIUM SERPL-MCNC: 8.9 MG/DL (ref 8.3–10.1)
CHLORIDE SERPL-SCNC: 107 MMOL/L (ref 96–108)
CO2 SERPL-SCNC: 29 MMOL/L (ref 21–32)
CREAT SERPL-MCNC: 1.08 MG/DL (ref 0.6–1.3)
EOSINOPHIL # BLD AUTO: 0.07 THOUSAND/ÂΜL (ref 0–0.61)
EOSINOPHIL NFR BLD AUTO: 1 % (ref 0–6)
ERYTHROCYTE [DISTWIDTH] IN BLOOD BY AUTOMATED COUNT: 14.7 % (ref 11.6–15.1)
GFR SERPL CREATININE-BSD FRML MDRD: 51 ML/MIN/1.73SQ M
GLUCOSE SERPL-MCNC: 108 MG/DL (ref 65–140)
HCT VFR BLD AUTO: 32.6 % (ref 34.8–46.1)
HGB BLD-MCNC: 10.1 G/DL (ref 11.5–15.4)
IMM GRANULOCYTES # BLD AUTO: 0.03 THOUSAND/UL (ref 0–0.2)
IMM GRANULOCYTES NFR BLD AUTO: 1 % (ref 0–2)
INR PPP: 1.82 (ref 0.84–1.19)
LYMPHOCYTES # BLD AUTO: 1.65 THOUSANDS/ÂΜL (ref 0.6–4.47)
LYMPHOCYTES NFR BLD AUTO: 30 % (ref 14–44)
MCH RBC QN AUTO: 29.4 PG (ref 26.8–34.3)
MCHC RBC AUTO-ENTMCNC: 31 G/DL (ref 31.4–37.4)
MCV RBC AUTO: 95 FL (ref 82–98)
MONOCYTES # BLD AUTO: 0.54 THOUSAND/ÂΜL (ref 0.17–1.22)
MONOCYTES NFR BLD AUTO: 10 % (ref 4–12)
NEUTROPHILS # BLD AUTO: 3.26 THOUSANDS/ÂΜL (ref 1.85–7.62)
NEUTS SEG NFR BLD AUTO: 57 % (ref 43–75)
NRBC BLD AUTO-RTO: 0 /100 WBCS
PLATELET # BLD AUTO: 126 THOUSANDS/UL (ref 149–390)
PMV BLD AUTO: 12.3 FL (ref 8.9–12.7)
POTASSIUM SERPL-SCNC: 4 MMOL/L (ref 3.5–5.3)
PROTHROMBIN TIME: 21.3 SECONDS (ref 11.6–14.5)
RBC # BLD AUTO: 3.44 MILLION/UL (ref 3.81–5.12)
SODIUM SERPL-SCNC: 140 MMOL/L (ref 135–147)
WBC # BLD AUTO: 5.58 THOUSAND/UL (ref 4.31–10.16)

## 2023-02-16 DEVICE — LEAD 5076-52 MRI US RCMCRD
Type: IMPLANTABLE DEVICE | Site: HEART | Status: FUNCTIONAL
Brand: CAPSUREFIX NOVUS MRI™ SURESCAN®

## 2023-02-16 DEVICE — ENVELOPE CMRM6122 ABSORB MED MR
Type: IMPLANTABLE DEVICE | Site: CHEST | Status: FUNCTIONAL
Brand: TYRX™

## 2023-02-16 DEVICE — IPG W1DR01 AZURE XT DR MRI USA
Type: IMPLANTABLE DEVICE | Site: CHEST | Status: FUNCTIONAL
Brand: AZURE™ XT DR MRI SURESCAN™

## 2023-02-16 DEVICE — LEAD 383069 MRI US
Type: IMPLANTABLE DEVICE | Site: HEART | Status: FUNCTIONAL
Brand: SELECTSECURE™ MRI SURESCAN™

## 2023-02-16 RX ORDER — LIDOCAINE HYDROCHLORIDE 10 MG/ML
INJECTION, SOLUTION EPIDURAL; INFILTRATION; INTRACAUDAL; PERINEURAL CODE/TRAUMA/SEDATION MEDICATION
Status: DISCONTINUED | OUTPATIENT
Start: 2023-02-16 | End: 2023-02-16 | Stop reason: HOSPADM

## 2023-02-16 RX ORDER — METOPROLOL SUCCINATE 25 MG/1
25 TABLET, EXTENDED RELEASE ORAL DAILY
Status: DISCONTINUED | OUTPATIENT
Start: 2023-02-16 | End: 2023-02-17 | Stop reason: HOSPADM

## 2023-02-16 RX ORDER — LIDOCAINE HYDROCHLORIDE 10 MG/ML
INJECTION, SOLUTION EPIDURAL; INFILTRATION; INTRACAUDAL; PERINEURAL AS NEEDED
Status: DISCONTINUED | OUTPATIENT
Start: 2023-02-16 | End: 2023-02-16

## 2023-02-16 RX ORDER — MIDAZOLAM HYDROCHLORIDE 2 MG/2ML
INJECTION, SOLUTION INTRAMUSCULAR; INTRAVENOUS AS NEEDED
Status: DISCONTINUED | OUTPATIENT
Start: 2023-02-16 | End: 2023-02-16

## 2023-02-16 RX ORDER — ALPRAZOLAM 0.25 MG/1
0.25 TABLET ORAL
Status: DISCONTINUED | OUTPATIENT
Start: 2023-02-16 | End: 2023-02-17 | Stop reason: HOSPADM

## 2023-02-16 RX ORDER — SODIUM CHLORIDE 9 MG/ML
INJECTION, SOLUTION INTRAVENOUS CONTINUOUS PRN
Status: DISCONTINUED | OUTPATIENT
Start: 2023-02-16 | End: 2023-02-16

## 2023-02-16 RX ORDER — PROPOFOL 10 MG/ML
INJECTION, EMULSION INTRAVENOUS CONTINUOUS PRN
Status: DISCONTINUED | OUTPATIENT
Start: 2023-02-16 | End: 2023-02-16

## 2023-02-16 RX ORDER — GENTAMICIN SULFATE 40 MG/ML
INJECTION, SOLUTION INTRAMUSCULAR; INTRAVENOUS CODE/TRAUMA/SEDATION MEDICATION
Status: DISCONTINUED | OUTPATIENT
Start: 2023-02-16 | End: 2023-02-16 | Stop reason: HOSPADM

## 2023-02-16 RX ADMIN — FUROSEMIDE 20 MG: 20 TABLET ORAL at 11:44

## 2023-02-16 RX ADMIN — FUROSEMIDE 20 MG: 20 TABLET ORAL at 16:58

## 2023-02-16 RX ADMIN — LIDOCAINE HYDROCHLORIDE 20 MG: 10 INJECTION, SOLUTION EPIDURAL; INFILTRATION; INTRACAUDAL; PERINEURAL at 09:14

## 2023-02-16 RX ADMIN — ALPRAZOLAM 0.25 MG: 0.25 TABLET ORAL at 01:27

## 2023-02-16 RX ADMIN — APIXABAN 5 MG: 5 TABLET, FILM COATED ORAL at 17:06

## 2023-02-16 RX ADMIN — SODIUM CHLORIDE: 9 INJECTION, SOLUTION INTRAVENOUS at 08:56

## 2023-02-16 RX ADMIN — VANCOMYCIN HYDROCHLORIDE 1000 MG: 1 INJECTION, SOLUTION INTRAVENOUS at 09:02

## 2023-02-16 RX ADMIN — PROPOFOL 70 MCG/KG/MIN: 10 INJECTION, EMULSION INTRAVENOUS at 09:14

## 2023-02-16 RX ADMIN — MIDAZOLAM 1 MG: 1 INJECTION INTRAMUSCULAR; INTRAVENOUS at 09:02

## 2023-02-16 RX ADMIN — LEVOTHYROXINE SODIUM 100 MCG: 100 TABLET ORAL at 05:21

## 2023-02-16 RX ADMIN — SACUBITRIL AND VALSARTAN 1 TABLET: 24; 26 TABLET, FILM COATED ORAL at 17:06

## 2023-02-16 RX ADMIN — PHENYLEPHRINE HYDROCHLORIDE 20 MCG/MIN: 10 INJECTION INTRAVENOUS at 09:27

## 2023-02-16 RX ADMIN — ALPRAZOLAM 0.25 MG: 0.25 TABLET ORAL at 22:02

## 2023-02-16 NOTE — DISCHARGE INSTR - AVS FIRST PAGE
LOOP RECORDER REMOVAL INSTRUCTIONS:  Do not use lotions/powders/creams on incision  Remove outer bandage 48 hours after procedure - if present, leave suture in place and they will be removed at 2 week follow up appointment  Please keep incision dry for the first first week - either keeping incision out of direct shower water or placing over the counter waterproof bandages over top when showering  Please call the office [phone] if you notice redness, swelling, bleeding, or drainage from incision or if you develop fevers  PACEMAKER INSTRUCTIONS:  Please refer to post pacemaker implantation discharge instructions and restrictions and your pacemaker booklet/temporary card  Keep incision dry for one week  Do not use lotions/powders/creams on incision  Leave outer bandage in place for 1 week - it is water proof, and as long as it is fully adhered to your skin you may shower with it  If it appears as though the bandage is coming off and/or there is any communication to the area of device incision, please then keep the whole area dry for the remaining week  After 1 week, please remove by pulling all edges away from the center of the bandage  No overhead reaching/pushing/pulling/lifting greater than 5-10lbs with left arm for six weeks  Please call the office if you notice redness, swelling, bleeding, or drainage from incision or if you develop fevers  AFTER PACEMAKER CARE:    If you have any questions, please call [phone] to speak with a nurse (8:30am-4pm, or [phone] after hours)  For appointments, please call [phone]  WHAT YOU SHOULD KNOW:   A pacemaker is a small, battery-powered device that is placed under your skin in your upper chest area with wires placed through a vein that lead directly into the heart  It helps regulate your heart rate and prevent your heart from beating too slowly  AFTER YOU LEAVE:     Medicines:     Pain medicine:   You may need medicine to take away or decrease pain  Learn how to take your medicine  Ask what medicine and how much you should take  Be sure you know how, when, and how often to take it  Usually Over the counter pain medicine is sufficient to control pain (Acetominophen or Ibuprofen) Ask your doctor if you may take these  If this does not control your pain, narcotic pain killers may be prescribed, please call if you need prescription  Do not wait until the pain is severe before you take your medicine  Tell caregivers if your pain does not decrease  Pain medicine can make you dizzy or sleepy  Prevent falls by calling someone when you get out of bed or if you need help  Take your medicine as directed  Call your healthcare provider if you think your medicine is not helping or if you have side effects  Tell him if you are allergic to any medicine  Follow up with your cardiologist after your procedure: You will need a follow-up visit approximately 2 weeks after you leave the hospital  Your cardiologist will check your wound and make sure that your pacemaker is working correctly  Follow the instructions to check your pacemaker: Your cardiologist or primary healthcare provider will check your pacemaker and the battery regularly  He will use a computer to check your pacemaker over the telephone or wireless device which will be given to you  Pacemaker batteries usually last 8 to 10 years  The pacemaker unit will be replaced when the battery gets low  This is a simpler procedure than the original one to implant your pacemaker  Wound care:  Keep your incision dry for one week  Do not use lotions/powders/creams on incision  Leave outer bandage in place for 1 week - it is water proof, and as long as it is fully adhered to your skin you may shower with it    If it appears as though the bandage is coming off and/or there is any communication to the area of device incision, please then keep the whole area dry for the remaining week  After 1 week, please remove by pulling all edges away from the center of the bandage  Please call the office if you notice redness, swelling, bleeding, or drainage from incision or if you develop fevers  Activity:   Arm movement and lifting:  Be careful using the arm on the side of your pacemaker  Do not move your arm for the first 24 hours after your procedure  Do not  lift your arm above your shoulder or lift more than 10 pounds for one month after your procedure  Avoid pushing, pulling, or repetitive arm movements for one month  This helps the leads stay in place and helps your wound heal  Ask your caregiver when you can drive after your procedure  You may move your arm side to side without lifting above your shoulder, and do not need to wear a sling at home  Driving: you are ok to drive 48 hours after pacemaker is implanted   Sports:  Ask your caregiver when it is okay to play tennis, golf, basketball, or any sport that requires you to lift your arms  Do not play full contact sports, such as football, that could damage your pacemaker  Ask your cardiologist or primary healthcare provider how much and what kinds of physical activity are safe for you  Living with a pacemaker:   Tell all caregivers you have a pacemaker: This includes surgeons, radiologists, and medical technicians  You may want to wear a medical alert ID bracelet or necklace that states that you have a pacemaker  Carry your pacemaker ID card: Make sure you receive a pacemaker ID card  Carry it with you at all times  It lists important information about your pacemaker  Show it to airport security if you travel  Avoid electrical interference:  Avoid welding equipment and other equipment with large magnets or electric fields  These things could interfere with how your pacemaker works  Use your cell phone on the ear opposite from your pacemaker   Do not carry your cell phone in your shirt pocket over your chest      Some Pacemakers are MRI safe  Ask you doctor if it is safe to proceed with MRI and let the radiologist and staff know you have a pacemaker  Do not touch the skin around your pacemaker: This can cause damage to the lead wires or move the pacemaker unit from where it should be  Contact your cardiologist or primary healthcare provider if:   The area around your pacemaker has increasing amount of pain after surgery  The pain should improve over first few days after implantation  The skin around your stitches has increasing redness, swelling, or has drainage  This may mean that you have an infection  You have a fever  You have chills, a cough, and feel weak or achy  These are also signs of infection  Your feet or ankles are more swollen than your baseline  Your Heart rate is less than 50 beats per minute     Seek care immediately if:   Your bandage becomes soaked with blood  Your pacemaker is swelling rapidly    Your stitches open up  You feel your heart suddenly beating very slowly or quickly  You become too weak or dizzy to stand, or you pass out  Your arm or leg feels warm, tender, and painful  It may look swollen and red  You have chest pain that does not go away with rest or medicine  You feel lightheaded, short of breath, and have chest pain  You cough up blood  © 2014 [address] is for End User's use only and may not be sold, redistributed or otherwise used for commercial purposes  All illustrations and images included in CareNotes® are the copyrighted property of [employer]  or David James  The above information is an  only  It is not intended as medical advice for individual conditions or treatments  Talk to your doctor, nurse or pharmacist before following any medical regimen to see if it is safe and effective for you

## 2023-02-16 NOTE — ANESTHESIA PREPROCEDURE EVALUATION
Procedure:  Cardiac pacer implant (Chest)  CARDIAC LOOP RECORDER EXPLANT (Chest)    Relevant Problems   CARDIO   (+) AV block, postoperative   (+) Atrial tachycardia (HCC)   (+) Paroxysmal atrial fibrillation (HCC)   (+) Paroxysmal atrial flutter (HCC)      ENDO   (+) Hypothyroidism      /RENAL   (+) Stage 3a chronic kidney disease (HCC)      HEMATOLOGY   (+) Waldenstrom macroglobulinemia (HCC)      NEURO/PSYCH   (+) History of Hodgkin's lymphoma      Cardiovascular and Mediastinum   (+) Dilated cardiomyopathy (HCC)      Respiratory   (+) ILD (interstitial lung disease) (HCC)      Other   (+) Myelodysplasia (myelodysplastic syndrome) (HCC)    •  Left Ventricle: Left ventricular cavity size is normal  Wall thickness    is mildly increased  The left ventricular ejection fraction is 55%  Systolic function is normal  Wall motion is normal    •  IVS: There is abnormal septal motion consistent with bundle branch    block  There is sigmoid appearance of the septum  •  Right Ventricle: Right ventricular cavity size is normal  Systolic    function is normal    •  Aortic Valve: There is mild regurgitation  •  Mitral Valve: There is mild regurgitation  •  Tricuspid Valve: There is mild regurgitation  •  Aorta: The aortic root is moderately dilated  •  Pulmonary Artery: The pulmonary artery systolic pressure is moderately   increased  Physical Exam    Airway    Mallampati score: II  TM Distance: >3 FB  Neck ROM: full     Dental       Cardiovascular      Pulmonary      Other Findings        Anesthesia Plan  ASA Score- 3     Anesthesia Type- IV sedation with anesthesia with ASA Monitors  Additional Monitors:   Airway Plan:           Plan Factors-    Chart reviewed  Induction- intravenous  Postoperative Plan-     Informed Consent- Anesthetic plan and risks discussed with patient  I personally reviewed this patient with the CRNA   Discussed and agreed on the Anesthesia Plan with the CRNA  Radha Cushing

## 2023-02-16 NOTE — ASSESSMENT & PLAN NOTE
Wt Readings from Last 3 Encounters:   02/16/23 62 4 kg (137 lb 8 oz)   02/08/23 60 5 kg (133 lb 6 1 oz)   02/03/23 60 4 kg (133 lb 3 2 oz)     · Patient was admitted for atrial tachycardia, underwent ablation 2/6, after the procedure she became hypotensive, home Entresto, metoprolol and Lasix were held on discharge 2/8/2023  · Patient has a history of tachycardia induced cardiomyopathy, most recent echo in February 2023 showed ejection fraction of 55%  · Patient returns with shortness of breath, lower extremity swelling suspect due to holding medications    On evaluation patient is in no distress  · Chest x-ray with vascular congestion, elevated BNP  · S/p Lasix 20 mg IV in the ED  · Continue Lasix 20 mg PO BID   · Daily weights, intake and output

## 2023-02-16 NOTE — ASSESSMENT & PLAN NOTE
Bradycardia and AV block post ablation  Patient refused PPM during last hospitalization  Now presenting with acute CHF  Loop interrogation showed bradycardic events in the 40s, 221 AV block      · EP consulted, appreciate recs  · Status post PPM placement today

## 2023-02-16 NOTE — ASSESSMENT & PLAN NOTE
· History of atrial tachycardia, status post prior ablation and ablation 2/6/2023  After the procedure noted to have bradycardia, intermittent 2 through 1 AV block and severe first-degree block  Pacemaker was offered but patient wanted to monitor with loop recorder  Due to bradycardia and hypotension metoprolol, Lasix and Entresto were held for 1 week she was scheduled for loop recorder interrogation, BP check and EKG on 2/15  Patient called EP office complaining of lower extremity edema and shortness of breath  Loop interrogation showed bradycardic events in the 40s, 221 AV block    · Restart metoprolol and Entresto today  · Status post PPM today  · Hold Eliquis  · Telemetry

## 2023-02-16 NOTE — ANESTHESIA POSTPROCEDURE EVALUATION
Post-Op Assessment Note    CV Status:  Stable    Pain management: adequate     Mental Status:  Awake and sleepy   Hydration Status:  Euvolemic   PONV Controlled:  Controlled   Airway Patency:  Patent      Post Op Vitals Reviewed: Yes      Staff: Anesthesiologist, CRNA         There were no known notable events for this encounter      /75 (02/16/23 1125)    Temp      Pulse 98 (02/16/23 1125)   Resp  16   SpO2 93 % (02/16/23 1125)

## 2023-02-16 NOTE — PROGRESS NOTES
[facility]  Progress Note - Edward Setter 1950, 67 y o  female MRN: [id number]  Unit/Bed#: [facility] 421-01 Encounter: [id number]  Primary Care Provider: Alejandra Estevez MD   Date and time admitted to hospital: 2/14/2023  2:41 PM    AV block, postoperative  Assessment & Plan  Bradycardia and AV block post ablation  Patient refused PPM during last hospitalization  Now presenting with acute CHF  Loop interrogation showed bradycardic events in the 40s, 221 AV block  · EP consulted, appreciate recs  · Status post PPM placement today    Atrial tachycardia ([address] )  Assessment & Plan  · History of atrial tachycardia, status post prior ablation and ablation 2/6/2023  After the procedure noted to have bradycardia, intermittent 2 through 1 AV block and severe first-degree block  Pacemaker was offered but patient wanted to monitor with loop recorder  Due to bradycardia and hypotension metoprolol, Lasix and Entresto were held for 1 week she was scheduled for loop recorder interrogation, BP check and EKG on 2/15  Patient called EP office complaining of lower extremity edema and shortness of breath  Loop interrogation showed bradycardic events in the 40s, 221 AV block    · Restart metoprolol and Entresto today  · Status post PPM today  · Hold Eliquis  · Telemetry    Hypothyroidism  Assessment & Plan  · Continue levothyroxine    * Acute on chronic combined systolic (congestive) and diastolic (congestive) heart failure (HCC)  Assessment & Plan  Wt Readings from Last 3 Encounters:   02/16/23 62 4 kg (137 lb 8 oz)   02/08/23 60 5 kg (133 lb 6 1 oz)   02/03/23 60 4 kg (133 lb 3 2 oz)     · Patient was admitted for atrial tachycardia, underwent ablation 2/6, after the procedure she became hypotensive, home Entresto, metoprolol and Lasix were held on discharge 2/8/2023  · Patient has a history of tachycardia induced cardiomyopathy, most recent echo in February 2023 showed ejection fraction of 55%  · Patient returns with shortness of breath, lower extremity swelling suspect due to holding medications  On evaluation patient is in no distress  · Chest x-ray with vascular congestion, elevated BNP  · S/p Lasix 20 mg IV in the ED  · Continue Lasix 20 mg PO BID   · Daily weights, intake and output          VTE Pharmacologic Prophylaxis:   Eliquis    Patient Centered Rounds: I performed bedside rounds with nursing staff today  Discussions with Specialists or Other Care Team Provider: Electrophysiology    Education and Discussions with Family / Patient: Patient declined call to   Total Time Spent on Date of Encounter in care of patient: 35 minutes This time was spent on one or more of the following: performing physical exam; counseling and coordination of care; obtaining or reviewing history; documenting in the medical record; reviewing/ordering tests, medications or procedures; communicating with other healthcare professionals and discussing with patient's family/caregivers  Current Length of Stay: 2 day(s)  Current Patient Status: Inpatient   Certification Statement: The patient will continue to require additional inpatient hospital stay due to Monitoring after pacemaker placement  Discharge Plan: Anticipate discharge tomorrow to home  Code Status: Level 1 - Full Code    Subjective:   Patient reports doing well after pacemaker placement today  Patient reports soreness at the site of implantation  Denies chest pain, shortness of breath, or any other symptoms  All questions and concerns addressed  Objective:     Vitals:   Temp (24hrs), Av 7 °F (36 5 °C), Min:97 4 °F (36 3 °C), Max:98 °F (36 7 °C)    Temp:  [97 4 °F (36 3 °C)-98 °F (36 7 °C)] 97 5 °F (36 4 °C)  HR:  [] 94  Resp:  [16-24] 20  BP: (104-125)/(65-87) 108/72  SpO2:  [93 %-100 %] 99 %  Body mass index is 24 36 kg/m²  Input and Output Summary (last 24 hours):      Intake/Output Summary (Last 24 hours) at 2/16/2023 1423  Last data filed at 2/16/2023 1053  Gross per 24 hour   Intake 200 ml   Output 1200 ml   Net -1000 ml       Physical Exam:   Physical Exam  Vitals and nursing note reviewed  Constitutional:       General: She is not in acute distress  Appearance: She is well-developed  HENT:      Head: Normocephalic and atraumatic  Eyes:      Conjunctiva/sclera: Conjunctivae normal    Cardiovascular:      Rate and Rhythm: Normal rate and regular rhythm  Heart sounds: No murmur heard  Pulmonary:      Effort: Pulmonary effort is normal  No respiratory distress  Breath sounds: Normal breath sounds  Comments: On 2 L nasal cannula  Abdominal:      Palpations: Abdomen is soft  Tenderness: There is no abdominal tenderness  Musculoskeletal:         General: No swelling  Cervical back: Neck supple  Right lower leg: No edema  Left lower leg: No edema  Skin:     General: Skin is warm and dry  Capillary Refill: Capillary refill takes less than 2 seconds  Neurological:      Mental Status: She is alert and oriented to person, place, and time     Psychiatric:         Mood and Affect: Mood normal          Additional Data:     Labs:  Results from last 7 days   Lab Units 02/16/23  0458   WBC Thousand/uL 5 58   HEMOGLOBIN g/dL 10 1*   HEMATOCRIT % 32 6*   PLATELETS Thousands/uL 126*   NEUTROS PCT % 57   LYMPHS PCT % 30   MONOS PCT % 10   EOS PCT % 1     Results from last 7 days   Lab Units 02/16/23  0458 02/15/23  0019 02/14/23  1510   SODIUM mmol/L 140   < > 138   POTASSIUM mmol/L 4 0   < > 3 7   CHLORIDE mmol/L 107   < > 109*   CO2 mmol/L 29   < > 25   BUN mg/dL 15   < > 9   CREATININE mg/dL 1 08   < > 0 93   ANION GAP mmol/L 4   < > 4   CALCIUM mg/dL 8 9   < > 9 4   ALBUMIN g/dL  --   --  3 7   TOTAL BILIRUBIN mg/dL  --   --  0 57   ALK PHOS U/L  --   --  105   ALT U/L  --   --  35   AST U/L  --   --  42   GLUCOSE RANDOM mg/dL 108   < > 126    < > = values in this interval not displayed  Results from last 7 days   Lab Units 02/16/23  0458   INR  1 82*                   Lines/Drains:  Invasive Devices     Peripheral Intravenous Line  Duration           Peripheral IV 02/14/23 Right Antecubital 1 day    Peripheral IV 02/16/23 Left Forearm <1 day                  Telemetry:  Telemetry Orders (From admission, onward)             48 Hour Telemetry Monitoring  Continuous x 48 hours        References:    Telemetry Guidelines   Question:  Reason for 48 Hour Telemetry  Answer:  Arrhythmias Requiring Medical Therapy (eg  SVT, Vtach/fib, Bradycardia, Uncontrolled A-fib)                 Telemetry Reviewed: Normal Sinus Rhythm  Indication for Continued Telemetry Use: Post op PPM or ICD             Imaging: No pertinent imaging reviewed  Recent Cultures (last 7 days):         Last 24 Hours Medication List:   Current Facility-Administered Medications   Medication Dose Route Frequency Provider Last Rate   • acetaminophen  650 mg Oral Q6H PRN Amanda Young MD     • ALPRAZolam  0 25 mg Oral HS PRN Lula Roe PA-C     • apixaban  5 mg Oral BID Uri Rascon PA-C     • furosemide  20 mg Oral BID (diuretic) Jen Pinon PA-C     • levothyroxine  100 mcg Oral Early Morning Amanda Young MD     • metoprolol succinate  25 mg Oral Daily Uri Rascon PA-C     • sacubitril-valsartan  1 tablet Oral BID Jen Pinon PA-C          Today, Patient Was Seen By: Evelio Chamberlain MD    **Please Note: This note may have been constructed using a voice recognition system  **

## 2023-02-17 ENCOUNTER — APPOINTMENT (INPATIENT)
Dept: RADIOLOGY | Facility: HOSPITAL | Age: 73
End: 2023-02-17

## 2023-02-17 VITALS
HEART RATE: 107 BPM | SYSTOLIC BLOOD PRESSURE: 97 MMHG | TEMPERATURE: 98.6 F | WEIGHT: 137.13 LBS | OXYGEN SATURATION: 93 % | RESPIRATION RATE: 17 BRPM | DIASTOLIC BLOOD PRESSURE: 65 MMHG | BODY MASS INDEX: 24.29 KG/M2

## 2023-02-17 LAB
ANION GAP SERPL CALCULATED.3IONS-SCNC: 7 MMOL/L (ref 4–13)
ATRIAL RATE: 107 BPM
ATRIAL RATE: 80 BPM
BUN SERPL-MCNC: 18 MG/DL (ref 5–25)
CALCIUM SERPL-MCNC: 9.1 MG/DL (ref 8.3–10.1)
CHLORIDE SERPL-SCNC: 104 MMOL/L (ref 96–108)
CO2 SERPL-SCNC: 27 MMOL/L (ref 21–32)
CREAT SERPL-MCNC: 0.98 MG/DL (ref 0.6–1.3)
ERYTHROCYTE [DISTWIDTH] IN BLOOD BY AUTOMATED COUNT: 14.6 % (ref 11.6–15.1)
GFR SERPL CREATININE-BSD FRML MDRD: 57 ML/MIN/1.73SQ M
GLUCOSE SERPL-MCNC: 109 MG/DL (ref 65–140)
HCT VFR BLD AUTO: 32.7 % (ref 34.8–46.1)
HGB BLD-MCNC: 10.5 G/DL (ref 11.5–15.4)
MCH RBC QN AUTO: 30 PG (ref 26.8–34.3)
MCHC RBC AUTO-ENTMCNC: 32.1 G/DL (ref 31.4–37.4)
MCV RBC AUTO: 93 FL (ref 82–98)
P AXIS: 50 DEGREES
PLATELET # BLD AUTO: 115 THOUSANDS/UL (ref 149–390)
PMV BLD AUTO: 11.8 FL (ref 8.9–12.7)
POTASSIUM SERPL-SCNC: 4 MMOL/L (ref 3.5–5.3)
PR INTERVAL: 408 MS
QRS AXIS: -71 DEGREES
QRS AXIS: -71 DEGREES
QRSD INTERVAL: 104 MS
QRSD INTERVAL: 112 MS
QT INTERVAL: 460 MS
QT INTERVAL: 470 MS
QTC INTERVAL: 542 MS
QTC INTERVAL: 550 MS
RBC # BLD AUTO: 3.5 MILLION/UL (ref 3.81–5.12)
SODIUM SERPL-SCNC: 138 MMOL/L (ref 135–147)
T WAVE AXIS: 226 DEGREES
T WAVE AXIS: 231 DEGREES
VENTRICULAR RATE: 80 BPM
VENTRICULAR RATE: 86 BPM
WBC # BLD AUTO: 5.81 THOUSAND/UL (ref 4.31–10.16)

## 2023-02-17 RX ORDER — METOPROLOL SUCCINATE 25 MG/1
25 TABLET, EXTENDED RELEASE ORAL DAILY
Qty: 30 TABLET | Refills: 0 | Status: SHIPPED | OUTPATIENT
Start: 2023-02-17 | End: 2023-03-19

## 2023-02-17 RX ORDER — FUROSEMIDE 20 MG/1
20 TABLET ORAL 2 TIMES DAILY
Qty: 60 TABLET | Refills: 0 | Status: SHIPPED | OUTPATIENT
Start: 2023-02-17 | End: 2023-03-19

## 2023-02-17 RX ADMIN — APIXABAN 5 MG: 5 TABLET, FILM COATED ORAL at 08:24

## 2023-02-17 RX ADMIN — LEVOTHYROXINE SODIUM 100 MCG: 100 TABLET ORAL at 05:04

## 2023-02-17 RX ADMIN — FUROSEMIDE 20 MG: 20 TABLET ORAL at 08:24

## 2023-02-17 RX ADMIN — SACUBITRIL AND VALSARTAN 1 TABLET: 24; 26 TABLET, FILM COATED ORAL at 08:24

## 2023-02-17 RX ADMIN — METOPROLOL SUCCINATE 25 MG: 25 TABLET, FILM COATED, EXTENDED RELEASE ORAL at 08:24

## 2023-02-17 NOTE — DISCHARGE SUMMARY
[facility]  Discharge- Varinder Long 1950, 67 y o  female MRN: [id number]  Unit/Bed#: [facility] 421-01 Encounter: [id number]  Primary Care Provider: Estevan Sales MD   Date and time admitted to hospital: 2/14/2023  2:41 PM    AV block, postoperative  Assessment & Plan  Bradycardia and AV block post ablation  Patient refused PPM during last hospitalization  Now presenting with acute CHF  Loop interrogation showed bradycardic events in the 40s, 221 AV block  · EP consulted, appreciate recs  · Status post PPM placement  · Outpatient follow up with EP    Atrial tachycardia ([facility] )  Assessment & Plan  · History of atrial tachycardia, status post prior ablation and ablation 2/6/2023  After the procedure noted to have bradycardia, intermittent 2 through 1 AV block and severe first-degree block  Pacemaker was offered but patient wanted to monitor with loop recorder  Due to bradycardia and hypotension metoprolol, Lasix and Entresto were held for 1 week she was scheduled for loop recorder interrogation, BP check and EKG on 2/15  Patient called EP office complaining of lower extremity edema and shortness of breath  Loop interrogation showed bradycardic events in the 40s, 221 AV block    · Restart metoprolol and Entresto  · Status post PPM   · Restarted Eliquis  · Outpatient follow up with EP/cardiology    Hypothyroidism  Assessment & Plan  · Continue levothyroxine    * Acute on chronic combined systolic (congestive) and diastolic (congestive) heart failure (HCC)  Assessment & Plan  Wt Readings from Last 3 Encounters:   02/17/23 62 2 kg (137 lb 2 oz)   02/08/23 60 5 kg (133 lb 6 1 oz)   02/03/23 60 4 kg (133 lb 3 2 oz)     · Patient was admitted for atrial tachycardia, underwent ablation 2/6, after the procedure she became hypotensive, home Entresto, metoprolol and Lasix were held on discharge 2/8/2023  · Patient has a history of tachycardia induced cardiomyopathy, most recent echo in February 2023 showed ejection fraction of 55%  · Patient returns with shortness of breath, lower extremity swelling suspect due to holding medications  · Chest x-ray with vascular congestion, elevated BNP  · S/p Lasix 20 mg IV in the ED  · Continue Lasix 40 mg PO at dc  · Restarted Entresto and metoprolol  · Cardiology outpatient follow up          Vitals:    02/17/23 1154   BP: 97/65   Pulse: (!) 107   Resp: 17   Temp: 98 6 °F (37 °C)   SpO2: 93%     Physical Exam  Constitutional:       Appearance: Normal appearance  HENT:      Head: Normocephalic  Mouth/Throat:      Mouth: Mucous membranes are moist       Pharynx: Oropharynx is clear  Cardiovascular:      Rate and Rhythm: Normal rate and regular rhythm  Heart sounds: Normal heart sounds  No murmur heard  Pulmonary:      Effort: Pulmonary effort is normal       Breath sounds: Normal breath sounds  Abdominal:      General: Abdomen is flat  Palpations: Abdomen is soft  Musculoskeletal:      Cervical back: Neck supple  Skin:     General: Skin is warm  Comments: Left upper chest dressing, no hematoma   Neurological:      Mental Status: She is alert and oriented to person, place, and time  Mental status is at baseline  Psychiatric:         Mood and Affect: Mood normal          Behavior: Behavior normal              Medical Problems     Resolved Problems  Date Reviewed: 2/14/2023   None         Admission Date:   Admission Orders (From admission, onward)     Ordered        02/14/23 1659  INPATIENT ADMISSION  Once                        Admitting Diagnosis: A-fib ([address] ) [I48 91]  SOB (shortness of breath) [R06 02]  Heart failure ([address] ) [I50 9]    HPI by Dr Mariluz Torres: "67 y o  female with a PMH of hypothyroidism, combined systolic and diastolic heart failure, tachycardia induced, atrial tachycardia who presents with shortness of breath and lower extremity swelling  Patient has a history of atrial tachycardia for ablation  2/6/2023 she underwent ablation, hypotension after the procedure  Noted to have bradycardia, intermittent 2 through 1 AV block and severe first-degree block, pacemaker was offered but patient refused and wished to continue monitoring with a loop recorder  Due to postprocedure hypotension bradycardia metoprolol, Lasix and Entresto were held on discharge for 1 week after she follows up with cardiology  Patient returned to the hospital with shortness of breath and lower extremity swelling "    Procedures Performed:   Orders Placed This Encounter   Procedures   • Cardiac ep lab eps/ablations       Summary of Hospital Course: Patient admitted and started on Lasix 20mg BID with appropriated diuresis and improvement of symptoms  She was evaluated by EP who decided for PPM placement  PPM was placed on 12/16 and patient was kept for observation overnight  Today, patient medically stable for discharge  Will continue medications as discussed above  Patient will follow up with EP in 2 weeks for device and site check  Significant Findings, Care, Treatment and Services Provided: EP with PPM placement    Complications: None    Condition at Discharge: good         Discharge instructions/Information to patient and family:   See after visit summary for information provided to patient and family  Provisions for Follow-Up Care:  See after visit summary for information related to follow-up care and any pertinent home health orders  PCP: Madyson Anthony MD    Disposition: Home    Planned Readmission: No    Discharge Statement   I spent 30 minutes discharging the patient  This time was spent on the day of discharge  I had direct contact with the patient on the day of discharge  Additional documentation is required if more than 30 minutes were spent on discharge  Discharge Medications:  See after visit summary for reconciled discharge medications provided to patient and family

## 2023-02-17 NOTE — ASSESSMENT & PLAN NOTE
Bradycardia and AV block post ablation  Patient refused PPM during last hospitalization  Now presenting with acute CHF  Loop interrogation showed bradycardic events in the 40s, 221 AV block      · EP consulted, appreciate recs  · Status post PPM placement  · Outpatient follow up with EP

## 2023-02-17 NOTE — ASSESSMENT & PLAN NOTE
Wt Readings from Last 3 Encounters:   02/17/23 62 2 kg (137 lb 2 oz)   02/08/23 60 5 kg (133 lb 6 1 oz)   02/03/23 60 4 kg (133 lb 3 2 oz)     · Patient was admitted for atrial tachycardia, underwent ablation 2/6, after the procedure she became hypotensive, home Entresto, metoprolol and Lasix were held on discharge 2/8/2023  · Patient has a history of tachycardia induced cardiomyopathy, most recent echo in February 2023 showed ejection fraction of 55%  · Patient returns with shortness of breath, lower extremity swelling suspect due to holding medications     · Chest x-ray with vascular congestion, elevated BNP  · S/p Lasix 20 mg IV in the ED  · Continue Lasix 40 mg PO at dc  · Restarted Entresto and metoprolol  · Cardiology outpatient follow up

## 2023-02-17 NOTE — ASSESSMENT & PLAN NOTE
· History of atrial tachycardia, status post prior ablation and ablation 2/6/2023  After the procedure noted to have bradycardia, intermittent 2 through 1 AV block and severe first-degree block  Pacemaker was offered but patient wanted to monitor with loop recorder  Due to bradycardia and hypotension metoprolol, Lasix and Entresto were held for 1 week she was scheduled for loop recorder interrogation, BP check and EKG on 2/15  Patient called EP office complaining of lower extremity edema and shortness of breath  Loop interrogation showed bradycardic events in the 40s, 221 AV block    · Restart metoprolol and Entresto  · Status post PPM   · Restarted Eliquis  · Outpatient follow up with EP/cardiology

## 2023-02-20 ENCOUNTER — TRANSITIONAL CARE MANAGEMENT (OUTPATIENT)
Dept: FAMILY MEDICINE CLINIC | Facility: CLINIC | Age: 73
End: 2023-02-20

## 2023-02-20 ENCOUNTER — OFFICE VISIT (OUTPATIENT)
Dept: FAMILY MEDICINE CLINIC | Facility: CLINIC | Age: 73
End: 2023-02-20

## 2023-02-20 VITALS
DIASTOLIC BLOOD PRESSURE: 68 MMHG | SYSTOLIC BLOOD PRESSURE: 110 MMHG | OXYGEN SATURATION: 98 % | HEART RATE: 93 BPM | BODY MASS INDEX: 24.56 KG/M2 | HEIGHT: 63 IN | WEIGHT: 138.6 LBS

## 2023-02-20 DIAGNOSIS — E03.9 HYPOTHYROIDISM, UNSPECIFIED TYPE: ICD-10-CM

## 2023-02-20 DIAGNOSIS — I48.92 PAROXYSMAL ATRIAL FLUTTER (HCC): Primary | ICD-10-CM

## 2023-02-20 DIAGNOSIS — I50.43 ACUTE ON CHRONIC COMBINED SYSTOLIC (CONGESTIVE) AND DIASTOLIC (CONGESTIVE) HEART FAILURE (HCC): ICD-10-CM

## 2023-02-20 DIAGNOSIS — J84.9 ILD (INTERSTITIAL LUNG DISEASE) (HCC): ICD-10-CM

## 2023-02-20 DIAGNOSIS — I42.0 DILATED CARDIOMYOPATHY (HCC): ICD-10-CM

## 2023-02-20 NOTE — UTILIZATION REVIEW
NOTIFICATION OF ADMISSION DISCHARGE   This is a Notification of Discharge from [facility]  Please be advised that this patient has been discharge from our facility  Below you will find the admission and discharge date and time including the patient’s disposition  UTILIZATION REVIEW CONTACT:  Lynda Sherman  Utilization   Network Utilization Review Department  Phone: [phone] x carefully listen to the prompts  All voicemails are confidential   Email: [email or web address]     ADMISSION INFORMATION  PRESENTATION DATE: 2/14/2023  2:41 PM  OBERVATION ADMISSION DATE:   INPATIENT ADMISSION DATE: 2/14/23  5:00 PM   DISCHARGE DATE: 2/17/2023  4:36 PM   DISPOSITION:Home/Self Care    IMPORTANT INFORMATION:  Send all requests for admission clinical reviews, approved or denied determinations and any other requests to dedicated fax number below belonging to the campus where the patient is receiving treatment   List of dedicated fax numbers:  [address] DENIALS (Administrative/Medical Necessity) [phone]   [address] (Maternity/NICU/Pediatrics) [phone]   [facility] [phone]   [place] [address] [phone]   [address] [phone]   [address] [facility] [phone]   [address] [place] [address] [phone]   [address] [place] [address] 119 [phone]   [facility]  [phone]   [facility] [facility] [phone]   [address] [facility] [address] [facility] [phone]

## 2023-02-20 NOTE — PROGRESS NOTES
FAMILY MEDICINE TRANSITION OF CARE OFFICE VISIT  [facility]    NAME: Aleksandra Cerda  AGE: 67 y o  SEX: female  : 1950       DATE: 2023    Assessment and Plan     Hypothyroidism  TSH improving since patient discontinued amiodarone  Continue levothyroxine 100 mcg  Continue monitoring  ILD (interstitial lung disease) (HCC)  Continue monitoring/management per pulmonary  Acute on chronic combined systolic (congestive) and diastolic (congestive) heart failure (HCC)  Wt Readings from Last 3 Encounters:   23 62 9 kg (138 lb 9 6 oz)   23 62 2 kg (137 lb 2 oz)   23 60 5 kg (133 lb 6 1 oz)     Shortness of breath has improved  Continue Lasix, Entresto and metoprolol per cardiology  Paroxysmal atrial flutter (HCC)  Heart rate is at goal   Continue beta-blocker, Eliquis per cardiology  - Counseling Documentation: patient was counseled regarding: diagnostic results, instructions for management, risk factor reductions, prognosis and patient and family education    Transitional Care Management Review     Aleksandra Cerda is a 67 y o  female here for TCM follow-up    During the TCM phone call patient stated:    TCM Call     Date and time call was made  2023  8:16 AM    Hospital care reviewed  Records reviewed    Patient was hospitialized at  [facility]    Date of Admission  23    Date of discharge  23    Diagnosis  Acute and Chronic CHF    Disposition  Home    Were the patients medications reviewed and updated  Yes    Current Symptoms  None      TCM Call     Post hospital issues  None    Should patient be enrolled in anticoag monitoring? No    Scheduled for follow up?   Yes    Patients specialists  Cardiologist    Did you obtain your prescribed medications  Yes    Do you need help managing your prescriptions or medications  No    Is transportation to your appointment needed  No    I have advised the patient to call PCP with any new or worsening symptoms  [place] members    Support System  Family    The type of support provided  Emotional; Financial    Do you have social support  Yes, as much as I need    Are you recieving any outpatient services  No    Are you recieving home care services  No    Are you using any community resources  No    Current waiver services  No    Have you fallen in the last 12 months  No    Interperter language line needed  No    Counseling  Patient          History of Present Illness     HPI    Patient following up from recent hospital discharge  Initially admitted with AV block and symptomatic bradycardia, underwent permanent pacemaker on 2/16  Later presented with acute CHF  Treated with IV Lasix followed by  oral rate Lasix  Restarted on Entresto and metoprolol  Closely monitored by cardiology  Patient has history of interstitial lung disease for which she is monitored by pulmonary  Denies any symptoms of chest pain or shortness of breath  The following portions of the patient's history were reviewed and updated as appropriate: allergies, current medications, past family history, past medical history, past social history, past surgical history and problem list     Review of Systems       Review of Systems   Constitutional: Negative  Cardiovascular: Negative  Neurological: Negative          Active Problem List     Patient Active Problem List   Diagnosis   • Hypothyroidism   • Myelodysplasia (myelodysplastic syndrome) (HCC)   • Waldenstrom macroglobulinemia (HCC)   • Dyslipidemia   • Paroxysmal atrial fibrillation (HCC)   • History of Hodgkin's lymphoma   • Rash   • Acute on chronic combined systolic (congestive) and diastolic (congestive) heart failure (HCC)   • Stage 3a chronic kidney disease (HCC)   • Allergies   • Pulmonary hypertension ([facility] )   • ILD (interstitial lung disease) ([facility] )   • Hard of hearing   • Dilated cardiomyopathy ([facility] ) • Elevated blood sugar   • Menopause   • Paroxysmal atrial flutter (HCC)   • Atrial tachycardia (HCC)   • AV block, postoperative       Objective     /68   Pulse 93   Ht 5' 3" (1 6 m)   Wt 62 9 kg (138 lb 9 6 oz)   SpO2 98%   BMI 24 55 kg/m²     Physical Exam  Constitutional:       Appearance: Normal appearance  Cardiovascular:      Heart sounds: Normal heart sounds  Pulmonary:      Breath sounds: Rales (BASAL RALES) present  Neurological:      Mental Status: She is oriented to person, place, and time  Psychiatric:         Mood and Affect: Mood normal          Laboratory Results: I have personally reviewed the pertinent laboratory results/reports     Radiology/Other Diagnostic Testing Results: I have personally reviewed pertinent reports  XR chest 2 views    Result Date: 2/15/2023  CHEST INDICATION:   SOB  COMPARISON:  2/3/2023 EXAM PERFORMED/VIEWS:  XR CHEST PA & LATERAL  The frontal view was performed utilizing dual energy radiographic technique  Images: 4 FINDINGS:  Loop recorder  Cardiomediastinal silhouette appears enlarged  Increasing pulmonary opacities particularly towards the lower lung zones  Cannot exclude an acute process such as edema superimposed upon chronic interstitial changes/fibrosis  Small pleural effusions without pneumothorax  Left axillary clips  Increasing pulmonary opacities  Cannot exclude an acute process such as pulmonary edema superimposed upon chronic interstitial changes  Small pleural effusions and cardiomegaly   Workstation performed: [id number]        Current Medications     Current Outpatient Medications:   •  apixaban (Eliquis) 5 mg, Take 1 tablet (5 mg total) by mouth 2 (two) times a day, Disp: 180 tablet, Rfl: 3  •  furosemide (LASIX) 20 mg tablet, Take 1 tablet (20 mg total) by mouth 2 (two) times a day, Disp: 60 tablet, Rfl: 0  •  levothyroxine 100 mcg tablet, Take 1 tablet (100 mcg total) by mouth daily, Disp: 90 tablet, Rfl: 1  •  metoprolol succinate (TOPROL-XL) 25 mg 24 hr tablet, Take 1 tablet (25 mg total) by mouth daily, Disp: 30 tablet, Rfl: 0  •  sacubitril-valsartan (ENTRESTO) 24-26 MG TABS, Take 1 tablet by mouth 2 (two) times a day, Disp: 60 tablet, Rfl: 0    Tahmina Lei MD  [place] [address]

## 2023-02-21 NOTE — ASSESSMENT & PLAN NOTE
Wt Readings from Last 3 Encounters:   02/20/23 62 9 kg (138 lb 9 6 oz)   02/17/23 62 2 kg (137 lb 2 oz)   02/08/23 60 5 kg (133 lb 6 1 oz)     Shortness of breath has improved  Continue Lasix, Entresto and metoprolol per cardiology

## 2023-03-06 ENCOUNTER — IN-CLINIC DEVICE VISIT (OUTPATIENT)
Dept: CARDIOLOGY CLINIC | Facility: CLINIC | Age: 73
End: 2023-03-06

## 2023-03-06 DIAGNOSIS — Z95.0 PRESENCE OF CARDIAC PACEMAKER: Primary | ICD-10-CM

## 2023-03-06 NOTE — PROGRESS NOTES
MDT DUAL CHAMBER PPM (MVP ON) - ACTIVE SYSTEM IS MRI COMPATIBLE     DEVICE INTERROGATED IN THE CARLOS A OFFICE: WOUND CHECK (EXPLANT ILR/IMPLANT PPM): INCISIONS CLEAN AND DRY WITH EDGES APPROXIMATED; SUTURES REMOVED FROM LP EXPLANT SITE; INCISION SITES CLEANSED; WOUND CARE AND RESTRICTIONS REVIEWED WITH PATIENT  BATTERY VOLTAGE ADEQUATE (15 2 YRS)  AP 2%  1 8% (AAI-DDD 60); ALL LEAD PARAMETERS WITHIN NORMAL LIMITS  1 VT-NS EPISODE WITH NSVT ON EGM, 6 @  BPM  EF 55% (2/4/2023 ECHO)  PATIENT ON ELIQUIS, METOPROLOL SUCC  NO PROGRAMMING CHANGES MADE TO DEVICE PARAMETERS   PACEMAKER FUNCTIONING APPROPRIATELY   ES

## 2023-03-14 ENCOUNTER — TELEPHONE (OUTPATIENT)
Dept: FAMILY MEDICINE CLINIC | Facility: CLINIC | Age: 73
End: 2023-03-14

## 2023-03-14 NOTE — TELEPHONE ENCOUNTER
Patient called and advised that she started with diarrhea on Thursday and then it subsided  Then started again on Sat night and then it subsided  And then again last night  Patient advised bland diet and stay hydrated  There are GI virus's going around and it will  Run its course

## 2023-03-15 ENCOUNTER — TELEMEDICINE (OUTPATIENT)
Dept: CARDIOLOGY CLINIC | Facility: CLINIC | Age: 73
End: 2023-03-15

## 2023-03-15 VITALS — WEIGHT: 138 LBS | HEART RATE: 82 BPM | BODY MASS INDEX: 24.45 KG/M2 | HEIGHT: 63 IN

## 2023-03-15 DIAGNOSIS — I47.1 ATRIAL TACHYCARDIA (HCC): Primary | ICD-10-CM

## 2023-03-15 NOTE — PROGRESS NOTES
Virtual Brief Visit    Patient is located in the following state in which I hold an active license [place]      Assessment/Plan:  1  AVB    * patient presents to B EP office today for post hospital follow up  She is now s/p AT RFA of inferoseptal, LA wall regions and slow pathway modification performed as well  Unfortunately after the procedure patient found to have 2:1 AVB on ILR likely from AV node injury during ablation  She underwent ILR explant and MDT PPM implant    * since device implant she has no major concerns or complaints  She is healing well  On her device she has had no atrial high rates with normal device function     2  AT - ablation as above - mainly ASVS    * will see if we can discontinue eliquis as no afib but AT found on   3  Tachy induced cardiomyopathy    * resolved now, last EF 55%    * when in persistent AT she did have exacerbations of HFpEF    * remains on toprol XL and entresto         Recent Visits  No visits were found meeting these conditions  Showing recent visits within past 7 days and meeting all other requirements  Today's Visits  Date Type Provider Dept   03/15/23 Telemedicine Alexander Lange PA-C [facility]   Showing today's visits and meeting all other requirements  Future Appointments  No visits were found meeting these conditions    Showing future appointments within next 150 days and meeting all other requirements         Visit Time    Visit Start Time: 0833  Visit Stop Time: 1120  Total Visit Duration: 22 minutes

## 2023-04-24 DIAGNOSIS — I50.43 ACUTE ON CHRONIC COMBINED SYSTOLIC (CONGESTIVE) AND DIASTOLIC (CONGESTIVE) HEART FAILURE (HCC): ICD-10-CM

## 2023-04-24 DIAGNOSIS — I47.1 ATRIAL TACHYCARDIA (HCC): ICD-10-CM

## 2023-04-24 RX ORDER — METOPROLOL SUCCINATE 25 MG/1
25 TABLET, EXTENDED RELEASE ORAL DAILY
Qty: 30 TABLET | Refills: 0 | Status: SHIPPED | OUTPATIENT
Start: 2023-04-24 | End: 2023-05-24

## 2023-04-24 RX ORDER — FUROSEMIDE 20 MG/1
20 TABLET ORAL 2 TIMES DAILY
Qty: 60 TABLET | Refills: 0 | Status: SHIPPED | OUTPATIENT
Start: 2023-04-24 | End: 2023-05-24

## 2023-05-02 ENCOUNTER — VBI (OUTPATIENT)
Dept: ADMINISTRATIVE | Facility: OTHER | Age: 73
End: 2023-05-02

## 2023-05-05 LAB
ALBUMIN SERPL-MCNC: 4.1 G/DL (ref 3.6–5.1)
ALBUMIN/GLOB SERPL: 1.5 (CALC) (ref 1–2.5)
ALP SERPL-CCNC: 72 U/L (ref 37–153)
ALT SERPL-CCNC: 5 U/L (ref 6–29)
AST SERPL-CCNC: 13 U/L (ref 10–35)
BILIRUB SERPL-MCNC: 0.7 MG/DL (ref 0.2–1.2)
BUN SERPL-MCNC: 25 MG/DL (ref 7–25)
BUN/CREAT SERPL: 25 (CALC) (ref 6–22)
CALCIUM SERPL-MCNC: 9.6 MG/DL (ref 8.6–10.4)
CHLORIDE SERPL-SCNC: 104 MMOL/L (ref 98–110)
CO2 SERPL-SCNC: 33 MMOL/L (ref 20–32)
CREAT SERPL-MCNC: 1.01 MG/DL (ref 0.6–1)
GFR/BSA.PRED SERPLBLD CYS-BASED-ARV: 59 ML/MIN/1.73M2
GLOBULIN SER CALC-MCNC: 2.8 G/DL (CALC) (ref 1.9–3.7)
GLUCOSE SERPL-MCNC: 103 MG/DL (ref 65–99)
POTASSIUM SERPL-SCNC: 3.8 MMOL/L (ref 3.5–5.3)
PROT SERPL-MCNC: 6.9 G/DL (ref 6.1–8.1)
SODIUM SERPL-SCNC: 144 MMOL/L (ref 135–146)
TSH SERPL-ACNC: 0.88 MIU/L (ref 0.4–4.5)

## 2023-05-09 ENCOUNTER — RA CDI HCC (OUTPATIENT)
Dept: OTHER | Facility: HOSPITAL | Age: 73
End: 2023-05-09

## 2023-05-10 NOTE — PROGRESS NOTES
[facility]  coding opportunities       [facility]  coding opportunities     I27 20     Chart Reviewed number of suggestions sent to Provider: 1   GR    Patients Insurance     Medicare Insurance: [employer]

## 2023-05-11 ENCOUNTER — OFFICE VISIT (OUTPATIENT)
Dept: FAMILY MEDICINE CLINIC | Facility: CLINIC | Age: 73
End: 2023-05-11

## 2023-05-11 ENCOUNTER — TELEPHONE (OUTPATIENT)
Dept: ADMINISTRATIVE | Facility: OTHER | Age: 73
End: 2023-05-11

## 2023-05-11 VITALS
WEIGHT: 130 LBS | OXYGEN SATURATION: 98 % | DIASTOLIC BLOOD PRESSURE: 78 MMHG | SYSTOLIC BLOOD PRESSURE: 122 MMHG | HEIGHT: 63 IN | HEART RATE: 66 BPM | BODY MASS INDEX: 23.04 KG/M2

## 2023-05-11 DIAGNOSIS — I48.0 PAROXYSMAL ATRIAL FIBRILLATION (HCC): ICD-10-CM

## 2023-05-11 DIAGNOSIS — Z23 NEED FOR VACCINATION: ICD-10-CM

## 2023-05-11 DIAGNOSIS — C88.0 WALDENSTROM MACROGLOBULINEMIA (HCC): ICD-10-CM

## 2023-05-11 DIAGNOSIS — E03.9 HYPOTHYROIDISM, UNSPECIFIED TYPE: Primary | ICD-10-CM

## 2023-05-11 DIAGNOSIS — E78.5 DYSLIPIDEMIA: ICD-10-CM

## 2023-05-11 DIAGNOSIS — D46.9 MYELODYSPLASIA (MYELODYSPLASTIC SYNDROME) (HCC): ICD-10-CM

## 2023-05-11 DIAGNOSIS — N18.31 STAGE 3A CHRONIC KIDNEY DISEASE (HCC): ICD-10-CM

## 2023-05-11 DIAGNOSIS — R73.9 ELEVATED BLOOD SUGAR: ICD-10-CM

## 2023-05-11 DIAGNOSIS — Z00.00 MEDICARE ANNUAL WELLNESS VISIT, SUBSEQUENT: ICD-10-CM

## 2023-05-11 DIAGNOSIS — Z78.0 MENOPAUSE: ICD-10-CM

## 2023-05-11 DIAGNOSIS — Z12.11 SCREEN FOR COLON CANCER: ICD-10-CM

## 2023-05-11 PROBLEM — I77.810 THORACIC AORTIC ECTASIA (HCC): Status: ACTIVE | Noted: 2023-05-11

## 2023-05-11 NOTE — PROGRESS NOTES
Assessment and Plan:     Problem List Items Addressed This Visit    None  1  Hypothyroidism, unspecified type  Assessment & Plan:  TSH stable   Continue levothyroxine 100 mcg  Continue monitoring  Orders:  -     TSH, 3rd generation with Free T4 reflex; Future; Expected date: 10/08/2023    2  Paroxysmal atrial fibrillation [facility])  Assessment & Plan:  Heart rate is at goal   Continue beta-blocker, Eliquis per cardiology  3  Medicare annual wellness visit, subsequent  Assessment & Plan:  Patient due for breast and colorectal cancer screening  Bone scan ordered  Received Prevnar 20 today  4  Screen for colon cancer  -     Cologuard    5  Menopause  -     DXA bone density spine hip and pelvis; Future; Expected date: 05/12/2023    6  Need for vaccination  -     Pneumococcal Conjugate Vaccine 20-valent (Pcv20)    7  Myelodysplasia (myelodysplastic syndrome) (HCC)  Assessment & Plan:  Platelet count  low but stable  Patient is asymptomatic  Continue monitoring per Hematology  8  Waldenstrom macroglobulinemia ([facility] [address] )  Assessment & Plan:  Monitoring per oncology  9  Stage 3a chronic kidney disease [facility])  Assessment & Plan:  Lab Results   Component Value Date    EGFR 59 (L) 05/05/2023    EGFR 57 02/17/2023    EGFR 51 02/16/2023    CREATININE 1 01 (H) 05/05/2023    CREATININE 0 98 02/17/2023    CREATININE 1 08 02/16/2023     Stable, BP at goal  Continue monitoring  10  Dyslipidemia  -     Lipid panel; Future; Expected date: 10/08/2023    11  Elevated blood sugar  Assessment & Plan:  Patient noted to have borderline fasting blood sugar however A1c is normal   Patient encouraged to continue with low carb diet  Orders:  -     Comprehensive metabolic panel; Future; Expected date: 10/08/2023  -     Hemoglobin A1C; Future; Expected date: 10/08/2023        Depression Screening and Follow-up Plan: Patient was screened for depression during today's encounter   They screened negative with a PHQ-2 score of 0       Preventive health issues were discussed with patient, and age appropriate screening tests were ordered as noted in patient's After Visit Summary  Personalized health advice and appropriate referrals for health education or preventive services given if needed, as noted in patient's After Visit Summary  History of Present Illness:     Patient presents for a Medicare Wellness Visit    Thyroid Problem  Presents for follow-up visit  Patient reports no fatigue, palpitations or tremors  The symptoms have been stable (Levothyroxine 100 mcg)  Patient has history of dyslipidemia  Has been following a low-fat diet  LDL has improved significantly  Patient has A-fib, rate controlled on metoprolol  Patient Care Team:  Patricia Rowe MD as PCP - General (Family Medicine)     Review of Systems:     Review of Systems   Constitutional: Negative  Negative for fatigue  Respiratory: Negative  Cardiovascular: Negative  Negative for palpitations  Musculoskeletal: Negative  Neurological: Negative for tremors          Problem List:     Patient Active Problem List   Diagnosis   • Hypothyroidism   • Myelodysplasia (myelodysplastic syndrome) (HCC)   • Waldenstrom macroglobulinemia (HCC)   • Dyslipidemia   • Paroxysmal atrial fibrillation (HCC)   • History of Hodgkin's lymphoma   • Rash   • Acute on chronic combined systolic (congestive) and diastolic (congestive) heart failure (HCC)   • Stage 3a chronic kidney disease (HCC)   • Allergies   • Pulmonary hypertension ([address] )   • ILD (interstitial lung disease) ([facility] [address] )   • Hard of hearing   • Dilated cardiomyopathy (HCC)   • Elevated blood sugar   • Menopause   • Paroxysmal atrial flutter (HCC)   • Atrial tachycardia (HCC)   • AV block, postoperative      Past Medical and Surgical History:     Past Medical History:   Diagnosis Date   • Abnormal CXR 1/14/2022   • Cancer [facility])    • Disease of thyroid gland    • Dysuria 12/16/2021   • Encounter for support and coordination of transition of care 2021     Past Surgical History:   Procedure Laterality Date   • CARDIAC ELECTROPHYSIOLOGY PROCEDURE N/A 2022    Procedure: Cardiac eps/aflutter ablation;  Surgeon: Catalina Luevano MD;  [facility]: [facility] CARD[facility]; Service: Cardiology   • CARDIAC ELECTROPHYSIOLOGY PROCEDURE N/A 2022    Procedure: Cardiac loop recorder implant;  Surgeon: Catalina Luevano MD;  [facility]: [facility]; Service: Cardiology   • CARDIAC ELECTROPHYSIOLOGY PROCEDURE N/A 2023    Procedure: CARDIAC EPS/SVT ABLATION;  Surgeon: Catalina Luevano MD;  Location: [facility]; Service: Cardiology   • CARDIAC ELECTROPHYSIOLOGY PROCEDURE N/A 2023    Procedure: Cardiac pacer implant;  Surgeon: Nichole Echevarria MD;  Location: [facility]; Service: Cardiology   • CARDIAC ELECTROPHYSIOLOGY PROCEDURE N/A 2023    Procedure: CARDIAC LOOP RECORDER EXPLANT;  Surgeon: Nichole Echevarria MD;  Location: [facility] C[facility];   Service: Cardiology   • HYSTERECTOMY        Family History:     Family History   Problem Relation Age of Onset   • No Known Problems Mother    • No Known Problems Father    • Cancer Maternal Aunt       Social History:     Social History     Socioeconomic History   • Marital status:      Spouse name: Not on file   • Number of children: Not on file   • Years of education: Not on file   • Highest education level: Not on file   Occupational History   • Not on file   Tobacco Use   • Smoking status: Former     Packs/day: 1 00     Years: 10 00     Pack years: 10 00     Types: Cigarettes     Start date:      Quit date: 1993     Years since quittin 3   • Smokeless tobacco: Never   Vaping Use   • Vaping Use: Never used   Substance and Sexual Activity   • Alcohol use: Never   • Drug use: Never   • Sexual activity: Never   Other Topics Concern   • Not on file   Social History Narrative    Most recent tobacco use screenin2018     Social Determinants of Health     Financial Resource Strain: Low Risk    • Difficulty of Paying Living Expenses: Not hard at all   Food Insecurity: No Food Insecurity   • Worried About Running Out of Food in the Last Year: Never true   • Ran Out of Food in the Last Year: Never true   Transportation Needs: No Transportation Needs   • Lack of Transportation (Medical): No   • Lack of Transportation (Non-Medical): No   Physical Activity: Not on file   Stress: Not on file   Social Connections: Not on file   Intimate Partner Violence: Not on file   Housing Stability: Low Risk    • Unable to Pay for Housing in the Last Year: No   • Number of Places Lived in the Last Year: 2   • Unstable Housing in the Last Year: No      Medications and Allergies:     Current Outpatient Medications   Medication Sig Dispense Refill   • furosemide (LASIX) 20 mg tablet Take 1 tablet (20 mg total) by mouth 2 (two) times a day 60 tablet 0   • metoprolol succinate (TOPROL-XL) 25 mg 24 hr tablet Take 1 tablet (25 mg total) by mouth daily 30 tablet 0   • sacubitril-valsartan (ENTRESTO) 24-26 MG TABS Take 1 tablet by mouth 2 (two) times a day 60 tablet 0   • levothyroxine 100 mcg tablet TAKE 1 TABLET BY MOUTH EVERY DAY 90 tablet 0     No current facility-administered medications for this visit       Allergies   Allergen Reactions   • Penicillins Rash      Immunizations:     Immunization History   Administered Date(s) Administered   • COVID-19 MODERNA VACC 0 5 ML IM 01/19/2021, 02/23/2021   • Influenza, high dose seasonal 0 7 mL 10/07/2021, 10/26/2022      Health Maintenance:         Topic Date Due   • Colorectal Cancer Screening  Never done   • Breast Cancer Screening: Mammogram  09/27/2022   • Hepatitis C Screening  Completed         Topic Date Due   • Pneumococcal Vaccine: 65+ Years (1 - PCV) Never done   • COVID-19 Vaccine (3 - Booster for Mittie Curd series) 04/20/2021      Medicare Screening Tests and Risk Assessments:     Lorraine Gonsales is here for her Subsequent Wellness visit  Health Risk Assessment:   Patient rates overall health as good  Patient feels that their physical health rating is much better  Patient is satisfied with their life  Eyesight was rated as same  Hearing was rated as same  Patient feels that their emotional and mental health rating is much better  Patients states they are never, rarely angry  Patient states they are sometimes unusually tired/fatigued  Pain experienced in the last 7 days has been none  Patient states that she has experienced no weight loss or gain in last 6 months  Depression Screening:   PHQ-2 Score: 0      Fall Risk Screening: In the past year, patient has experienced: no history of falling in past year      Urinary Incontinence Screening:   Patient has not leaked urine accidently in the last six months  Home Safety:  Patient does not have trouble with stairs inside or outside of their home  Patient has working smoke alarms and has working carbon monoxide detector  Home safety hazards include: none  Nutrition:   Current diet is No Added Salt  Medications:   Patient is not currently taking any over-the-counter supplements  Patient is able to manage medications  Activities of Daily Living (ADLs)/Instrumental Activities of Daily Living (IADLs):   Walk and transfer into and out of bed and chair?: Yes  Dress and groom yourself?: Yes    Bathe or shower yourself?: Yes    Feed yourself?  Yes  Do your laundry/housekeeping?: Yes  Manage your money, pay your bills and track your expenses?: Yes  Make your own meals?: Yes    Do your own shopping?: Yes    Previous Hospitalizations:   Any hospitalizations or ED visits within the last 12 months?: Yes    How many hospitalizations have you had in the last year?: 1-2    Advance Care Planning:   Living will: No    Durable POA for healthcare: No    Advanced directive: No      Cognitive Screening:   Provider or family/friend/caregiver concerned regarding cognition?: No    PREVENTIVE SCREENINGS      Cardiovascular Screening:    General: Screening Current      Diabetes Screening:     General: Screening Current      Colorectal Cancer Screening:     General: Risks and Benefits Discussed    Due for: Cologuard      Breast Cancer Screening:     General: Screening Current      Cervical Cancer Screening:    General: Screening Not Indicated      Osteoporosis Screening:    General: Risks and Benefits Discussed    Due for: DXA Axial      Abdominal Aortic Aneurysm (AAA) Screening:        General: Risks and Benefits Discussed      Lung Cancer Screening:     General: Screening Not Indicated      Hepatitis C Screening:    General: Screening Current    Screening, Brief Intervention, and Referral to Treatment (SBIRT)    Screening  Typical number of drinks in a day: 0  Typical number of drinks in a week: 0  Interpretation: Low risk drinking behavior  AUDIT-C Screenin) How often did you have a drink containing alcohol in the past year? never  2) How many drinks did you have on a typical day when you were drinking in the past year? 0  3) How often did you have 6 or more drinks on one occasion in the past year? never    AUDIT-C Score: 0  Interpretation: Score 0-2 (female): Negative screen for alcohol misuse    Single Item Drug Screening:  How often have you used an illegal drug (including marijuana) or a prescription medication for non-medical reasons in the past year? never    Single Item Drug Screen Score: 0  Interpretation: Negative screen for possible drug use disorder    Other Counseling Topics:   Car/seat belt/driving safety, skin self-exam, sunscreen and regular weightbearing exercise and calcium and vitamin D intake  No results found  Physical Exam:     There were no vitals taken for this visit  Physical Exam  Constitutional:       Appearance: Normal appearance     HENT:      Right Ear: Tympanic membrane normal       Left Ear: Tympanic membrane normal    Cardiovascular: Heart sounds: Normal heart sounds  Pulmonary:      Breath sounds: Normal breath sounds  Abdominal:      Palpations: Abdomen is soft  Musculoskeletal:      Right lower leg: No edema  Left lower leg: No edema  Neurological:      Mental Status: She is oriented to person, place, and time     Psychiatric:         Mood and Affect: Mood normal           Marcos Love MD

## 2023-05-11 NOTE — ASSESSMENT & PLAN NOTE
Patient due for breast and colorectal cancer screening  Bone scan ordered  Received Prevnar 20 today

## 2023-05-11 NOTE — PATIENT INSTRUCTIONS
Medicare Preventive Visit Patient Instructions  Thank you for completing your Welcome to Medicare Visit or Medicare Annual Wellness Visit today  Your next wellness visit will be due in one year (5/11/2024)  The screening/preventive services that you may require over the next 5-10 years are detailed below  Some tests may not apply to you based off risk factors and/or age  Screening tests ordered at today's visit but not completed yet may show as past due  Also, please note that scanned in results may not display below  Preventive Screenings:  Service Recommendations Previous Testing/Comments   Colorectal Cancer Screening  * Colonoscopy    * Fecal Occult Blood Test (FOBT)/Fecal Immunochemical Test (FIT)  * Fecal DNA/Cologuard Test  * Flexible Sigmoidoscopy Age: 39-70 years old   Colonoscopy: every 10 years (may be performed more frequently if at higher risk)  OR  FOBT/FIT: every 1 year  OR  Cologuard: every 3 years  OR  Sigmoidoscopy: every 5 years  Screening may be recommended earlier than age 39 if at higher risk for colorectal cancer  Also, an individualized decision between you and your healthcare provider will decide whether screening between the ages of 74-80 would be appropriate  Colonoscopy: Not on file  FOBT/FIT: Not on file  Cologuard: Not on file  Sigmoidoscopy: Not on file          Breast Cancer Screening Age: 36 years old  Frequency: every 1-2 years  Not required if history of left and right mastectomy Mammogram: 10/03/2022    Screening Current   Cervical Cancer Screening Between the ages of 21-29, pap smear recommended once every 3 years  Between the ages of 33-67, can perform pap smear with HPV co-testing every 5 years     Recommendations may differ for women with a history of total hysterectomy, cervical cancer, or abnormal pap smears in past  Pap Smear: Not on file    Screening Not Indicated   Hepatitis C Screening Once for adults born between 1945 and 1965  More frequently in patients at high risk for Hepatitis C Hep C Antibody: 10/14/2022    Screening Current   Diabetes Screening 1-2 times per year if you're at risk for diabetes or have pre-diabetes Fasting glucose: 112 mg/dL (12/30/2022)  A1C: 5 2 % of total Hgb (10/14/2022)  Screening Current   Cholesterol Screening Once every 5 years if you don't have a lipid disorder  May order more often based on risk factors  Lipid panel: 02/04/2023    Screening Current     Other Preventive Screenings Covered by Medicare:  1  Abdominal Aortic Aneurysm (AAA) Screening: covered once if your at risk  You're considered to be at risk if you have a family history of AAA  2  Lung Cancer Screening: covers low dose CT scan once per year if you meet all of the following conditions: (1) Age 50-69; (2) No signs or symptoms of lung cancer; (3) Current smoker or have quit smoking within the last 15 years; (4) You have a tobacco smoking history of at least 20 pack years (packs per day multiplied by number of years you smoked); (5) You get a written order from a healthcare provider  3  Glaucoma Screening: covered annually if you're considered high risk: (1) You have diabetes OR (2) Family history of glaucoma OR (3)  aged 48 and older OR (3)  American aged 72 and older  3  Osteoporosis Screening: covered every 2 years if you meet one of the following conditions: (1) You're estrogen deficient and at risk for osteoporosis based off medical history and other findings; (2) Have a vertebral abnormality; (3) On glucocorticoid therapy for more than 3 months; (4) Have primary hyperparathyroidism; (5) On osteoporosis medications and need to assess response to drug therapy  · Last bone density test (DXA Scan): Not on file  5  HIV Screening: covered annually if you're between the age of 12-76  Also covered annually if you are younger than 13 and older than 72 with risk factors for HIV infection   For pregnant patients, it is covered up to 3 times per pregnancy  Immunizations:  Immunization Recommendations   Influenza Vaccine Annual influenza vaccination during flu season is recommended for all persons aged >= 6 months who do not have contraindications   Pneumococcal Vaccine   * Pneumococcal conjugate vaccine = PCV13 (Prevnar 13), PCV15 (Vaxneuvance), PCV20 (Prevnar 20)  * Pneumococcal polysaccharide vaccine = PPSV23 (Pneumovax) Adults 25-60 years old: 1-3 doses may be recommended based on certain risk factors  Adults 72 years old: 1-2 doses may be recommended based off what pneumonia vaccine you previously received   Hepatitis B Vaccine 3 dose series if at intermediate or high risk (ex: diabetes, end stage renal disease, liver disease)   Tetanus (Td) Vaccine - COST NOT COVERED BY MEDICARE PART B Following completion of primary series, a booster dose should be given every 10 years to maintain immunity against tetanus  Td may also be given as tetanus wound prophylaxis  Tdap Vaccine - COST NOT COVERED BY MEDICARE PART B Recommended at least once for all adults  For pregnant patients, recommended with each pregnancy  Shingles Vaccine (Shingrix) - COST NOT COVERED BY MEDICARE PART B  2 shot series recommended in those aged 48 and above     Health Maintenance Due:      Topic Date Due   • Colorectal Cancer Screening  Never done   • Breast Cancer Screening: Mammogram  09/27/2022   • Hepatitis C Screening  Completed     Immunizations Due:      Topic Date Due   • Pneumococcal Vaccine: 65+ Years (1 - PCV) Never done   • COVID-19 Vaccine (3 - Booster for Jennifer [place] series) 04/20/2021     Advance Directives   What are advance directives? Advance directives are legal documents that state your wishes and plans for medical care  These plans are made ahead of time in case you lose your ability to make decisions for yourself  Advance directives can apply to any medical decision, such as the treatments you want, and if you want to donate organs     What are the types of advance directives? There are many types of advance directives, and each state has rules about how to use them  You may choose a combination of any of the following:  · Living will: This is a written record of the treatment you want  You can also choose which treatments you do not want, which to limit, and which to stop at a certain time  This includes surgery, medicine, IV fluid, and tube feedings  · Durable power of  for healthcare [facility] [place]): This is a written record that states who you want to make healthcare choices for you when you are unable to make them for yourself  This person, called a proxy, is usually a family member or a friend  You may choose more than 1 proxy  · Do not resuscitate (DNR) order:  A DNR order is used in case your heart stops beating or you stop breathing  It is a request not to have certain forms of treatment, such as CPR  A DNR order may be included in other types of advance directives  · Medical directive: This covers the care that you want if you are in a coma, near death, or unable to make decisions for yourself  You can list the treatments you want for each condition  Treatment may include pain medicine, surgery, blood transfusions, dialysis, IV or tube feedings, and a ventilator (breathing machine)  · Values history: This document has questions about your views, beliefs, and how you feel and think about life  This information can help others choose the care that you would choose  Why are advance directives important? An advance directive helps you control your care  Although spoken wishes may be used, it is better to have your wishes written down  Spoken wishes can be misunderstood, or not followed  Treatments may be given even if you do not want them  An advance directive may make it easier for your family to make difficult choices about your care         © Copyright [employer] 2018 Information is for End User's use only and may not be sold, redistributed or otherwise used for commercial purposes   All illustrations and images included in CareNotes® are the copyrighted property of A D A M , Inc  or [facility] Gregg Haddad

## 2023-05-11 NOTE — ASSESSMENT & PLAN NOTE
Lab Results   Component Value Date    EGFR 59 (L) 05/05/2023    EGFR 57 02/17/2023    EGFR 51 02/16/2023    CREATININE 1 01 (H) 05/05/2023    CREATININE 0 98 02/17/2023    CREATININE 1 08 02/16/2023     Stable, BP at goal  Continue monitoring

## 2023-05-11 NOTE — TELEPHONE ENCOUNTER
----- Message from Justin Michel sent at 5/11/2023  8:43 AM EDT -----  Regarding: mammo  Contact: [phone]  I have found documentation regarding mammo within care everywhere Please link and update the Patient's chart   Thank You   10/2022

## 2023-05-11 NOTE — TELEPHONE ENCOUNTER
Upon review of the In Basket request we were able to locate, review, and update the patient chart as requested for Mammogram     Any additional questions or concerns should be emailed to the Practice Liaisons via the appropriate education email address, please do not reply via In Basket      Thank you  Abhishek Jones MA

## 2023-05-13 DIAGNOSIS — I50.43 ACUTE ON CHRONIC COMBINED SYSTOLIC (CONGESTIVE) AND DIASTOLIC (CONGESTIVE) HEART FAILURE (HCC): ICD-10-CM

## 2023-05-13 DIAGNOSIS — I47.1 ATRIAL TACHYCARDIA (HCC): ICD-10-CM

## 2023-05-15 RX ORDER — METOPROLOL SUCCINATE 25 MG/1
25 TABLET, EXTENDED RELEASE ORAL DAILY
Qty: 30 TABLET | Refills: 11 | Status: SHIPPED | OUTPATIENT
Start: 2023-05-15 | End: 2023-06-14

## 2023-05-17 DIAGNOSIS — I50.43 ACUTE ON CHRONIC COMBINED SYSTOLIC (CONGESTIVE) AND DIASTOLIC (CONGESTIVE) HEART FAILURE (HCC): ICD-10-CM

## 2023-05-17 RX ORDER — FUROSEMIDE 20 MG/1
TABLET ORAL
Qty: 180 TABLET | Refills: 1 | Status: SHIPPED | OUTPATIENT
Start: 2023-05-17

## 2023-05-22 DIAGNOSIS — I50.43 ACUTE ON CHRONIC COMBINED SYSTOLIC (CONGESTIVE) AND DIASTOLIC (CONGESTIVE) HEART FAILURE (HCC): ICD-10-CM

## 2023-05-22 RX ORDER — SACUBITRIL AND VALSARTAN 24; 26 MG/1; MG/1
TABLET, FILM COATED ORAL
Qty: 60 TABLET | Refills: 5 | Status: SHIPPED | OUTPATIENT
Start: 2023-05-22

## 2023-05-31 DIAGNOSIS — I47.1 ATRIAL TACHYCARDIA (HCC): ICD-10-CM

## 2023-05-31 DIAGNOSIS — I50.43 ACUTE ON CHRONIC COMBINED SYSTOLIC (CONGESTIVE) AND DIASTOLIC (CONGESTIVE) HEART FAILURE (HCC): ICD-10-CM

## 2023-05-31 RX ORDER — METOPROLOL SUCCINATE 25 MG/1
25 TABLET, EXTENDED RELEASE ORAL 2 TIMES DAILY
Qty: 60 TABLET | Refills: 11 | Status: SHIPPED | OUTPATIENT
Start: 2023-05-31 | End: 2024-05-25

## 2023-06-13 ENCOUNTER — IN-CLINIC DEVICE VISIT (OUTPATIENT)
Dept: CARDIOLOGY CLINIC | Facility: CLINIC | Age: 73
End: 2023-06-13
Payer: COMMERCIAL

## 2023-06-13 DIAGNOSIS — Z95.0 PRESENCE OF CARDIAC PACEMAKER: Primary | ICD-10-CM

## 2023-06-13 PROCEDURE — 93280 PM DEVICE PROGR EVAL DUAL: CPT | Performed by: INTERNAL MEDICINE

## 2023-06-13 NOTE — PROGRESS NOTES
Results for orders placed or performed in visit on 06/13/23   Cardiac EP device report    Narrative    MDT DUAL CHAMBER PPM (MVP ON) - ACTIVE SYSTEM IS MRI CONDITIONAL  DEVICE INTERROGATED IN THE [place] OFFICE: BATTERY VOLTAGE ADEQUATE (14 7 YRS)  AP 12%  7 9% (AAI-DDD 60PPM); ALL LEAD PARAMETERS WITHIN NORMAL LIMITS/STABLE; 3 VT-NS EPISODES WITH NSVT ON EGM'S (10 @ 180 BPM, 13 @ 168 BPM, 5 @ 222 BPM); EF 55% (2/4/2023 ECHO); PATIENT ON METOPROLOL SUCC; DECREASE MADE TO RA AMPLITUDE TO PROMOTE DEVICE LONGEVITY WHILE MAINTAINING AN APPROPRIATE SAFETY MARGIN  TASK TO DR CUETO FOR NSVT, >200 BPM; NORMAL DEVICE FUNCTION    ES
no

## 2023-06-22 ENCOUNTER — HOSPITAL ENCOUNTER (OUTPATIENT)
Dept: CT IMAGING | Facility: HOSPITAL | Age: 73
Discharge: HOME | End: 2023-06-22
Attending: INTERNAL MEDICINE
Payer: COMMERCIAL

## 2023-06-22 ENCOUNTER — HOSPITAL ENCOUNTER (OUTPATIENT)
Dept: PULMONOLOGY | Facility: HOSPITAL | Age: 73
End: 2023-06-22
Attending: INTERNAL MEDICINE
Payer: COMMERCIAL

## 2023-06-22 DIAGNOSIS — J84.9 ILD (INTERSTITIAL LUNG DISEASE) (HCC): ICD-10-CM

## 2023-06-22 PROCEDURE — 94060 EVALUATION OF WHEEZING: CPT

## 2023-06-22 PROCEDURE — 71250 CT THORAX DX C-: CPT

## 2023-06-22 PROCEDURE — 94761 N-INVAS EAR/PLS OXIMETRY MLT: CPT

## 2023-06-22 PROCEDURE — 94760 N-INVAS EAR/PLS OXIMETRY 1: CPT

## 2023-06-22 PROCEDURE — 94729 DIFFUSING CAPACITY: CPT

## 2023-06-22 PROCEDURE — G1004 CDSM NDSC: HCPCS

## 2023-06-22 PROCEDURE — 94726 PLETHYSMOGRAPHY LUNG VOLUMES: CPT

## 2023-06-22 RX ORDER — ALBUTEROL SULFATE 2.5 MG/3ML
2.5 SOLUTION RESPIRATORY (INHALATION) ONCE
Status: COMPLETED | OUTPATIENT
Start: 2023-06-22 | End: 2023-06-22

## 2023-06-22 RX ADMIN — ALBUTEROL SULFATE 2.5 MG: 2.5 SOLUTION RESPIRATORY (INHALATION) at 10:05

## 2023-06-29 DIAGNOSIS — I50.43 ACUTE ON CHRONIC COMBINED SYSTOLIC (CONGESTIVE) AND DIASTOLIC (CONGESTIVE) HEART FAILURE (HCC): ICD-10-CM

## 2023-06-29 DIAGNOSIS — I47.1 ATRIAL TACHYCARDIA (HCC): ICD-10-CM

## 2023-06-29 RX ORDER — METOPROLOL SUCCINATE 25 MG/1
TABLET, EXTENDED RELEASE ORAL
Qty: 180 TABLET | Refills: 4 | Status: SHIPPED | OUTPATIENT
Start: 2023-06-29

## 2023-07-10 PROBLEM — Z00.00 MEDICARE ANNUAL WELLNESS VISIT, SUBSEQUENT: Status: RESOLVED | Noted: 2021-05-06 | Resolved: 2023-07-10

## 2023-07-13 DIAGNOSIS — E03.9 HYPOTHYROIDISM, UNSPECIFIED TYPE: ICD-10-CM

## 2023-07-13 RX ORDER — LEVOTHYROXINE SODIUM 0.1 MG/1
TABLET ORAL
Qty: 90 TABLET | Refills: 0 | Status: SHIPPED | OUTPATIENT
Start: 2023-07-13

## 2023-07-16 ENCOUNTER — APPOINTMENT (EMERGENCY)
Dept: CT IMAGING | Facility: HOSPITAL | Age: 73
End: 2023-07-16
Payer: COMMERCIAL

## 2023-07-16 ENCOUNTER — HOSPITAL ENCOUNTER (EMERGENCY)
Facility: HOSPITAL | Age: 73
Discharge: HOME | End: 2023-07-16
Attending: EMERGENCY MEDICINE | Admitting: EMERGENCY MEDICINE
Payer: COMMERCIAL

## 2023-07-16 VITALS
OXYGEN SATURATION: 93 % | SYSTOLIC BLOOD PRESSURE: 102 MMHG | DIASTOLIC BLOOD PRESSURE: 61 MMHG | WEIGHT: 129.41 LBS | TEMPERATURE: 99.2 F | HEART RATE: 103 BPM | RESPIRATION RATE: 16 BRPM | BODY MASS INDEX: 22.92 KG/M2

## 2023-07-16 DIAGNOSIS — R93.89 ABNORMAL FINDING ON RADIOLOGY EXAM: ICD-10-CM

## 2023-07-16 DIAGNOSIS — R63.0 DECREASED APPETITE: ICD-10-CM

## 2023-07-16 DIAGNOSIS — N17.9 AKI (ACUTE KIDNEY INJURY) (HCC): ICD-10-CM

## 2023-07-16 DIAGNOSIS — N39.0 URINARY TRACT INFECTION: ICD-10-CM

## 2023-07-16 DIAGNOSIS — R53.1 GENERALIZED WEAKNESS: Primary | ICD-10-CM

## 2023-07-16 LAB
2HR DELTA HS TROPONIN: -9 NG/L
ALBUMIN SERPL BCP-MCNC: 4 G/DL (ref 3.5–5)
ALP SERPL-CCNC: 66 U/L (ref 34–104)
ALT SERPL W P-5'-P-CCNC: 6 U/L (ref 7–52)
ANION GAP SERPL CALCULATED.3IONS-SCNC: 13 MMOL/L
AST SERPL W P-5'-P-CCNC: 13 U/L (ref 13–39)
BACTERIA UR QL AUTO: ABNORMAL /HPF
BASOPHILS # BLD AUTO: 0.06 THOUSANDS/ÂΜL (ref 0–0.1)
BASOPHILS NFR BLD AUTO: 0 % (ref 0–1)
BILIRUB SERPL-MCNC: 1.71 MG/DL (ref 0.2–1)
BILIRUB UR QL STRIP: NEGATIVE
BUN SERPL-MCNC: 30 MG/DL (ref 5–25)
CALCIUM SERPL-MCNC: 9.5 MG/DL (ref 8.4–10.2)
CARDIAC TROPONIN I PNL SERPL HS: 26 NG/L
CARDIAC TROPONIN I PNL SERPL HS: 35 NG/L
CHLORIDE SERPL-SCNC: 95 MMOL/L (ref 96–108)
CLARITY UR: ABNORMAL
CO2 SERPL-SCNC: 26 MMOL/L (ref 21–32)
COLOR UR: YELLOW
CREAT SERPL-MCNC: 1.47 MG/DL (ref 0.6–1.3)
EOSINOPHIL # BLD AUTO: 0.06 THOUSAND/ÂΜL (ref 0–0.61)
EOSINOPHIL NFR BLD AUTO: 0 % (ref 0–6)
ERYTHROCYTE [DISTWIDTH] IN BLOOD BY AUTOMATED COUNT: 14.1 % (ref 11.6–15.1)
FLUAV RNA RESP QL NAA+PROBE: NEGATIVE
FLUBV RNA RESP QL NAA+PROBE: NEGATIVE
GFR SERPL CREATININE-BSD FRML MDRD: 35 ML/MIN/1.73SQ M
GLUCOSE SERPL-MCNC: 124 MG/DL (ref 65–140)
GLUCOSE UR STRIP-MCNC: NEGATIVE MG/DL
HCT VFR BLD AUTO: 42.3 % (ref 34.8–46.1)
HGB BLD-MCNC: 13.7 G/DL (ref 11.5–15.4)
HGB UR QL STRIP.AUTO: ABNORMAL
HYALINE CASTS #/AREA URNS LPF: ABNORMAL /LPF
IMM GRANULOCYTES # BLD AUTO: 0.08 THOUSAND/UL (ref 0–0.2)
IMM GRANULOCYTES NFR BLD AUTO: 1 % (ref 0–2)
KETONES UR STRIP-MCNC: NEGATIVE MG/DL
LEUKOCYTE ESTERASE UR QL STRIP: ABNORMAL
LIPASE SERPL-CCNC: 9 U/L (ref 11–82)
LYMPHOCYTES # BLD AUTO: 1.91 THOUSANDS/ÂΜL (ref 0.6–4.47)
LYMPHOCYTES NFR BLD AUTO: 13 % (ref 14–44)
MAGNESIUM SERPL-MCNC: 1.7 MG/DL (ref 1.9–2.7)
MCH RBC QN AUTO: 28.9 PG (ref 26.8–34.3)
MCHC RBC AUTO-ENTMCNC: 32.4 G/DL (ref 31.4–37.4)
MCV RBC AUTO: 89 FL (ref 82–98)
MONOCYTES # BLD AUTO: 1.15 THOUSAND/ÂΜL (ref 0.17–1.22)
MONOCYTES NFR BLD AUTO: 8 % (ref 4–12)
MUCOUS THREADS UR QL AUTO: ABNORMAL
NEUTROPHILS # BLD AUTO: 11.94 THOUSANDS/ÂΜL (ref 1.85–7.62)
NEUTS SEG NFR BLD AUTO: 78 % (ref 43–75)
NITRITE UR QL STRIP: NEGATIVE
NON-SQ EPI CELLS URNS QL MICRO: ABNORMAL /HPF
NRBC BLD AUTO-RTO: 0 /100 WBCS
PH UR STRIP.AUTO: 5 [PH]
PLATELET # BLD AUTO: 95 THOUSANDS/UL (ref 149–390)
PLATELET BLD QL SMEAR: ABNORMAL
PMV BLD AUTO: 13.3 FL (ref 8.9–12.7)
POTASSIUM SERPL-SCNC: 3.8 MMOL/L (ref 3.5–5.3)
PROT SERPL-MCNC: 7.5 G/DL (ref 6.4–8.4)
PROT UR STRIP-MCNC: ABNORMAL MG/DL
RBC # BLD AUTO: 4.74 MILLION/UL (ref 3.81–5.12)
RBC #/AREA URNS AUTO: ABNORMAL /HPF
RBC MORPH BLD: NORMAL
RSV RNA RESP QL NAA+PROBE: NEGATIVE
SARS-COV-2 RNA RESP QL NAA+PROBE: NEGATIVE
SODIUM SERPL-SCNC: 134 MMOL/L (ref 135–147)
SP GR UR STRIP.AUTO: 1.02 (ref 1–1.03)
UROBILINOGEN UR STRIP-ACNC: <2 MG/DL
WBC # BLD AUTO: 15.2 THOUSAND/UL (ref 4.31–10.16)
WBC #/AREA URNS AUTO: ABNORMAL /HPF

## 2023-07-16 PROCEDURE — 36415 COLL VENOUS BLD VENIPUNCTURE: CPT | Performed by: EMERGENCY MEDICINE

## 2023-07-16 PROCEDURE — 96367 TX/PROPH/DG ADDL SEQ IV INF: CPT

## 2023-07-16 PROCEDURE — 93005 ELECTROCARDIOGRAM TRACING: CPT

## 2023-07-16 PROCEDURE — 96365 THER/PROPH/DIAG IV INF INIT: CPT

## 2023-07-16 PROCEDURE — 99285 EMERGENCY DEPT VISIT HI MDM: CPT

## 2023-07-16 PROCEDURE — 0241U HB NFCT DS VIR RESP RNA 4 TRGT: CPT | Performed by: EMERGENCY MEDICINE

## 2023-07-16 PROCEDURE — 85025 COMPLETE CBC W/AUTO DIFF WBC: CPT | Performed by: EMERGENCY MEDICINE

## 2023-07-16 PROCEDURE — 87086 URINE CULTURE/COLONY COUNT: CPT | Performed by: EMERGENCY MEDICINE

## 2023-07-16 PROCEDURE — 83690 ASSAY OF LIPASE: CPT | Performed by: EMERGENCY MEDICINE

## 2023-07-16 PROCEDURE — 83735 ASSAY OF MAGNESIUM: CPT | Performed by: EMERGENCY MEDICINE

## 2023-07-16 PROCEDURE — 81001 URINALYSIS AUTO W/SCOPE: CPT | Performed by: EMERGENCY MEDICINE

## 2023-07-16 PROCEDURE — 74177 CT ABD & PELVIS W/CONTRAST: CPT

## 2023-07-16 PROCEDURE — 84484 ASSAY OF TROPONIN QUANT: CPT | Performed by: EMERGENCY MEDICINE

## 2023-07-16 PROCEDURE — 80053 COMPREHEN METABOLIC PANEL: CPT | Performed by: EMERGENCY MEDICINE

## 2023-07-16 RX ORDER — CEFUROXIME AXETIL 500 MG/1
500 TABLET ORAL EVERY 12 HOURS SCHEDULED
Qty: 20 TABLET | Refills: 0 | Status: SHIPPED | OUTPATIENT
Start: 2023-07-16 | End: 2023-07-26

## 2023-07-16 RX ORDER — ONDANSETRON 4 MG/1
4 TABLET, ORALLY DISINTEGRATING ORAL EVERY 6 HOURS PRN
Qty: 20 TABLET | Refills: 0 | Status: ON HOLD | OUTPATIENT
Start: 2023-07-16

## 2023-07-16 RX ADMIN — CEFTRIAXONE 2000 MG: 2 INJECTION, POWDER, FOR SOLUTION INTRAMUSCULAR; INTRAVENOUS at 20:56

## 2023-07-16 RX ADMIN — SODIUM CHLORIDE, SODIUM LACTATE, POTASSIUM CHLORIDE, AND CALCIUM CHLORIDE 1000 ML: .6; .31; .03; .02 INJECTION, SOLUTION INTRAVENOUS at 19:56

## 2023-07-16 RX ADMIN — IOHEXOL 75 ML: 350 INJECTION, SOLUTION INTRAVENOUS at 20:42

## 2023-07-16 NOTE — ED PROVIDER NOTES
History  Chief Complaint   Patient presents with   • Weakness - Generalized     Pt come to ED for a couple days of weakness, loss of appetite and diarrhea. This is a 54-year-old female with a relevant past medical history of hypothyroidism, CHF on Entresto, presenting to the ED today for complaint of generalized weakness. Patient also has had decreased appetite over the past few days. She does have a history of atrial fibrillation but is not on any anticoagulation. She states that she has been taking in fluids, but otherwise cannot explain why she has been having her decreased appetite. She is not nauseous. She does not have any abdominal pain. She has had some diarrhea, liquid stool twice daily. Her daughters presented with her. Prior to Admission Medications   Prescriptions Last Dose Informant Patient Reported? Taking? Entresto 24-26 MG TABS   No No   Sig: TAKE 1 TABLET BY MOUTH TWICE A DAY   furosemide (LASIX) 20 mg tablet   No No   Sig: TAKE 1 TABLET BY MOUTH TWICE A DAY   levothyroxine 100 mcg tablet   No No   Sig: TAKE 1 TABLET BY MOUTH EVERY DAY   metoprolol succinate (TOPROL-XL) 25 mg 24 hr tablet   No No   Sig: TAKE 1 TABLET BY MOUTH TWICE A DAY      Facility-Administered Medications: None       Past Medical History:   Diagnosis Date   • Abnormal CXR 1/14/2022   • Cancer [facility])    • Disease of thyroid gland    • Dysuria 12/16/2021   • Encounter for support and coordination of transition of care 5/6/2021       Past Surgical History:   Procedure Laterality Date   • CARDIAC ELECTROPHYSIOLOGY PROCEDURE N/A 12/30/2022    Procedure: Cardiac eps/aflutter ablation;  Surgeon: Elayne Harvey MD;  Location: [facility]; Service: Cardiology   • CARDIAC ELECTROPHYSIOLOGY PROCEDURE N/A 12/30/2022    Procedure: Cardiac loop recorder implant;  Surgeon: Elayne Harvey MD;  Location: [facility];   Service: Cardiology   • CARDIAC ELECTROPHYSIOLOGY PROCEDURE N/A 2/6/2023 Procedure: CARDIAC EPS/SVT ABLATION;  Surgeon: Gideon Villar MD;  Location: [facility]; Service: Cardiology   • CARDIAC ELECTROPHYSIOLOGY PROCEDURE N/A 2023    Procedure: Cardiac pacer implant;  Surgeon: Ugo Elilngton MD;  Location: [facility]; Service: Cardiology   • CARDIAC ELECTROPHYSIOLOGY PROCEDURE N/A 2023    Procedure: CARDIAC LOOP RECORDER EXPLANT;  Surgeon: Ugo Ellington MD;  [facility]: [facility]; Service: Cardiology   • HYSTERECTOMY         Family History   Problem Relation Age of Onset   • No Known Problems Mother    • No Known Problems Father    • Cancer Maternal Aunt      I have reviewed and agree with the history as documented. E-Cigarette/Vaping   • E-Cigarette Use Never User      E-Cigarette/Vaping Substances   • Nicotine No    • THC No    • CBD No    • Flavoring No    • Other No    • Unknown No      Social History     Tobacco Use   • Smoking status: Former     Packs/day: 1.00     Years: 10.00     Total pack years: 10.00     Types: Cigarettes     Start date: 79     Quit date: 1993     Years since quittin.5   • Smokeless tobacco: Never   Vaping Use   • Vaping Use: Never used   Substance Use Topics   • Alcohol use: Never   • Drug use: Never       Review of Systems   Constitutional: Positive for activity change and appetite change. Negative for chills and fever. HENT: Negative for congestion and rhinorrhea. Eyes: Negative for photophobia and visual disturbance. Respiratory: Negative for cough, chest tightness and shortness of breath. Cardiovascular: Negative for chest pain and leg swelling. Gastrointestinal: Negative for abdominal distention, nausea and vomiting. Genitourinary: Negative for dysuria and frequency. Musculoskeletal: Negative for back pain and neck stiffness. Skin: Negative for rash and wound. Neurological: Negative for dizziness and weakness. Psychiatric/Behavioral: Negative for agitation and suicidal ideas. Physical Exam  Physical Exam  Vitals and nursing note reviewed. Constitutional:       General: She is not in acute distress. Appearance: Normal appearance. She is normal weight. She is not ill-appearing. HENT:      Head: Normocephalic and atraumatic. Right Ear: External ear normal.      Left Ear: External ear normal.      Nose: Nose normal. No congestion or rhinorrhea. Mouth/Throat:      Mouth: Mucous membranes are moist.      Pharynx: Oropharynx is clear. No oropharyngeal exudate. Eyes:      General: No scleral icterus. Conjunctiva/sclera: Conjunctivae normal.   Cardiovascular:      Rate and Rhythm: Regular rhythm. Tachycardia present. Pulses: Normal pulses. Heart sounds: Normal heart sounds. No murmur heard. No gallop. Pulmonary:      Effort: Pulmonary effort is normal. No respiratory distress. Breath sounds: Normal breath sounds. No stridor. No wheezing or rhonchi. Abdominal:      General: Abdomen is flat. Bowel sounds are normal. There is no distension. Palpations: Abdomen is soft. There is no mass. Tenderness: There is no abdominal tenderness. Musculoskeletal:         General: No swelling or tenderness. Normal range of motion. Cervical back: Normal range of motion and neck supple. No tenderness. Right lower leg: No edema. Left lower leg: No edema. Skin:     General: Skin is warm and dry. Capillary Refill: Capillary refill takes less than 2 seconds. Coloration: Skin is not jaundiced. Findings: No bruising. Neurological:      General: No focal deficit present. Mental Status: She is alert and oriented to person, place, and time. Mental status is at baseline. Sensory: No sensory deficit. Motor: No weakness. Psychiatric:         Mood and Affect: Mood normal.         Behavior: Behavior normal.         Thought Content:  Thought content normal.         Judgment: Judgment normal.         Vital Signs  ED Triage Vitals [07/16/23 1939]   Temperature Pulse Respirations Blood Pressure SpO2   99.2 °F (37.3 °C) (!) 128 18 130/66 95 %      Temp Source Heart Rate Source Patient Position - Orthostatic VS BP Location FiO2 (%)   Oral Monitor -- Right arm --      Pain Score       No Pain           Vitals:    07/16/23 2015 07/16/23 2100 07/16/23 2130 07/16/23 2230   BP: 123/60 117/58 127/64 102/61   Pulse: (!) 116 (!) 108 105 103         Visual Acuity      ED Medications  Medications   lactated ringers bolus 1,000 mL (0 mL Intravenous Stopped 7/16/23 2056)   ceftriaxone (ROCEPHIN) 2 g/50 mL in dextrose IVPB (0 mg Intravenous Stopped 7/16/23 2126)   iohexol (OMNIPAQUE) 350 MG/ML injection (SINGLE-DOSE) 75 mL (75 mL Intravenous Given 7/16/23 2042)       Diagnostic Studies  Results Reviewed     Procedure Component Value Units Date/Time    HS Troponin I 2hr [[id number]]  (Normal) Collected: 07/16/23 2153    Lab Status: Final result Specimen: Blood from Arm, Right Updated: 07/16/23 2226     hs TnI 2hr 26 ng/L      Delta 2hr hsTnI -9 ng/L     Smear Review(Phlebs Do Not Order) [[id number]]  (Abnormal) Collected: 07/16/23 1958    Lab Status: Final result Specimen: Blood from Arm, Right Updated: 07/16/23 2106     RBC Morphology Normal     Platelet Estimate Decreased    CBC and differential [[id number]]  (Abnormal) Collected: 07/16/23 1958    Lab Status: Final result Specimen: Blood from Arm, Right Updated: 07/16/23 2106     WBC 15.20 Thousand/uL      RBC 4.74 Million/uL      Hemoglobin 13.7 g/dL      Hematocrit 42.3 %      MCV 89 fL      MCH 28.9 pg      MCHC 32.4 g/dL      RDW 14.1 %      MPV 13.3 fL      Platelets 95 Thousands/uL      nRBC 0 /100 WBCs      Neutrophils Relative 78 %      Immat GRANS % 1 %      Lymphocytes Relative 13 %      Monocytes Relative 8 %      Eosinophils Relative 0 %      Basophils Relative 0 %      Neutrophils Absolute 11.94 Thousands/µL      Immature Grans Absolute 0.08 Thousand/uL      Lymphocytes Absolute 1.91 Thousands/µL      Monocytes Absolute 1.15 Thousand/µL      Eosinophils Absolute 0.06 Thousand/µL      Basophils Absolute 0.06 Thousands/µL     Narrative: This is an appended report. These results have been appended to a previously verified report. FLU/RSV/COVID - if FLU/RSV clinically relevant [[id number]]  (Normal) Collected: 07/16/23 1958    Lab Status: Final result Specimen: Nares from Nose Updated: 07/16/23 2042     SARS-CoV-2 Negative     INFLUENZA A PCR Negative     INFLUENZA B PCR Negative     RSV PCR Negative    Narrative:      FOR PEDIATRIC PATIENTS - copy/paste COVID Guidelines URL to browser: [email or web address]    SARS-CoV-2 assay is a Nucleic Acid Amplification assay intended for the  qualitative detection of nucleic acid from SARS-CoV-2 in nasopharyngeal  swabs. Results are for the presumptive identification of SARS-CoV-2 RNA. Positive results are indicative of infection with SARS-CoV-2, the virus  causing COVID-19, but do not rule out bacterial infection or co-infection  with other viruses. Laboratories within the [facility] and its  territories are required to report all positive results to the appropriate  public health authorities. Negative results do not preclude SARS-CoV-2  infection and should not be used as the sole basis for treatment or other  patient management decisions. Negative results must be combined with  clinical observations, patient history, and epidemiological information. This test has not been FDA cleared or approved. This test has been authorized by FDA under an Emergency Use Authorization  (EUA). This test is only authorized for the duration of time the  declaration that circumstances exist justifying the authorization of the  emergency use of an in vitro diagnostic tests for detection of SARS-CoV-2  virus and/or diagnosis of COVID-19 infection under section 564(b)(1) of  the Act, 21 U. S.C. 004KKQ-5(N)(8), unless the authorization is terminated  or revoked sooner. The test has been validated but independent review by FDA  and CLIA is pending. Test performed using [employer] Gene[employer]pert: This RT-PCR assay targets N2,  a region unique to SARS-CoV-2. A conserved region in the E-gene was chosen  for pan-Sarbecovirus detection which includes SARS-CoV-2. According to CMS-2020-01-R, this platform meets the definition of high-throughput technology. Urine Microscopic [[id number]]  (Abnormal) Collected: 07/16/23 2012    Lab Status: Final result Specimen: Urine, Clean Catch Updated: 07/16/23 2031     RBC, UA 10-20 /hpf      WBC, UA 30-50 /hpf      Epithelial Cells Occasional /hpf      Bacteria, UA Occasional /hpf      MUCUS THREADS Occasional     Hyaline Casts, UA 10-25 /lpf     Urine culture [[id number]] Collected: 07/16/23 2012    Lab Status:  In process Specimen: Urine, Clean Catch Updated: 07/16/23 2031    Comprehensive metabolic panel [[id number]]  (Abnormal) Collected: 07/16/23 1958    Lab Status: Final result Specimen: Blood from Arm, Right Updated: 07/16/23 2029     Sodium 134 mmol/L      Potassium 3.8 mmol/L      Chloride 95 mmol/L      CO2 26 mmol/L      ANION GAP 13 mmol/L      BUN 30 mg/dL      Creatinine 1.47 mg/dL      Glucose 124 mg/dL      Calcium 9.5 mg/dL      AST 13 U/L      ALT 6 U/L      Alkaline Phosphatase 66 U/L      Total Protein 7.5 g/dL      Albumin 4.0 g/dL      Total Bilirubin 1.71 mg/dL      eGFR 35 ml/min/1.73sq m     Narrative:      [place] guidelines for Chronic Kidney Disease (CKD):   •  Stage 1 with normal or high GFR (GFR > 90 mL/min/1.73 square meters)  •  Stage 2 Mild CKD (GFR = 60-89 mL/min/1.73 square meters)  •  Stage 3A Moderate CKD (GFR = 45-59 mL/min/1.73 square meters)  •  Stage 3B Moderate CKD (GFR = 30-44 mL/min/1.73 square meters)  •  Stage 4 Severe CKD (GFR = 15-29 mL/min/1.73 square meters)  •  Stage 5 End Stage CKD (GFR <15 mL/min/1.73 square meters)  Note: GFR calculation is accurate only with a steady state creatinine    Magnesium [[id number]]  (Abnormal) Collected: 07/16/23 1958    Lab Status: Final result Specimen: Blood from Arm, Right Updated: 07/16/23 2029     Magnesium 1.7 mg/dL     Lipase [[id number]]  (Abnormal) Collected: 07/16/23 1958    Lab Status: Final result Specimen: Blood from Arm, Right Updated: 07/16/23 2029     Lipase 9 u/L     UA w Reflex to Microscopic w Reflex to Culture [[id number]]  (Abnormal) Collected: 07/16/23 2012    Lab Status: Final result Specimen: Urine, Clean Catch Updated: 07/16/23 2029     Color, UA Yellow     Clarity, UA Turbid     Specific Gravity, UA 1.018     pH, UA 5.0     Leukocytes, UA Large     Nitrite, UA Negative     Protein, UA 50 (1+) mg/dl      Glucose, UA Negative mg/dl      Ketones, UA Negative mg/dl      Urobilinogen, UA <2.0 mg/dl      Bilirubin, UA Negative     Occult Blood, UA Moderate    HS Troponin I 4hr [[id number]]     Lab Status: No result Specimen: Blood     HS Troponin 0hr (reflex protocol) [[id number]]  (Normal) Collected: 07/16/23 1958    Lab Status: Final result Specimen: Blood from Arm, Right Updated: 07/16/23 2026     hs TnI 0hr 35 ng/L                  CT abdomen pelvis with contrast   Final Result by aLla Villeda MD (07/16 2213)      6 mm splenic lesion possibly a hemangioma, hamartoma, lymphangioma, or other. Recommend elective MRI with contrast for further evaluation. .      Bibasilar reticular changes with increased groundglass opacity in comparison to the prior CT chest dated 6/22/2023 suggest superimposed developing infiltrates. Gallstones are present. Recommend clinical correlation. Workstation performed: [id number]                    Procedures  Procedures         ED Course                                             Medical Decision Making  This is a 44-year-old female presenting to the ED today for complaint of generalized weakness and decreased appetite.   Patient denies any other significantly associated symptoms. She presents with her daughters who corroborate her history. On presentation she has profound tachycardia, and an elevated temperature not reaching a clinical fever. IV fluids were ordered, and her heart rate did improve with IV fluids. Her differential diagnosis includes: Urinary tract infection versus electrolyte abnormality versus renal insufficiency versus other. She underwent a CBC showing leukocytosis and a thrombocytopenia which is chronic for her. Her metabolic panel showed a slight TERESA, which is likely due to her volume depletion. She did receive IV fluids while here in the ED as previously stated. Her urine did show evidence for a urinary tract infection. She was ordered Rocephin while here in the ED. She underwent a CT of the abdomen pelvis which did not show any evidence for significant acute abnormality. She did have incidental finding of splenic lesion, with MRI recommended on nonemergent basis. Patient was informed of this. She also had some questionable interstitial infiltrates in her bilateral bases, but she is not complaining of any shortness of breath, does not have a cough, or any other symptoms to suggest this. Her CT also did show cholelithiasis without acute cholecystitis, and she was nontender in her abdomen, and specifically in her right upper quadrant. Patient was informed of all these incidental findings. Shared decision making was undergone with patient, and I did discuss with her my preference for hospitalization versus discharge with close follow-up, and patient of sound mind and would like to go home with close follow-up. I did confirm that there would be family staying with her, in case her symptoms did worsen. Patient prescribed Ceftin, she will take it as an outpatient twice daily for the next 10 days. Patient had bloodwork, ekg all interpreted by myself.   The management plan was discussed in detail with the patient at bedside and all questions were answered. Strict ED return instructions were discussed at bedside. Prior to discharge, both verbal and written instructions were provided. We discussed the signs and symptoms that should prompt the patient to return to the ED. All questions were answered and the patient was comfortable with the plan of care and discharged home. The patient agrees to return to the Emergency Department for concerns and/or progression of illness. Amount and/or Complexity of Data Reviewed  Labs: ordered. Radiology: ordered. Risk  Prescription drug management. Disposition  Final diagnoses:   Generalized weakness   Urinary tract infection   Abnormal finding on radiology exam   Decreased appetite   TERESA (acute kidney injury) ([address])     Time reflects when diagnosis was documented in both MDM as applicable and the Disposition within this note     Time User Action Codes Description Comment    7/16/2023 10:34 PM Janeice Genin, Amr Add [R53.1] Generalized weakness     7/16/2023 10:34 PM Janeice Genin, Amr Add [N39.0] Urinary tract infection     7/16/2023 10:34 PM Janeice Genin, Amr Add [R93.89] Abnormal finding on radiology exam     7/16/2023 10:35 PM Virgie, Amr Add [R63.0] Decreased appetite     7/16/2023 11:53 PM Janeice Genin, Amr Add [N17.9] TERESA (acute kidney injury) [facility])       ED Disposition     ED Disposition   Discharge    Condition   Stable    Date/Time   Sun Jul 16, 2023 10:34 PM    Comment   [address] discharge to home/self care.                Follow-up Information     Follow up With Specialties Details Why Contact Info    Quinn Rowe MD Family Medicine Call in 1 day  [address] [facility]  [phone]            Discharge Medication List as of 7/16/2023 10:37 PM      START taking these medications    Details   cefuroxime (CEFTIN) 500 mg tablet Take 1 tablet (500 mg total) by mouth every 12 (twelve) hours for 10 days, Starting Sun 7/16/2023, Until Wed 7/26/2023, Normal      ondansetron (ZOFRAN-ODT) 4 mg disintegrating tablet Take 1 tablet (4 mg total) by mouth every 6 (six) hours as needed for nausea or vomiting, Starting Sun 7/16/2023, Normal         CONTINUE these medications which have NOT CHANGED    Details   Entresto 24-26 MG TABS TAKE 1 TABLET BY MOUTH TWICE A DAY, Normal      furosemide (LASIX) 20 mg tablet TAKE 1 TABLET BY MOUTH TWICE A DAY, Normal      levothyroxine 100 mcg tablet TAKE 1 TABLET BY MOUTH EVERY DAY, Normal      metoprolol succinate (TOPROL-XL) 25 mg 24 hr tablet TAKE 1 TABLET BY MOUTH TWICE A DAY, Normal             No discharge procedures on file.     PDMP Review     None          ED Provider  Electronically Signed by           Lindsey Verma MD  07/16/23 [id number]

## 2023-07-17 ENCOUNTER — VBI (OUTPATIENT)
Dept: FAMILY MEDICINE CLINIC | Facility: CLINIC | Age: 73
End: 2023-07-17

## 2023-07-17 LAB
ATRIAL RATE: 60 BPM
QRS AXIS: -79 DEGREES
QRSD INTERVAL: 192 MS
QT INTERVAL: 352 MS
QTC INTERVAL: 493 MS
T WAVE AXIS: 148 DEGREES
VENTRICULAR RATE: 118 BPM

## 2023-07-17 PROCEDURE — 93010 ELECTROCARDIOGRAM REPORT: CPT | Performed by: INTERNAL MEDICINE

## 2023-07-17 NOTE — DISCHARGE INSTRUCTIONS
You were seen in the ED for generalized weakness. You had blood work, CT scan. You were diagnosed with a urinary tract infection. You have been discharged with Ceftin to be taken twice daily for the next 10 days. Please follow up with your primary care physician within the next 1 week for continued management of your conditions. Please come back to the ED if you develop uncontrollable pain, inability to eat or drink, loss of consciousness, chest pain or shortness of breath. Thank you very much for utilizing the ED this evening. You also had an abnormality on your spleen, for which the radiologist is recommending having an MRI on a nonemergent basis as an outpatient. Please speak with your primary care physician about this.   You also have gallstones in your gallbladder without evidence of infection, if you do become symptomatic from your gallbladder please speak with your primary care physician about a surgery referral.

## 2023-07-17 NOTE — TELEPHONE ENCOUNTER
07/17/23 10:15 AM    Patient contacted post ED visit, [facility] department spoke with patient/caregiver and outreach was successful. Thank you.   Jaja Grissom  [facility]

## 2023-07-19 LAB — BACTERIA UR CULT: NORMAL

## 2023-07-24 ENCOUNTER — HOSPITAL ENCOUNTER (OUTPATIENT)
Dept: RADIOLOGY | Facility: MEDICAL CENTER | Age: 73
Discharge: HOME | End: 2023-07-24
Payer: COMMERCIAL

## 2023-07-24 DIAGNOSIS — Z78.0 MENOPAUSE: ICD-10-CM

## 2023-07-24 DIAGNOSIS — Z13.820 SCREENING FOR OSTEOPOROSIS: ICD-10-CM

## 2023-07-24 PROCEDURE — 77080 DXA BONE DENSITY AXIAL: CPT

## 2023-07-25 ENCOUNTER — TELEPHONE (OUTPATIENT)
Dept: FAMILY MEDICINE CLINIC | Facility: CLINIC | Age: 73
End: 2023-07-25

## 2023-07-25 NOTE — TELEPHONE ENCOUNTER
----- Message from Thuy Chester MD sent at 7/25/2023  4:13 PM EDT -----  Please call the patient regarding DEXA results which reveals osteopenia. Patient should take over-the-counter calcium and vitamin-D 1000 international units daily.

## 2023-07-26 ENCOUNTER — OFFICE VISIT (OUTPATIENT)
Dept: FAMILY MEDICINE CLINIC | Facility: CLINIC | Age: 73
End: 2023-07-26
Payer: COMMERCIAL

## 2023-07-26 VITALS
DIASTOLIC BLOOD PRESSURE: 78 MMHG | HEIGHT: 63 IN | BODY MASS INDEX: 23.5 KG/M2 | WEIGHT: 132.6 LBS | HEART RATE: 92 BPM | SYSTOLIC BLOOD PRESSURE: 110 MMHG

## 2023-07-26 DIAGNOSIS — D73.4 SPLENIC CYST: ICD-10-CM

## 2023-07-26 DIAGNOSIS — N18.31 STAGE 3A CHRONIC KIDNEY DISEASE (HCC): ICD-10-CM

## 2023-07-26 DIAGNOSIS — K80.20 GALL STONES: ICD-10-CM

## 2023-07-26 DIAGNOSIS — I77.810 THORACIC AORTIC ECTASIA (HCC): ICD-10-CM

## 2023-07-26 DIAGNOSIS — R91.8 LUNG INFILTRATE: Primary | ICD-10-CM

## 2023-07-26 DIAGNOSIS — D46.9 MYELODYSPLASIA (MYELODYSPLASTIC SYNDROME) (HCC): ICD-10-CM

## 2023-07-26 PROCEDURE — 99215 OFFICE O/P EST HI 40 MIN: CPT | Performed by: FAMILY MEDICINE

## 2023-07-26 NOTE — ASSESSMENT & PLAN NOTE
CAT scan reveals bibasilar reticular changes with increased groundglass opacity in comparison to the prior exam dated 6/22/2023 suggest superimposed developing infiltrates. Patient is on last day of cefuroxime, reports improvement in cough. Recommended to repeat chest x-ray in 6 weeks to follow-up on lung infiltrates. Continue monitoring of interstitial lung disease per pulmonary.

## 2023-07-26 NOTE — ASSESSMENT & PLAN NOTE
Lab Results   Component Value Date    EGFR 35 07/16/2023    EGFR 59 (L) 05/05/2023    EGFR 57 02/17/2023    CREATININE 1.47 (H) 07/16/2023    CREATININE 1.01 (H) 05/05/2023    CREATININE 0.98 02/17/2023   Secondary to dehydration. Patient is back to her baseline. Recommended to repeat CMP to ensure return to baseline.

## 2023-07-26 NOTE — PROGRESS NOTES
Subjective:      Patient ID: Elías Reyes is a 67 y.o. female. HPI    Patient is following up from the ER visit. Evaluated for symptoms of decreased appetite, generalized weakness patient has history of A-fib. ER work-up revealed a lung infiltrate, possible UTI. Patient was treated with a 10-day of oral antibiotic. Patient reports improvement in her symptoms since. Today's last day of antibiotic. Urine culture was reported normal.  Patient noted to have mild deterioration in her renal function, diagnosed with TERESA, treated with IV fluids. Labs can be ordered to follow-up on her kidney function to ensure that she is back to her baseline. Also has history of myelodysplastic syndrome, noted to have slight decrease in her platelet count. Patient will be advised to repeat labs to ensure stability. Noted to have multiple abnormalities on the CAT scan of abdomen and pelvis. Patient is asymptomatic so she will be recommended follow-up imaging on this. Incidental finding of gallstones, patient denies postprandial worsening of abdominal pain. Past Medical History:   Diagnosis Date   • Abnormal CXR 1/14/2022   • Cancer [facility])    • Disease of thyroid gland    • Dysuria 12/16/2021   • Encounter for support and coordination of transition of care 5/6/2021       Family History   Problem Relation Age of Onset   • No Known Problems Mother    • No Known Problems Father    • Cancer Maternal Aunt        Past Surgical History:   Procedure Laterality Date   • CARDIAC ELECTROPHYSIOLOGY PROCEDURE N/A 12/30/2022    Procedure: Cardiac eps/aflutter ablation;  Surgeon: Sandor Arango MD;  [facility]: [facility]; Service: Cardiology   • CARDIAC ELECTROPHYSIOLOGY PROCEDURE N/A 12/30/2022    Procedure: Cardiac loop recorder implant;  Surgeon: Sandor Arango MD;  [facility]: [facility];   Service: Cardiology   • CARDIAC ELECTROPHYSIOLOGY PROCEDURE N/A 2/6/2023    Procedure: CARDIAC EPS/SVT ABLATION;  Surgeon: Michelle Miles MD;  [facility]: [facility]; Service: Cardiology   • CARDIAC ELECTROPHYSIOLOGY PROCEDURE N/A 2/16/2023    Procedure: Cardiac pacer implant;  Surgeon: Celeste Avilez MD;  [facility]; Service: Cardiology   • CARDIAC ELECTROPHYSIOLOGY PROCEDURE N/A 2/16/2023    Procedure: CARDIAC LOOP RECORDER EXPLANT;  Surgeon: Celeste Avilez MD;  [facility]; Service: Cardiology   • HYSTERECTOMY          reports that she quit smoking about 30 years ago. Her smoking use included cigarettes. She started smoking about 50 years ago. She has a 10.00 pack-year smoking history. She has never used smokeless tobacco. She reports that she does not drink alcohol and does not use drugs. Current Outpatient Medications:   •  cefuroxime (CEFTIN) 500 mg tablet, Take 1 tablet (500 mg total) by mouth every 12 (twelve) hours for 10 days, Disp: 20 tablet, Rfl: 0  •  Entresto 24-26 MG TABS, TAKE 1 TABLET BY MOUTH TWICE A DAY, Disp: 60 tablet, Rfl: 5  •  furosemide (LASIX) 20 mg tablet, TAKE 1 TABLET BY MOUTH TWICE A DAY, Disp: 180 tablet, Rfl: 1  •  levothyroxine 100 mcg tablet, TAKE 1 TABLET BY MOUTH EVERY DAY, Disp: 90 tablet, Rfl: 0  •  metoprolol succinate (TOPROL-XL) 25 mg 24 hr tablet, TAKE 1 TABLET BY MOUTH TWICE A DAY, Disp: 180 tablet, Rfl: 4  •  ondansetron (ZOFRAN-ODT) 4 mg disintegrating tablet, Take 1 tablet (4 mg total) by mouth every 6 (six) hours as needed for nausea or vomiting, Disp: 20 tablet, Rfl: 0    The following portions of the patient's history were reviewed and updated as appropriate: allergies, current medications, past family history, past medical history, past social history, past surgical history and problem list.    Review of Systems   Constitutional: Negative. Respiratory: Negative. Negative for cough, choking, shortness of breath and wheezing. Cardiovascular: Negative.             Objective:    /78   Pulse 92   Ht 5' 3" (1.6 m)   Wt 60.1 kg (132 lb 9.6 oz)   BMI 23.49 kg/m²      Physical Exam  Constitutional:       Appearance: Normal appearance. Cardiovascular:      Heart sounds: Normal heart sounds. Pulmonary:      Comments: B/l crackles. Musculoskeletal:      Right lower leg: No edema. Left lower leg: No edema. Neurological:      Mental Status: She is oriented to person, place, and time.    Psychiatric:         Mood and Affect: Mood normal.           Recent Results (from the past 1008 hour(s))   ECG 12 lead    Collection Time: 07/16/23  7:53 PM   Result Value Ref Range    Ventricular Rate 118 BPM    Atrial Rate 60 BPM    DC Interval  ms    QRSD Interval 192 ms    QT Interval 352 ms    QTC Interval 493 ms    P Axis  degrees    QRS Axis -79 degrees    T Wave Axis 148 degrees   CBC and differential    Collection Time: 07/16/23  7:58 PM   Result Value Ref Range    WBC 15.20 (H) 4.31 - 10.16 Thousand/uL    RBC 4.74 3.81 - 5.12 Million/uL    Hemoglobin 13.7 11.5 - 15.4 g/dL    Hematocrit 42.3 34.8 - 46.1 %    MCV 89 82 - 98 fL    MCH 28.9 26.8 - 34.3 pg    MCHC 32.4 31.4 - 37.4 g/dL    RDW 14.1 11.6 - 15.1 %    MPV 13.3 (H) 8.9 - 12.7 fL    Platelets 95 (L) 088 - 390 Thousands/uL    nRBC 0 /100 WBCs    Neutrophils Relative 78 (H) 43 - 75 %    Immat GRANS % 1 0 - 2 %    Lymphocytes Relative 13 (L) 14 - 44 %    Monocytes Relative 8 4 - 12 %    Eosinophils Relative 0 0 - 6 %    Basophils Relative 0 0 - 1 %    Neutrophils Absolute 11.94 (H) 1.85 - 7.62 Thousands/µL    Immature Grans Absolute 0.08 0.00 - 0.20 Thousand/uL    Lymphocytes Absolute 1.91 0.60 - 4.47 Thousands/µL    Monocytes Absolute 1.15 0.17 - 1.22 Thousand/µL    Eosinophils Absolute 0.06 0.00 - 0.61 Thousand/µL    Basophils Absolute 0.06 0.00 - 0.10 Thousands/µL   Comprehensive metabolic panel    Collection Time: 07/16/23  7:58 PM   Result Value Ref Range    Sodium 134 (L) 135 - 147 mmol/L    Potassium 3.8 3.5 - 5.3 mmol/L    Chloride 95 (L) 96 - 108 mmol/L    CO2 26 21 - 32 mmol/L ANION GAP 13 mmol/L    BUN 30 (H) 5 - 25 mg/dL    Creatinine 1.47 (H) 0.60 - 1.30 mg/dL    Glucose 124 65 - 140 mg/dL    Calcium 9.5 8.4 - 10.2 mg/dL    AST 13 13 - 39 U/L    ALT 6 (L) 7 - 52 U/L    Alkaline Phosphatase 66 34 - 104 U/L    Total Protein 7.5 6.4 - 8.4 g/dL    Albumin 4.0 3.5 - 5.0 g/dL    Total Bilirubin 1.71 (H) 0.20 - 1.00 mg/dL    eGFR 35 ml/min/1.73sq m   FLU/RSV/COVID - if FLU/RSV clinically relevant    Collection Time: 07/16/23  7:58 PM    Specimen: Nose; Nares   Result Value Ref Range    SARS-CoV-2 Negative Negative    INFLUENZA A PCR Negative Negative    INFLUENZA B PCR Negative Negative    RSV PCR Negative Negative   HS Troponin 0hr (reflex protocol)    Collection Time: 07/16/23  7:58 PM   Result Value Ref Range    hs TnI 0hr 35 "Refer to ACS Flowchart"- see link ng/L   Magnesium    Collection Time: 07/16/23  7:58 PM   Result Value Ref Range    Magnesium 1.7 (L) 1.9 - 2.7 mg/dL   Lipase    Collection Time: 07/16/23  7:58 PM   Result Value Ref Range    Lipase 9 (L) 11 - 82 u/L   Smear Review(Phlebs Do Not Order)    Collection Time: 07/16/23  7:58 PM   Result Value Ref Range    RBC Morphology Normal     Platelet Estimate Decreased (A) Adequate   UA w Reflex to Microscopic w Reflex to Culture    Collection Time: 07/16/23  8:12 PM    Specimen: Urine, Clean Catch   Result Value Ref Range    Color, UA Yellow     Clarity, UA Turbid     Specific Gravity, UA 1.018 1.003 - 1.030    pH, UA 5.0 4.5, 5.0, 5.5, 6.0, 6.5, 7.0, 7.5, 8.0    Leukocytes, UA Large (A) Negative    Nitrite, UA Negative Negative    Protein, UA 50 (1+) (A) Negative mg/dl    Glucose, UA Negative Negative mg/dl    Ketones, UA Negative Negative mg/dl    Urobilinogen, UA <2.0 <2.0 mg/dl mg/dl    Bilirubin, UA Negative Negative    Occult Blood, UA Moderate (A) Negative   Urine Microscopic    Collection Time: 07/16/23  8:12 PM   Result Value Ref Range    RBC, UA 10-20 (A) None Seen, 1-2 /hpf    WBC, UA 30-50 (A) None Seen, 1-2 /hpf Epithelial Cells Occasional None Seen, Occasional /hpf    Bacteria, UA Occasional None Seen, Occasional /hpf    MUCUS THREADS Occasional (A) None Seen    Hyaline Casts, UA 10-25 (A) None Seen /lpf   Urine culture    Collection Time: 07/16/23  8:12 PM    Specimen: Urine, Clean Catch   Result Value Ref Range    Urine Culture 50,000-59,000 cfu/ml    HS Troponin I 2hr    Collection Time: 07/16/23  9:53 PM   Result Value Ref Range    hs TnI 2hr 26 "Refer to ACS Flowchart"- see link ng/L    Delta 2hr hsTnI -9 <20 ng/L       Assessment/Plan:         Problem List Items Addressed This Visit        Digestive    Gall stones     There are gallstone(s) within the gallbladder, without pericholecystic inflammatory changes. Patient is asymptomatic. Patient would like to monitor her symptoms, would like to get surgical opinion only if symptoms get worse. Respiratory    Lung infiltrate - Primary     CAT scan reveals bibasilar reticular changes with increased groundglass opacity in comparison to the prior exam dated 6/22/2023 suggest superimposed developing infiltrates. Patient is on last day of cefuroxime, reports improvement in cough. Recommended to repeat chest x-ray in 6 weeks to follow-up on lung infiltrates. Continue monitoring of interstitial lung disease per pulmonary. Relevant Orders    XR chest pa & lateral       Cardiovascular and Mediastinum    Thoracic aortic ectasia (HCC)     Continue monitoring per cardiology. Genitourinary    Stage 3a chronic kidney disease [facility])     Lab Results   Component Value Date    EGFR 35 07/16/2023    EGFR 59 (L) 05/05/2023    EGFR 57 02/17/2023    CREATININE 1.47 (H) 07/16/2023    CREATININE 1.01 (H) 05/05/2023    CREATININE 0.98 02/17/2023   Secondary to dehydration. Patient is back to her baseline. Recommended to repeat CMP to ensure return to baseline.          Relevant Orders    Comprehensive metabolic panel       Other    Myelodysplasia (myelodysplastic syndrome) (HCC)     Platelet count  low but stable. Patient is asymptomatic. Repeat CBC to ensure stability  Continue monitoring per Hematology. Relevant Orders    CBC and differential    Splenic cyst     Incidental finding of 6 mm splenic lesion possibly a hemangioma, hamartoma, lymphangioma, or other. Recommend elective MRI with contrast for further evaluation. Can order MRI after reassessing renal function. I have spent a total time of 40 minutes on 07/26/23 in caring for this patient including Diagnostic results, Prognosis, Risks and benefits of tx options, Instructions for management, Patient and family education, Importance of tx compliance, Risk factor reductions, Impressions, Counseling / Coordination of care, Documenting in the medical record, Reviewing / ordering tests, medicine, procedures   and Obtaining or reviewing history  .

## 2023-07-26 NOTE — ASSESSMENT & PLAN NOTE
There are gallstone(s) within the gallbladder, without pericholecystic inflammatory changes. Patient is asymptomatic. Patient would like to monitor her symptoms, would like to get surgical opinion only if symptoms get worse.

## 2023-07-26 NOTE — ASSESSMENT & PLAN NOTE
Platelet count  low but stable. Patient is asymptomatic. Repeat CBC to ensure stability  Continue monitoring per Hematology.

## 2023-07-26 NOTE — ASSESSMENT & PLAN NOTE
Incidental finding of 6 mm splenic lesion possibly a hemangioma, hamartoma, lymphangioma, or other. Recommend elective MRI with contrast for further evaluation. Can order MRI after reassessing renal function.

## 2023-07-29 ENCOUNTER — APPOINTMENT (EMERGENCY)
Dept: CT IMAGING | Facility: HOSPITAL | Age: 73
DRG: 280 | End: 2023-07-29
Payer: COMMERCIAL

## 2023-07-29 ENCOUNTER — HOSPITAL ENCOUNTER (INPATIENT)
Facility: HOSPITAL | Age: 73
LOS: 4 days | Discharge: HOME | DRG: 280 | End: 2023-08-02
Attending: EMERGENCY MEDICINE | Admitting: GENERAL PRACTICE
Payer: COMMERCIAL

## 2023-07-29 ENCOUNTER — APPOINTMENT (EMERGENCY)
Dept: RADIOLOGY | Facility: HOSPITAL | Age: 73
DRG: 280 | End: 2023-07-29
Payer: COMMERCIAL

## 2023-07-29 ENCOUNTER — APPOINTMENT (INPATIENT)
Dept: NON INVASIVE DIAGNOSTICS | Facility: HOSPITAL | Age: 73
DRG: 280 | End: 2023-07-29
Payer: COMMERCIAL

## 2023-07-29 DIAGNOSIS — A41.9 SEPSIS (HCC): ICD-10-CM

## 2023-07-29 DIAGNOSIS — J96.01 ACUTE RESPIRATORY FAILURE WITH HYPOXIA (HCC): ICD-10-CM

## 2023-07-29 DIAGNOSIS — J18.9 MULTIFOCAL PNEUMONIA: ICD-10-CM

## 2023-07-29 DIAGNOSIS — J81.1 PULMONARY EDEMA: ICD-10-CM

## 2023-07-29 DIAGNOSIS — I42.0 DILATED CARDIOMYOPATHY (HCC): ICD-10-CM

## 2023-07-29 DIAGNOSIS — I50.43 ACUTE ON CHRONIC COMBINED SYSTOLIC (CONGESTIVE) AND DIASTOLIC (CONGESTIVE) HEART FAILURE (HCC): ICD-10-CM

## 2023-07-29 DIAGNOSIS — R77.8 ELEVATED TROPONIN: ICD-10-CM

## 2023-07-29 DIAGNOSIS — I25.10 CORONARY ARTERY DISEASE INVOLVING NATIVE CORONARY ARTERY: Primary | ICD-10-CM

## 2023-07-29 DIAGNOSIS — I50.33 ACUTE ON CHRONIC DIASTOLIC HEART FAILURE (HCC): ICD-10-CM

## 2023-07-29 PROBLEM — N18.30 CKD (CHRONIC KIDNEY DISEASE) STAGE 3, GFR 30-59 ML/MIN (HCC): Status: ACTIVE | Noted: 2022-01-25

## 2023-07-29 LAB
2HR DELTA HS TROPONIN: 48 NG/L
4HR DELTA HS TROPONIN: 116 NG/L
ALBUMIN SERPL BCP-MCNC: 3.6 G/DL (ref 3.5–5)
ALBUMIN SERPL BCP-MCNC: 3.8 G/DL (ref 3.5–5)
ALP SERPL-CCNC: 103 U/L (ref 34–104)
ALP SERPL-CCNC: 96 U/L (ref 34–104)
ALT SERPL W P-5'-P-CCNC: 33 U/L (ref 7–52)
ALT SERPL W P-5'-P-CCNC: 36 U/L (ref 7–52)
ANION GAP SERPL CALCULATED.3IONS-SCNC: 10 MMOL/L
ANION GAP SERPL CALCULATED.3IONS-SCNC: 11 MMOL/L
AORTIC ROOT: 4.3 CM
APICAL FOUR CHAMBER EJECTION FRACTION: 27 %
APTT PPP: 29 SECONDS (ref 23–37)
AST SERPL W P-5'-P-CCNC: 59 U/L (ref 13–39)
AST SERPL W P-5'-P-CCNC: 64 U/L (ref 13–39)
ATRIAL RATE: 115 BPM
AV REGURGITATION PRESSURE HALF TIME: 333 MS
BASE EX.OXY STD BLDV CALC-SCNC: 86.2 % (ref 60–80)
BASE EX.OXY STD BLDV CALC-SCNC: 98 % (ref 60–80)
BASE EXCESS BLDV CALC-SCNC: 3.7 MMOL/L
BASE EXCESS BLDV CALC-SCNC: 5.6 MMOL/L
BASOPHILS # BLD AUTO: 0.04 THOUSANDS/ÂΜL (ref 0–0.1)
BASOPHILS NFR BLD AUTO: 0 % (ref 0–1)
BILIRUB SERPL-MCNC: 0.7 MG/DL (ref 0.2–1)
BILIRUB SERPL-MCNC: 0.93 MG/DL (ref 0.2–1)
BNP SERPL-MCNC: >4700 PG/ML (ref 0–100)
BUN SERPL-MCNC: 12 MG/DL (ref 5–25)
BUN SERPL-MCNC: 15 MG/DL (ref 5–25)
CALCIUM SERPL-MCNC: 8.8 MG/DL (ref 8.4–10.2)
CALCIUM SERPL-MCNC: 9 MG/DL (ref 8.4–10.2)
CARDIAC TROPONIN I PNL SERPL HS: 156 NG/L
CARDIAC TROPONIN I PNL SERPL HS: 192 NG/L (ref 8–18)
CARDIAC TROPONIN I PNL SERPL HS: 40 NG/L
CARDIAC TROPONIN I PNL SERPL HS: 88 NG/L
CHLORIDE SERPL-SCNC: 100 MMOL/L (ref 96–108)
CHLORIDE SERPL-SCNC: 101 MMOL/L (ref 96–108)
CO2 SERPL-SCNC: 27 MMOL/L (ref 21–32)
CO2 SERPL-SCNC: 28 MMOL/L (ref 21–32)
CREAT SERPL-MCNC: 0.87 MG/DL (ref 0.6–1.3)
CREAT SERPL-MCNC: 1.04 MG/DL (ref 0.6–1.3)
D DIMER PPP FEU-MCNC: 1.01 UG/ML FEU
EOSINOPHIL # BLD AUTO: 0.02 THOUSAND/ÂΜL (ref 0–0.61)
EOSINOPHIL NFR BLD AUTO: 0 % (ref 0–6)
ERYTHROCYTE [DISTWIDTH] IN BLOOD BY AUTOMATED COUNT: 15.3 % (ref 11.6–15.1)
FLUAV RNA RESP QL NAA+PROBE: NEGATIVE
FLUBV RNA RESP QL NAA+PROBE: NEGATIVE
GFR SERPL CREATININE-BSD FRML MDRD: 53 ML/MIN/1.73SQ M
GFR SERPL CREATININE-BSD FRML MDRD: 66 ML/MIN/1.73SQ M
GLUCOSE SERPL-MCNC: 145 MG/DL (ref 65–140)
GLUCOSE SERPL-MCNC: 173 MG/DL (ref 65–140)
HCO3 BLDV-SCNC: 27.4 MMOL/L (ref 24–30)
HCO3 BLDV-SCNC: 29.2 MMOL/L (ref 24–30)
HCT VFR BLD AUTO: 37.9 % (ref 34.8–46.1)
HGB BLD-MCNC: 12.1 G/DL (ref 11.5–15.4)
IMM GRANULOCYTES # BLD AUTO: 0.04 THOUSAND/UL (ref 0–0.2)
IMM GRANULOCYTES NFR BLD AUTO: 0 % (ref 0–2)
INR PPP: 1.03 (ref 0.84–1.19)
LACTATE SERPL-SCNC: 1.8 MMOL/L (ref 0.5–2)
LACTATE SERPL-SCNC: 1.9 MMOL/L (ref 0.5–2)
LYMPHOCYTES # BLD AUTO: 1.91 THOUSANDS/ÂΜL (ref 0.6–4.47)
LYMPHOCYTES NFR BLD AUTO: 19 % (ref 14–44)
MAGNESIUM SERPL-MCNC: 1.8 MG/DL (ref 1.9–2.7)
MCH RBC QN AUTO: 29.3 PG (ref 26.8–34.3)
MCHC RBC AUTO-ENTMCNC: 31.9 G/DL (ref 31.4–37.4)
MCV RBC AUTO: 92 FL (ref 82–98)
MONOCYTES # BLD AUTO: 0.59 THOUSAND/ÂΜL (ref 0.17–1.22)
MONOCYTES NFR BLD AUTO: 6 % (ref 4–12)
NASAL CANNULA: 3
NEUTROPHILS # BLD AUTO: 7.68 THOUSANDS/ÂΜL (ref 1.85–7.62)
NEUTS SEG NFR BLD AUTO: 75 % (ref 43–75)
NRBC BLD AUTO-RTO: 0 /100 WBCS
O2 CT BLDV-SCNC: 15.3 ML/DL
O2 CT BLDV-SCNC: 17.9 ML/DL
PCO2 BLDV: 38.2 MM HG (ref 42–50)
PCO2 BLDV: 38.8 MM HG (ref 42–50)
PH BLDV: 7.47 [PH] (ref 7.3–7.4)
PH BLDV: 7.49 [PH] (ref 7.3–7.4)
PLATELET # BLD AUTO: 185 THOUSANDS/UL (ref 149–390)
PMV BLD AUTO: 11.4 FL (ref 8.9–12.7)
PO2 BLDV: 183.8 MM HG (ref 35–45)
PO2 BLDV: 53.3 MM HG (ref 35–45)
POTASSIUM SERPL-SCNC: 3.6 MMOL/L (ref 3.5–5.3)
POTASSIUM SERPL-SCNC: 4.9 MMOL/L (ref 3.5–5.3)
PR INTERVAL: 188 MS
PROCALCITONIN SERPL-MCNC: <0.05 NG/ML
PROT SERPL-MCNC: 6.8 G/DL (ref 6.4–8.4)
PROT SERPL-MCNC: 6.9 G/DL (ref 6.4–8.4)
PROTHROMBIN TIME: 13.8 SECONDS (ref 11.6–14.5)
QRS AXIS: -47 DEGREES
QRSD INTERVAL: 154 MS
QT INTERVAL: 306 MS
QTC INTERVAL: 423 MS
RA PRESSURE ESTIMATED: 8 MMHG
RBC # BLD AUTO: 4.13 MILLION/UL (ref 3.81–5.12)
RSV RNA RESP QL NAA+PROBE: NEGATIVE
RV PSP: 42 MMHG
SARS-COV-2 RNA RESP QL NAA+PROBE: NEGATIVE
SL CV AV DECELERATION TIME RETROGRADE: 1148 MS
SL CV AV PEAK GRADIENT RETROGRADE: 88 MMHG
SL CV LV EF: 30
SODIUM SERPL-SCNC: 137 MMOL/L (ref 135–147)
SODIUM SERPL-SCNC: 140 MMOL/L (ref 135–147)
T WAVE AXIS: 214 DEGREES
T4 FREE SERPL-MCNC: 1.07 NG/DL (ref 0.61–1.12)
TR MAX PG: 34 MMHG
TR PEAK VELOCITY: 2.9 M/S
TRICUSPID VALVE PEAK REGURGITATION VELOCITY: 2.92 M/S
TSH SERPL DL<=0.05 MIU/L-ACNC: 9.17 UIU/ML (ref 0.45–4.5)
VENTRICULAR RATE: 115 BPM
WBC # BLD AUTO: 10.28 THOUSAND/UL (ref 4.31–10.16)

## 2023-07-29 PROCEDURE — 99291 CRITICAL CARE FIRST HOUR: CPT | Performed by: EMERGENCY MEDICINE

## 2023-07-29 PROCEDURE — 84443 ASSAY THYROID STIM HORMONE: CPT | Performed by: GENERAL PRACTICE

## 2023-07-29 PROCEDURE — 85610 PROTHROMBIN TIME: CPT | Performed by: EMERGENCY MEDICINE

## 2023-07-29 PROCEDURE — 80053 COMPREHEN METABOLIC PANEL: CPT | Performed by: GENERAL PRACTICE

## 2023-07-29 PROCEDURE — 82805 BLOOD GASES W/O2 SATURATION: CPT | Performed by: EMERGENCY MEDICINE

## 2023-07-29 PROCEDURE — 84484 ASSAY OF TROPONIN QUANT: CPT | Performed by: EMERGENCY MEDICINE

## 2023-07-29 PROCEDURE — 83605 ASSAY OF LACTIC ACID: CPT | Performed by: GENERAL PRACTICE

## 2023-07-29 PROCEDURE — 85379 FIBRIN DEGRADATION QUANT: CPT | Performed by: EMERGENCY MEDICINE

## 2023-07-29 PROCEDURE — 80053 COMPREHEN METABOLIC PANEL: CPT | Performed by: EMERGENCY MEDICINE

## 2023-07-29 PROCEDURE — 71275 CT ANGIOGRAPHY CHEST: CPT

## 2023-07-29 PROCEDURE — 96365 THER/PROPH/DIAG IV INF INIT: CPT

## 2023-07-29 PROCEDURE — 83605 ASSAY OF LACTIC ACID: CPT | Performed by: EMERGENCY MEDICINE

## 2023-07-29 PROCEDURE — 96375 TX/PRO/DX INJ NEW DRUG ADDON: CPT

## 2023-07-29 PROCEDURE — 83880 ASSAY OF NATRIURETIC PEPTIDE: CPT | Performed by: EMERGENCY MEDICINE

## 2023-07-29 PROCEDURE — 0241U HB NFCT DS VIR RESP RNA 4 TRGT: CPT | Performed by: EMERGENCY MEDICINE

## 2023-07-29 PROCEDURE — 94664 DEMO&/EVAL PT USE INHALER: CPT

## 2023-07-29 PROCEDURE — 93321 DOPPLER ECHO F-UP/LMTD STD: CPT

## 2023-07-29 PROCEDURE — 83735 ASSAY OF MAGNESIUM: CPT | Performed by: NURSE PRACTITIONER

## 2023-07-29 PROCEDURE — 99223 1ST HOSP IP/OBS HIGH 75: CPT | Performed by: INTERNAL MEDICINE

## 2023-07-29 PROCEDURE — 85730 THROMBOPLASTIN TIME PARTIAL: CPT | Performed by: EMERGENCY MEDICINE

## 2023-07-29 PROCEDURE — 94640 AIRWAY INHALATION TREATMENT: CPT

## 2023-07-29 PROCEDURE — 93308 TTE F-UP OR LMTD: CPT

## 2023-07-29 PROCEDURE — 99223 1ST HOSP IP/OBS HIGH 75: CPT | Performed by: GENERAL PRACTICE

## 2023-07-29 PROCEDURE — 82805 BLOOD GASES W/O2 SATURATION: CPT | Performed by: GENERAL PRACTICE

## 2023-07-29 PROCEDURE — 93325 DOPPLER ECHO COLOR FLOW MAPG: CPT | Performed by: INTERNAL MEDICINE

## 2023-07-29 PROCEDURE — 96366 THER/PROPH/DIAG IV INF ADDON: CPT

## 2023-07-29 PROCEDURE — 84484 ASSAY OF TROPONIN QUANT: CPT | Performed by: NURSE PRACTITIONER

## 2023-07-29 PROCEDURE — 93005 ELECTROCARDIOGRAM TRACING: CPT

## 2023-07-29 PROCEDURE — 84439 ASSAY OF FREE THYROXINE: CPT | Performed by: GENERAL PRACTICE

## 2023-07-29 PROCEDURE — 93321 DOPPLER ECHO F-UP/LMTD STD: CPT | Performed by: INTERNAL MEDICINE

## 2023-07-29 PROCEDURE — G1004 CDSM NDSC: HCPCS

## 2023-07-29 PROCEDURE — 93308 TTE F-UP OR LMTD: CPT | Performed by: INTERNAL MEDICINE

## 2023-07-29 PROCEDURE — 84484 ASSAY OF TROPONIN QUANT: CPT | Performed by: GENERAL PRACTICE

## 2023-07-29 PROCEDURE — 85025 COMPLETE CBC W/AUTO DIFF WBC: CPT | Performed by: EMERGENCY MEDICINE

## 2023-07-29 PROCEDURE — 84145 PROCALCITONIN (PCT): CPT | Performed by: GENERAL PRACTICE

## 2023-07-29 PROCEDURE — 36415 COLL VENOUS BLD VENIPUNCTURE: CPT | Performed by: EMERGENCY MEDICINE

## 2023-07-29 PROCEDURE — 99285 EMERGENCY DEPT VISIT HI MDM: CPT

## 2023-07-29 PROCEDURE — 94760 N-INVAS EAR/PLS OXIMETRY 1: CPT

## 2023-07-29 PROCEDURE — 87040 BLOOD CULTURE FOR BACTERIA: CPT | Performed by: EMERGENCY MEDICINE

## 2023-07-29 PROCEDURE — 71045 X-RAY EXAM CHEST 1 VIEW: CPT

## 2023-07-29 PROCEDURE — 93325 DOPPLER ECHO COLOR FLOW MAPG: CPT

## 2023-07-29 RX ORDER — BUMETANIDE 0.25 MG/ML
1 INJECTION INTRAMUSCULAR; INTRAVENOUS
Status: DISCONTINUED | OUTPATIENT
Start: 2023-07-29 | End: 2023-07-30

## 2023-07-29 RX ORDER — ALBUTEROL SULFATE 2.5 MG/3ML
2.5 SOLUTION RESPIRATORY (INHALATION) ONCE
Status: COMPLETED | OUTPATIENT
Start: 2023-07-29 | End: 2023-07-29

## 2023-07-29 RX ORDER — BUMETANIDE 0.25 MG/ML
1 INJECTION INTRAMUSCULAR; INTRAVENOUS ONCE
Status: COMPLETED | OUTPATIENT
Start: 2023-07-29 | End: 2023-07-29

## 2023-07-29 RX ORDER — ACETAMINOPHEN 325 MG/1
650 TABLET ORAL EVERY 6 HOURS PRN
Status: DISCONTINUED | OUTPATIENT
Start: 2023-07-29 | End: 2023-08-02 | Stop reason: HOSPADM

## 2023-07-29 RX ORDER — FUROSEMIDE 10 MG/ML
40 INJECTION INTRAMUSCULAR; INTRAVENOUS ONCE
Status: COMPLETED | OUTPATIENT
Start: 2023-07-29 | End: 2023-07-29

## 2023-07-29 RX ORDER — ENOXAPARIN SODIUM 100 MG/ML
40 INJECTION SUBCUTANEOUS DAILY
Status: DISCONTINUED | OUTPATIENT
Start: 2023-07-29 | End: 2023-08-02 | Stop reason: HOSPADM

## 2023-07-29 RX ORDER — BENZONATATE 100 MG/1
100 CAPSULE ORAL 3 TIMES DAILY PRN
Status: DISCONTINUED | OUTPATIENT
Start: 2023-07-29 | End: 2023-08-02 | Stop reason: HOSPADM

## 2023-07-29 RX ORDER — METOPROLOL SUCCINATE 25 MG/1
25 TABLET, EXTENDED RELEASE ORAL EVERY 12 HOURS SCHEDULED
Status: DISCONTINUED | OUTPATIENT
Start: 2023-07-29 | End: 2023-08-02 | Stop reason: HOSPADM

## 2023-07-29 RX ORDER — ALBUTEROL SULFATE 2.5 MG/3ML
2.5 SOLUTION RESPIRATORY (INHALATION) EVERY 4 HOURS PRN
Status: DISCONTINUED | OUTPATIENT
Start: 2023-07-29 | End: 2023-08-02 | Stop reason: HOSPADM

## 2023-07-29 RX ORDER — ALPRAZOLAM 0.25 MG/1
0.25 TABLET ORAL DAILY PRN
Status: DISCONTINUED | OUTPATIENT
Start: 2023-07-29 | End: 2023-08-02 | Stop reason: HOSPADM

## 2023-07-29 RX ORDER — LEVOFLOXACIN 5 MG/ML
750 INJECTION, SOLUTION INTRAVENOUS ONCE
Status: COMPLETED | OUTPATIENT
Start: 2023-07-29 | End: 2023-07-29

## 2023-07-29 RX ORDER — LEVOTHYROXINE SODIUM 0.1 MG/1
100 TABLET ORAL
Status: DISCONTINUED | OUTPATIENT
Start: 2023-07-29 | End: 2023-08-02 | Stop reason: HOSPADM

## 2023-07-29 RX ORDER — POTASSIUM CHLORIDE 20 MEQ/1
40 TABLET, EXTENDED RELEASE ORAL ONCE
Status: COMPLETED | OUTPATIENT
Start: 2023-07-29 | End: 2023-07-29

## 2023-07-29 RX ORDER — FUROSEMIDE 10 MG/ML
40 INJECTION INTRAMUSCULAR; INTRAVENOUS
Status: DISCONTINUED | OUTPATIENT
Start: 2023-07-29 | End: 2023-07-29

## 2023-07-29 RX ORDER — LANOLIN ALCOHOL/MO/W.PET/CERES
3 CREAM (GRAM) TOPICAL
Status: DISCONTINUED | OUTPATIENT
Start: 2023-07-29 | End: 2023-08-02 | Stop reason: HOSPADM

## 2023-07-29 RX ADMIN — POTASSIUM CHLORIDE 40 MEQ: 1500 TABLET, EXTENDED RELEASE ORAL at 11:59

## 2023-07-29 RX ADMIN — Medication 3 MG: at 22:58

## 2023-07-29 RX ADMIN — ALBUTEROL SULFATE 2.5 MG: 2.5 SOLUTION RESPIRATORY (INHALATION) at 17:56

## 2023-07-29 RX ADMIN — BUMETANIDE 1 MG: 0.25 INJECTION INTRAMUSCULAR; INTRAVENOUS at 16:00

## 2023-07-29 RX ADMIN — SACUBITRIL AND VALSARTAN 1 TABLET: 24; 26 TABLET, FILM COATED ORAL at 11:59

## 2023-07-29 RX ADMIN — SACUBITRIL AND VALSARTAN 1 TABLET: 24; 26 TABLET, FILM COATED ORAL at 16:00

## 2023-07-29 RX ADMIN — ALPRAZOLAM 0.25 MG: 0.25 TABLET ORAL at 20:54

## 2023-07-29 RX ADMIN — FUROSEMIDE 40 MG: 10 INJECTION, SOLUTION INTRAMUSCULAR; INTRAVENOUS at 08:30

## 2023-07-29 RX ADMIN — LEVOFLOXACIN 750 MG: 750 INJECTION, SOLUTION INTRAVENOUS at 07:10

## 2023-07-29 RX ADMIN — ENOXAPARIN SODIUM 40 MG: 40 INJECTION SUBCUTANEOUS at 11:59

## 2023-07-29 RX ADMIN — ALBUTEROL SULFATE 2.5 MG: 2.5 SOLUTION RESPIRATORY (INHALATION) at 07:13

## 2023-07-29 RX ADMIN — LEVOTHYROXINE SODIUM 100 MCG: 100 TABLET ORAL at 11:59

## 2023-07-29 RX ADMIN — IOHEXOL 60 ML: 350 INJECTION, SOLUTION INTRAVENOUS at 08:29

## 2023-07-29 RX ADMIN — METOPROLOL SUCCINATE 25 MG: 25 TABLET, EXTENDED RELEASE ORAL at 11:59

## 2023-07-29 RX ADMIN — METOPROLOL SUCCINATE 25 MG: 25 TABLET, EXTENDED RELEASE ORAL at 20:54

## 2023-07-29 RX ADMIN — BENZONATATE 100 MG: 100 CAPSULE ORAL at 14:48

## 2023-07-29 RX ADMIN — BUMETANIDE 1 MG: 0.25 INJECTION INTRAMUSCULAR; INTRAVENOUS at 19:05

## 2023-07-29 NOTE — ASSESSMENT & PLAN NOTE
Lab Results   Component Value Date    EGFR 66 07/29/2023    EGFR 35 07/16/2023    EGFR 59 (L) 05/05/2023    CREATININE 0.87 07/29/2023    CREATININE 1.47 (H) 07/16/2023    CREATININE 1.01 (H) 05/05/2023   Estimated Creatinine Clearance: 49.6 mL/min (by C-G formula based on SCr of 0.87 mg/dL).   · Cr at b/l 0.8-1  · Watch w/ diuresis

## 2023-07-29 NOTE — CONSULTS
Consultation - Cardiology Team One  Dipak Sexton 67 y.o. female MRN: [id number]  Unit/Bed#: S -01 Encounter: [id number]    Inpatient consult to Cardiology  Consult performed by: BAO Allen  Consult ordered by: Steve Piedra DO          Physician Requesting Consult: Steve Piedra DO     Reason for Consult / Principal Problem: CHF      Assessment/ Plan:    1. Acute on chronic HF: + orthopnea; SOB at rest. + pulmonary edema and b/; effusions on imaging  BNP > 4700  She has rales on exam; mild SOB at rest. No LE edema  Agree with IV diuresis; lasix 40mg BID IV  - strict I/o, daliy standing scale wt (basleine 130lbs), low Na diet  Unclear cause of exacerbation; she reports following low na diet and med compliance  - Will check limited echo to eval EF and wall motion  - Obtain pacer interrogation to see if recurrent arrhythmias/afib/flutter  - If EF depressed again may consider cardiac cath if no other cause. 2. Hx of cardiomyopathy EF 25% 1/2022 insetting of tachyarrhythmia  Non-suchemic Nuclear ST 7/2022. EF recovered  Remains on metoprolol succinate and entresto; continue here  Repeating limited echo  3. PAF/flutter: s/p 2 ablation in last 7 months; currently in sinus tachycardia with long 1st degree AV block. Check pacer to see if any recurrent afib or flutter; if she has nay on device interrogation or on tele would recommend resuming AC with eliquis  4. Elevated troponin: mild elevation 40 --> 88 --> 156. In setting of acute CHF/hypoxia  Obtaining echo to eval wall motion and ef    Family at bedside and updated; all questions answered to satisfaction. History of Present Illness      HPI: Dipak Sexton is a 67y.o. year old female who has a history of PAF/flutter on eliquis, HFrEF, dilated cardiomyopathy, dyslipidemia, hx of hodgkins lymphoma, ILD,     follows with cardiologist Dr. Chato Lion and Electrophysiology, Dr Tess Chavez at [facility].         Pt presented to ED today with complaints of SOB/cough/fatigure for past 3 weeks. Started off with mild exertional sob and cough worse at night. Progressively worsened over time. Yesterday she was at outlets with grandchild and she jept having to stop and rest to catch her breath; her grandchild noted b/l feet were swollen. Last night the breathing was worse which prompted them to come to ED today. On presentation she was HD stable with /86, afebrile. SPO2 88% on RA. EKG showed sinus tachycardia with long 1st degree AV block; HRs in 100-110s. CTA done which was negative for PE but showed b/l pleural effusions and pulmonary edema. Labs significant for BNP > 4700  HS serial Trop 40 --> 88 --> 156  Cr 0.87, K 3.6  WBC 10 otherwise CBC wnl    She was given a dose of IV lasix 40mg in ED and admitted for acute HF. Cardiology asked to see for further management. At time of my exam she reports feeling ok; still having mild SOB at rest or with conversation. No chest pain and not feeling any palpitations. Reports complaints with home diuretics lasix 20mg BID and low Na diet. Did not note any wt gain at home. baseline around 130 lbs; she is 135lbs on standing scale here. 2 occurrences of urine since getting IV lasix in ED. She carries past hx of cardiomyopathy; likely non-ischemic tachy mediated/afib. With rhythm control and GDMT her EF went from 25% in 1/2022 to 50% 4/2022; she had a non-ischemic nuclear ST 7/2022. She saw ep for definitive rhythm management and underwent afib/flutter ablation 12/30/2022 and then recurrent arrhythmia; another ablation 2/6/2022; ef prior to 2nd ablation 65% on TANI. She had some bradycardia post ablation and pacer was offered but she opted for loop and monitoring; developed more significant sytmpomatic bradycardia and AV block and underwent MDT dual chamber PPM implant on 2/16/2023. After that she had been feeling well. Has followed up with EP. No recurrent afib or flutter and she was taken off eliquis 3/2023.  She has had documented a tach. She remains on metoprolol succinate 25mg daily and entresto 24/26mg BID. EKG reviewed personally:  7/29/2023  Sinus tachycardia with long 1st degree AV block    Telemetry reviewed personally:   Sinus tachycardia; -110, no events    Review of Systems   Constitutional: Negative for decreased appetite and fever. Cardiovascular: Positive for dyspnea on exertion, leg swelling, orthopnea and paroxysmal nocturnal dyspnea. Negative for chest pain, palpitations and syncope. Respiratory: Positive for cough and shortness of breath. Negative for sputum production. Gastrointestinal: Negative for nausea and vomiting. Genitourinary: Negative for dysuria. Neurological: Negative for dizziness and light-headedness. Psychiatric/Behavioral: Negative for altered mental status. All other systems reviewed and are negative. Historical Information   Past Medical History:   Diagnosis Date   • Abnormal CXR 1/14/2022   • Cancer [facility])    • Disease of thyroid gland    • Dysuria 12/16/2021   • Encounter for support and coordination of transition of care 5/6/2021     Past Surgical History:   Procedure Laterality Date   • CARDIAC ELECTROPHYSIOLOGY PROCEDURE N/A 12/30/2022    Procedure: Cardiac eps/aflutter ablation;  Surgeon: Piero Hernandez MD;  [facility]: [facility]; Service: Cardiology   • CARDIAC ELECTROPHYSIOLOGY PROCEDURE N/A 12/30/2022    Procedure: Cardiac loop recorder implant;  Surgeon: Piero Hernandez MD;  [facility]; Service: Cardiology   • CARDIAC ELECTROPHYSIOLOGY PROCEDURE N/A 2/6/2023    Procedure: CARDIAC EPS/SVT ABLATION;  Surgeon: Piero Hernandez MD;  [facility]; Service: Cardiology   • CARDIAC ELECTROPHYSIOLOGY PROCEDURE N/A 2/16/2023    Procedure: Cardiac pacer implant;  Surgeon: Junie Romero MD;  Location: [facility];   Service: Cardiology   • CARDIAC ELECTROPHYSIOLOGY PROCEDURE N/A 2/16/2023 Procedure: CARDIAC LOOP RECORDER EXPLANT;  Surgeon: Joseluis Franks MD;  [facility]: [facility]; Service: Cardiology   • HYSTERECTOMY       Social History     Substance and Sexual Activity   Alcohol Use Never     Social History     Substance and Sexual Activity   Drug Use Never     Social History     Tobacco Use   Smoking Status Former   • Packs/day: 1.00   • Years: 10.00   • Total pack years: 10.00   • Types: Cigarettes   • Start date: 79   • Quit date: 1993   • Years since quittin.5   Smokeless Tobacco Never     Family History:   Family History   Problem Relation Age of Onset   • No Known Problems Mother    • No Known Problems Father    • Cancer Maternal Aunt        Meds/Allergies   current meds:   Current Facility-Administered Medications   Medication Dose Route Frequency   • acetaminophen (TYLENOL) tablet 650 mg  650 mg Oral Q6H PRN   • enoxaparin (LOVENOX) subcutaneous injection 40 mg  40 mg Subcutaneous Daily   • furosemide (LASIX) injection 40 mg  40 mg Intravenous BID (diuretic)   • levothyroxine tablet 100 mcg  100 mcg Oral Early Morning   • metoprolol succinate (TOPROL-XL) 24 hr tablet 25 mg  25 mg Oral Q12H [address]   • potassium chloride (K-DUR,KLOR-CON) CR tablet 40 mEq  40 mEq Oral Once   • sacubitril-valsartan (ENTRESTO) 24-26 MG per tablet 1 tablet  1 tablet Oral BID          Allergies   Allergen Reactions   • Penicillins Rash       Objective   Vitals: Blood pressure 128/86, pulse 105, temperature 98 °F (36.7 °C), temperature source Oral, resp. rate (!) 26, height 5' 2" (1.575 m), weight 61.4 kg (135 lb 5.8 oz), SpO2 94 %. ,     Body mass index is 24.76 kg/m². ,     Systolic (51FDE), DJI:482 , Min:128 , AIC:183     Diastolic (07IXT), GWI:02, Min:83, Max:86          No intake or output data in the 24 hours ending 23 1125  Weight (last 2 days)     Date/Time Weight    23 1055 61.4 (135.36)        Invasive Devices     Peripheral Intravenous Line  Duration           Peripheral IV 07/29/23 Left;Ventral (anterior) Forearm <1 day    Peripheral IV 07/29/23 Right Antecubital <1 day              Physical Exam  Vitals reviewed. Constitutional:       General: She is not in acute distress. Appearance: Normal appearance. HENT:      Head: Normocephalic. Mouth/Throat:      Mouth: Mucous membranes are moist.   Cardiovascular:      Rate and Rhythm: Regular rhythm. Tachycardia present. Heart sounds: S1 normal and S2 normal. No murmur heard. Pulmonary:      Breath sounds: Rales present. Comments: On 2L NC; diffuse insp rales; mild SOB at rest and conversation  Musculoskeletal:      Right lower leg: No edema. Left lower leg: No edema. Skin:     General: Skin is warm. Coloration: Skin is pale. Neurological:      Mental Status: She is alert and oriented to person, place, and time.    Psychiatric:         Mood and Affect: Mood normal.       LABORATORY RESULTS:      CBC with diff:   Results from last 7 days   Lab Units 07/29/23  0659   WBC Thousand/uL 10.28*   HEMOGLOBIN g/dL 12.1   HEMATOCRIT % 37.9   MCV fL 92   PLATELETS Thousands/uL 185   RBC Million/uL 4.13   MCH pg 29.3   MCHC g/dL 31.9   RDW % 15.3*   MPV fL 11.4   NRBC AUTO /100 WBCs 0       CMP:  Results from last 7 days   Lab Units 07/29/23  0659   POTASSIUM mmol/L 3.6   CHLORIDE mmol/L 101   CO2 mmol/L 28   BUN mg/dL 12   CREATININE mg/dL 0.87   CALCIUM mg/dL 9.0   AST U/L 64*   ALT U/L 33   ALK PHOS U/L 96   EGFR ml/min/1.73sq m 66       BMP:  Results from last 7 days   Lab Units 07/29/23  0659   POTASSIUM mmol/L 3.6   CHLORIDE mmol/L 101   CO2 mmol/L 28   BUN mg/dL 12   CREATININE mg/dL 0.87   CALCIUM mg/dL 9.0          Lab Results   Component Value Date    NTBNP 45,371 (H) 02/14/2023    NTBNP 1,799 (H) 02/04/2023    NTBNP 4,408 (H) 01/10/2022                              Results from last 7 days   Lab Units 07/29/23  0659   INR  1.03     Lipid Profile:   No results found for: "CHOL"  Lab Results   Component Value Date    HDL 50 02/04/2023    HDL 49 (L) 10/14/2022    HDL 58 03/05/2022     Lab Results   Component Value Date    LDLCALC 84 02/04/2023    LDLCALC 109 (H) 10/14/2022    LDLCALC 131 (H) 03/05/2022     Lab Results   Component Value Date    TRIG 60 02/04/2023    TRIG 117 10/14/2022    TRIG 101 03/05/2022         Cardiac testing:   No results found for this or any previous visit. No results found for this or any previous visit. No valid procedures specified. No results found for this or any previous visit. Imaging: I have personally reviewed pertinent reports. CTA ED chest PE study    Result Date: 7/29/2023  Narrative: CTA - CHEST WITH IV CONTRAST - PULMONARY ANGIOGRAM INDICATION:   Pulmonary embolism (PE) suspected, positive D-dimer sob, cough, elevated D-dimer. CTs of the chest from Reanna 3, 2022 and June 22, 2023 COMPARISON: None. TECHNIQUE: CTA examination of the chest was performed using angiographic technique according to a protocol specifically tailored to evaluate for pulmonary embolism. Multiplanar 2D reformatted images were created from the source data. In addition, coronal 3D MIP postprocessing was performed on the acquisition scanner. This examination was performed utilizing dual energy CT technique. Radiation dose length product (DLP) for this visit:  225 mGy-cm . This examination, like all CT scans performed in the [facility], was performed utilizing techniques to minimize radiation dose exposure, including the use of iterative reconstruction and automated exposure control. IV Contrast:  60 mL of iohexol (OMNIPAQUE) FINDINGS: PULMONARY ARTERIAL TREE:  No pulmonary embolus is seen. Mild dilatation of the central pulmonary arteries, suggesting pulmonary arterial hypertension.  LUNGS: Reticulation, groundglass attenuation and honeycombing in the bases of both lower lobes and right middle lobe, similar in appearance to a high-resolution CT of the chest from 6/3/2022, typical of chronic interstitial lung disease. Prominent interlobular septal thickening, developing since earlier CTs. Chronic bilateral apical fibrosis patchy irregular subsolid nodules in both lower lobes and right upper and middle lobes as well as patchy consolidation in the bases of both lower lobes, not present last month. Mild bilateral lower lobe and right middle lobe bronchiectasis. PLEURA: Small bilateral pleural effusions, developing since 6/22/2023. HEART/GREAT VESSELS: Heart mildly enlarged. Permanent pacemaker present. Mild ectasia of the ascending aorta, 4.1 cm in maximal diameter. MEDIASTINUM AND ALAN: No lymphadenopathy or mass. Dilatation of the upper third of the esophagus, similar to earlier CTs, most likely due to chronic esophageal dysmotility. Small hiatal hernia. Trachea and main stem bronchi normal. CHEST WALL AND LOWER NECK:   Unremarkable. VISUALIZED STRUCTURES IN THE UPPER ABDOMEN:  Unremarkable. OSSEOUS STRUCTURES: Scoliosis. Advanced degenerative disc disease at T11-T12. No recent fracture or destructive lesion. Impression: 1. No evidence of pulmonary embolus. 2.  Chronic interstitial lung disease with a UIP or IPF pattern as well as mild bibasilar bronchiectasis. 3.  Findings consistent with CHF, including small pleural effusions and interstitial pulmonary edema. 4.  Patchy nodular opacities and consolidation in both lower lobes, consistent with multifocal pneumonia or, more likely, pulmonary edema. Workstation performed: [id number]     DXA bone density spine hip and pelvis    Result Date: 7/25/2023  Narrative: DXA SCAN CLINICAL HISTORY: 67 years postmenopausal female. OTHER RISK FACTORS: Lasix therapy. PHARMACOLOGIC THERAPY FOR OSTEOPOROSIS:  None. TECHNIQUE: Bone densitometry was performed using a HoloNovarra A bone densitometer. Regions of interest appear properly placed. COMPARISON: There are no prior DXA studies performed on this unit for comparison.  RESULTS: LUMBAR SPINE Level: L1-L4 : BMD: 0.835 gm/cm2 T-score: -1.9 LEFT  TOTAL HIP: BMD: 0.785 gm/cm2 T-score: -1.3 LEFT  FEMORAL NECK: BMD: 0.584 gm/cm2 T score: -2.4     Impression: 1. Low bone mass (osteopenia). 2.  The 10 year risk of hip fracture is 4.1% with the 10 year risk of major osteoporotic fracture being 15% as calculated by the [facility] fracture risk assessment tool (FRAX, which is based on data generated by the [facility]  for Metabolic Bone Diseases). 3.  The current NOF guidelines recommend treating patients with a T-score of -2.5 or less in the lumbar spine or hips, or in post-menopausal women and men over the age of 48 with low bone mass (osteopenia) and a FRAX 10 year risk score of >3% for hip fracture and/or >20% for major osteoporotic fracture. 4.  The NOF recommends follow-up DXA in 1-2 years after initiating therapy for osteoporosis and every 2 years thereafter. More frequent evaluation is appropriate for patients with conditions associated with rapid bone loss, such as glucocorticoid therapy. The interval between DXA screenings may be longer for individuals without major risk factors and initial T-score in the normal or upper low bone mass range. The FRAX algorithm has certain limitations: -FRAX has not been validated in patients currently or previously treated with pharmacotherapy for osteoporosis. In such patients, clinical judgment must be exercised in interpreting FRAX scores. -Prior hip, vertebral and humeral fragility fractures appear to confer greater risk of subsequent fracture than fractures at other sites (this is especially true for individuals with severe vertebral fractures), but quantification of this incremental risk is not possible with FRAX. -FRAX underestimates fracture risk in patients with history of multiple fragility fractures.  -FRAX may underestimate fracture risk in patients with history of frequent falls. -It is not appropriate to use FRAX to monitor treatment response. WHO CLASSIFICATION: Normal (a T-score of -1.0 or higher) Low bone mineral density (a T-score of less than -1.0 but higher than -2.5) Osteoporosis (a T-score of -2.5 or less) Severe osteoporosis (a T-score of -2.5 or less with a fragility fracture) Workstation performed: [id number]     CT abdomen pelvis with contrast    Result Date: 7/16/2023  Narrative: CT ABDOMEN AND PELVIS WITH IV CONTRAST INDICATION:   diarrhea. COMPARISON: No prior CT of the abdomen/pelvis. Overlapping anatomy from prior CT abdomen pelvis dated 6/22/2023 is used for correlation. . TECHNIQUE:  CT examination of the abdomen and pelvis was performed. Multiplanar 2D reformatted images were created from the source data. This examination, like all CT scans performed in the [facility], was performed utilizing techniques to minimize radiation dose exposure, including the use of iterative reconstruction and automated exposure control. Radiation dose length product (DLP) for this visit:  421 mGy-cm IV Contrast:  75 mL of iohexol (OMNIPAQUE) Enteric Contrast:  Enteric contrast was not administered. FINDINGS: ABDOMEN LOWER CHEST: Bibasilar reticular changes with increased groundglass opacity in comparison to the prior exam dated 6/22/2023 suggest superimposed developing infiltrates. Cardiomegaly, post PTCA, and pacemaker leads are noted. LIVER/BILIARY TREE:  Unremarkable. GALLBLADDER: There are gallstone(s) within the gallbladder, without pericholecystic inflammatory changes. SPLEEN: 6 mm splenic lesion possibly a hemangioma, hamartoma, lymphangioma, or other. Recommend elective MRI with contrast for further evaluation. [place] PANCREAS:  Unremarkable. ADRENAL GLANDS:  Unremarkable. KIDNEYS/URETERS:  No hydronephrosis or urinary tract calculus. One or more sharply circumscribed subcentimeter renal hypodensities are present, too small to accurately characterize, and statistically most likely benign findings.  According to recent literature (Radiology 2019) no further workup of these findings is recommended. STOMACH AND BOWEL: There is colonic diverticulosis without evidence of acute diverticulitis. APPENDIX: A normal appendix was visualized. ABDOMINOPELVIC CAVITY:  No ascites. No pneumoperitoneum. No lymphadenopathy. VESSELS:  Unremarkable for patient's age. PELVIS REPRODUCTIVE ORGANS:  Unremarkable for patient's age. URINARY BLADDER:  Unremarkable. ABDOMINAL WALL/INGUINAL REGIONS:  Unremarkable. OSSEOUS STRUCTURES:  No acute fracture or destructive osseous lesion. Moderate lumbar degenerative changes are noted. Impression: 6 mm splenic lesion possibly a hemangioma, hamartoma, lymphangioma, or other. Recommend elective MRI with contrast for further evaluation. . Bibasilar reticular changes with increased groundglass opacity in comparison to the prior CT chest dated 6/22/2023 suggest superimposed developing infiltrates. Gallstones are present. Recommend clinical correlation. Workstation performed: [id number]     Assessment  Principal Problem:    Acute on chronic diastolic heart failure (HCC)  Active Problems:    Hypothyroidism    CKD (chronic kidney disease) stage 3, GFR 30-59 ml/min ([facility])    ILD (interstitial lung disease) ([address])    Acute respiratory failure with hypoxia ([address])    Thank you for allowing us to participate in this patient's care. This pt will follow up with          once discharged. Counseling / Coordination of Care  Total floor / unit time spent today 45 minutes. Greater than 50% of total time was spent with the patient and / or family counseling and / or coordination of care. A description of the counseling / coordination of care: Review of history, current assessment, development of a plan. Code Status: Level 1 - Full Code    ** Please Note: Dragon 360 Dictation voice to text software may have been used in the creation of this document.  **

## 2023-07-29 NOTE — H&P
[address] [facility]  H&P  Name: Jose Alfredo Herndon 67 y.o. female I MRN: [id number]  Unit/Bed#: S -01 I Date of Admission: 7/29/2023   Date of Service: 7/29/2023 I Hospital Day: 0      Assessment/Plan   * Acute on chronic diastolic heart failure [facility])  Assessment & Plan  Wt Readings from Last 3 Encounters:   07/29/23 61.4 kg (135 lb 5.8 oz)   07/26/23 60.1 kg (132 lb 9.6 oz)   07/16/23 58.7 kg (129 lb 6.6 oz)     · Markedly elevated BNP, dyspnea laying down, CT chest c/f effusions and pulm edema  · Cardio consult  · IV Lasix given in ER, will give 40 IV bid  · Prior HFrEF - so c/w BB and Entresto        Acute respiratory failure with hypoxia (HCC)  Assessment & Plan  · 87 on RA  · Doing well on 2L  · 2/2 CHF  · Wean as tolerated  · Afebrile and recently completed 10 days abx. Check procal for completeness but strongly doubt PNA    ILD (interstitial lung disease) ([address])  Assessment & Plan  · Managed by PCP    CKD (chronic kidney disease) stage 3, GFR 30-59 ml/min [facility])  Assessment & Plan  Lab Results   Component Value Date    EGFR 66 07/29/2023    EGFR 35 07/16/2023    EGFR 59 (L) 05/05/2023    CREATININE 0.87 07/29/2023    CREATININE 1.47 (H) 07/16/2023    CREATININE 1.01 (H) 05/05/2023   Estimated Creatinine Clearance: 49.6 mL/min (by C-G formula based on SCr of 0.87 mg/dL). · Cr at b/l 0.8-1  · Watch w/ diuresis    Hypothyroidism  Assessment & Plan  · F/u TFTs  · C/w Synthroid           VTE Pharmacologic Prophylaxis: VTE Score: 5 High Risk (Score >/= 5) - Pharmacological DVT Prophylaxis Ordered: enoxaparin (Lovenox). Sequential Compression Devices Ordered. Code Status: Level 1 - Full Code   Discussion with family: Updated  (daughter, niece and granddtr) at bedside. Anticipated Length of Stay: Patient will be admitted on an inpatient basis with an anticipated length of stay of greater than 2 midnights secondary to resp fail.     Total Time Spent on Date of Encounter in care of patient: 55 minutes This time was spent on one or more of the following: performing physical exam; counseling and coordination of care; obtaining or reviewing history; documenting in the medical record; reviewing/ordering tests, medications or procedures; communicating with other healthcare professionals and discussing with patient's family/caregivers. Chief Complaint: SOB    History of Present Illness:  Sheryl Miner is a 67 y.o. female w heart failure ith a PMH of with recovered ejection fraction who presents with progressive shortness of breath over the past few days. Patient was seen in the ER 10 days ago for fatigue and diagnosed with a UTI. She was given a liter of IV fluids and also given 10 days of antibiotics which she completed. Patient also has a dry cough. Last night patient laid down and felt she could not breathe so she went to the ER. Patient denies any fevers. She denies any chest pain. No nausea or vomiting. No wheezing. Review of Systems:  Review of Systems   Constitutional: Positive for fatigue. HENT: Negative. Eyes: Negative. Respiratory: Positive for cough and shortness of breath. Cardiovascular: Positive for leg swelling. Gastrointestinal: Negative. Endocrine: Negative. Genitourinary: Negative. Musculoskeletal: Negative. Skin: Negative. Allergic/Immunologic: Negative. Neurological: Negative. Hematological: Negative. Psychiatric/Behavioral: Negative. Past Medical and Surgical History:   Past Medical History:   Diagnosis Date   • Abnormal CXR 1/14/2022   • Cancer [facility])    • Disease of thyroid gland    • Dysuria 12/16/2021   • Encounter for support and coordination of transition of care 5/6/2021       Past Surgical History:   Procedure Laterality Date   • CARDIAC ELECTROPHYSIOLOGY PROCEDURE N/A 12/30/2022    Procedure: Cardiac eps/aflutter ablation;  Surgeon: Margaux Cabello MD;  [facility]: [facility];   Service: Cardiology   • CARDIAC ELECTROPHYSIOLOGY PROCEDURE N/A 12/30/2022    Procedure: Cardiac loop recorder implant;  Surgeon: Pk Alaniz MD;  Location: [facility]; Service: Cardiology   • CARDIAC ELECTROPHYSIOLOGY PROCEDURE N/A 2/6/2023    Procedure: CARDIAC EPS/SVT ABLATION;  Surgeon: Pk Alaniz MD;  Location: [facility]; Service: Cardiology   • CARDIAC ELECTROPHYSIOLOGY PROCEDURE N/A 2/16/2023    Procedure: Cardiac pacer implant;  Surgeon: Nikita Dunham MD;  [facility]: [facility]; Service: Cardiology   • CARDIAC ELECTROPHYSIOLOGY PROCEDURE N/A 2/16/2023    Procedure: CARDIAC LOOP RECORDER EXPLANT;  Surgeon: Nikita Dunham MD;  Location: [facility]; Service: Cardiology   • HYSTERECTOMY         Meds/Allergies:  Prior to Admission medications    Medication Sig Start Date End Date Taking? Authorizing Provider   Entresto 24-26 MG TABS TAKE 1 TABLET BY MOUTH TWICE A DAY 5/22/23  Yes Polina Faster, DO   furosemide (LASIX) 20 mg tablet TAKE 1 TABLET BY MOUTH TWICE A DAY 5/17/23  Yes Polina Faster, DO   levothyroxine 100 mcg tablet TAKE 1 TABLET BY MOUTH EVERY DAY 7/13/23  Yes Tahmina Lei MD   metoprolol succinate (TOPROL-XL) 25 mg 24 hr tablet TAKE 1 TABLET BY MOUTH TWICE A DAY 6/29/23  Yes Nighat Kauffman MD   ondansetron (ZOFRAN-ODT) 4 mg disintegrating tablet Take 1 tablet (4 mg total) by mouth every 6 (six) hours as needed for nausea or vomiting  Patient not taking: Reported on 7/29/2023 7/16/23   Amr Xavier Ritchie MD   amiodarone 200 mg tablet Take 1 tablet (200 mg total) by mouth daily 1/25/22 5/4/22  BAO Granados     I have reviewed home medications using recent Epic encounter. Allergies:    Allergies   Allergen Reactions   • Penicillins Rash       Social History:  Marital Status:      Patient Pre-hospital Living Situation: Home  Patient Pre-hospital Level of Mobility: walks    Substance Use History:   Social History     Substance and Sexual Activity   Alcohol Use Never     Social History     Tobacco Use   Smoking Status Former   • Packs/day: 1.00   • Years: 10.00   • Total pack years: 10.00   • Types: Cigarettes   • Start date: 79   • Quit date: 1993   • Years since quittin.5   Smokeless Tobacco Never     Social History     Substance and Sexual Activity   Drug Use Never       Family History:  Family History   Problem Relation Age of Onset   • No Known Problems Mother    • No Known Problems Father    • Cancer Maternal Aunt        Physical Exam:     Vitals:   Blood Pressure: 128/86 (23 1055)  Pulse: 105 (23 1055)  Temperature: 98 °F (36.7 °C) (23 1055)  Temp Source: Oral (23 1055)  Respirations: (!) 26 (23 105)  Height: 5' 2" (157.5 cm) (23 1055)  Weight - Scale: 61.4 kg (135 lb 5.8 oz) (23 105)  SpO2: 94 % (23 1049)    Physical Exam  HENT:      Head: Normocephalic and atraumatic. Mouth/Throat:      Mouth: Mucous membranes are moist.   Eyes:      Extraocular Movements: Extraocular movements intact. Conjunctiva/sclera: Conjunctivae normal.   Cardiovascular:      Rate and Rhythm: Tachycardia present. Pulmonary:      Breath sounds: Rales present. Abdominal:      General: Bowel sounds are normal.      Palpations: Abdomen is soft. Musculoskeletal:         General: Normal range of motion. Cervical back: Normal range of motion and neck supple. Right lower leg: Edema present. Left lower leg: Edema present. Skin:     General: Skin is warm and dry. Neurological:      Mental Status: She is alert and oriented to person, place, and time.          Additional Data:     Lab Results:  Results from last 7 days   Lab Units 23  0659   WBC Thousand/uL 10.28*   HEMOGLOBIN g/dL 12.1   HEMATOCRIT % 37.9   PLATELETS Thousands/uL 185   NEUTROS PCT % 75   LYMPHS PCT % 19   MONOS PCT % 6   EOS PCT % 0     Results from last 7 days   Lab Units 23  0659   SODIUM mmol/L 140   POTASSIUM mmol/L 3.6 CHLORIDE mmol/L 101   CO2 mmol/L 28   BUN mg/dL 12   CREATININE mg/dL 0.87   ANION GAP mmol/L 11   CALCIUM mg/dL 9.0   ALBUMIN g/dL 3.8   TOTAL BILIRUBIN mg/dL 0.70   ALK PHOS U/L 96   ALT U/L 33   AST U/L 64*   GLUCOSE RANDOM mg/dL 173*     Results from last 7 days   Lab Units 07/29/23  0659   INR  1.03             Results from last 7 days   Lab Units 07/29/23  0659   LACTIC ACID mmol/L 1.9       Lines/Drains:  Invasive Devices     Peripheral Intravenous Line  Duration           Peripheral IV 07/29/23 Left;Ventral (anterior) Forearm <1 day    Peripheral IV 07/29/23 Right Antecubital <1 day                    Imaging: Reviewed radiology reports from this admission including: chest CT scan  CTA ED chest PE study   ED Interpretation by Caty Ovalles MD (07/29 0910)   FINDINGS:     PULMONARY ARTERIAL TREE:  No pulmonary embolus is seen. Mild dilatation of the central pulmonary arteries, suggesting pulmonary arterial hypertension.     LUNGS: Reticulation, groundglass attenuation and honeycombing in the bases of both lower lobes and right middle lobe, similar in appearance to a high-resolution CT of the chest from 6/3/2022, typical of chronic interstitial lung disease. Prominent   interlobular septal thickening, developing since earlier CTs. Chronic bilateral apical fibrosis patchy irregular subsolid nodules in both lower lobes and right upper and middle lobes as well as patchy consolidation in the bases of both lower lobes, not   present last month. Mild bilateral lower lobe and right middle lobe bronchiectasis.     PLEURA: Small bilateral pleural effusions, developing since 6/22/2023.     HEART/GREAT VESSELS: Heart mildly enlarged. Permanent pacemaker present. Mild ectasia of the ascending aorta, 4.1 cm in maximal diameter.     MEDIASTINUM AND ALAN   : No lymphadenopathy or mass. Dilatation of the upper third of the esophagus, similar to earlier CTs, most likely due to chronic esophageal dysmotility.  Small hiatal hernia. Trachea and main stem bronchi normal.     CHEST WALL AND LOWER NECK:   Unremarkable.     VISUALIZED STRUCTURES IN THE UPPER ABDOMEN:  Unremarkable.     OSSEOUS STRUCTURES: Scoliosis. Advanced degenerative disc disease at T11-T12. No recent fracture or destructive lesion.     IMPRESSION:     1. No evidence of pulmonary embolus.     2. Chronic interstitial lung disease with a UIP or IPF pattern as well as mild bibasilar bronchiectasis.     3. Findings consistent with CHF, including small pleural effusions and interstitial pulmonary edema.     4.  Patchy nodular opacities and consolidation in both lower lobes, consistent with multifocal pneumonia or, more likely, pulmonary edema.                 Workstation performed: [id number]      Final Result by Lawrence Fuentes MD (07/29 0908)      1. No evidence of pulmonary embolus. 2.  Chronic interstitial lung disease with a UIP or IPF pattern as well as mild bibasilar bronchiectasis. 3.  Findings consistent with CHF, including small pleural effusions and interstitial pulmonary edema. 4.  Patchy nodular opacities and consolidation in both lower lobes, consistent with multifocal pneumonia or, more likely, pulmonary edema. Workstation performed: [id number]         XR chest 1 view portable   ED Interpretation by Angel Griffin MD (07/29 0706)   RLL infiltrate and increased interstitial markings bilaterally and PPM read by me. EKG and Other Studies Reviewed on Admission:   · EKG: sinus tach. ** Please Note: This note has been constructed using a voice recognition system.  **

## 2023-07-29 NOTE — PROGRESS NOTES
PT w/ resp distress. Put on midflow to give more PEEP. VBG w/ resp alkalosis. LA WNL. Cr at b/l of 1.0. Will give an extra dose of IV Bumex. Also bladder scan elevated - will order retention protocol.

## 2023-07-29 NOTE — ASSESSMENT & PLAN NOTE
· 87 on RA  · Doing well on 2L  · 2/2 CHF  · Wean as tolerated  · Afebrile and recently completed 10 days abx.   Check procal for completeness but strongly doubt PNA

## 2023-07-29 NOTE — RESPIRATORY THERAPY NOTE
RT Protocol Note  Danyelle Hendrickson 67 y.o. female MRN: [id number]  Unit/Bed#: S -01 Encounter: [id number]    Assessment    Principal Problem:    Acute on chronic diastolic heart failure (HCC)  Active Problems:    Hypothyroidism    CKD (chronic kidney disease) stage 3, GFR 30-59 ml/min (HCC)    ILD (interstitial lung disease) (HCC)    Acute respiratory failure with hypoxia (HCC)      Home Pulmonary Medications:  None  Home Devices/Therapy: (P)  (None)    Past Medical History:   Diagnosis Date    Abnormal CXR 2022    Cancer ([address])     Disease of thyroid gland     Dysuria 2021    Encounter for support and coordination of transition of care 2021     Social History     Socioeconomic History    Marital status:      Spouse name: None    Number of children: None    Years of education: None    Highest education level: None   Occupational History    None   Tobacco Use    Smoking status: Former     Packs/day: 1.00     Years: 10.00     Total pack years: 10.00     Types: Cigarettes     Start date: 79     Quit date: 1993     Years since quittin.5    Smokeless tobacco: Never   Vaping Use    Vaping Use: Never used   Substance and Sexual Activity    Alcohol use: Never    Drug use: Never    Sexual activity: Never   Other Topics Concern    None   Social History Narrative    Most recent tobacco use screenin2018     Social Determinants of Health     Financial Resource Strain: Low Risk  (2023)    Overall Financial Resource Strain (CARDIA)     Difficulty of Paying Living Expenses: Not hard at all   Food Insecurity: No Food Insecurity (2/15/2023)    Hunger Vital Sign     Worried About Running Out of Food in the Last Year: Never true     Ran Out of Food in the Last Year: Never true   Transportation Needs: No Transportation Needs (2023)    PRAPARE - Transportation     Lack of Transportation (Medical): No     Lack of Transportation (Non-Medical):  No   Physical Activity: Not on file Stress: Not on file   Social Connections: Not on file   Intimate Partner Violence: Not on file   Housing Stability: Low Risk  (2/15/2023)    Housing Stability Vital Sign     Unable to Pay for Housing in the Last Year: No     Number of Places Lived in the Last Year: 2     Unstable Housing in the Last Year: No       Subjective         Objective    Physical Exam:   Assessment Type: (P) Assess only  General Appearance: (P) Awake  Respiratory Pattern: (P) Dyspnea with exertion  Chest Assessment: (P) Chest expansion symmetrical  Bilateral Breath Sounds: (P) Rales  Cough: (P) Non-productive, Strong    Vitals:  Blood pressure 140/93, pulse (!) 109, temperature 97.8 °F (36.6 °C), temperature source Oral, resp. rate 18, height 5' 2" (1.575 m), weight 61.2 kg (135 lb), SpO2 96 %. Imaging and other studies: I have personally reviewed pertinent reports.             Plan    Respiratory Plan: (P) No distress/Pulmonary history

## 2023-07-29 NOTE — ED PROVIDER NOTES
History  Chief Complaint   Patient presents with   • Shortness of Breath     Pt woke up this morning feeling like she could not breath. Recent abx treatment for UTI     Patient is a 67year old female with worsening sob since last night and worsening cough. No chest pain. No fever. No N/V. No travel. No smoking. Not on oxygen at home. Was last seen at [facility], [facility] in [facility] on 7/26/23 for lung infiltrate. [facility] checked on this patient and no Rx found. Patient needed my urgent attention. History provided by:  Patient and relative (granddaughter)   used: No    Shortness of Breath  Associated symptoms: cough    Associated symptoms: no chest pain, no fever and no vomiting        Prior to Admission Medications   Prescriptions Last Dose Informant Patient Reported? Taking? Entresto 24-26 MG TABS   No No   Sig: TAKE 1 TABLET BY MOUTH TWICE A DAY   furosemide (LASIX) 20 mg tablet   No No   Sig: TAKE 1 TABLET BY MOUTH TWICE A DAY   levothyroxine 100 mcg tablet   No No   Sig: TAKE 1 TABLET BY MOUTH EVERY DAY   metoprolol succinate (TOPROL-XL) 25 mg 24 hr tablet   No No   Sig: TAKE 1 TABLET BY MOUTH TWICE A DAY   ondansetron (ZOFRAN-ODT) 4 mg disintegrating tablet   No No   Sig: Take 1 tablet (4 mg total) by mouth every 6 (six) hours as needed for nausea or vomiting      Facility-Administered Medications: None       Past Medical History:   Diagnosis Date   • Abnormal CXR 1/14/2022   • Cancer [facility])    • Disease of thyroid gland    • Dysuria 12/16/2021   • Encounter for support and coordination of transition of care 5/6/2021       Past Surgical History:   Procedure Laterality Date   • CARDIAC ELECTROPHYSIOLOGY PROCEDURE N/A 12/30/2022    Procedure: Cardiac eps/aflutter ablation;  Surgeon: Dylan Jimenez MD;  Location: [facility];   Service: Cardiology   • CARDIAC ELECTROPHYSIOLOGY PROCEDURE N/A 12/30/2022    Procedure: Cardiac loop recorder implant;  Surgeon: Dylan Jimenez MD; Location: [facility]; Service: Cardiology   • CARDIAC ELECTROPHYSIOLOGY PROCEDURE N/A 2023    Procedure: CARDIAC EPS/SVT ABLATION;  Surgeon: Bridget Galvin MD;  Location: [facility]; Service: Cardiology   • CARDIAC ELECTROPHYSIOLOGY PROCEDURE N/A 2023    Procedure: Cardiac pacer implant;  Surgeon: Joseluis Franks MD;  Location: [facility]; Service: Cardiology   • CARDIAC ELECTROPHYSIOLOGY PROCEDURE N/A 2023    Procedure: CARDIAC LOOP RECORDER EXPLANT;  Surgeon: Joseluis Franks MD;  Location: [facility]; Service: Cardiology   • HYSTERECTOMY         Family History   Problem Relation Age of Onset   • No Known Problems Mother    • No Known Problems Father    • Cancer Maternal Aunt      I have reviewed and agree with the history as documented. E-Cigarette/Vaping   • E-Cigarette Use Never User      E-Cigarette/Vaping Substances   • Nicotine No    • THC No    • CBD No    • Flavoring No    • Other No    • Unknown No      Social History     Tobacco Use   • Smoking status: Former     Packs/day: 1.00     Years: 10.00     Total pack years: 10.00     Types: Cigarettes     Start date:      Quit date: 1993     Years since quittin.5   • Smokeless tobacco: Never   Vaping Use   • Vaping Use: Never used   Substance Use Topics   • Alcohol use: Never   • Drug use: Never       Review of Systems   Constitutional: Negative for fever. Respiratory: Positive for cough and shortness of breath. Cardiovascular: Negative for chest pain. Gastrointestinal: Negative for blood in stool, diarrhea, nausea and vomiting. All other systems reviewed and are negative. Physical Exam  Physical Exam  Vitals and nursing note reviewed. Constitutional:       General: She is in acute distress (moderate). HENT:      Head: Normocephalic and atraumatic. Mouth/Throat:      Mouth: Mucous membranes are moist.   Eyes:      General: No scleral icterus.   Cardiovascular:      Rate and Rhythm: Regular rhythm. Tachycardia present. Heart sounds: Normal heart sounds. No murmur heard. Pulmonary:      Effort: Respiratory distress present. Breath sounds: No stridor. Rhonchi and rales present. No wheezing. Comments: Tachypnea. Accessory muscle use. Abdominal:      General: Bowel sounds are normal.      Palpations: Abdomen is soft. Tenderness: There is no abdominal tenderness. Musculoskeletal:         General: No deformity. Cervical back: Normal range of motion and neck supple. No rigidity. Right lower leg: No edema. Left lower leg: No edema. Skin:     General: Skin is warm and dry. Findings: No erythema or rash. Neurological:      General: No focal deficit present. Mental Status: She is alert and oriented to person, place, and time.    Psychiatric:         Mood and Affect: Mood normal.         Vital Signs  ED Triage Vitals   Temperature Pulse Respirations Blood Pressure SpO2   07/29/23 0645 07/29/23 0645 07/29/23 0645 07/29/23 0646 07/29/23 0645   97.9 °F (36.6 °C) (!) 123 22 153/86 (!) 88 %      Temp Source Heart Rate Source Patient Position - Orthostatic VS BP Location FiO2 (%)   07/29/23 0645 07/29/23 0645 -- 07/29/23 0646 --   Oral Monitor  Right arm       Pain Score       --                  Vitals:    07/29/23 0645 07/29/23 0646 07/29/23 0830   BP:  153/86 134/83   Pulse: (!) 123  (!) 107         Visual Acuity      ED Medications  Medications   albuterol inhalation solution 2.5 mg (2.5 mg Nebulization Given 7/29/23 0713)   levofloxacin (LEVAQUIN) IVPB (premix in dextrose) 750 mg 150 mL (750 mg Intravenous New Bag 7/29/23 0710)   furosemide (LASIX) injection 40 mg (40 mg Intravenous Given 7/29/23 0830)   iohexol (OMNIPAQUE) 350 MG/ML injection (SINGLE-DOSE) 60 mL (60 mL Intravenous Given 7/29/23 0829)       Diagnostic Studies  Results Reviewed     Procedure Component Value Units Date/Time    HS Troponin I 2hr [[id number]]  (Abnormal) Collected: 07/29/23 0830    Lab Status: Final result Specimen: Blood from Arm, Left Updated: 07/29/23 0906     hs TnI 2hr 88 ng/L      Delta 2hr hsTnI 48 ng/L     HS Troponin I 4hr [[id number]]     Lab Status: No result Specimen: Blood     FLU/RSV/COVID - if FLU/RSV clinically relevant [[id number]]  (Normal) Collected: 07/29/23 0659    Lab Status: Final result Specimen: Nares from Nose Updated: 07/29/23 0800     SARS-CoV-2 Negative     INFLUENZA A PCR Negative     INFLUENZA B PCR Negative     RSV PCR Negative    Narrative:      FOR PEDIATRIC PATIENTS - copy/paste COVID Guidelines URL to browser: [email or web address]    SARS-CoV-2 assay is a Nucleic Acid Amplification assay intended for the  qualitative detection of nucleic acid from SARS-CoV-2 in nasopharyngeal  swabs. Results are for the presumptive identification of SARS-CoV-2 RNA. Positive results are indicative of infection with SARS-CoV-2, the virus  causing COVID-19, but do not rule out bacterial infection or co-infection  with other viruses. Laboratories within the [facility] and its  territories are required to report all positive results to the appropriate  public health authorities. Negative results do not preclude SARS-CoV-2  infection and should not be used as the sole basis for treatment or other  patient management decisions. Negative results must be combined with  clinical observations, patient history, and epidemiological information. This test has not been FDA cleared or approved. This test has been authorized by FDA under an Emergency Use Authorization  (EUA). This test is only authorized for the duration of time the  declaration that circumstances exist justifying the authorization of the  emergency use of an in vitro diagnostic tests for detection of SARS-CoV-2  virus and/or diagnosis of COVID-19 infection under section 564(b)(1) of  the Act, 21 U. S.C. 689UMJ-0(I)(6), unless the authorization is terminated  or revoked sooner. The test has been validated but independent review by FDA  and CLIA is pending. Test performed using [employer] GeneXpert: This RT-PCR assay targets N2,  a region unique to SARS-CoV-2. A conserved region in the E-gene was chosen  for pan-Sarbecovirus detection which includes SARS-CoV-2. According to CMS-2020-01-R, this platform meets the definition of high-throughput technology.     B-Type Natriuretic Peptide(BNP) [[id number]]  (Abnormal) Collected: 07/29/23 0659    Lab Status: Final result Specimen: Blood from Arm, Right Updated: 07/29/23 0753     BNP >4,700 pg/mL     HS Troponin 0hr (reflex protocol) [[id number]]  (Normal) Collected: 07/29/23 0659    Lab Status: Final result Specimen: Blood from Arm, Right Updated: 07/29/23 0744     hs TnI 0hr 40 ng/L     Comprehensive metabolic panel [[id number]]  (Abnormal) Collected: 07/29/23 0659    Lab Status: Final result Specimen: Blood from Arm, Right Updated: 07/29/23 0734     Sodium 140 mmol/L      Potassium 3.6 mmol/L      Chloride 101 mmol/L      CO2 28 mmol/L      ANION GAP 11 mmol/L      BUN 12 mg/dL      Creatinine 0.87 mg/dL      Glucose 173 mg/dL      Calcium 9.0 mg/dL      AST 64 U/L      ALT 33 U/L      Alkaline Phosphatase 96 U/L      Total Protein 6.9 g/dL      Albumin 3.8 g/dL      Total Bilirubin 0.70 mg/dL      eGFR 66 ml/min/1.73sq m     Narrative:      [place] guidelines for Chronic Kidney Disease (CKD):   •  Stage 1 with normal or high GFR (GFR > 90 mL/min/1.73 square meters)  •  Stage 2 Mild CKD (GFR = 60-89 mL/min/1.73 square meters)  •  Stage 3A Moderate CKD (GFR = 45-59 mL/min/1.73 square meters)  •  Stage 3B Moderate CKD (GFR = 30-44 mL/min/1.73 square meters)  •  Stage 4 Severe CKD (GFR = 15-29 mL/min/1.73 square meters)  •  Stage 5 End Stage CKD (GFR <15 mL/min/1.73 square meters)  Note: GFR calculation is accurate only with a steady state creatinine    Lactic acid, plasma (w/reflex if result > 2.0) [[id number]]  (Normal) Collected: 07/29/23 0659    Lab Status: Final result Specimen: Blood from Arm, Right Updated: 07/29/23 0734     LACTIC ACID 1.9 mmol/L     Narrative:      Result may be elevated if tourniquet was used during collection. D-Dimer [[id number]]  (Abnormal) Collected: 07/29/23 0659    Lab Status: Final result Specimen: Blood from Arm, Right Updated: 07/29/23 0731     D-Dimer, Quant 1.01 ug/ml FEU     Narrative: In the evaluation for possible pulmonary embolism, in the appropriate (Well's Score of 4 or less) patient, the age adjusted d-dimer cutoff for this patient can be calculated as:    Age x 0.01 (in ug/mL) for Age-adjusted D-dimer exclusion threshold for a patient over 50 years.     Blood gas, venous [[id number]]  (Abnormal) Collected: 07/29/23 0717    Lab Status: Final result Specimen: Blood from Arm, Right Updated: 07/29/23 0730     pH, Alfred 7.473     pCO2, Alfred 38.2 mm Hg      pO2, Alfred 53.3 mm Hg      HCO3, Alfred 27.4 mmol/L      Base Excess, Alfred 3.7 mmol/L      O2 Content, Alfred 15.3 ml/dL      O2 HGB, VENOUS 86.2 %      Nasal Cannula 3    Protime-INR [[id number]]  (Normal) Collected: 07/29/23 0659    Lab Status: Final result Specimen: Blood from Arm, Right Updated: 07/29/23 0721     Protime 13.8 seconds      INR 1.03    APTT [[id number]]  (Normal) Collected: 07/29/23 0659    Lab Status: Final result Specimen: Blood from Arm, Right Updated: 07/29/23 0721     PTT 29 seconds     CBC and differential [[id number]]  (Abnormal) Collected: 07/29/23 0659    Lab Status: Final result Specimen: Blood from Arm, Right Updated: 07/29/23 0720     WBC 10.28 Thousand/uL      RBC 4.13 Million/uL      Hemoglobin 12.1 g/dL      Hematocrit 37.9 %      MCV 92 fL      MCH 29.3 pg      MCHC 31.9 g/dL      RDW 15.3 %      MPV 11.4 fL      Platelets 422 Thousands/uL      nRBC 0 /100 WBCs      Neutrophils Relative 75 %      Immat GRANS % 0 %      Lymphocytes Relative 19 %      Monocytes Relative 6 % Eosinophils Relative 0 %      Basophils Relative 0 %      Neutrophils Absolute 7.68 Thousands/µL      Immature Grans Absolute 0.04 Thousand/uL      Lymphocytes Absolute 1.91 Thousands/µL      Monocytes Absolute 0.59 Thousand/µL      Eosinophils Absolute 0.02 Thousand/µL      Basophils Absolute 0.04 Thousands/µL     Blood culture #1 [[id number]] Collected: 07/29/23 0659    Lab Status: In process Specimen: Blood from Arm, Right Updated: 07/29/23 0707    Blood culture #2 [[id number]] Collected: 07/29/23 0659    Lab Status: In process Specimen: Blood from Arm, Left Updated: 07/29/23 9468                 CTA ED chest PE study   ED Interpretation by Alfonso Morales MD (07/29 0910)   FINDINGS:     PULMONARY ARTERIAL TREE:  No pulmonary embolus is seen. Mild dilatation of the central pulmonary arteries, suggesting pulmonary arterial hypertension.     LUNGS: Reticulation, groundglass attenuation and honeycombing in the bases of both lower lobes and right middle lobe, similar in appearance to a high-resolution CT of the chest from 6/3/2022, typical of chronic interstitial lung disease. Prominent   interlobular septal thickening, developing since earlier CTs. Chronic bilateral apical fibrosis patchy irregular subsolid nodules in both lower lobes and right upper and middle lobes as well as patchy consolidation in the bases of both lower lobes, not   present last month. Mild bilateral lower lobe and right middle lobe bronchiectasis.     PLEURA: Small bilateral pleural effusions, developing since 6/22/2023.     HEART/GREAT VESSELS: Heart mildly enlarged. Permanent pacemaker present. Mild ectasia of the ascending aorta, 4.1 cm in maximal diameter.     MEDIASTINUM AND ALAN   : No lymphadenopathy or mass. Dilatation of the upper third of the esophagus, similar to earlier CTs, most likely due to chronic esophageal dysmotility. Small hiatal hernia.  Trachea and main stem bronchi normal.     CHEST WALL AND LOWER NECK: Unremarkable.     VISUALIZED STRUCTURES IN THE UPPER ABDOMEN:  Unremarkable.     OSSEOUS STRUCTURES: Scoliosis. Advanced degenerative disc disease at T11-T12. No recent fracture or destructive lesion.     IMPRESSION:     1. No evidence of pulmonary embolus.     2. Chronic interstitial lung disease with a UIP or IPF pattern as well as mild bibasilar bronchiectasis.     3. Findings consistent with CHF, including small pleural effusions and interstitial pulmonary edema.     4.  Patchy nodular opacities and consolidation in both lower lobes, consistent with multifocal pneumonia or, more likely, pulmonary edema.                 Workstation performed: [id number]      Final Result by Maryan Paz MD (07/29 0908)      1. No evidence of pulmonary embolus. 2.  Chronic interstitial lung disease with a UIP or IPF pattern as well as mild bibasilar bronchiectasis. 3.  Findings consistent with CHF, including small pleural effusions and interstitial pulmonary edema. 4.  Patchy nodular opacities and consolidation in both lower lobes, consistent with multifocal pneumonia or, more likely, pulmonary edema. Workstation performed: [id number]         XR chest 1 view portable   ED Interpretation by Melanie Henson MD (07/29 0706)   RLL infiltrate and increased interstitial markings bilaterally and PPM read by me.                   Procedures  ECG 12 Lead Documentation Only    Date/Time: 7/29/2023 6:49 AM    Performed by: Melanie Henson MD  Authorized by: Melanie Henson MD    Indications / Diagnosis:  Sob  ECG reviewed by me, the ED Provider: yes    Patient location:  ED  Previous ECG:     Previous ECG:  Compared to current    Comparison ECG info:  7/16/23    Similarity:  No change  Quality:     Tracing quality:  Limited by artifact  Rate:     ECG rate:  115    ECG rate assessment: tachycardic    Rhythm:     Rhythm: sinus tachycardia    Ectopy:     Ectopy: none    QRS:     QRS axis: Left  Conduction:     Conduction: abnormal      Abnormal conduction: non-specific intraventricular conduction delay    ST segments:     ST segments:  Normal  T waves:     T waves: normal      CriticalCare Time    Date/Time: 7/29/2023 7:52 AM    Performed by: Oziel Santos MD  Authorized by: Oziel Santos MD    Critical care provider statement:     Critical care time (minutes):  35    Critical care time was exclusive of:  Separately billable procedures and treating other patients    Critical care was necessary to treat or prevent imminent or life-threatening deterioration of the following conditions:  Respiratory failure and sepsis    Critical care was time spent personally by me on the following activities:  Obtaining history from patient or surrogate, development of treatment plan with patient or surrogate, evaluation of patient's response to treatment, examination of patient, ordering and performing treatments and interventions, ordering and review of laboratory studies, ordering and review of radiographic studies, re-evaluation of patient's condition and review of old charts    I assumed direction of critical care for this patient from another provider in my specialty: no               ED Course  ED Course as of 07/29/23 0917   Sat Jul 29, 2023   0705 SpO2(!): 88 %  Nasal cannula oxygen given with improvement in pulse ox to 94%. 4149 EKG and CXR d/w patient and granddaughter with patient's permission. 7769 D-Dimer, Quant(!): 1.01  CT PE study ordered. 6150 Rigoberto John d/w patient and granddaughter. 0754 BNP(!): >4,700  IV lasix ordered. 3315 CT d/w patient and family.               HEART Risk Score    Flowsheet Row Most Recent Value   Heart Score Risk Calculator    History 0 Filed at: 07/29/2023 0745   ECG 1 Filed at: 07/29/2023 0745   Age 2 Filed at: 07/29/2023 0745   Risk Factors 1 Filed at: 07/29/2023 0745   Troponin 2 Filed at: 07/29/2023 0745   HEART Score 6 Filed at: 07/29/2023 0742 PERC Rule for PE    Flowsheet Row Most Recent Value   PERC Rule for PE    Age >=50 1 Filed at: 07/29/2023 0735   HR >=100 1 Filed at: 07/29/2023 0735   O2 Sat on room air < 95% 1 Filed at: 07/29/2023 4972   History of PE or DVT --   Recent trauma or surgery --   Hemoptysis --   Exogenous estrogen --   Unilateral leg swelling --   PERC Rule for PE Results 3 Filed at: 07/29/2023 9002           Initial Sepsis Screening     Row Name 07/29/23 0747                Is the patient's history suggestive of a new or worsening infection? Yes (Proceed)  -AO        Suspected source of infection pneumonia  -AO        Indicate SIRS criteria Tachycardia > 90 bpm;Tachypnea > 20 resp per min  -AO        Are two or more of the above signs & symptoms of infection both present and new to the patient? Yes (Proceed)  -AO        Assess for evidence of organ dysfunction: Are any of the below criteria present within 6 hours of suspected infection and SIRS criteria that are NOT considered to be chronic conditions? --  None  -AO              User Key  (r) = Recorded By, (t) = Taken By, (c) = Cosigned By    Initials Name Provider Hugo Jones MD Physician                SBIRT 20yo+    Flowsheet Row Most Recent Value   Initial Alcohol Screen: US AUDIT-C     1. How often do you have a drink containing alcohol? 0 Filed at: 07/29/2023 0654   2. How many drinks containing alcohol do you have on a typical day you are drinking? 0 Filed at: 07/29/2023 0654   3a. Male UNDER 65: How often do you have five or more drinks on one occasion? 0 Filed at: 07/29/2023 0654   3b. FEMALE Any Age, or MALE 65+: How often do you have 4 or more drinks on one occassion? 0 Filed at: 07/29/2023 0654   Audit-C Score 0 Filed at: 07/29/2023 3764   RERE: How many times in the past year have you. .. Used an illegal drug or used a prescription medication for non-medical reasons?  Never Filed at: 07/29/2023 3723          Wells' Criteria for PE Flowsheet Row Most Recent Value   Wells' Criteria for PE    Clinical signs and symptoms of DVT 0 Filed at: 07/29/2023 6328   PE is primary diagnosis or equally likely 3 Filed at: 07/29/2023 0735   HR >100 1.5 Filed at: 07/29/2023 0735   Immobilization at least 3 days or Surgery in the previous 4 weeks 0 Filed at: 07/29/2023 5543   Previous, objectively diagnosed PE or DVT 0 Filed at: 07/29/2023 0735   Hemoptysis 0 Filed at: 07/29/2023 8915   Malignancy with treatment within 6 months or palliative 0 Filed at: 07/29/2023 1138   Wells' Criteria Total 4.5 Filed at: 07/29/2023 9510                Medical Decision Making  DDX including but not limited to: pneumonia, pleural effusion, CHF, PE, PTX, ACS, MI, asthma exacerbation, COPD exacerbation, COVID 19, anemia, metabolic abnormality, renal failure. Amount and/or Complexity of Data Reviewed  Labs: ordered. Decision-making details documented in ED Course. Radiology: ordered and independent interpretation performed. Decision-making details documented in ED Course. ECG/medicine tests: ordered and independent interpretation performed. Decision-making details documented in ED Course. Risk  Prescription drug management.           Disposition  Final diagnoses:   Acute respiratory failure with hypoxia (HCC)   Sepsis ([address])   Pulmonary edema   Elevated troponin   Multifocal pneumonia     Time reflects when diagnosis was documented in both MDM as applicable and the Disposition within this note     Time User Action Codes Description Comment    7/29/2023  7:07 AM Cortez Mas Add [J18.9] Pneumonia     7/29/2023  7:08 AM Cortez Mas Add [J96.01] Acute respiratory failure with hypoxia ([address])     7/29/2023  7:08 AM Cortez Mas Modify [J18.9] Pneumonia     7/29/2023  7:08 AM Cortez Mas Modify [J96.01] Acute respiratory failure with hypoxia ([address])     7/29/2023  7:49 AM Cortez Mas Add [A41.9] Sepsis ([address])     7/29/2023  7:55 AM Debbie Olivo Sanna Cowden Add [J81.1] Pulmonary edema     7/29/2023  9:08 AM Miachel Juan Add [R77.8] Elevated troponin     7/29/2023  9:10 AM Miachel Juan Modify [J96.01] Acute respiratory failure with hypoxia ([address])     7/29/2023  9:10 AM Miachel Juan Remove [J18.9] Pneumonia     7/29/2023  9:10 AM Miachel Juan Add [J18.9] Multifocal pneumonia       ED Disposition     ED Disposition   Admit    Condition   Stable    Date/Time   Sat Jul 29, 2023  9:16 AM    Comment   Case was discussed with Dr. Salome Thompson and the patient's admission status was agreed to be Admission Status: inpatient status to the service of Dr. Salome Thompson . Follow-up Information    None         Patient's Medications   Discharge Prescriptions    No medications on file       No discharge procedures on file.     PDMP Review       Value Time User    PDMP Reviewed  Yes 7/29/2023  6:51 AM Maria Isabel Juan MD          ED Provider  Electronically Signed by           Maria Isabel Juan MD  07/29/23 5973

## 2023-07-29 NOTE — ASSESSMENT & PLAN NOTE
Wt Readings from Last 3 Encounters:   07/29/23 61.4 kg (135 lb 5.8 oz)   07/26/23 60.1 kg (132 lb 9.6 oz)   07/16/23 58.7 kg (129 lb 6.6 oz)     · Markedly elevated BNP, dyspnea laying down, CT chest c/f effusions and pulm edema  · Cardio consult  · IV Lasix given in ER, will give 40 IV bid  · Prior HFrEF - so c/w BB and Entresto

## 2023-07-29 NOTE — SEPSIS NOTE
Sepsis Note   Maryan Paz 67 y.o. female MRN: [id number]  Unit/Bed#: ED-29 Encounter: [id number]       Initial Sepsis Screening     Row Name 07/29/23 0747                Is the patient's history suggestive of a new or worsening infection? Yes (Proceed)  -AO        Suspected source of infection pneumonia  -AO        Indicate SIRS criteria Tachycardia > 90 bpm;Tachypnea > 20 resp per min  -AO        Are two or more of the above signs & symptoms of infection both present and new to the patient? Yes (Proceed)  -AO        Assess for evidence of organ dysfunction: Are any of the below criteria present within 6 hours of suspected infection and SIRS criteria that are NOT considered to be chronic conditions? --  None  -AO              User Key  (r) = Recorded By, (t) = Taken By, (c) = Cosigned By    [address] [place] [address] Name Provider Dawn Vieira MD Physician                    There is no height or weight on file to calculate BMI.   Wt Readings from Last 1 Encounters:   07/26/23 60.1 kg (132 lb 9.6 oz)        Ideal body weight: 52.4 kg (115 lb 8.3 oz)  Adjusted ideal body weight: 55.5 kg (122 lb 5.6 oz)

## 2023-07-30 LAB
ALBUMIN SERPL BCP-MCNC: 3.4 G/DL (ref 3.5–5)
ALP SERPL-CCNC: 102 U/L (ref 34–104)
ALT SERPL W P-5'-P-CCNC: 36 U/L (ref 7–52)
ANION GAP SERPL CALCULATED.3IONS-SCNC: 10 MMOL/L
AST SERPL W P-5'-P-CCNC: 51 U/L (ref 13–39)
BILIRUB SERPL-MCNC: 1.05 MG/DL (ref 0.2–1)
BUN SERPL-MCNC: 18 MG/DL (ref 5–25)
CALCIUM ALBUM COR SERPL-MCNC: 9.5 MG/DL (ref 8.3–10.1)
CALCIUM SERPL-MCNC: 9 MG/DL (ref 8.4–10.2)
CHLORIDE SERPL-SCNC: 101 MMOL/L (ref 96–108)
CO2 SERPL-SCNC: 28 MMOL/L (ref 21–32)
CREAT SERPL-MCNC: 1.2 MG/DL (ref 0.6–1.3)
ERYTHROCYTE [DISTWIDTH] IN BLOOD BY AUTOMATED COUNT: 15.7 % (ref 11.6–15.1)
GFR SERPL CREATININE-BSD FRML MDRD: 44 ML/MIN/1.73SQ M
GLUCOSE SERPL-MCNC: 128 MG/DL (ref 65–140)
HCT VFR BLD AUTO: 34.3 % (ref 34.8–46.1)
HGB BLD-MCNC: 11 G/DL (ref 11.5–15.4)
MAGNESIUM SERPL-MCNC: 1.7 MG/DL (ref 1.9–2.7)
MCH RBC QN AUTO: 29.3 PG (ref 26.8–34.3)
MCHC RBC AUTO-ENTMCNC: 32.1 G/DL (ref 31.4–37.4)
MCV RBC AUTO: 92 FL (ref 82–98)
PHOSPHATE SERPL-MCNC: 3.7 MG/DL (ref 2.3–4.1)
PLATELET # BLD AUTO: 139 THOUSANDS/UL (ref 149–390)
PMV BLD AUTO: 11.4 FL (ref 8.9–12.7)
POTASSIUM SERPL-SCNC: 4.8 MMOL/L (ref 3.5–5.3)
PROT SERPL-MCNC: 6.4 G/DL (ref 6.4–8.4)
RBC # BLD AUTO: 3.75 MILLION/UL (ref 3.81–5.12)
SODIUM SERPL-SCNC: 139 MMOL/L (ref 135–147)
WBC # BLD AUTO: 9.69 THOUSAND/UL (ref 4.31–10.16)

## 2023-07-30 PROCEDURE — 84100 ASSAY OF PHOSPHORUS: CPT | Performed by: GENERAL PRACTICE

## 2023-07-30 PROCEDURE — 80053 COMPREHEN METABOLIC PANEL: CPT | Performed by: GENERAL PRACTICE

## 2023-07-30 PROCEDURE — 99233 SBSQ HOSP IP/OBS HIGH 50: CPT | Performed by: INTERNAL MEDICINE

## 2023-07-30 PROCEDURE — 94760 N-INVAS EAR/PLS OXIMETRY 1: CPT

## 2023-07-30 PROCEDURE — 83735 ASSAY OF MAGNESIUM: CPT | Performed by: GENERAL PRACTICE

## 2023-07-30 PROCEDURE — 85027 COMPLETE CBC AUTOMATED: CPT | Performed by: GENERAL PRACTICE

## 2023-07-30 PROCEDURE — 99232 SBSQ HOSP IP/OBS MODERATE 35: CPT | Performed by: INTERNAL MEDICINE

## 2023-07-30 RX ORDER — BUMETANIDE 0.25 MG/ML
2 INJECTION INTRAMUSCULAR; INTRAVENOUS
Status: DISCONTINUED | OUTPATIENT
Start: 2023-07-30 | End: 2023-07-31

## 2023-07-30 RX ORDER — MAGNESIUM SULFATE HEPTAHYDRATE 40 MG/ML
2 INJECTION, SOLUTION INTRAVENOUS ONCE
Status: COMPLETED | OUTPATIENT
Start: 2023-07-30 | End: 2023-07-31

## 2023-07-30 RX ORDER — BUMETANIDE 0.25 MG/ML
2 INJECTION INTRAMUSCULAR; INTRAVENOUS
Status: DISCONTINUED | OUTPATIENT
Start: 2023-07-30 | End: 2023-07-30

## 2023-07-30 RX ADMIN — METOPROLOL SUCCINATE 25 MG: 25 TABLET, EXTENDED RELEASE ORAL at 08:58

## 2023-07-30 RX ADMIN — LEVOTHYROXINE SODIUM 100 MCG: 100 TABLET ORAL at 06:18

## 2023-07-30 RX ADMIN — ENOXAPARIN SODIUM 40 MG: 40 INJECTION SUBCUTANEOUS at 08:58

## 2023-07-30 RX ADMIN — BUMETANIDE 2 MG: 0.25 INJECTION INTRAMUSCULAR; INTRAVENOUS at 12:10

## 2023-07-30 RX ADMIN — SACUBITRIL AND VALSARTAN 1 TABLET: 24; 26 TABLET, FILM COATED ORAL at 17:44

## 2023-07-30 RX ADMIN — MAGNESIUM SULFATE HEPTAHYDRATE 2 G: 40 INJECTION, SOLUTION INTRAVENOUS at 07:21

## 2023-07-30 RX ADMIN — BUMETANIDE 2 MG: 0.25 INJECTION INTRAMUSCULAR; INTRAVENOUS at 17:44

## 2023-07-30 RX ADMIN — Medication 3 MG: at 21:37

## 2023-07-30 RX ADMIN — SACUBITRIL AND VALSARTAN 1 TABLET: 24; 26 TABLET, FILM COATED ORAL at 08:58

## 2023-07-30 RX ADMIN — METOPROLOL SUCCINATE 25 MG: 25 TABLET, EXTENDED RELEASE ORAL at 21:40

## 2023-07-30 RX ADMIN — BUMETANIDE 2 MG: 0.25 INJECTION INTRAMUSCULAR; INTRAVENOUS at 08:58

## 2023-07-30 NOTE — ASSESSMENT & PLAN NOTE
Lab Results   Component Value Date    EGFR 44 07/30/2023    EGFR 53 07/29/2023    EGFR 66 07/29/2023    CREATININE 1.20 07/30/2023    CREATININE 1.04 07/29/2023    CREATININE 0.87 07/29/2023   Estimated Creatinine Clearance: 35.9 mL/min (by C-G formula based on SCr of 1.2 mg/dL).   · Cr at b/l 0.8-1  · Watch w/ diuresis

## 2023-07-30 NOTE — PROGRESS NOTES
Cardiology Progress Note - Sheryle Black 67 y.o. female MRN: [id number]    Unit/Bed#: S -01 Encounter: [id number]      Assessment:  1. Acute HFrEF  2. History of tachycardia mediated cardiomyopathy  3. PAT/AVNRT status post ablation x2  4. Sinus node dysfunction status post permanent pacemaker implantation  5. Interstitial lung disease  6. Dyslipidemia     Plan:  1.  EF severely reduced with wall motion abnormalities suggestive of Takotsubo syndrome - no inciting event  2. No response to IV Bumex - will increase to 2 mg three times daily for net negative fluid balance  3. Pacemaker interrogation today to rule out recurrent arrhythmias as a contributor to #1  4. If unremarkable would pursue left heart catheterization to rule out CAD as a contributor, less likely  5. On proper GDMT with metoprolol succinate and Entresto  6. Minimally elevated and flat troponin secondary to type 2 myocardial infarction in the setting of heart failure and hypoxia  7. Daughter updated at bedside    Subjective:   Patient seen and examined. No significant events overnight. Objective:     Vitals: Blood pressure 118/76, pulse 89, temperature 98 °F (36.7 °C), resp. rate 18, height 5' 2" (1.575 m), weight 60.8 kg (134 lb 0.6 oz), SpO2 97 %. , Body mass index is 24.52 kg/m².,   Orthostatic Blood Pressures    Flowsheet Row Most Recent Value   Blood Pressure 118/76 filed at 07/30/2023 0747   Patient Position - Orthostatic VS Sitting filed at 07/29/2023 1538            Intake/Output Summary (Last 24 hours) at 7/30/2023 0831  Last data filed at 7/29/2023 2054  Gross per 24 hour   Intake 720 ml   Output 900 ml   Net -180 ml         Physical Exam:    GEN: Sheryle Black appears well, alert and oriented x 3, pleasant and cooperative   HEENT: pupils equal, round, and reactive to light; extraocular muscles intact  NECK: supple, no carotid bruits   HEART: regular rhythm, normal S1 and S2, no murmurs, clicks, gallops or rubs LUNGS: Inspiratory and expiratory rales bilaterally  ABDOMEN: normal bowel sounds, soft, no tenderness, no distention  EXTREMITIES: No edema  NEURO: no focal findings   SKIN: normal without suspicious lesions on exposed skin    Medications:      Current Facility-Administered Medications:   •  acetaminophen (TYLENOL) tablet 650 mg, 650 mg, Oral, Q6H PRN, [facility],   •  albuterol inhalation solution 2.5 mg, 2.5 mg, Nebulization, Q4H PRN, [facility], , 2.5 mg at 07/29/23 1756  •  ALPRAZolam (XANAX) tablet 0.25 mg, 0.25 mg, Oral, Daily PRN, BAO Burleson, 0.25 mg at 07/29/23 2054  •  benzonatate (TESSALON PERLES) capsule 100 mg, 100 mg, Oral, TID PRN, [facility], 100 mg at 07/29/23 1448  •  bumetanide (BUMEX) injection 2 mg, 2 mg, Intravenous, TID, John Paulrocío Avila DO  •  enoxaparin (LOVENOX) subcutaneous injection 40 mg, 40 mg, Subcutaneous, Daily, [facility], 40 mg at 07/29/23 1159  •  levothyroxine tablet 100 mcg, 100 mcg, Oral, Early Morning, [facility], 100 mcg at 07/30/23 5309  •  magnesium sulfate 2 g/50 mL IVPB (premix) 2 g, 2 g, Intravenous, Once, May Stephanie Garcia MD, Last Rate: 25 mL/hr at 07/30/23 0721, 2 g at 07/30/23 0649  •  melatonin tablet 3 mg, 3 mg, Oral, HS, [facility], 3 mg at 07/29/23 2258  •  metoprolol succinate (TOPROL-XL) 24 hr tablet 25 mg, 25 mg, Oral, Q12H [address], [facility], 25 mg at 07/29/23 2054  •  sacubitril-valsartan (ENTRESTO) 24-26 MG per tablet 1 tablet, 1 tablet, Oral, BID, [facility], 1 tablet at 07/29/23 1600     Labs & Results:        Results from last 7 days   Lab Units 07/30/23  0502 07/29/23  0659   WBC Thousand/uL 9.69 10.28*   HEMOGLOBIN g/dL 11.0* 12.1   HEMATOCRIT % 34.3* 37.9   PLATELETS Thousands/uL 139* 185         Results from last 7 days   Lab Units 07/30/23  0502 07/29/23  1753 07/29/23  0659   POTASSIUM mmol/L 4.8 4.9 3.6   CHLORIDE mmol/L 101 100 101   CO2 mmol/L 28 27 28   BUN mg/dL 18 15 12   CREATININE mg/dL 1. 20 1.04 0.87   CALCIUM mg/dL 9.0 8.8 9.0   ALK PHOS U/L 102 103 96   ALT U/L 36 36 33   AST U/L 51* 59* 64*     Results from last 7 days   Lab Units 07/29/23  0659   INR  1.03   PTT seconds 29     Results from last 7 days   Lab Units 07/30/23  0502 07/29/23  0659   MAGNESIUM mg/dL 1.7* 1.8*       Counseling / Coordination of Care  Total floor / unit time spent today 25  minutes. Greater than 50% of total time was spent with the patient and / or family counseling and / or coordination of care.

## 2023-07-30 NOTE — ASSESSMENT & PLAN NOTE
Wt Readings from Last 3 Encounters:   07/30/23 60.8 kg (134 lb 0.6 oz)   07/26/23 60.1 kg (132 lb 9.6 oz)   07/16/23 58.7 kg (129 lb 6.6 oz)     · Markedly elevated BNP, dyspnea laying down, CT chest c/f effusions and pulm edema  · Cardio consulted and recommendations appreciated  · Continue BB and Entresto. · Increased IV Bumex 2 mg three times daily. · Pacemaker interrogation today per cardiology to rule out recurrent arrhythmias as a contributor to Takotsubo syndrome. · If unremarkable would pursue left heart catheterization to rule out CAD as a contributor which is less likely.

## 2023-07-30 NOTE — PROGRESS NOTES
[facility]  Progress Note  Name: Brady Mittal  MRN: [id number]  Unit/Bed#: S -31 I Date of Admission: 7/29/2023   Date of Service: 7/30/2023 I Hospital Day: 1    Assessment/Plan   * Acute on chronic diastolic heart failure [facility])  Assessment & Plan  Wt Readings from Last 3 Encounters:   07/30/23 60.8 kg (134 lb 0.6 oz)   07/26/23 60.1 kg (132 lb 9.6 oz)   07/16/23 58.7 kg (129 lb 6.6 oz)     · Markedly elevated BNP, dyspnea laying down, CT chest c/f effusions and pulm edema  · Cardio consulted and recommendations appreciated  · Continue BB and Entresto. · Increased IV Bumex 2 mg three times daily. · Pacemaker interrogation today per cardiology to rule out recurrent arrhythmias as a contributor to Takotsubo syndrome. · If unremarkable would pursue left heart catheterization to rule out CAD as a contributor which is less likely. Acute respiratory failure with hypoxia (HCC)  Assessment & Plan  · 87 on RA on admission. · Afebrile and recently completed 10 days abx. · 6 L mid flow overnight. · 90% on 5 L of oxygen via nasal cannula this morning. · 2/2 CHF  · Wean as tolerated      ILD (interstitial lung disease) ([address])  Assessment & Plan  · Managed by PCP    CKD (chronic kidney disease) stage 3, GFR 30-59 ml/min [facility])  Assessment & Plan  Lab Results   Component Value Date    EGFR 44 07/30/2023    EGFR 53 07/29/2023    EGFR 66 07/29/2023    CREATININE 1.20 07/30/2023    CREATININE 1.04 07/29/2023    CREATININE 0.87 07/29/2023   Estimated Creatinine Clearance: 35.9 mL/min (by C-G formula based on SCr of 1.2 mg/dL). · Cr at b/l 0.8-1  · Watch w/ diuresis    Hypothyroidism  Assessment & Plan  · Free T4 normal   · Follow-up TSH   · Continue levothyroxine 100 mg daily. VTE Pharmacologic Prophylaxis: VTE Score: 5 High Risk (Score >/= 5) - Pharmacological DVT Prophylaxis Ordered: enoxaparin (Lovenox). Sequential Compression Devices Ordered.     Patient Centered Rounds: I performed bedside rounds with nursing staff today. Discussions with Specialists or Other Care Team Provider: Cardiologist    Education and Discussions with Family / Patient: Updated  (daughter) at bedside. Christi Danielle    Current Length of Stay: 1 day(s)  Current Patient Status: Inpatient   Discharge Plan: Anticipate discharge in 24-48 hrs to home. Code Status: Level 1 - Full Code    Subjective:   Patient seen and examined. Patient reports that she feels better this morning. Denied chest pain, shortness of breath, fever, chills, nausea, vomiting. No new complaint. No significant events overnight. Daughter was at bedside this morning. Objective:     Vitals:   Temp (24hrs), Av.8 °F (36.6 °C), Min:97.7 °F (36.5 °C), Max:98 °F (36.7 °C)    Temp:  [97.7 °F (36.5 °C)-98 °F (36.7 °C)] 98 °F (36.7 °C)  HR:  [] 89  Resp:  [18] 18  BP: (118-140)/(76-94) 118/76  SpO2:  [96 %-97 %] 97 %  Body mass index is 24.52 kg/m². Input and Output Summary (last 24 hours): Intake/Output Summary (Last 24 hours) at 2023 1220  Last data filed at 2023 1204  Gross per 24 hour   Intake 720 ml   Output 1409 ml   Net -689 ml       Physical Exam:   Physical Exam  Vitals and nursing note reviewed. Constitutional:       General: She is not in acute distress. Appearance: Normal appearance. She is well-developed. HENT:      Head: Normocephalic and atraumatic. Mouth/Throat:      Mouth: Mucous membranes are moist.      Pharynx: Oropharynx is clear. Eyes:      Extraocular Movements: Extraocular movements intact. Conjunctiva/sclera: Conjunctivae normal.      Pupils: Pupils are equal, round, and reactive to light. Cardiovascular:      Rate and Rhythm: Normal rate and regular rhythm. Heart sounds: No murmur heard. Pulmonary:      Effort: Pulmonary effort is normal. No respiratory distress. Breath sounds: Normal breath sounds. No wheezing or rales.    Abdominal: General: Bowel sounds are normal. There is no distension. Palpations: Abdomen is soft. Tenderness: There is no abdominal tenderness. Musculoskeletal:         General: No swelling. Cervical back: Neck supple. Right lower leg: No edema. Left lower leg: No edema. Skin:     General: Skin is warm and dry. Capillary Refill: Capillary refill takes less than 2 seconds. Coloration: Skin is not jaundiced or pale. Findings: No lesion or rash. Neurological:      Mental Status: She is alert and oriented to person, place, and time.    Psychiatric:         Mood and Affect: Mood normal.          Additional Data:     Labs:  Results from last 7 days   Lab Units 23  0502 23  0659   WBC Thousand/uL 9.69 10.28*   HEMOGLOBIN g/dL 11.0* 12.1   HEMATOCRIT % 34.3* 37.9   PLATELETS Thousands/uL 139* 185   NEUTROS PCT %  --  75   LYMPHS PCT %  --  19   MONOS PCT %  --  6   EOS PCT %  --  0     Results from last 7 days   Lab Units 23  0502   SODIUM mmol/L 139   POTASSIUM mmol/L 4.8   CHLORIDE mmol/L 101   CO2 mmol/L 28   BUN mg/dL 18   CREATININE mg/dL 1.20   ANION GAP mmol/L 10   CALCIUM mg/dL 9.0   ALBUMIN g/dL 3.4*   TOTAL BILIRUBIN mg/dL 1.05*   ALK PHOS U/L 102   ALT U/L 36   AST U/L 51*   GLUCOSE RANDOM mg/dL 128     Results from last 7 days   Lab Units 23  0659   INR  1.03             Results from last 7 days   Lab Units 23  1753 23  0659   LACTIC ACID mmol/L 1.8 1.9   PROCALCITONIN ng/ml  --  <0.05       Lines/Drains:  Invasive Devices     Peripheral Intravenous Line  Duration           Peripheral IV 23 Left;Ventral (anterior) Forearm 1 day    Peripheral IV 23 Right Antecubital 1 day                  Telemetry:  Telemetry Orders (From admission, onward)             24 Hour Telemetry Monitoring  Continuous x 24 Hours (Telem)        Comments: Elevated troponin      Question:  Reason for 24 Hour Telemetry  Answer:  PCI/EP study (including pacer and ICD implementation), Cardiac surgery, MI, abnormal cardiac cath, and chest pain- rule out MI  Comment:  elevated troponin                 Telemetry Reviewed: Normal Sinus Rhythm  Indication for Continued Telemetry Use: Acute CHF on >200 mg lasix/day or equivalent dose or with new reduced EF. Imaging: Reviewed radiology reports from this admission including: chest xray  CTA ED chest PE study   ED Interpretation by Florencia Ruffin MD (07/29 0910)   FINDINGS:     PULMONARY ARTERIAL TREE:  No pulmonary embolus is seen. Mild dilatation of the central pulmonary arteries, suggesting pulmonary arterial hypertension.     LUNGS: Reticulation, groundglass attenuation and honeycombing in the bases of both lower lobes and right middle lobe, similar in appearance to a high-resolution CT of the chest from 6/3/2022, typical of chronic interstitial lung disease. Prominent   interlobular septal thickening, developing since earlier CTs. Chronic bilateral apical fibrosis patchy irregular subsolid nodules in both lower lobes and right upper and middle lobes as well as patchy consolidation in the bases of both lower lobes, not   present last month. Mild bilateral lower lobe and right middle lobe bronchiectasis.     PLEURA: Small bilateral pleural effusions, developing since 6/22/2023.     HEART/GREAT VESSELS: Heart mildly enlarged. Permanent pacemaker present. Mild ectasia of the ascending aorta, 4.1 cm in maximal diameter.     MEDIASTINUM AND ALAN   : No lymphadenopathy or mass. Dilatation of the upper third of the esophagus, similar to earlier CTs, most likely due to chronic esophageal dysmotility. Small hiatal hernia. Trachea and main stem bronchi normal.     CHEST WALL AND LOWER NECK:   Unremarkable.     VISUALIZED STRUCTURES IN THE UPPER ABDOMEN:  Unremarkable.     OSSEOUS STRUCTURES: Scoliosis. Advanced degenerative disc disease at T11-T12. No recent fracture or destructive lesion.     IMPRESSION:     1.   No evidence of pulmonary embolus.     2. Chronic interstitial lung disease with a UIP or IPF pattern as well as mild bibasilar bronchiectasis.     3. Findings consistent with CHF, including small pleural effusions and interstitial pulmonary edema.     4.  Patchy nodular opacities and consolidation in both lower lobes, consistent with multifocal pneumonia or, more likely, pulmonary edema.                 Workstation performed: [id number]      Final Result by Ric Arredondo MD (07/29 0908)      1. No evidence of pulmonary embolus. 2.  Chronic interstitial lung disease with a UIP or IPF pattern as well as mild bibasilar bronchiectasis. 3.  Findings consistent with CHF, including small pleural effusions and interstitial pulmonary edema. 4.  Patchy nodular opacities and consolidation in both lower lobes, consistent with multifocal pneumonia or, more likely, pulmonary edema. Workstation performed: [id number]         XR chest 1 view portable   ED Interpretation by Kleli Bui MD (07/29 0706)   RLL infiltrate and increased interstitial markings bilaterally and PPM read by me. Final Result by Anabel Ferrara MD (07/29 1208)      Moderate pulmonary edema superimposed on pulmonary fibrosis. Pneumonia not excluded in the appropriate clinical setting. Workstation performed: [id number]           Recent Cultures (last 7 days):   Results from last 7 days   Lab Units 07/29/23  0659   BLOOD CULTURE  Received in Microbiology Lab. Culture in Progress. Received in Microbiology Lab. Culture in Progress.        Last 24 Hours Medication List:   Current Facility-Administered Medications   Medication Dose Route Frequency Provider Last Rate   • acetaminophen  650 mg Oral Q6H PRN Miky Peacock DO     • albuterol  2.5 mg Nebulization Q4H PRN Miky Peacock DO     • ALPRAZolam  0.25 mg Oral Daily PRN BAO Tan     • benzonatate  100 mg Oral TID PRN Adron Oris Loly Walker, DO     • bumetanide  2 mg Intravenous TID (diuretic) Tad Sabillon, DO     • enoxaparin  40 mg Subcutaneous Daily Mario Andrews, DO     • levothyroxine  100 mcg Oral Early Morning Mario Andrews, DO     • melatonin  3 mg Oral HS Mario Andrews, DO     • metoprolol succinate  25 mg Oral Q12H [facility] Mario Andrews, DO     • sacubitril-valsartan  1 tablet Oral BID Mario Andrews, DO          Today, Patient Was Seen By: Melida Cordoba MD    **Please Note: This note may have been constructed using a voice recognition system. **

## 2023-07-30 NOTE — PLAN OF CARE
Problem: Potential for Falls  Goal: Patient will remain free of falls  Description: INTERVENTIONS:  - Educate patient/family on patient safety including physical limitations  - Instruct patient to call for assistance with activity   - Consult OT/PT to assist with strengthening/mobility   - Keep Call bell within reach  - Keep bed low and locked with side rails adjusted as appropriate  - Keep care items and personal belongings within reach  - Initiate and maintain comfort rounds  - Make Fall Risk Sign visible to staff  - Offer Toileting every  Hours, in advance of need  - Initiate/Maintain alarm  - Obtain necessary fall risk management equipment:   - Apply yellow socks and bracelet for high fall risk patients  - Consider moving patient to room near nurses station  Outcome: Progressing     Problem: SAFETY ADULT  Goal: Patient will remain free of falls  Description: INTERVENTIONS:  - Educate patient/family on patient safety including physical limitations  - Instruct patient to call for assistance with activity   - Consult OT/PT to assist with strengthening/mobility   - Keep Call bell within reach  - Keep bed low and locked with side rails adjusted as appropriate  - Keep care items and personal belongings within reach  - Initiate and maintain comfort rounds  - Make Fall Risk Sign visible to staff  - Offer Toileting every  Hours, in advance of need  - Initiate/Maintain alarm  - Obtain necessary fall risk management equipment:   - Apply yellow socks and bracelet for high fall risk patients  - Consider moving patient to room near nurses station  Outcome: Progressing  Goal: Maintain or return to baseline ADL function  Description: INTERVENTIONS:  -  Assess patient's ability to carry out ADLs; assess patient's baseline for ADL function and identify physical deficits which impact ability to perform ADLs (bathing, care of mouth/teeth, toileting, grooming, dressing, etc.)  - Assess/evaluate cause of self-care deficits   - Assess range of motion  - Assess patient's mobility; develop plan if impaired  - Assess patient's need for assistive devices and provide as appropriate  - Encourage maximum independence but intervene and supervise when necessary  - Involve family in performance of ADLs  - Assess for home care needs following discharge   - Consider OT consult to assist with ADL evaluation and planning for discharge  - Provide patient education as appropriate  Outcome: Progressing  Goal: Maintains/Returns to pre admission functional level  Description: INTERVENTIONS:  - Perform BMAT or MOVE assessment daily.   - Set and communicate daily mobility goal to care team and patient/family/caregiver. - Collaborate with rehabilitation services on mobility goals if consulted  - Perform Range of Motion  times a day. - Reposition patient every  hours.   - Dangle patient  times a day  - Stand patient  times a day  - Ambulate patient  times a day  - Out of bed to chair  times a day   - Out of bed for meal times a day  - Out of bed for toileting  - Record patient progress and toleration of activity level   Outcome: Progressing     Problem: DISCHARGE PLANNING  Goal: Discharge to home or other facility with appropriate resources  Description: INTERVENTIONS:  - Identify barriers to discharge w/patient and caregiver  - Arrange for needed discharge resources and transportation as appropriate  - Identify discharge learning needs (meds, wound care, etc.)  - Arrange for interpretive services to assist at discharge as needed  - Refer to Case Management Department for coordinating discharge planning if the patient needs post-hospital services based on physician/advanced practitioner order or complex needs related to functional status, cognitive ability, or social support system  Outcome: Progressing     Problem: Knowledge Deficit  Goal: Patient/family/caregiver demonstrates understanding of disease process, treatment plan, medications, and discharge instructions  Description: Complete learning assessment and assess knowledge base.   Interventions:  - Provide teaching at level of understanding  - Provide teaching via preferred learning methods  Outcome: Progressing     Problem: CARDIOVASCULAR - ADULT  Goal: Maintains optimal cardiac output and hemodynamic stability  Description: INTERVENTIONS:  - Monitor I/O, vital signs and rhythm  - Monitor for S/S and trends of decreased cardiac output  - Administer and titrate ordered vasoactive medications to optimize hemodynamic stability  - Assess quality of pulses, skin color and temperature  - Assess for signs of decreased coronary artery perfusion  - Instruct patient to report change in severity of symptoms  Outcome: Progressing  Goal: Absence of cardiac dysrhythmias or at baseline rhythm  Description: INTERVENTIONS:  - Continuous cardiac monitoring, vital signs, obtain 12 lead EKG if ordered  - Administer antiarrhythmic and heart rate control medications as ordered  - Monitor electrolytes and administer replacement therapy as ordered  Outcome: Progressing     Problem: RESPIRATORY - ADULT  Goal: Achieves optimal ventilation and oxygenation  Description: INTERVENTIONS:  - Assess for changes in respiratory status  - Assess for changes in mentation and behavior  - Position to facilitate oxygenation and minimize respiratory effort  - Oxygen administered by appropriate delivery if ordered  - Initiate smoking cessation education as indicated  - Encourage broncho-pulmonary hygiene including cough, deep breathe, Incentive Spirometry  - Assess the need for suctioning and aspirate as needed  - Assess and instruct to report SOB or any respiratory difficulty  - Respiratory Therapy support as indicated  Outcome: Progressing     Problem: GENITOURINARY - ADULT  Goal: Maintains or returns to baseline urinary function  Description: INTERVENTIONS:  - Assess urinary function  - Encourage oral fluids to ensure adequate hydration if ordered  - Administer IV fluids as ordered to ensure adequate hydration  - Administer ordered medications as needed  - Offer frequent toileting  - Follow urinary retention protocol if ordered  Outcome: Progressing  Goal: Absence of urinary retention  Description: INTERVENTIONS:  - Assess patient’s ability to void and empty bladder  - Monitor I/O  - Bladder scan as needed  - Discuss with physician/AP medications to alleviate retention as needed  - Discuss catheterization for long term situations as appropriate  Outcome: Progressing  Goal: Urinary catheter remains patent  Description: INTERVENTIONS:  - Assess patency of urinary catheter  - If patient has a chronic nunez, consider changing catheter if non-functioning  - Follow guidelines for intermittent irrigation of non-functioning urinary catheter  Outcome: Progressing

## 2023-07-30 NOTE — ASSESSMENT & PLAN NOTE
· 88 on RA on admission. · Afebrile and recently completed 10 days abx. · 6 L mid flow overnight. · 90% on 5 L of oxygen via nasal cannula this morning.   · 2/2 CHF  · Wean as tolerated

## 2023-07-31 LAB
ANION GAP SERPL CALCULATED.3IONS-SCNC: 8 MMOL/L
ATRIAL RATE: 115 BPM
BUN SERPL-MCNC: 24 MG/DL (ref 5–25)
CALCIUM SERPL-MCNC: 8.5 MG/DL (ref 8.4–10.2)
CHLORIDE SERPL-SCNC: 98 MMOL/L (ref 96–108)
CO2 SERPL-SCNC: 35 MMOL/L (ref 21–32)
CREAT SERPL-MCNC: 1.06 MG/DL (ref 0.6–1.3)
ERYTHROCYTE [DISTWIDTH] IN BLOOD BY AUTOMATED COUNT: 15.7 % (ref 11.6–15.1)
GFR SERPL CREATININE-BSD FRML MDRD: 52 ML/MIN/1.73SQ M
GLUCOSE SERPL-MCNC: 98 MG/DL (ref 65–140)
HCT VFR BLD AUTO: 35 % (ref 34.8–46.1)
HGB BLD-MCNC: 10.8 G/DL (ref 11.5–15.4)
MAGNESIUM SERPL-MCNC: 2.1 MG/DL (ref 1.9–2.7)
MCH RBC QN AUTO: 28.6 PG (ref 26.8–34.3)
MCHC RBC AUTO-ENTMCNC: 30.9 G/DL (ref 31.4–37.4)
MCV RBC AUTO: 93 FL (ref 82–98)
PLATELET # BLD AUTO: 114 THOUSANDS/UL (ref 149–390)
PMV BLD AUTO: 12.2 FL (ref 8.9–12.7)
POTASSIUM SERPL-SCNC: 4 MMOL/L (ref 3.5–5.3)
PR INTERVAL: 188 MS
QRS AXIS: -47 DEGREES
QRSD INTERVAL: 154 MS
QT INTERVAL: 306 MS
QTC INTERVAL: 423 MS
RBC # BLD AUTO: 3.78 MILLION/UL (ref 3.81–5.12)
SODIUM SERPL-SCNC: 141 MMOL/L (ref 135–147)
T WAVE AXIS: 214 DEGREES
VENTRICULAR RATE: 115 BPM
WBC # BLD AUTO: 5.88 THOUSAND/UL (ref 4.31–10.16)

## 2023-07-31 PROCEDURE — 85027 COMPLETE CBC AUTOMATED: CPT

## 2023-07-31 PROCEDURE — 99232 SBSQ HOSP IP/OBS MODERATE 35: CPT | Performed by: INTERNAL MEDICINE

## 2023-07-31 PROCEDURE — 83735 ASSAY OF MAGNESIUM: CPT

## 2023-07-31 PROCEDURE — 93010 ELECTROCARDIOGRAM REPORT: CPT | Performed by: INTERNAL MEDICINE

## 2023-07-31 PROCEDURE — 80048 BASIC METABOLIC PNL TOTAL CA: CPT

## 2023-07-31 RX ORDER — BUMETANIDE 0.25 MG/ML
2 INJECTION INTRAMUSCULAR; INTRAVENOUS 3 TIMES DAILY
Status: DISCONTINUED | OUTPATIENT
Start: 2023-07-31 | End: 2023-07-31

## 2023-07-31 RX ORDER — BUMETANIDE 0.25 MG/ML
2 INJECTION INTRAMUSCULAR; INTRAVENOUS ONCE
Status: COMPLETED | OUTPATIENT
Start: 2023-07-31 | End: 2023-07-31

## 2023-07-31 RX ADMIN — ENOXAPARIN SODIUM 40 MG: 40 INJECTION SUBCUTANEOUS at 07:57

## 2023-07-31 RX ADMIN — BUMETANIDE 2 MG: 0.25 INJECTION INTRAMUSCULAR; INTRAVENOUS at 11:01

## 2023-07-31 RX ADMIN — LEVOTHYROXINE SODIUM 100 MCG: 100 TABLET ORAL at 05:07

## 2023-07-31 RX ADMIN — METOPROLOL SUCCINATE 25 MG: 25 TABLET, EXTENDED RELEASE ORAL at 07:56

## 2023-07-31 RX ADMIN — Medication 3 MG: at 21:44

## 2023-07-31 RX ADMIN — SACUBITRIL AND VALSARTAN 1 TABLET: 24; 26 TABLET, FILM COATED ORAL at 17:17

## 2023-07-31 RX ADMIN — SACUBITRIL AND VALSARTAN 1 TABLET: 24; 26 TABLET, FILM COATED ORAL at 07:55

## 2023-07-31 RX ADMIN — BUMETANIDE 2 MG: 0.25 INJECTION INTRAMUSCULAR; INTRAVENOUS at 16:47

## 2023-07-31 NOTE — UTILIZATION REVIEW
NOTIFICATION OF INPATIENT ADMISSION   AUTHORIZATION REQUEST   SERVICING FACILITY:   [address] [place] [address] Road  [address] [place] [address]. [facility], [address] [place] [address]  Tax ID: [id number]  NPI: [id number]   ATTENDING PROVIDER:  Attending Name and NPI#: Lori Dinh Do [[id number]]  Address: [address] [place] [address]. [facility], [address] [place] [address]  Phone: [phone]     ADMISSION INFORMATION:  [facility]   Place of Service Code: 21  Inpatient Admission Date/Time: 7/29/23  9:17 AM  Discharge Date/Time: No discharge date for patient encounter. Admitting Diagnosis Code/Description:  Shortness of breath [R06.02]  Pulmonary edema [J81.1]  Elevated troponin [R77.8]  Acute respiratory failure with hypoxia ([address]) [J96.01]  Sepsis ([address]) [A41.9]  Multifocal pneumonia [J18.9]     UTILIZATION REVIEW CONTACT:  Yadi Victoria Utilization   Network Utilization Review Department  Phone: [phone]  Fax: [phone]  Email: [email or web address]  Contact for approvals/pending authorizations, clinical reviews, and discharge. PHYSICIAN ADVISORY SERVICES:  Medical Necessity Denial & [employer] Review  Phone: [phone]  Fax: [phone]  Email: [email or web address]

## 2023-07-31 NOTE — UTILIZATION REVIEW
Initial Clinical Review    Admission: Date/Time/Statement:   Admission Orders (From admission, onward)     Ordered        07/29/23 0917  INPATIENT ADMISSION  Once                      Orders Placed This Encounter   Procedures   • INPATIENT ADMISSION     telemetry     Standing Status:   Standing     Number of Occurrences:   1     Order Specific Question:   Level of Care     Answer:   Med Surg [16]     Order Specific Question:   Bed request comments     Answer:   telemetry     Order Specific Question:   Estimated length of stay     Answer:   More than 2 Midnights     Order Specific Question:   Certification     Answer:   I certify that inpatient services are medically necessary for this patient for a duration of greater than two midnights. See H&P and MD Progress Notes for additional information about the patient's course of treatment. ED Arrival Information     Expected   -    Arrival   7/29/2023 06:34    Acuity   Emergent            Means of arrival   Walk-In    Escorted by   Family Member    Service   Hospitalist    Admission type   Emergency            Arrival complaint   shortness of breah            Chief Complaint   Patient presents with   • Shortness of Breath     Pt woke up this morning feeling like she could not breath. Recent abx treatment for UTI       Initial Presentation: 67 y.o. female presents to ED from home with progressive shortness of breath for past few days. Seen in ED  10 days prior to this with fatigue, diagnosed with a  UTI, given IVF  And  Completed  10 days  Po antibiotics. Laid d own the evening prior to admission, unable to  Breathe. PMH  Is   S/P  PPM,  Heart failure,  Recovered  EF,  CKD and ILD. Sats in ED  88 %  RA. Labs  Show  Markedly elevated  BNP. Ct  Chest shows effusions/pulmonary edema.    Admit  Ip with  Acute/chronic  Diastolic heart failure, Acute respiratory failure with  Hypoxia and plan is   O2  L  NC,  IV lasix  BID,  Monitor labs, cardiology consult   And continue home meds. Cardiology consult  Appears overloaded on exam, not much response  To IV lasix. Transition to IV bumex. Etiology  Of  Exacerbation  Unclear. NSR on  EKG. Minimally leevated and  Flat troponin represents Non  MI  Troponin elevation in setting of  CHF and hypoxia. Respiratory distress noted and placed  On  Mid flow. VBG shows  resp  Alkalosis. Giving  Additional IV bumex. Date:    7/30        Day 2: Increasing  IV  bumex  To  TID. Plan pacemaker interrogation. May need   Cardiac cath to R/O  CAD, less likely. Repeat  Echo shows EF  30  %, severely reduced systolic function. Sats  This am  90  %   On 5 L  NC,  Required  6L  MF  Overnight. Date:    7/31    Day 3: Has surpassed a 2nd midnight with active treatments and services, which include   Possible  Heart cath pending  Pacemaker interrogation. Remains on  IV  bumex  TID. Xin Dobbs Now on   RA this am.  Continue  Current meds/close monitoring.     ED Triage Vitals   Temperature Pulse Respirations Blood Pressure SpO2   07/29/23 0645 07/29/23 0645 07/29/23 0645 07/29/23 0646 07/29/23 0645   97.9 °F (36.6 °C) (!) 123 22 153/86 (!) 88 %      Temp Source Heart Rate Source Patient Position - Orthostatic VS BP Location FiO2 (%)   07/29/23 0645 07/29/23 0645 07/29/23 1538 07/29/23 0646 --   Oral Monitor Sitting Right arm       Pain Score       07/29/23 1025       No Pain          Wt Readings from Last 1 Encounters:   07/31/23 59.9 kg (132 lb 0.9 oz)     Additional Vital Signs:   97.5 °F (36.4 °C) 72 18 108/65 79 98 % -- -- -- -- Lying    07/31/23 05:14:24 -- 75 -- 107/65 79 98 % -- -- -- -- --   07/30/23 21:39:38 97.9 °F (36.6 °C) 87 18 116/70 85 98 % -- -- -- -- Lying   07/30/23 21:00:43 97.8 °F (36.6 °C) 89 18 107/62 77 96 % 36 -- 4 L/min Nasal cannula Lying   07/30/23 15:36:10 97.3 °F (36.3 °C) Abnormal  82 18 116/74 88 99 % 36 -- 4 L/min Nasal cannula Sitting   07/30/23 1345 -- -- -- -- -- 95 % 36 -- 4 L/min  Nasal cannula -- 07/30/23 0748 -- -- -- -- -- 97 % 44 -- 6 L/min  Mid flow nasal cannula --   07/30/23 07:47:03 98 °F (36.7 °C) 89 -- 118/76 90 97 % -- -- -- -- --   07/30/23 0300 -- -- -- -- -- 97 % -- 8 L/min -- Mid flow nasal cannula --   07/30/23 0122 -- -- -- -- -- -- 52 -- 8 L/min Mid flow nasal cannula --   07/29/23 20:55:56 97.7 °F (36.5 °C) 104 -- 130/94 106 97 % -- -- -- -- --   07/29/23 2010 -- -- -- -- -- 97 % -- 8 L/min -- Mid flow nasal cannula --   07/29/23 1756 -- -- -- -- -- 97 % 60 -- 10 L/min  Mid flow nasal cannula  --   07/29/23 15:38:54 97.8 °F (36.6 °C) 109 Abnormal  18 140/93 109 96 % 36 -- 4 L/min Nasal cannula Sitting   07/29/23 1306 -- 104 -- 128/86 -- -- -- -- -- -- --   07/29/23 1055 98 °F (36.7 °C) 105 26 Abnormal  128/86 -- -- -- -- -- -- --   07/29/23 1049 -- -- -- -- -- 94 % 32 -- 3 L/min Nasal cannula --   07/29/23 1031 98 °F (36.7 °C) 105 28 Abnormal  128/86 -- -- -- -- -- -- --   07/29/23 10:29:01 98 °F (36.7 °C) 105 -- 128/86 100 94 % -- -- -- -- --   07/29/23 0830 -- 107 Abnormal  22 134/83 103 95 % 32 -- 3 L/min -- --   07/29/23 4765 -- -- -- -- -- 94 % 32 -- 3 L/min Nasal cannula        Pertinent Labs/Diagnostic Test Results:   EKG   ST   Long  1st degree  AV block    HR   100 - 110  CTA ED chest PE study   ED Interpretation by Melanie Henson MD (07/29 0910)   FINDINGS:     PULMONARY ARTERIAL TREE:  No pulmonary embolus is seen. Mild dilatation of the central pulmonary arteries, suggesting pulmonary arterial hypertension.     LUNGS: Reticulation, groundglass attenuation and honeycombing in the bases of both lower lobes and right middle lobe, similar in appearance to a high-resolution CT of the chest from 6/3/2022, typical of chronic interstitial lung disease. Prominent   interlobular septal thickening, developing since earlier CTs.  Chronic bilateral apical fibrosis patchy irregular subsolid nodules in both lower lobes and right upper and middle lobes as well as patchy consolidation in the bases of both lower lobes, not   present last month. Mild bilateral lower lobe and right middle lobe bronchiectasis.     PLEURA: Small bilateral pleural effusions, developing since 6/22/2023.     HEART/GREAT VESSELS: Heart mildly enlarged. Permanent pacemaker present. Mild ectasia of the ascending aorta, 4.1 cm in maximal diameter.     MEDIASTINUM AND ALAN   : No lymphadenopathy or mass. Dilatation of the upper third of the esophagus, similar to earlier CTs, most likely due to chronic esophageal dysmotility. Small hiatal hernia. Trachea and main stem bronchi normal.     CHEST WALL AND LOWER NECK:   Unremarkable.     VISUALIZED STRUCTURES IN THE UPPER ABDOMEN:  Unremarkable.     OSSEOUS STRUCTURES: Scoliosis. Advanced degenerative disc disease at T11-T12. No recent fracture or destructive lesion.     IMPRESSION:     1. No evidence of pulmonary embolus.     2. Chronic interstitial lung disease with a UIP or IPF pattern as well as mild bibasilar bronchiectasis.     3. Findings consistent with CHF, including small pleural effusions and interstitial pulmonary edema.     4.  Patchy nodular opacities and consolidation in both lower lobes, consistent with multifocal pneumonia or, more likely, pulmonary edema.                 Workstation performed: [id number]      Final Result by Cory Betts MD (07/29 0969)      1. No evidence of pulmonary embolus. 2.  Chronic interstitial lung disease with a UIP or IPF pattern as well as mild bibasilar bronchiectasis. 3.  Findings consistent with CHF, including small pleural effusions and interstitial pulmonary edema. 4.  Patchy nodular opacities and consolidation in both lower lobes, consistent with multifocal pneumonia or, more likely, pulmonary edema.                   Workstation performed: [id number]         XR chest 1 view portable   ED Interpretation by Camden Neal MD (07/29 3034)   RLL infiltrate and increased interstitial markings bilaterally and PPM read by me. Final Result by Marine Koyanagi, MD (07/29 1208)      Moderate pulmonary edema superimposed on pulmonary fibrosis. Pneumonia not excluded in the appropriate clinical setting.                Workstation performed: [id number]           Results from last 7 days   Lab Units 07/29/23  0659   SARS-COV-2  Negative     Results from last 7 days   Lab Units 07/31/23 0448 07/30/23  0502 07/29/23  0659   WBC Thousand/uL 5.88 9.69 10.28*   HEMOGLOBIN g/dL 10.8* 11.0* 12.1   HEMATOCRIT % 35.0 34.3* 37.9   PLATELETS Thousands/uL 114* 139* 185   NEUTROS ABS Thousands/µL  --   --  7.68*         Results from last 7 days   Lab Units 07/31/23 0448 07/30/23 0502 07/29/23 1753 07/29/23  0659   SODIUM mmol/L 141 139 137 140   POTASSIUM mmol/L 4.0 4.8 4.9 3.6   CHLORIDE mmol/L 98 101 100 101   CO2 mmol/L 35* 28 27 28   ANION GAP mmol/L 8 10 10 11   BUN mg/dL 24 18 15 12   CREATININE mg/dL 1.06 1.20 1.04 0.87   EGFR ml/min/1.73sq m 52 44 53 66   CALCIUM mg/dL 8.5 9.0 8.8 9.0   MAGNESIUM mg/dL 2.1 1.7*  --  1.8*   PHOSPHORUS mg/dL  --  3.7  --   --      Results from last 7 days   Lab Units 07/30/23  0502 07/29/23 1753 07/29/23  0659   AST U/L 51* 59* 64*   ALT U/L 36 36 33   ALK PHOS U/L 102 103 96   TOTAL PROTEIN g/dL 6.4 6.8 6.9   ALBUMIN g/dL 3.4* 3.6 3.8   TOTAL BILIRUBIN mg/dL 1.05* 0.93 0.70         Results from last 7 days   Lab Units 07/31/23 0448 07/30/23  0502 07/29/23 1753 07/29/23  0659   GLUCOSE RANDOM mg/dL 98 128 145* 173*              Results from last 7 days   Lab Units 07/29/23  1824 07/29/23  0717   PH PEGGY  7.494* 7.473*   PCO2 PEGGY mm Hg 38.8* 38.2*   PO2 PEGGY mm Hg 183.8* 53.3*   HCO3 PEGGY mmol/L 29.2 27.4   BASE EXC PEGGY mmol/L 5.6 3.7   O2 CONTENT PEGGY ml/dL 17.9 15.3   O2 HGB, VENOUS % 98.0* 86.2*             Results from last 7 days   Lab Units 07/29/23  1107 07/29/23  0830 07/29/23  0659   HS TNI 0HR ng/L  --   --  40   HS TNI 2HR ng/L  --  88*  --    HSTNI D2 ng/L  --  48* --    HS TNI 4HR ng/L 156*  --   --    HSTNI D4 ng/L 116*  --   --      Results from last 7 days   Lab Units 07/29/23  0659   D-DIMER QUANTITATIVE ug/ml FEU 1.01*     Results from last 7 days   Lab Units 07/29/23  0659   PROTIME seconds 13.8   INR  1.03   PTT seconds 29     Results from last 7 days   Lab Units 07/29/23  0659   TSH 3RD GENERATON uIU/mL 9.167*     Results from last 7 days   Lab Units 07/29/23  0659   PROCALCITONIN ng/ml <0.05     Results from last 7 days   Lab Units 07/29/23  1753 07/29/23  0659   LACTIC ACID mmol/L 1.8 1.9             Results from last 7 days   Lab Units 07/29/23  0659   BNP pg/mL >4,700*               Results from last 7 days   Lab Units 07/29/23  0659   INFLUENZA A PCR  Negative   INFLUENZA B PCR  Negative   RSV PCR  Negative               Results from last 7 days   Lab Units 07/29/23  0659   BLOOD CULTURE  No Growth at 24 hrs. No Growth at 24 hrs.                    ED Treatment:   Medication Administration from 07/29/2023 0634 to 07/29/2023 1017       Date/Time Order Dose Route Action Comments     07/29/2023 0713 EDT albuterol inhalation solution 2.5 mg 2.5 mg Nebulization Given --     07/29/2023 0918 EDT levofloxacin (LEVAQUIN) IVPB (premix in dextrose) 750 mg 150 mL 0 mg Intravenous Stopped --     07/29/2023 0710 EDT levofloxacin (LEVAQUIN) IVPB (premix in dextrose) 750 mg 150 mL 750 mg Intravenous New Bag --     07/29/2023 0830 EDT furosemide (LASIX) injection 40 mg 40 mg Intravenous Given --     07/29/2023 0829 EDT iohexol (OMNIPAQUE) 350 MG/ML injection (SINGLE-DOSE) 60 mL 60 mL Intravenous Given --        Present on Admission:  • Acute on chronic diastolic heart failure (HCC)  • CKD (chronic kidney disease) stage 3, GFR 30-59 ml/min ([facility])  • ILD (interstitial lung disease) ([address])  • Hypothyroidism      Admitting Diagnosis: Shortness of breath [R06.02]  Pulmonary edema [J81.1]  Elevated troponin [R77.8]  Acute respiratory failure with hypoxia ([address]) [J96.01]  Sepsis ([address]) [A41.9]  Multifocal pneumonia [J18.9]  Age/Sex: 67 y.o. female  Admission Orders:  Scheduled Medications:  bumetanide, 2 mg, Intravenous, TID  enoxaparin, 40 mg, Subcutaneous, Daily  levothyroxine, 100 mcg, Oral, Early Morning  melatonin, 3 mg, Oral, HS  metoprolol succinate, 25 mg, Oral, Q12H [place]  sacubitril-valsartan, 1 tablet, Oral, BID      Continuous IV Infusions:     PRN Meds:  acetaminophen, 650 mg, Oral, Q6H PRN  albuterol, 2.5 mg, Nebulization, Q4H PRN  ALPRAZolam, 0.25 mg, Oral, Daily PRN  benzonatate, 100 mg, Oral, TID PRN        IP CONSULT TO CARDIOLOGY  IP CONSULT TO NUTRITION SERVICES    Network Utilization Review Department  ATTENTION: Please call with any questions or concerns to [phone] and carefully listen to the prompts so that you are directed to the right person. All voicemails are confidential.  Michaelle Trujillo all requests for admission clinical reviews, approved or denied determinations and any other requests to dedicated fax number below belonging to the campus where the patient is receiving treatment.  List of dedicated fax numbers for the Facilities:  [place] DENIALS (Administrative/Medical Necessity) [phone]   [address] (Maternity/NICU/Pediatrics) [phone]   [address] [place] [address] Drive [phone]   [facility] [address] [facility] [phone]   [address] [place] [address] [phone]   [address] [address] [facility] [phone]

## 2023-07-31 NOTE — ASSESSMENT & PLAN NOTE
Wt Readings from Last 3 Encounters:   07/31/23 59.9 kg (132 lb 0.9 oz)   07/26/23 60.1 kg (132 lb 9.6 oz)   07/16/23 58.7 kg (129 lb 6.6 oz)     · Markedly elevated BNP, dyspnea laying down, CT chest c/f effusions and pulm edema    Plan:    · Cardio consulted and recommendations appreciated  · Continue BB and Entresto. · Increased IV Bumex 2 mg three times daily. · Pacemaker interrogation normal as per cardiology.   · Patient plan for cardiac cath tomorrow to rule out CAD as a contributor

## 2023-07-31 NOTE — PROGRESS NOTES
[address] [facility]  Progress Note  Name: Chiquis Roberto  MRN: [id number]  Unit/Bed#: S -07 I Date of Admission: 7/29/2023   Date of Service: 7/31/2023 I Hospital Day: 2    Assessment/Plan   Acute respiratory failure with hypoxia ([address])  Assessment & Plan  · 88 on RA on admission. · Afebrile and recently completed 10 days abx. · 6 L mid flow overnight. · 90% on 5 L of oxygen via nasal cannula this morning. · 2/2 CHF  · Wean as tolerated  · 7/31/2023 patient satting in 90s without O2 supplementation      * Acute on chronic diastolic heart failure (HCC)  Assessment & Plan  Wt Readings from Last 3 Encounters:   07/31/23 59.9 kg (132 lb 0.9 oz)   07/26/23 60.1 kg (132 lb 9.6 oz)   07/16/23 58.7 kg (129 lb 6.6 oz)     · Markedly elevated BNP, dyspnea laying down, CT chest c/f effusions and pulm edema    Plan:    · Cardio consulted and recommendations appreciated  · Continue BB and Entresto. · Increased IV Bumex 2 mg three times daily. · Pacemaker interrogation normal as per cardiology. · Patient plan for cardiac cath tomorrow to rule out CAD as a contributor       Hypothyroidism  Assessment & Plan  · Free T4 normal    Plan    · Follow-up TSH   · Continue levothyroxine 100 mg daily. ILD (interstitial lung disease) ([address])  Assessment & Plan  · Managed by PCP    CKD (chronic kidney disease) stage 3, GFR 30-59 ml/min [facility])  Assessment & Plan  Lab Results   Component Value Date    EGFR 52 07/31/2023    EGFR 44 07/30/2023    EGFR 53 07/29/2023    CREATININE 1.06 07/31/2023    CREATININE 1.20 07/30/2023    CREATININE 1.04 07/29/2023   Estimated Creatinine Clearance: 37.4 mL/min (by C-G formula based on SCr of 1.06 mg/dL). · Cr at b/l 0.8-1  · Watch w/ diuresis               VTE Pharmacologic Prophylaxis: VTE Score: 5 High Risk (Score >/= 5) - Pharmacological DVT Prophylaxis Ordered: enoxaparin (Lovenox). Sequential Compression Devices Ordered.     Patient Centered Rounds: I evaluated the patient without nursing staff present due to Nurse being unavailable  Discussions with Specialists or Other Care Team Provider: Discussed with attending physician, Senior resident, and nurse. Reached out to by cardiology    Education and Discussions with Family / Patient: Attempted to update  (daughter) via phone. Unable to contact. Current Length of Stay: 2 day(s)  Current Patient Status: Inpatient   Discharge Plan: Anticipate discharge in 24-48 hrs to home. Code Status: Level 1 - Full Code    Subjective:   Examined patient at bedside. She stated that she feels much better than when she came in, and had a good nights rest.  Patient had no acute complaints. Patient did not have supplemental O2 on, and was satting well. She stated that she did not need it anymore. Patient denied any chest pain, palpitations, shortness of breath, fever, lightheadedness, nausea. Of note nurse held morning dose of Bumex due to low blood pressure. Objective:     Vitals:   Temp (24hrs), Av.8 °F (36.6 °C), Min:97.5 °F (36.4 °C), Max:97.9 °F (36.6 °C)    Temp:  [97.5 °F (36.4 °C)-97.9 °F (36.6 °C)] 97.9 °F (36.6 °C)  HR:  [72-89] 89  Resp:  [18] 18  BP: (107-126)/(62-70) 126/68  SpO2:  [92 %-98 %] 94 %  Body mass index is 24.15 kg/m². Input and Output Summary (last 24 hours): Intake/Output Summary (Last 24 hours) at 2023 1838  Last data filed at 2023 1300  Gross per 24 hour   Intake 60 ml   Output 1700 ml   Net -1640 ml       Physical Exam:   Physical Exam  Vitals and nursing note reviewed. Constitutional:       General: She is not in acute distress. Appearance: She is well-developed. HENT:      Head: Normocephalic and atraumatic. Eyes:      Conjunctiva/sclera: Conjunctivae normal.   Cardiovascular:      Rate and Rhythm: Normal rate and regular rhythm. Heart sounds: No murmur heard. Pulmonary:      Effort: Pulmonary effort is normal. No respiratory distress.       Breath sounds: No stridor. Rales (Mild bilateral) present. No wheezing or rhonchi. Abdominal:      Palpations: Abdomen is soft. Tenderness: There is no abdominal tenderness. Musculoskeletal:         General: No swelling. Skin:     General: Skin is warm and dry. Capillary Refill: Capillary refill takes less than 2 seconds. Neurological:      Mental Status: She is alert. Psychiatric:         Mood and Affect: Mood normal.         Additional Data:     Labs:  Results from last 7 days   Lab Units 23  0448 23  0502 23  0659   WBC Thousand/uL 5.88   < > 10.28*   HEMOGLOBIN g/dL 10.8*   < > 12.1   HEMATOCRIT % 35.0   < > 37.9   PLATELETS Thousands/uL 114*   < > 185   NEUTROS PCT %  --   --  75   LYMPHS PCT %  --   --  19   MONOS PCT %  --   --  6   EOS PCT %  --   --  0    < > = values in this interval not displayed.      Results from last 7 days   Lab Units 23  0448 23  0502   SODIUM mmol/L 141 139   POTASSIUM mmol/L 4.0 4.8   CHLORIDE mmol/L 98 101   CO2 mmol/L 35* 28   BUN mg/dL 24 18   CREATININE mg/dL 1.06 1.20   ANION GAP mmol/L 8 10   CALCIUM mg/dL 8.5 9.0   ALBUMIN g/dL  --  3.4*   TOTAL BILIRUBIN mg/dL  --  1.05*   ALK PHOS U/L  --  102   ALT U/L  --  36   AST U/L  --  51*   GLUCOSE RANDOM mg/dL 98 128     Results from last 7 days   Lab Units 23  0659   INR  1.03             Results from last 7 days   Lab Units 23  1753 23  0659   LACTIC ACID mmol/L 1.8 1.9   PROCALCITONIN ng/ml  --  <0.05       Lines/Drains:  Invasive Devices     Peripheral Intravenous Line  Duration           Peripheral IV 23 Left;Ventral (anterior) Forearm 2 days    Peripheral IV 23 Right Antecubital 2 days                  Telemetry:  Telemetry Orders (From admission, onward)             24 Hour Telemetry Monitoring  Continuous x 24 Hours (Telem)        Comments: Elevated troponin      Question:  Reason for 24 Hour Telemetry  Answer:  PCI/EP study (including pacer and ICD implementation), Cardiac surgery, MI, abnormal cardiac cath, and chest pain- rule out MI  Comment:  elevated troponin                 Telemetry Reviewed: Normal Sinus Rhythm  Indication for Continued Telemetry Use: Acute CHF on >200 mg lasix/day or equivalent dose or with new reduced EF. Imaging: Reviewed radiology reports from this admission including: chest xray    Recent Cultures (last 7 days):   Results from last 7 days   Lab Units 07/29/23  0659   BLOOD CULTURE  No Growth at 48 hrs. No Growth at 48 hrs. Last 24 Hours Medication List:   Current Facility-Administered Medications   Medication Dose Route Frequency Provider Last Rate   • acetaminophen  650 mg Oral Q6H PRN Aletta [place], DO     • albuterol  2.5 mg Nebulization Q4H PRN Aletta [place], DO     • ALPRAZolam  0.25 mg Oral Daily PRN BAO Mathew     • benzonatate  100 mg Oral TID PRN Aletta [place], DO     • enoxaparin  40 mg Subcutaneous Daily Aletta [place], DO     • levothyroxine  100 mcg Oral Early Morning Aletta [place], DO     • melatonin  3 mg Oral HS Aletta [place], DO     • metoprolol succinate  25 mg Oral Q12H [facility] Aletta [place], DO     • sacubitril-valsartan  1 tablet Oral BID Aletta [place], DO          Today, Patient Was Seen By: Yan Haas MD    **Please Note: This note may have been constructed using a voice recognition system. **

## 2023-07-31 NOTE — PROGRESS NOTES
Progress Note - Cardiology Team 1  Bettyann Cogan [facility] 67 y.o. female MRN: [id number]  Unit/Bed#: S -01 Encounter: [id number]        Principal Problem:    Acute on chronic diastolic heart failure (HCC)  Active Problems:    Hypothyroidism    CKD (chronic kidney disease) stage 3, GFR 30-59 ml/min (HCC)    ILD (interstitial lung disease) (HCC)    Acute respiratory failure with hypoxia (HCC)      Assessment/Plan    1. Acute HFrEF  Reduced EF with wall motion abnormalities suggestive of Takotsubo syndrome-no inciting event  BNP >4,700  Diuretics- escalated yesterday- IV Bumex 2 mg 3 times daily- will continue today  Renal function stable   PTA - lasix 20mg oral daily  24 hr net negative 2.5 L . Symptomatically improved. Weaned to r/a  BB-Toprol 25 mg every 12 hours   Entresto 24/26 mg twice daily   Possible LHC pending PPM interrogation results    2. History of tachycardic mediated cardiomyopathy  With improved LVEF but current TTE LVEF reduced- 30% with wall motion abnormalities- possible Takotsubo   TTE 2/2023- LVEF 55%    3. PAT/AVNRT status post ablation x2  PPM interrogation pending    4. Sinus node dysfunction status post PPM    5. ILD- not oxygen dependent     6. Type II MI in the setting of heart failure and hypoxia  Minimal trop elevation- trended to 156    7. Moderate AI/mild to moderate MR    8. Moderate TR/ mild pulm HTN        Subjective/Objective   Chief Complaint/Subjective  No events overnight. Patient weaned to room air.   No dyspnea at rest.  Chest pain or pressure      Vitals: /65 (BP Location: Right arm)   Pulse 72   Temp 97.5 °F (36.4 °C) (Oral)   Resp 18   Ht 5' 2" (1.575 m)   Wt 59.9 kg (132 lb 0.9 oz)   SpO2 98%   BMI 24.15 kg/m²     Vitals:    07/31/23 0600 07/31/23 0605   Weight: 59.9 kg (132 lb 0.9 oz) 59.9 kg (132 lb 0.9 oz)     Orthostatic Blood Pressures    Flowsheet Row Most Recent Value   Blood Pressure 108/65 filed at 07/31/2023 0708   Patient Position - Orthostatic VS Lying filed at 07/31/2023 0708            Intake/Output Summary (Last 24 hours) at 7/31/2023 4209  Last data filed at 7/31/2023 0900  Gross per 24 hour   Intake 300 ml   Output 2800 ml   Net -2500 ml       Invasive Devices     Peripheral Intravenous Line  Duration           Peripheral IV 07/29/23 Left;Ventral (anterior) Forearm 2 days    Peripheral IV 07/29/23 Right Antecubital 2 days                Current Facility-Administered Medications   Medication Dose Route Frequency   • acetaminophen (TYLENOL) tablet 650 mg  650 mg Oral Q6H PRN   • albuterol inhalation solution 2.5 mg  2.5 mg Nebulization Q4H PRN   • ALPRAZolam (XANAX) tablet 0.25 mg  0.25 mg Oral Daily PRN   • benzonatate (TESSALON PERLES) capsule 100 mg  100 mg Oral TID PRN   • bumetanide (BUMEX) injection 2 mg  2 mg Intravenous TID (diuretic)   • enoxaparin (LOVENOX) subcutaneous injection 40 mg  40 mg Subcutaneous Daily   • levothyroxine tablet 100 mcg  100 mcg Oral Early Morning   • melatonin tablet 3 mg  3 mg Oral HS   • metoprolol succinate (TOPROL-XL) 24 hr tablet 25 mg  25 mg Oral Q12H [place]   • sacubitril-valsartan (ENTRESTO) 24-26 MG per tablet 1 tablet  1 tablet Oral BID         Physical Exam: /65 (BP Location: Right arm)   Pulse 72   Temp 97.5 °F (36.4 °C) (Oral)   Resp 18   Ht 5' 2" (1.575 m)   Wt 59.9 kg (132 lb 0.9 oz)   SpO2 98%   BMI 24.15 kg/m²     General Appearance:    Alert, cooperative, no distress, appears stated age   Head:    Normocephalic, no scleral icterus   Eyes:    PERRL   Nose:   Nares normal, septum midline, no drainage    Throat:   Lips, mucosa, and tongue normal   Neck:   Supple, symmetrical, trachea midline,      Elevated JVP   Back:     Symmetric, no CVA tenderness   Lungs:     Clear to auscultation bilaterally, respirations unlabored   Chest Wall:    No tenderness or deformity    Heart:    Regular rate and rhythm, S1 and S2 normal   Abdomen:     Soft, non-tender, bowel sounds active all four quadrants, no masses, no organomegaly   Extremities:   Extremities normal, atraumatic, no cyanosis or edema   Pulses:   2+ and symmetric all extremities   Skin:   Skin color, texture, turgor normal, no rashes or lesions   Neurologic:   Alert and oriented to person place and time, no focal deficits                 Lab Results:   Recent Results (from the past 72 hour(s))   ECG 12 lead    Collection Time: 07/29/23  6:47 AM   Result Value Ref Range    Ventricular Rate 115 BPM    Atrial Rate 115 BPM    IA Interval 188 ms    QRSD Interval 154 ms    QT Interval 306 ms    QTC Interval 423 ms    P Axis  degrees    QRS Axis -47 degrees    T Wave Axis 214 degrees   CBC and differential    Collection Time: 07/29/23  6:59 AM   Result Value Ref Range    WBC 10.28 (H) 4.31 - 10.16 Thousand/uL    RBC 4.13 3.81 - 5.12 Million/uL    Hemoglobin 12.1 11.5 - 15.4 g/dL    Hematocrit 37.9 34.8 - 46.1 %    MCV 92 82 - 98 fL    MCH 29.3 26.8 - 34.3 pg    MCHC 31.9 31.4 - 37.4 g/dL    RDW 15.3 (H) 11.6 - 15.1 %    MPV 11.4 8.9 - 12.7 fL    Platelets 803 702 - 885 Thousands/uL    nRBC 0 /100 WBCs    Neutrophils Relative 75 43 - 75 %    Immat GRANS % 0 0 - 2 %    Lymphocytes Relative 19 14 - 44 %    Monocytes Relative 6 4 - 12 %    Eosinophils Relative 0 0 - 6 %    Basophils Relative 0 0 - 1 %    Neutrophils Absolute 7.68 (H) 1.85 - 7.62 Thousands/µL    Immature Grans Absolute 0.04 0.00 - 0.20 Thousand/uL    Lymphocytes Absolute 1.91 0.60 - 4.47 Thousands/µL    Monocytes Absolute 0.59 0.17 - 1.22 Thousand/µL    Eosinophils Absolute 0.02 0.00 - 0.61 Thousand/µL    Basophils Absolute 0.04 0.00 - 0.10 Thousands/µL   Protime-INR    Collection Time: 07/29/23  6:59 AM   Result Value Ref Range    Protime 13.8 11.6 - 14.5 seconds    INR 1.03 0.84 - 1.19   APTT    Collection Time: 07/29/23  6:59 AM   Result Value Ref Range    PTT 29 23 - 37 seconds   Comprehensive metabolic panel    Collection Time: 07/29/23  6:59 AM   Result Value Ref Range    Sodium 140 135 - 147 mmol/L    Potassium 3.6 3.5 - 5.3 mmol/L    Chloride 101 96 - 108 mmol/L    CO2 28 21 - 32 mmol/L    ANION GAP 11 mmol/L    BUN 12 5 - 25 mg/dL    Creatinine 0.87 0.60 - 1.30 mg/dL    Glucose 173 (H) 65 - 140 mg/dL    Calcium 9.0 8.4 - 10.2 mg/dL    AST 64 (H) 13 - 39 U/L    ALT 33 7 - 52 U/L    Alkaline Phosphatase 96 34 - 104 U/L    Total Protein 6.9 6.4 - 8.4 g/dL    Albumin 3.8 3.5 - 5.0 g/dL    Total Bilirubin 0.70 0.20 - 1.00 mg/dL    eGFR 66 ml/min/1.73sq m   FLU/RSV/COVID - if FLU/RSV clinically relevant    Collection Time: 07/29/23  6:59 AM    Specimen: Nose; Nares   Result Value Ref Range    SARS-CoV-2 Negative Negative    INFLUENZA A PCR Negative Negative    INFLUENZA B PCR Negative Negative    RSV PCR Negative Negative   Blood culture #1    Collection Time: 07/29/23  6:59 AM    Specimen: Arm, Right; Blood   Result Value Ref Range    Blood Culture No Growth at 24 hrs. Blood culture #2    Collection Time: 07/29/23  6:59 AM    Specimen: Arm, Left; Blood   Result Value Ref Range    Blood Culture No Growth at 24 hrs.     Lactic acid, plasma (w/reflex if result > 2.0)    Collection Time: 07/29/23  6:59 AM   Result Value Ref Range    LACTIC ACID 1.9 0.5 - 2.0 mmol/L   D-Dimer    Collection Time: 07/29/23  6:59 AM   Result Value Ref Range    D-Dimer, Quant 1.01 (H) <0.50 ug/ml FEU   HS Troponin 0hr (reflex protocol)    Collection Time: 07/29/23  6:59 AM   Result Value Ref Range    hs TnI 0hr 40 "Refer to ACS Flowchart"- see link ng/L   B-Type Natriuretic Peptide(BNP)    Collection Time: 07/29/23  6:59 AM   Result Value Ref Range    BNP >4,700 (H) 0 - 100 pg/mL   Procalcitonin    Collection Time: 07/29/23  6:59 AM   Result Value Ref Range    Procalcitonin <0.05 <=0.25 ng/ml   TSH, 3rd generation with Free T4 reflex    Collection Time: 07/29/23  6:59 AM   Result Value Ref Range    TSH 3RD GENERATON 9.167 (H) 0.450 - 4.500 uIU/mL   T4, free    Collection Time: 07/29/23  6:59 AM   Result Value Ref Range Free T4 1.07 0.61 - 1.12 ng/dL   Magnesium    Collection Time: 07/29/23  6:59 AM   Result Value Ref Range    Magnesium 1.8 (L) 1.9 - 2.7 mg/dL   Blood gas, venous    Collection Time: 07/29/23  7:17 AM   Result Value Ref Range    pH, Alfred 7.473 (H) 7.300 - 7.400    pCO2, Alfred 38.2 (L) 42.0 - 50.0 mm Hg    pO2, Alfred 53.3 (H) 35.0 - 45.0 mm Hg    HCO3, Alfred 27.4 24 - 30 mmol/L    Base Excess, Alfred 3.7 mmol/L    O2 Content, Alfred 15.3 ml/dL    O2 HGB, VENOUS 86.2 (H) 60.0 - 80.0 %    Nasal Cannula 3    HS Troponin I 2hr    Collection Time: 07/29/23  8:30 AM   Result Value Ref Range    hs TnI 2hr 88 (H) "Refer to ACS Flowchart"- see link ng/L    Delta 2hr hsTnI 48 (H) <20 ng/L   HS Troponin I 4hr    Collection Time: 07/29/23 11:07 AM   Result Value Ref Range    hs TnI 4hr 156 (H) "Refer to ACS Flowchart"- see link ng/L    Delta 4hr hsTnI 116 (H) <20 ng/L   Echo follow up/limited w/ contrast if indicated    Collection Time: 07/29/23  1:47 PM   Result Value Ref Range    AV peak gradient 88 mmHg    Triscuspid Valve Regurgitation Peak Gradient 34.0 mmHg    Tricuspid valve peak regurgitation velocity 2.92 m/s    TR Peak Sandeep 2.9 m/s    AV Deceleration Time 1,148 ms    A4C EF 27 %    Ao root 4.30 cm    AV regurgitation pressure 1/2 time 333 ms    LV EF 30     Est. RA pres 8.0 mmHg    Right Ventricular Peak Systolic Pressure 49.53 mmHg   High Sensitivity Troponin I Random    Collection Time: 07/29/23  1:55 PM   Result Value Ref Range    HS TnI random 192 (H) 8 - 18 ng/L   Lactic acid, plasma (w/reflex if result > 2.0)    Collection Time: 07/29/23  5:53 PM   Result Value Ref Range    LACTIC ACID 1.8 0.5 - 2.0 mmol/L   Comprehensive metabolic panel    Collection Time: 07/29/23  5:53 PM   Result Value Ref Range    Sodium 137 135 - 147 mmol/L    Potassium 4.9 3.5 - 5.3 mmol/L    Chloride 100 96 - 108 mmol/L    CO2 27 21 - 32 mmol/L    ANION GAP 10 mmol/L    BUN 15 5 - 25 mg/dL    Creatinine 1.04 0.60 - 1.30 mg/dL    Glucose 145 (H) 65 - 140 mg/dL    Calcium 8.8 8.4 - 10.2 mg/dL    AST 59 (H) 13 - 39 U/L    ALT 36 7 - 52 U/L    Alkaline Phosphatase 103 34 - 104 U/L    Total Protein 6.8 6.4 - 8.4 g/dL    Albumin 3.6 3.5 - 5.0 g/dL    Total Bilirubin 0.93 0.20 - 1.00 mg/dL    eGFR 53 ml/min/1.73sq m   Blood gas, venous    Collection Time: 07/29/23  6:24 PM   Result Value Ref Range    pH, Alfred 7.494 (H) 7.300 - 7.400    pCO2, Alfred 38.8 (L) 42.0 - 50.0 mm Hg    pO2, Alfred 183.8 (H) 35.0 - 45.0 mm Hg    HCO3, Alfred 29.2 24 - 30 mmol/L    Base Excess, Alfred 5.6 mmol/L    O2 Content, Alfred 17.9 ml/dL    O2 HGB, VENOUS 98.0 (H) 60.0 - 80.0 %   CBC (With Platelets)    Collection Time: 07/30/23  5:02 AM   Result Value Ref Range    WBC 9.69 4.31 - 10.16 Thousand/uL    RBC 3.75 (L) 3.81 - 5.12 Million/uL    Hemoglobin 11.0 (L) 11.5 - 15.4 g/dL    Hematocrit 34.3 (L) 34.8 - 46.1 %    MCV 92 82 - 98 fL    MCH 29.3 26.8 - 34.3 pg    MCHC 32.1 31.4 - 37.4 g/dL    RDW 15.7 (H) 11.6 - 15.1 %    Platelets 065 (L) 390 - 390 Thousands/uL    MPV 11.4 8.9 - 12.7 fL   Comprehensive metabolic panel    Collection Time: 07/30/23  5:02 AM   Result Value Ref Range    Sodium 139 135 - 147 mmol/L    Potassium 4.8 3.5 - 5.3 mmol/L    Chloride 101 96 - 108 mmol/L    CO2 28 21 - 32 mmol/L    ANION GAP 10 mmol/L    BUN 18 5 - 25 mg/dL    Creatinine 1.20 0.60 - 1.30 mg/dL    Glucose 128 65 - 140 mg/dL    Calcium 9.0 8.4 - 10.2 mg/dL    Corrected Calcium 9.5 8.3 - 10.1 mg/dL    AST 51 (H) 13 - 39 U/L    ALT 36 7 - 52 U/L    Alkaline Phosphatase 102 34 - 104 U/L    Total Protein 6.4 6.4 - 8.4 g/dL    Albumin 3.4 (L) 3.5 - 5.0 g/dL    Total Bilirubin 1.05 (H) 0.20 - 1.00 mg/dL    eGFR 44 ml/min/1.73sq m   Magnesium    Collection Time: 07/30/23  5:02 AM   Result Value Ref Range    Magnesium 1.7 (L) 1.9 - 2.7 mg/dL   Phosphorus    Collection Time: 07/30/23  5:02 AM   Result Value Ref Range    Phosphorus 3.7 2.3 - 4.1 mg/dL   CBC    Collection Time: 07/31/23  4:48 AM   Result Value Ref Range    WBC 5. 88 4.31 - 10.16 Thousand/uL    RBC 3.78 (L) 3.81 - 5.12 Million/uL    Hemoglobin 10.8 (L) 11.5 - 15.4 g/dL    Hematocrit 35.0 34.8 - 46.1 %    MCV 93 82 - 98 fL    MCH 28.6 26.8 - 34.3 pg    MCHC 30.9 (L) 31.4 - 37.4 g/dL    RDW 15.7 (H) 11.6 - 15.1 %    Platelets 348 (L) 351 - 390 Thousands/uL    MPV 12.2 8.9 - 12.7 fL   Basic metabolic panel    Collection Time: 07/31/23  4:48 AM   Result Value Ref Range    Sodium 141 135 - 147 mmol/L    Potassium 4.0 3.5 - 5.3 mmol/L    Chloride 98 96 - 108 mmol/L    CO2 35 (H) 21 - 32 mmol/L    ANION GAP 8 mmol/L    BUN 24 5 - 25 mg/dL    Creatinine 1.06 0.60 - 1.30 mg/dL    Glucose 98 65 - 140 mg/dL    Calcium 8.5 8.4 - 10.2 mg/dL    eGFR 52 ml/min/1.73sq m   Magnesium    Collection Time: 07/31/23  4:48 AM   Result Value Ref Range    Magnesium 2.1 1.9 - 2.7 mg/dL     Imaging: I have personally reviewed pertinent reports. Tele- nsr    Counseling / Coordination of Care  Total time spent today 20 minutes. Greater than 50% of total time was spent with the patient and / or family counseling and / or coordination of care.

## 2023-07-31 NOTE — OCCUPATIONAL THERAPY NOTE
Occupational Therapy Cancellation     Patient Name: Arianne Corea  OLEIW'W Date: 7/31/2023  Problem List  Principal Problem:    Acute on chronic diastolic heart failure (HCC)  Active Problems:    Hypothyroidism    CKD (chronic kidney disease) stage 3, GFR 30-59 ml/min (HCC)    ILD (interstitial lung disease) ([address])    Acute respiratory failure with hypoxia [facility])    Past Medical History  Past Medical History:   Diagnosis Date    Abnormal CXR 1/14/2022    Cancer ([address])     Disease of thyroid gland     Dysuria 12/16/2021    Encounter for support and coordination of transition of care 5/6/2021     Past Surgical History  Past Surgical History:   Procedure Laterality Date    CARDIAC ELECTROPHYSIOLOGY PROCEDURE N/A 12/30/2022    Procedure: Cardiac eps/aflutter ablation;  Surgeon: Boo Hill MD;  Location: [facility]; Service: Cardiology    CARDIAC ELECTROPHYSIOLOGY PROCEDURE N/A 12/30/2022    Procedure: Cardiac loop recorder implant;  Surgeon: Boo Hill MD;  [facility]; Service: Cardiology    CARDIAC ELECTROPHYSIOLOGY PROCEDURE N/A 2/6/2023    Procedure: CARDIAC EPS/SVT ABLATION;  Surgeon: Boo Hill MD;  [facility]; Service: Cardiology    CARDIAC ELECTROPHYSIOLOGY PROCEDURE N/A 2/16/2023    Procedure: Cardiac pacer implant;  Surgeon: Corin Gonzalez MD;  [facility]; Service: Cardiology    CARDIAC ELECTROPHYSIOLOGY PROCEDURE N/A 2/16/2023    Procedure: CARDIAC LOOP RECORDER EXPLANT;  Surgeon: Corin Gonzalez MD;  [facility]; Service: Cardiology    HYSTERECTOMY             07/31/23 1632   Note Type   Note type Cancelled Session   Cancel Reasons Other  (pt requested therapist return following cardiac cath and once dieuretics decreased. Pt reports hesistant to get up due to urgency to void. Reports using commode w/ RN staff assist)   Additional Comments Will continue to follow. Pt declined.  Per cardiology note, plan for cardiac cath tomorrow (08/01/23)   Pain Assessment   Pain Assessment Tool 0-10   Home Living   Type of [address] One level; Other (Comment); Performs ADLs on one level; Able to live on main level with bedroom/bathroom  (2 ERICH)   Bathroom Shower/Tub Walk-in shower  (and tub/ shower)   Bathroom Toilet Standard   Bathroom Equipment Grab bars in [address] Other (Comment)  (no AD)   Additional Comments Pt reports living alone in 1 SH   Prior Function   Level of [place] Independent with ADLs; Independent with functional mobility; Independent with IADLS   Lives With (S)  Alone   Receives Help From Family;Friend(s)   IADLs Independent with driving; Independent with meal prep; Independent with medication management   Falls in the last 6 months 0   Vocational Retired   Comments Pt reports I w/ ADLs at baseline w/ out use of AD   Lifestyle   Reciprocal Relationships Pt reports living alone.  Supportive family   Service to Others Pt reports retired    Intrinsic Gratification Pt reports enjoying reading     Danny Sherwood, OTR/L  [id number]  [id number]

## 2023-07-31 NOTE — CASE MANAGEMENT
Case Management Assessment & Discharge Planning Note    Patient name Milton Macias  Location S /S -68 MRN [id number]  : 1950 Date 2023       Current Admission Date: 2023  Current Admission Diagnosis:Acute on chronic diastolic heart failure [facility])   Patient Active Problem List    Diagnosis Date Noted   • Acute respiratory failure with hypoxia ([address]) 2023   • Splenic cyst 2023   • Lung infiltrate 2023   • Gall stones 2023   • Thoracic aortic ectasia ([address]) 2023   • AV block, postoperative 02/15/2023   • Atrial tachycardia ([address]) 2023   • Paroxysmal atrial flutter ([address]) 10/05/2022   • Elevated blood sugar 05/10/2022   • Menopause 05/10/2022   • Dilated cardiomyopathy ([address]) 2022   • Pulmonary hypertension ([address]) 2022   • ILD (interstitial lung disease) ([address]) 2022   • Hard of hearing 2022   • Allergies 2022   • CKD (chronic kidney disease) stage 3, GFR 30-59 ml/min ([facility]) 2022   • Acute on chronic diastolic heart failure ([address]) 2022   • Paroxysmal atrial fibrillation ([address]) 01/10/2022   • Dyslipidemia 10/07/2021   • Hypothyroidism 2021   • Myelodysplasia (myelodysplastic syndrome) ([address]) 2017   • Waldenstrom macroglobulinemia ([address]) 2017   • History of Hodgkin's lymphoma 2017   • Rash 2017      LOS (days): 2  Geometric Mean LOS (GMLOS) (days):   Days to GMLOS:     OBJECTIVE:    Risk of Unplanned Readmission Score: 19.48         Current admission status: Inpatient       Preferred Pharmacy:   [facility]/pharmacy #[id number]- [place] - [address]. [address] [place] [address]  Phone: [phone] Fax: [phone]    Primary Care Provider: Luan Hopson MD    Primary Insurance: [facility]  Secondary Insurance:     ASSESSMENT:  [place] Proxies    There are no active Health Care Proxies on file.                       Patient Information  Admitted from[de-identified] Home  Mental Status: Alert  During Assessment patient was accompanied by: Daughter, Other-Comment (Son-in-law and grandchildren)  Assessment information provided by[de-identified] Patient  Primary Caregiver: Self  Support Systems: Family members  [place] of Residence: [address] do you live in?: [facility] entry access options. Select all that apply.: Stairs  Number of steps to enter home.: 2  Type of Current Residence: Ranch  In the last 12 months, was there a time when you were not able to pay the mortgage or rent on time?: No  In the last 12 months, was there a time when you did not have a steady place to sleep or slept in a shelter (including now)?: No  Living Arrangements: Lives Alone    Activities of Daily Living Prior to Admission  Functional Status: Independent  Completes ADLs independently?: Yes  Ambulates independently?: Yes  Does patient use assisted devices?: No  Does patient currently own DME?: No  Does patient have a history of Outpatient Therapy (PT/OT)?: No  Does the patient have a history of Short-Term Rehab?: No  Does patient have a history of HHC?: No  Does patient currently have [address]?: No         Patient Information Continued  Within the past 12 months, you worried that your food would run out before you got the money to buy more.: Never true  Within the past 12 months, the food you bought just didn't last and you didn't have money to get more.: Never true  Food insecurity resource given?: N/A         Means of Transportation  Means of Transport to Appts[de-identified] Drives Self  In the past 12 months, has lack of transportation kept you from medical appointments or from getting medications?: No  In the past 12 months, has lack of transportation kept you from meetings, work, or from getting things needed for daily living?: No  Was application for public transport provided?: N/A        DISCHARGE DETAILS:    Discharge planning discussed with[de-identified] patient, pt's dtr, son-in-law and grandchildren at bedside.   [place] of Choice: Yes  Comments - Freedom of Choice: CM met with pt and family at bedside re: assessment and dcp. Patient lives alone in ranch home, 2 ERICH. Pt independent with ADLS, does not use nor own any dme, reported no hx of therapies, and drives self to appoointments. Confirmed PCP (Quique) and preferred pharmacy ([facility]). Pt does not currently have healthcare POA - advance directive info packet provided to patient. Family to provide patient transport home when ready for d/c. No CM d/c needs identified at this time. CM remains available. Would you like to participate in our [facility] [email or web address] service program?  : No - Declined                                     IMM reviewed with patient, patient agrees with discharge determination.   IMM Given (Date):: 07/31/23  IMM Given to[de-identified] Patient

## 2023-07-31 NOTE — ASSESSMENT & PLAN NOTE
· 88 on RA on admission. · Afebrile and recently completed 10 days abx. · 6 L mid flow overnight. · 90% on 5 L of oxygen via nasal cannula this morning.   · 2/2 CHF  · Wean as tolerated  · 7/31/2023 patient satting in 90s without O2 supplementation

## 2023-07-31 NOTE — ASSESSMENT & PLAN NOTE
Wt Readings from Last 3 Encounters:   07/31/23 59.9 kg (132 lb 0.9 oz)   07/26/23 60.1 kg (132 lb 9.6 oz)   07/16/23 58.7 kg (129 lb 6.6 oz)     · Markedly elevated BNP, dyspnea laying down, CT chest c/f effusions and pulm edema  · Pacemaker interrogated 7/31/2023 and cardiac catheterization 8/1/2023 performed by cardiology. No signs of occlusive disease noted on catheterization. Cardiology cleared patient for home. Plan:    · Cardio consulted and recommendations appreciated  · Continue BB and Entresto.   · Patient not started on Demadex 20 mg post catheterization, holding Bumex

## 2023-07-31 NOTE — ASSESSMENT & PLAN NOTE
Lab Results   Component Value Date    EGFR 52 07/31/2023    EGFR 44 07/30/2023    EGFR 53 07/29/2023    CREATININE 1.06 07/31/2023    CREATININE 1.20 07/30/2023    CREATININE 1.04 07/29/2023   Estimated Creatinine Clearance: 37.4 mL/min (by C-G formula based on SCr of 1.06 mg/dL).   · Cr at b/l 0.8-1  · Watch w/ diuresis

## 2023-07-31 NOTE — PLAN OF CARE
Problem: Potential for Falls  Goal: Patient will remain free of falls  Description: INTERVENTIONS:  - Educate patient/family on patient safety including physical limitations  - Instruct patient to call for assistance with activity   - Consult OT/PT to assist with strengthening/mobility   - Keep Call bell within reach  - Keep bed low and locked with side rails adjusted as appropriate  - Keep care items and personal belongings within reach  - Initiate and maintain comfort rounds  - Make Fall Risk Sign visible to staff  - Offer Toileting every 2 Hours, in advance of need  - Initiate/Maintain bed/chair alarm  - Obtain necessary fall risk management equipment  - Apply yellow socks and bracelet for high fall risk patients  - Consider moving patient to room near nurses station  Outcome: Progressing     Problem: DISCHARGE PLANNING  Goal: Discharge to home or other facility with appropriate resources  Description: INTERVENTIONS:  - Identify barriers to discharge w/patient and caregiver  - Arrange for needed discharge resources and transportation as appropriate  - Identify discharge learning needs (meds, wound care, etc.)  - Arrange for interpretive services to assist at discharge as needed  - Refer to Case Management Department for coordinating discharge planning if the patient needs post-hospital services based on physician/advanced practitioner order or complex needs related to functional status, cognitive ability, or social support system  Outcome: Progressing     Problem: Knowledge Deficit  Goal: Patient/family/caregiver demonstrates understanding of disease process, treatment plan, medications, and discharge instructions  Description: Complete learning assessment and assess knowledge base.   Interventions:  - Provide teaching at level of understanding  - Provide teaching via preferred learning methods  Outcome: Progressing     Problem: CARDIOVASCULAR - ADULT  Goal: Maintains optimal cardiac output and hemodynamic stability  Description: INTERVENTIONS:  - Monitor I/O, vital signs and rhythm  - Monitor for S/S and trends of decreased cardiac output  - Administer and titrate ordered vasoactive medications to optimize hemodynamic stability  - Assess quality of pulses, skin color and temperature  - Assess for signs of decreased coronary artery perfusion  - Instruct patient to report change in severity of symptoms  Outcome: Progressing  Goal: Absence of cardiac dysrhythmias or at baseline rhythm  Description: INTERVENTIONS:  - Continuous cardiac monitoring, vital signs, obtain 12 lead EKG if ordered  - Administer antiarrhythmic and heart rate control medications as ordered  - Monitor electrolytes and administer replacement therapy as ordered  Outcome: Progressing     Problem: RESPIRATORY - ADULT  Goal: Achieves optimal ventilation and oxygenation  Description: INTERVENTIONS:  - Assess for changes in respiratory status  - Assess for changes in mentation and behavior  - Position to facilitate oxygenation and minimize respiratory effort  - Oxygen administered by appropriate delivery if ordered  - Initiate smoking cessation education as indicated  - Encourage broncho-pulmonary hygiene including cough, deep breathe, Incentive Spirometry  - Assess the need for suctioning and aspirate as needed  - Assess and instruct to report SOB or any respiratory difficulty  - Respiratory Therapy support as indicated  Outcome: Progressing     Problem: GENITOURINARY - ADULT  Goal: Maintains or returns to baseline urinary function  Description: INTERVENTIONS:  - Assess urinary function  - Encourage oral fluids to ensure adequate hydration if ordered  - Administer IV fluids as ordered to ensure adequate hydration  - Administer ordered medications as needed  - Offer frequent toileting  - Follow urinary retention protocol if ordered  Outcome: Progressing  Goal: Absence of urinary retention  Description: INTERVENTIONS:  - Assess patient’s ability to void and empty bladder  - Monitor I/O  - Bladder scan as needed  - Discuss with physician/AP medications to alleviate retention as needed  - Discuss catheterization for long term situations as appropriate  Outcome: Progressing  Goal: Urinary catheter remains patent  Description: INTERVENTIONS:  - Assess patency of urinary catheter  - If patient has a chronic nunez, consider changing catheter if non-functioning  - Follow guidelines for intermittent irrigation of non-functioning urinary catheter  Outcome: Progressing

## 2023-08-01 PROBLEM — I25.10 CORONARY ARTERY DISEASE INVOLVING NATIVE CORONARY ARTERY: Status: ACTIVE | Noted: 2023-08-01

## 2023-08-01 LAB
ANION GAP SERPL CALCULATED.3IONS-SCNC: 9 MMOL/L
BUN SERPL-MCNC: 30 MG/DL (ref 5–25)
CALCIUM SERPL-MCNC: 8.7 MG/DL (ref 8.4–10.2)
CHLORIDE SERPL-SCNC: 98 MMOL/L (ref 96–108)
CO2 SERPL-SCNC: 34 MMOL/L (ref 21–32)
CREAT SERPL-MCNC: 0.94 MG/DL (ref 0.6–1.3)
ERYTHROCYTE [DISTWIDTH] IN BLOOD BY AUTOMATED COUNT: 15.2 % (ref 11.6–15.1)
GFR SERPL CREATININE-BSD FRML MDRD: 60 ML/MIN/1.73SQ M
GLUCOSE SERPL-MCNC: 104 MG/DL (ref 65–140)
HCT VFR BLD AUTO: 40.6 % (ref 34.8–46.1)
HGB BLD-MCNC: 12.4 G/DL (ref 11.5–15.4)
MCH RBC QN AUTO: 28.4 PG (ref 26.8–34.3)
MCHC RBC AUTO-ENTMCNC: 30.5 G/DL (ref 31.4–37.4)
MCV RBC AUTO: 93 FL (ref 82–98)
PLATELET # BLD AUTO: 132 THOUSANDS/UL (ref 149–390)
PMV BLD AUTO: 11.6 FL (ref 8.9–12.7)
POTASSIUM SERPL-SCNC: 3.8 MMOL/L (ref 3.5–5.3)
RBC # BLD AUTO: 4.36 MILLION/UL (ref 3.81–5.12)
SODIUM SERPL-SCNC: 141 MMOL/L (ref 135–147)
WBC # BLD AUTO: 4.02 THOUSAND/UL (ref 4.31–10.16)

## 2023-08-01 PROCEDURE — 99153 MOD SED SAME PHYS/QHP EA: CPT | Performed by: INTERNAL MEDICINE

## 2023-08-01 PROCEDURE — 93458 L HRT ARTERY/VENTRICLE ANGIO: CPT | Performed by: INTERNAL MEDICINE

## 2023-08-01 PROCEDURE — C1769 GUIDE WIRE: HCPCS | Performed by: INTERNAL MEDICINE

## 2023-08-01 PROCEDURE — 4A023N7 MEASUREMENT OF CARDIAC SAMPLING AND PRESSURE, LEFT HEART, PERCUTANEOUS APPROACH: ICD-10-PCS | Performed by: INTERNAL MEDICINE

## 2023-08-01 PROCEDURE — B2111ZZ FLUOROSCOPY OF MULTIPLE CORONARY ARTERIES USING LOW OSMOLAR CONTRAST: ICD-10-PCS | Performed by: INTERNAL MEDICINE

## 2023-08-01 PROCEDURE — 99232 SBSQ HOSP IP/OBS MODERATE 35: CPT | Performed by: INTERNAL MEDICINE

## 2023-08-01 PROCEDURE — 99152 MOD SED SAME PHYS/QHP 5/>YRS: CPT | Performed by: INTERNAL MEDICINE

## 2023-08-01 PROCEDURE — 85027 COMPLETE CBC AUTOMATED: CPT

## 2023-08-01 PROCEDURE — 99232 SBSQ HOSP IP/OBS MODERATE 35: CPT | Performed by: HOSPITALIST

## 2023-08-01 PROCEDURE — 97166 OT EVAL MOD COMPLEX 45 MIN: CPT

## 2023-08-01 PROCEDURE — 80048 BASIC METABOLIC PNL TOTAL CA: CPT

## 2023-08-01 PROCEDURE — C1894 INTRO/SHEATH, NON-LASER: HCPCS | Performed by: INTERNAL MEDICINE

## 2023-08-01 PROCEDURE — B2151ZZ FLUOROSCOPY OF LEFT HEART USING LOW OSMOLAR CONTRAST: ICD-10-PCS | Performed by: INTERNAL MEDICINE

## 2023-08-01 RX ORDER — SODIUM CHLORIDE 9 MG/ML
50 INJECTION, SOLUTION INTRAVENOUS CONTINUOUS
Status: DISPENSED | OUTPATIENT
Start: 2023-08-01 | End: 2023-08-01

## 2023-08-01 RX ORDER — ASPIRIN 81 MG/1
324 TABLET, CHEWABLE ORAL ONCE
Status: COMPLETED | OUTPATIENT
Start: 2023-08-01 | End: 2023-08-01

## 2023-08-01 RX ORDER — TORSEMIDE 20 MG/1
20 TABLET ORAL DAILY
Status: DISCONTINUED | OUTPATIENT
Start: 2023-08-01 | End: 2023-08-01

## 2023-08-01 RX ORDER — ATORVASTATIN CALCIUM 20 MG/1
20 TABLET, FILM COATED ORAL
Status: DISCONTINUED | OUTPATIENT
Start: 2023-08-01 | End: 2023-08-02 | Stop reason: HOSPADM

## 2023-08-01 RX ORDER — LIDOCAINE HYDROCHLORIDE 10 MG/ML
INJECTION, SOLUTION EPIDURAL; INFILTRATION; INTRACAUDAL; PERINEURAL CODE/TRAUMA/SEDATION MEDICATION
Status: DISCONTINUED | OUTPATIENT
Start: 2023-08-01 | End: 2023-08-01 | Stop reason: HOSPADM

## 2023-08-01 RX ORDER — NITROGLYCERIN 20 MG/100ML
INJECTION INTRAVENOUS CODE/TRAUMA/SEDATION MEDICATION
Status: DISCONTINUED | OUTPATIENT
Start: 2023-08-01 | End: 2023-08-01 | Stop reason: HOSPADM

## 2023-08-01 RX ORDER — SODIUM CHLORIDE 9 MG/ML
50 INJECTION, SOLUTION INTRAVENOUS CONTINUOUS
Status: DISCONTINUED | OUTPATIENT
Start: 2023-08-01 | End: 2023-08-01

## 2023-08-01 RX ORDER — ASPIRIN 325 MG
TABLET ORAL
Status: DISPENSED
Start: 2023-08-01 | End: 2023-08-01

## 2023-08-01 RX ORDER — HEPARIN SODIUM 1000 [USP'U]/ML
INJECTION, SOLUTION INTRAVENOUS; SUBCUTANEOUS CODE/TRAUMA/SEDATION MEDICATION
Status: DISCONTINUED | OUTPATIENT
Start: 2023-08-01 | End: 2023-08-01 | Stop reason: HOSPADM

## 2023-08-01 RX ORDER — VERAPAMIL HYDROCHLORIDE 2.5 MG/ML
INJECTION, SOLUTION INTRAVENOUS CODE/TRAUMA/SEDATION MEDICATION
Status: DISCONTINUED | OUTPATIENT
Start: 2023-08-01 | End: 2023-08-01 | Stop reason: HOSPADM

## 2023-08-01 RX ORDER — MIDAZOLAM HYDROCHLORIDE 2 MG/2ML
INJECTION, SOLUTION INTRAMUSCULAR; INTRAVENOUS CODE/TRAUMA/SEDATION MEDICATION
Status: DISCONTINUED | OUTPATIENT
Start: 2023-08-01 | End: 2023-08-01 | Stop reason: HOSPADM

## 2023-08-01 RX ORDER — FENTANYL CITRATE 50 UG/ML
INJECTION, SOLUTION INTRAMUSCULAR; INTRAVENOUS CODE/TRAUMA/SEDATION MEDICATION
Status: DISCONTINUED | OUTPATIENT
Start: 2023-08-01 | End: 2023-08-01 | Stop reason: HOSPADM

## 2023-08-01 RX ADMIN — ATORVASTATIN CALCIUM 20 MG: 20 TABLET, FILM COATED ORAL at 17:13

## 2023-08-01 RX ADMIN — SODIUM CHLORIDE 50 ML/HR: 0.9 INJECTION, SOLUTION INTRAVENOUS at 07:49

## 2023-08-01 RX ADMIN — SODIUM CHLORIDE 50 ML/HR: 0.9 INJECTION, SOLUTION INTRAVENOUS at 10:49

## 2023-08-01 RX ADMIN — METOPROLOL SUCCINATE 25 MG: 25 TABLET, EXTENDED RELEASE ORAL at 08:03

## 2023-08-01 RX ADMIN — ASPIRIN 81 MG 324 MG: 81 TABLET ORAL at 08:55

## 2023-08-01 RX ADMIN — SACUBITRIL AND VALSARTAN 1 TABLET: 24; 26 TABLET, FILM COATED ORAL at 07:53

## 2023-08-01 RX ADMIN — Medication 3 MG: at 21:53

## 2023-08-01 NOTE — DISCHARGE INSTRUCTIONS
Dear Maryan Paz,     It was our pleasure to care for you here at [facility]. It is our hope that we were always able to exceed the expected standards for your care during your stay. You were hospitalized due to exacerbation of your heart failure. You were cared for on the third floor by Jimy Arreaga MD under the service of Armida Riddle MD with the [facility] who covers for your primary care physician (PCP), Vinay Story MD, while you were hospitalized. If you have any questions or concerns related to this hospitalization, you may contact us at [phone]. For follow up as well as any medication refills, we recommend that you follow up with your primary care physician. A registered nurse will reach out to you by phone within a few days after your discharge to answer any additional questions that you may have after going home. However, at this time we provide for you here, the most important instructions / recommendations at discharge:     Notable Medication Adjustments -   Stop taking Lasix (furosemide)   Start taking (torsemide) 20mg once a day  Start taking Atorvastatin 20mg with dinner  Testing Required after Discharge -   None  ** Please contact your PCP to request testing orders for any of the testing recommended here **  Important follow up information -   Follow-up with your primary care physician in a week  Please follow-up with Dr. Fatoumata Arredondo next week  Other Instructions -   Stay compliant with regular medications and appointments  Stay compliant with the Cardiac Diet  Please review this entire after visit summary as additional general instructions including medication list, appointments, activity, diet, any pertinent wound care, and other additional recommendations from your care team that may be provided for you.       Sincerely,     Jimy Arreaga MD

## 2023-08-01 NOTE — ASSESSMENT & PLAN NOTE
Wt Readings from Last 3 Encounters:   08/01/23 59.7 kg (131 lb 9.8 oz)   07/26/23 60.1 kg (132 lb 9.6 oz)   07/16/23 58.7 kg (129 lb 6.6 oz)     · Markedly elevated BNP, dyspnea laying down, CT chest c/f effusions and pulm edema  · Pacemaker interrogated 7/31/2023 and cardiac catheterization 8/1/2023 performed by cardiology. No signs of occlusive disease noted on catheterization. Cardiology cleared patient for home. Plan:    · Continue home doses Toprol XL and Entresto.   · Patient started on patient started on torsemide 20 mg once daily

## 2023-08-01 NOTE — ASSESSMENT & PLAN NOTE
Coronary cath done 8/1/2023, negative for obstructive disease as cause of worsening ejection fraction.   Patient ASCVD calculated at 9%, will continue on atorvastatin 20 mg    Plan:    Continue home medications as above  Start taking atorvastatin 20 mg at home

## 2023-08-01 NOTE — PROGRESS NOTES
Progress Note - Cardiology Team 1  Javier Livingston [facility] 67 y.o. female MRN: [id number]  Unit/Bed#: AN CATH LAB ROOM Encounter: [id number]        Principal Problem:    Acute on chronic diastolic heart failure (HCC)  Active Problems:    Hypothyroidism    CKD (chronic kidney disease) stage 3, GFR 30-59 ml/min (HCC)    ILD (interstitial lung disease) ([address])    Acute respiratory failure with hypoxia (HCC)    Coronary artery disease involving native coronary artery         Assessment/Plan     1. Acute HFrEF  Reduced EF with wall motion abnormalities suggestive of Takotsubo syndrome-no inciting event  BNP >4,700  Diuretics- escalated 7/30 bumex 2mg TID, yesterday 2mg BID  Renal function stable   PTA - lasix 20mg oral daily  Incomplete I.O. Symptomatically improved. Weaned to r/a  BB-Toprol 25 mg every 12 hours   Entresto 24/26 mg twice daily   LHC pending  Post cath will transition to demadex 20mg oral daily  Tele- NSR PVC's     2. History of tachycardic mediated cardiomyopathy  With improved LVEF but current TTE LVEF reduced- 30% with wall motion abnormalities- possible Takotsubo   TTE 2/2023- LVEF 55%     3. PAT/AVNRT status post ablation x2  PPM interrogation - reportedly no significant events     4. Sinus node dysfunction status post PPM     5. ILD- not oxygen dependent      6. Type II MI in the setting of heart failure and hypoxia  Minimal trop elevation- trended to 156     7. Moderate AI/mild to moderate MR     8. Moderate TR/ mild pulm HTN        Subjective/Objective   Chief Complaint/subjective  No events overnight  Anxious  Not sob, no cp  Seen early this am, family at bedside.  Await [facility]        Vitals: /67   Pulse 80   Temp 98 °F (36.7 °C)   Resp 18   Ht 5' 2" (1.575 m)   Wt 59.7 kg (131 lb 9.8 oz)   SpO2 97%   BMI 24.07 kg/m²     Vitals:    07/31/23 0605 08/01/23 0542   Weight: 59.9 kg (132 lb 0.9 oz) 59.7 kg (131 lb 9.8 oz)     Orthostatic Blood Pressures    Flowsheet Row Most Recent Value Blood Pressure 115/67 filed at 08/01/2023 0803   Patient Position - Orthostatic VS Lying filed at 07/31/2023 8994            Intake/Output Summary (Last 24 hours) at 8/1/2023 6661  Last data filed at 8/1/2023 0908  Gross per 24 hour   Intake 0 ml   Output 1325 ml   Net -1325 ml       Invasive Devices     Peripheral Intravenous Line  Duration           Peripheral IV 07/29/23 Left;Ventral (anterior) Forearm 3 days    Peripheral IV 07/29/23 Right Antecubital 3 days                Current Facility-Administered Medications   Medication Dose Route Frequency   • acetaminophen (TYLENOL) tablet 650 mg  650 mg Oral Q6H PRN   • albuterol inhalation solution 2.5 mg  2.5 mg Nebulization Q4H PRN   • ALPRAZolam (XANAX) tablet 0.25 mg  0.25 mg Oral Daily PRN   • aspirin 325 mg tablet **ADS Override Pull**       • benzonatate (TESSALON PERLES) capsule 100 mg  100 mg Oral TID PRN   • enoxaparin (LOVENOX) subcutaneous injection 40 mg  40 mg Subcutaneous Daily   • levothyroxine tablet 100 mcg  100 mcg Oral Early Morning   • melatonin tablet 3 mg  3 mg Oral HS   • metoprolol succinate (TOPROL-XL) 24 hr tablet 25 mg  25 mg Oral Q12H [place]   • sacubitril-valsartan (ENTRESTO) 24-26 MG per tablet 1 tablet  1 tablet Oral BID   • sodium chloride 0.9 % infusion  50 mL/hr Intravenous Continuous         Physical Exam: /67   Pulse 80   Temp 98 °F (36.7 °C)   Resp 18   Ht 5' 2" (1.575 m)   Wt 59.7 kg (131 lb 9.8 oz)   SpO2 97%   BMI 24.07 kg/m²     General Appearance:    Alert, cooperative, no distress, appears stated age   Head:    Normocephalic, no scleral icterus   Eyes:    PERRL   Nose:   Nares normal, septum midline, no drainage    Throat:   Lips, mucosa, and tongue normal   Neck:   Supple, symmetrical, trachea midline,              Lungs:     Crackles to auscultation bilaterally, respirations unlabored        Heart:    Regular rate and rhythm, S1 and S2 normal, no murmur, rub   or gallop   Abdomen:     Soft, non-tender, bowel sounds active all four quadrants,     no masses, no organomegaly   Extremities:   Extremities normal, atraumatic, no cyanosis or edema       Skin:   Skin color, texture, turgor normal, no rashes or lesions   Neurologic:   Alert and oriented to person place and time, no focal deficits                 Lab Results:   Recent Results (from the past 72 hour(s))   HS Troponin I 4hr    Collection Time: 07/29/23 11:07 AM   Result Value Ref Range    hs TnI 4hr 156 (H) "Refer to ACS Flowchart"- see link ng/L    Delta 4hr hsTnI 116 (H) <20 ng/L   Echo follow up/limited w/ contrast if indicated    Collection Time: 07/29/23  1:47 PM   Result Value Ref Range    AV peak gradient 88 mmHg    Triscuspid Valve Regurgitation Peak Gradient 34.0 mmHg    Tricuspid valve peak regurgitation velocity 2.92 m/s    TR Peak Sandeep 2.9 m/s    AV Deceleration Time 1,148 ms    A4C EF 27 %    Ao root 4.30 cm    AV regurgitation pressure 1/2 time 333 ms    LV EF 30     Est. RA pres 8.0 mmHg    Right Ventricular Peak Systolic Pressure 76.31 mmHg   High Sensitivity Troponin I Random    Collection Time: 07/29/23  1:55 PM   Result Value Ref Range    HS TnI random 192 (H) 8 - 18 ng/L   Lactic acid, plasma (w/reflex if result > 2.0)    Collection Time: 07/29/23  5:53 PM   Result Value Ref Range    LACTIC ACID 1.8 0.5 - 2.0 mmol/L   Comprehensive metabolic panel    Collection Time: 07/29/23  5:53 PM   Result Value Ref Range    Sodium 137 135 - 147 mmol/L    Potassium 4.9 3.5 - 5.3 mmol/L    Chloride 100 96 - 108 mmol/L    CO2 27 21 - 32 mmol/L    ANION GAP 10 mmol/L    BUN 15 5 - 25 mg/dL    Creatinine 1.04 0.60 - 1.30 mg/dL    Glucose 145 (H) 65 - 140 mg/dL    Calcium 8.8 8.4 - 10.2 mg/dL    AST 59 (H) 13 - 39 U/L    ALT 36 7 - 52 U/L    Alkaline Phosphatase 103 34 - 104 U/L    Total Protein 6.8 6.4 - 8.4 g/dL    Albumin 3.6 3.5 - 5.0 g/dL    Total Bilirubin 0.93 0.20 - 1.00 mg/dL    eGFR 53 ml/min/1.73sq m   Blood gas, venous    Collection Time: 07/29/23 6:24 PM   Result Value Ref Range    pH, Alfred 7.494 (H) 7.300 - 7.400    pCO2, Alfred 38.8 (L) 42.0 - 50.0 mm Hg    pO2, Alfred 183.8 (H) 35.0 - 45.0 mm Hg    HCO3, Alfred 29.2 24 - 30 mmol/L    Base Excess, Alfred 5.6 mmol/L    O2 Content, Alfred 17.9 ml/dL    O2 HGB, VENOUS 98.0 (H) 60.0 - 80.0 %   CBC (With Platelets)    Collection Time: 07/30/23  5:02 AM   Result Value Ref Range    WBC 9.69 4.31 - 10.16 Thousand/uL    RBC 3.75 (L) 3.81 - 5.12 Million/uL    Hemoglobin 11.0 (L) 11.5 - 15.4 g/dL    Hematocrit 34.3 (L) 34.8 - 46.1 %    MCV 92 82 - 98 fL    MCH 29.3 26.8 - 34.3 pg    MCHC 32.1 31.4 - 37.4 g/dL    RDW 15.7 (H) 11.6 - 15.1 %    Platelets 917 (L) 440 - 390 Thousands/uL    MPV 11.4 8.9 - 12.7 fL   Comprehensive metabolic panel    Collection Time: 07/30/23  5:02 AM   Result Value Ref Range    Sodium 139 135 - 147 mmol/L    Potassium 4.8 3.5 - 5.3 mmol/L    Chloride 101 96 - 108 mmol/L    CO2 28 21 - 32 mmol/L    ANION GAP 10 mmol/L    BUN 18 5 - 25 mg/dL    Creatinine 1.20 0.60 - 1.30 mg/dL    Glucose 128 65 - 140 mg/dL    Calcium 9.0 8.4 - 10.2 mg/dL    Corrected Calcium 9.5 8.3 - 10.1 mg/dL    AST 51 (H) 13 - 39 U/L    ALT 36 7 - 52 U/L    Alkaline Phosphatase 102 34 - 104 U/L    Total Protein 6.4 6.4 - 8.4 g/dL    Albumin 3.4 (L) 3.5 - 5.0 g/dL    Total Bilirubin 1.05 (H) 0.20 - 1.00 mg/dL    eGFR 44 ml/min/1.73sq m   Magnesium    Collection Time: 07/30/23  5:02 AM   Result Value Ref Range    Magnesium 1.7 (L) 1.9 - 2.7 mg/dL   Phosphorus    Collection Time: 07/30/23  5:02 AM   Result Value Ref Range    Phosphorus 3.7 2.3 - 4.1 mg/dL   CBC    Collection Time: 07/31/23  4:48 AM   Result Value Ref Range    WBC 5.88 4.31 - 10.16 Thousand/uL    RBC 3.78 (L) 3.81 - 5.12 Million/uL    Hemoglobin 10.8 (L) 11.5 - 15.4 g/dL    Hematocrit 35.0 34.8 - 46.1 %    MCV 93 82 - 98 fL    MCH 28.6 26.8 - 34.3 pg    MCHC 30.9 (L) 31.4 - 37.4 g/dL    RDW 15.7 (H) 11.6 - 15.1 %    Platelets 196 (L) 206 - 390 Thousands/uL    MPV 12.2 8.9 - 12.7 fL   Basic metabolic panel    Collection Time: 07/31/23  4:48 AM   Result Value Ref Range    Sodium 141 135 - 147 mmol/L    Potassium 4.0 3.5 - 5.3 mmol/L    Chloride 98 96 - 108 mmol/L    CO2 35 (H) 21 - 32 mmol/L    ANION GAP 8 mmol/L    BUN 24 5 - 25 mg/dL    Creatinine 1.06 0.60 - 1.30 mg/dL    Glucose 98 65 - 140 mg/dL    Calcium 8.5 8.4 - 10.2 mg/dL    eGFR 52 ml/min/1.73sq m   Magnesium    Collection Time: 07/31/23  4:48 AM   Result Value Ref Range    Magnesium 2.1 1.9 - 2.7 mg/dL   Basic metabolic panel    Collection Time: 08/01/23  5:42 AM   Result Value Ref Range    Sodium 141 135 - 147 mmol/L    Potassium 3.8 3.5 - 5.3 mmol/L    Chloride 98 96 - 108 mmol/L    CO2 34 (H) 21 - 32 mmol/L    ANION GAP 9 mmol/L    BUN 30 (H) 5 - 25 mg/dL    Creatinine 0.94 0.60 - 1.30 mg/dL    Glucose 104 65 - 140 mg/dL    Calcium 8.7 8.4 - 10.2 mg/dL    eGFR 60 ml/min/1.73sq m   CBC and Platelet    Collection Time: 08/01/23  5:42 AM   Result Value Ref Range    WBC 4.02 (L) 4.31 - 10.16 Thousand/uL    RBC 4.36 3.81 - 5.12 Million/uL    Hemoglobin 12.4 11.5 - 15.4 g/dL    Hematocrit 40.6 34.8 - 46.1 %    MCV 93 82 - 98 fL    MCH 28.4 26.8 - 34.3 pg    MCHC 30.5 (L) 31.4 - 37.4 g/dL    RDW 15.2 (H) 11.6 - 15.1 %    Platelets 144 (L) 964 - 390 Thousands/uL    MPV 11.6 8.9 - 12.7 fL     Imaging: I have personally reviewed pertinent reports. Tele- nsr pvc's    Counseling / Coordination of Care  Total time spent today 30 minutes. Greater than 50% of total time was spent with the patient and / or family counseling and / or coordination of care.

## 2023-08-01 NOTE — ASSESSMENT & PLAN NOTE
· Free T4 normal, TSH 9.167, no need for advancement of treatment    Plan    · Continue levothyroxine 100 mcg daily.

## 2023-08-01 NOTE — ASSESSMENT & PLAN NOTE
Lab Results   Component Value Date    EGFR 60 08/01/2023    EGFR 52 07/31/2023    EGFR 44 07/30/2023    CREATININE 0.94 08/01/2023    CREATININE 1.06 07/31/2023    CREATININE 1.20 07/30/2023   Estimated Creatinine Clearance: 42.2 mL/min (by C-G formula based on SCr of 0.94 mg/dL).   · Cr at b/l 0.8-1

## 2023-08-01 NOTE — DISCHARGE INSTR - AVS FIRST PAGE
1. Please see the post cardiac catheterization dishcarge instructions. No heavy lifting, greater than 10 lbs. or strenuous  activity for 48 hrs. 2.Remove band aid tomorrow. Shower and wash area- wrist gently with soap and water- beginning tomorrow. Rinse and pat dry. Apply new water seal band aid. Repeat this process for 5 days. No powders, creams lotions or antibiotic ointments  for 5 days. No tub baths, hot tubs or swimming for 5 days. 3. Please call our office ([phone]) if you have any fever, redness, swelling, discharge from your wrist access site. 4.No driving for 1 day        Take your medications as directed, and keep your follow up appointments. Adhere to a heart healthy lifestyle, maintaining a low sodium diet. Daily weight and record. If your weight increases 2-3 lbs in one day, or 5 lbs in 2 days, you are short of breath or have lower extremity swelling, please call [address] [place] [address] office  at [phone]       Dear Maria C Sheehan,      It was our pleasure to care for you here at [facility]. It is our hope that we were always able to exceed the expected standards for your care during your stay. You were hospitalized due to exacerbation of your heart failure. You were cared for on the third floor by Tahir Oliveros MD under the service of Luanne Hobbs MD with the [facility]ist Group who covers for your primary care physician (PCP), Rosendo Tse MD, while you were hospitalized. If you have any questions or concerns related to this hospitalization, you may contact us at [phone]. For follow up as well as any medication refills, we recommend that you follow up with your primary care physician. A registered nurse will reach out to you by phone within a few days after your discharge to answer any additional questions that you may have after going home.   However, at this time we provide for you here, the most important instructions / recommendations at discharge:     Notable Medication Adjustments -   Stop taking Lasix (furosemide)   Start taking (torsemide) 20mg once a day  Start taking Atorvastatin 20mg with dinner  Testing Required after Discharge -   None  ** Please contact your PCP to request testing orders for any of the testing recommended here **  Important follow up information -   Follow-up with your primary care physician in a week  Please follow-up with Dr. Silvia Edward next week  Other Instructions -   Stay compliant with regular medications and appointments  Stay compliant with the Cardiac Diet  Please review this entire after visit summary as additional general instructions including medication list, appointments, activity, diet, any pertinent wound care, and other additional recommendations from your care team that may be provided for you.         Sincerely,      Jillian Booker MD

## 2023-08-01 NOTE — ASSESSMENT & PLAN NOTE
Lab Results   Component Value Date    EGFR 60 08/01/2023    EGFR 52 07/31/2023    EGFR 44 07/30/2023    CREATININE 0.94 08/01/2023    CREATININE 1.06 07/31/2023    CREATININE 1.20 07/30/2023   Estimated Creatinine Clearance: 42.2 mL/min (by C-G formula based on SCr of 0.94 mg/dL).   · Cr at b/l 0.8-1  · Watch w/ diuresis

## 2023-08-01 NOTE — PROGRESS NOTES
[facility]  Progress Note  Name: Luciana Moreno  MRN: [id number]  Unit/Bed#: S -85 I Date of Admission: 7/29/2023   Date of Service: 8/1/2023 I Hospital Day: 3    Assessment/Plan   * Acute on chronic diastolic heart failure [facility])  Assessment & Plan  Wt Readings from Last 3 Encounters:   08/01/23 59.7 kg (131 lb 9.8 oz)   07/26/23 60.1 kg (132 lb 9.6 oz)   07/16/23 58.7 kg (129 lb 6.6 oz)     · Markedly elevated BNP, dyspnea laying down, CT chest c/f effusions and pulm edema  · Pacemaker interrogated 7/31/2023 and cardiac catheterization 8/1/2023 performed by cardiology. No signs of occlusive disease noted on catheterization. Cardiology cleared patient for home. Plan:    · Cardio consulted and recommendations appreciated  · Continue BB and Entresto. · Patient not started on Demadex 20 mg post catheterization, holding Bumex      Acute respiratory failure with hypoxia (HCC)  Assessment & Plan  · 88 on RA on admission. · Afebrile and recently completed 10 days abx. · 6 L mid flow overnight. · 90% on 5 L of oxygen via nasal cannula this morning. · 2/2 CHF  · Wean as tolerated  · 7/31/2023 patient satting in 90s without O2 supplementation      Coronary artery disease involving native coronary artery  Assessment & Plan  Coronary cath done today, negative for obstructive disease as cause of worsening ejection fraction. Plan:    Continue home medications as above    CKD (chronic kidney disease) stage 3, GFR 30-59 ml/min [facility])  Assessment & Plan  Lab Results   Component Value Date    EGFR 60 08/01/2023    EGFR 52 07/31/2023    EGFR 44 07/30/2023    CREATININE 0.94 08/01/2023    CREATININE 1.06 07/31/2023    CREATININE 1.20 07/30/2023   Estimated Creatinine Clearance: 42.2 mL/min (by C-G formula based on SCr of 0.94 mg/dL).   · Cr at b/l 0.8-1  · Watch w/ diuresis    Hypothyroidism  Assessment & Plan  · Free T4 normal, TSH 9.167    Plan    · Continue levothyroxine 100 mcg daily.    ILD (interstitial lung disease) ([address])  Assessment & Plan  · Managed by PCP             VTE Pharmacologic Prophylaxis: VTE Score: 5 High Risk (Score >/= 5) - Pharmacological DVT Prophylaxis Ordered: enoxaparin (Lovenox). Sequential Compression Devices Ordered. Patient Centered Rounds: I evaluated the patient without nursing staff present due to Nurse being occupied with other patient  Discussions with Specialists or Other Care Team Provider: Discussed with attending physician, Senior resident, cardiology through [facility] text    Education and Discussions with Family / Patient: Updated  (sister) at bedside. Current Length of Stay: 3 day(s)  Current Patient Status: Inpatient   Discharge Plan: Anticipate discharge tomorrow to home. Code Status: Level 1 - Full Code    Subjective:   Evaluated patient at bedside this morning. Patient stable and without any complaints, slept well overnight. Went for cardiac cath in the morning. Evaluated patient at bedside after her catheterization, patient was doing well and without shortness of breath, chest pain, dizziness, lightheadedness. Patient stated she feels well but would feel comfortable staying 1 more night due to concerns of blood pressure and recent catheterization. Objective:     Vitals:   Temp (24hrs), Av.6 °F (36.4 °C), Min:97.2 °F (36.2 °C), Max:98 °F (36.7 °C)    Temp:  [97.2 °F (36.2 °C)-98 °F (36.7 °C)] 97.2 °F (36.2 °C)  HR:  [78-92] 83  Resp:  [16-18] 16  BP: ()/(54-67) 90/59  SpO2:  [90 %-99 %] 97 %  Body mass index is 24.07 kg/m². Input and Output Summary (last 24 hours): Intake/Output Summary (Last 24 hours) at 2023 1709  Last data filed at 2023 1330  Gross per 24 hour   Intake 0 ml   Output 1275 ml   Net -1275 ml       Physical Exam:   Physical Exam  Vitals and nursing note reviewed. Constitutional:       General: She is not in acute distress. Appearance: She is well-developed.    HENT: Mouth/Throat:      Mouth: Mucous membranes are moist.   Eyes:      Conjunctiva/sclera: Conjunctivae normal.   Cardiovascular:      Rate and Rhythm: Normal rate and regular rhythm. Heart sounds: No murmur heard. Pulmonary:      Effort: Pulmonary effort is normal. No respiratory distress. Breath sounds: No stridor. Rales (bilateral) present. No wheezing or rhonchi. Abdominal:      General: There is no distension. Palpations: Abdomen is soft. Tenderness: There is no abdominal tenderness. Musculoskeletal:         General: No swelling. Skin:     General: Skin is warm and dry. Capillary Refill: Capillary refill takes less than 2 seconds. Neurological:      Mental Status: She is alert and oriented to person, place, and time. Psychiatric:         Mood and Affect: Mood normal.         Additional Data:     Labs:  Results from last 7 days   Lab Units 08/01/23  0542 07/30/23  0502 07/29/23  0659   WBC Thousand/uL 4.02*   < > 10.28*   HEMOGLOBIN g/dL 12.4   < > 12.1   HEMATOCRIT % 40.6   < > 37.9   PLATELETS Thousands/uL 132*   < > 185   NEUTROS PCT %  --   --  75   LYMPHS PCT %  --   --  19   MONOS PCT %  --   --  6   EOS PCT %  --   --  0    < > = values in this interval not displayed. Results from last 7 days   Lab Units 08/01/23  0542 07/31/23  0448 07/30/23  0502   SODIUM mmol/L 141   < > 139   POTASSIUM mmol/L 3.8   < > 4.8   CHLORIDE mmol/L 98   < > 101   CO2 mmol/L 34*   < > 28   BUN mg/dL 30*   < > 18   CREATININE mg/dL 0.94   < > 1.20   ANION GAP mmol/L 9   < > 10   CALCIUM mg/dL 8.7   < > 9.0   ALBUMIN g/dL  --   --  3.4*   TOTAL BILIRUBIN mg/dL  --   --  1.05*   ALK PHOS U/L  --   --  102   ALT U/L  --   --  36   AST U/L  --   --  51*   GLUCOSE RANDOM mg/dL 104   < > 128    < > = values in this interval not displayed.      Results from last 7 days   Lab Units 07/29/23  0659   INR  1.03             Results from last 7 days   Lab Units 07/29/23  1753 07/29/23  0659   LACTIC ACID mmol/L 1.8 1.9   PROCALCITONIN ng/ml  --  <0.05       Lines/Drains:  Invasive Devices     Peripheral Intravenous Line  Duration           Peripheral IV 07/29/23 Left;Ventral (anterior) Forearm 3 days    Peripheral IV 07/29/23 Right Antecubital 3 days                  Telemetry:  Telemetry Orders (From admission, onward)             24 Hour Telemetry Monitoring  Continuous x 24 Hours (Telem)        Comments: Elevated troponin   Question:  Reason for 24 Hour Telemetry  Answer:  PCI/EP study (including pacer and ICD implementation), Cardiac surgery, MI, abnormal cardiac cath, and chest pain- rule out MI  Comment:  elevated troponin                 Telemetry Reviewed: Normal Sinus Rhythm and PVCs  Indication for Continued Telemetry Use: Post PCI/EP Study              Imaging: Reviewed radiology reports from this admission including: chest xray and CTA PE study    Recent Cultures (last 7 days):   Results from last 7 days   Lab Units 07/29/23  0659   BLOOD CULTURE  No Growth at 72 hrs. No Growth at 72 hrs. Last 24 Hours Medication List:   Current Facility-Administered Medications   Medication Dose Route Frequency Provider Last Rate   • acetaminophen  650 mg Oral Q6H PRN Juan Segundo, DO     • albuterol  2.5 mg Nebulization Q4H PRN Juan Segundo, DO     • ALPRAZolam  0.25 mg Oral Daily PRN BAO Kennedy     • aspirin          • atorvastatin  20 mg Oral Daily With Dinner BAO Ramos     • benzonatate  100 mg Oral TID PRN Juan Segundo, DO     • enoxaparin  40 mg Subcutaneous Daily Juan Segundo, DO     • levothyroxine  100 mcg Oral Early Morning Juan Segundo, DO     • melatonin  3 mg Oral HS Juan Segundo, DO     • metoprolol succinate  25 mg Oral Q12H [address] Juan Segundo, DO     • sacubitril-valsartan  1 tablet Oral BID Juan Segundo, DO          Today, Patient Was Seen By: Roanna Gottron, MD    **Please Note: This note may have been constructed using a voice recognition system. **

## 2023-08-01 NOTE — OCCUPATIONAL THERAPY NOTE
Occupational Therapy Evaluation     Patient Name: Dipak WARRENWHN'U Date: 8/1/2023  Problem List  Principal Problem:    Acute on chronic diastolic heart failure ([address])  Active Problems:    Hypothyroidism    CKD (chronic kidney disease) stage 3, GFR 30-59 ml/min ([facility])    ILD (interstitial lung disease) ([address])    Acute respiratory failure with hypoxia ([facility])    Coronary artery disease involving native coronary artery    Past Medical History  Past Medical History:   Diagnosis Date    Abnormal CXR 1/14/2022    Cancer ([address])     Disease of thyroid gland     Dysuria 12/16/2021    Encounter for support and coordination of transition of care 5/6/2021     Past Surgical History  Past Surgical History:   Procedure Laterality Date    CARDIAC ELECTROPHYSIOLOGY PROCEDURE N/A 12/30/2022    Procedure: Cardiac eps/aflutter ablation;  Surgeon: David Peters MD;  [facility]: [facility]; Service: Cardiology    CARDIAC ELECTROPHYSIOLOGY PROCEDURE N/A 12/30/2022    Procedure: Cardiac loop recorder implant;  Surgeon: David Peters MD;  [facility]: [facility]; Service: Cardiology    CARDIAC ELECTROPHYSIOLOGY PROCEDURE N/A 2/6/2023    Procedure: CARDIAC EPS/SVT ABLATION;  Surgeon: David Peters MD;  [facility]; Service: Cardiology    CARDIAC ELECTROPHYSIOLOGY PROCEDURE N/A 2/16/2023    Procedure: Cardiac pacer implant;  Surgeon: Lali Jaramillo MD;  [facility]: [facility]; Service: Cardiology    CARDIAC ELECTROPHYSIOLOGY PROCEDURE N/A 2/16/2023    Procedure: CARDIAC LOOP RECORDER EXPLANT;  Surgeon: Lali Jaramillo MD;  [facility]; Service: Cardiology    HYSTERECTOMY          08/01/23 1429   OT Last Visit   OT Visit Date 08/01/23   Note Type   Note type Evaluation   Pain Assessment   Pain Assessment Tool 0-10   Pain Score No Pain   Restrictions/Precautions   Weight Bearing Precautions Per Order No   Other Precautions Chair Alarm; Bed Alarm;Telemetry;Multiple lines;O2  (2 L 02)   Home Living   Type of [address] [place] [address]  One level;Performs ADLs on one level   Bathroom Shower/Tub Walk-in shower   Bathroom Toilet Standard   Bathroom Accessibility Accessible   Additional Comments No DME or AD at home   Prior Function   Level of Branch Independent with ADLs; Independent with functional mobility; Independent with IADLS   Lives With Alone   Receives Help From Family;Friend(s)   IADLs Independent with driving; Independent with meal prep; Independent with medication management   Falls in the last 6 months 0   Vocational Retired   Comments I at baseline w/ ADLs w/o AD   Lifestyle   Autonomy pta pt was (I) w ADLs and IADLs, (0) falls (+) , 1 story home alone, no AD or DME   Reciprocal Relationships Pt reports living alone. Supportive family   Service to Others Pt reports retired    Intrinsic Gratification Pt reports enjoying reading   General   Additional Pertinent History CKD, ILD, respiratory failure,   Family/Caregiver Present Yes  (Sister was present)   Additional General Comments Family and Pt was very pleasant   ADL   Where Assessed Edge of bed   Eating Assistance 6  Modified independent   Grooming Assistance 6  Modified Independent   UB Bathing Assistance 6  Modified Independent   LB Bathing Assistance 6  Modified Independent   UB Dressing Assistance 6  Modified independent   [address]; Thread LUE   LB Dressing Assistance 6  Modified independent   LB Dressing Deficit Don/doff R sock; Don/doff L sock; Increased time to complete   Toileting Assistance  6  Modified independent   Bed Mobility   Supine to Sit 6  Modified independent   Additional items Increased time required;Assist x 1;HOB elevated   Sit to Supine   (pt oob in recliner after evaluation)   Transfers   Sit to Stand 6  Modified independent   Additional items Assist x 1; Increased time required   Stand to Sit 6  Modified independent   Additional items Assist x 1; Increased time required   Functional Mobility   Functional Mobility 6  Modified independent   Additional Comments required assistance w. IV pole   Additional items   (no AD)   Balance   Static Sitting Good   Dynamic Sitting Good   Static Standing Good   Dynamic Standing Good   Ambulatory Good   Activity Tolerance   Activity Tolerance Patient tolerated treatment well   Medical Staff Made Aware MD Aware   Nurse Made Aware Nurse Aware   RUE Assessment   RUE Assessment WFL   LUE Assessment   LUE Assessment WFL   Vision-Basic Assessment   Current Vision Wears glasses all the time   Vision - Complex Assessment   Acuity Able to read clock/calendar on wall without difficulty; Able to read employee name badge without difficulty   Psychosocial   Psychosocial (WDL) WDL   Cognition   Overall Cognitive Status WFL   Arousal/Participation Alert; Cooperative   Attention Within functional limits   Orientation Level Oriented X4   Memory Within functional limits   Following Commands Follows all commands and directions without difficulty   Assessment   Limitation Decreased endurance   Prognosis Good   Assessment Pt is a 67 y.o. female seen for OT evaluation s/p [facility]2023 due toAcute on chronic diastolic heart failure ([address] Personal and env factors supporting pt at time of IE include age, (I) PLOF, supportive family  and attitude towards recovery. Personal and env factors inhibiting engagement in occupations include potential new O2 requirements . Performance deficits that affect the pt’s occupational performance can be seen above. Despite pt's current functional limitations and medical complications pt is functioning at baseline. No further acute OT needs identified at this time. Recommend continued active ADL participation and mobilization with hospital staff while in the hospital to increase pt’s endurance and strength upon D/C. From OT standpoint, recommend D/C to home with family support when medically cleared.  D/C pt from OT caseload at this time. Plan   Treatment Interventions Energy conservation; Activityengagement   Goal Expiration Date 08/01/23   OT Treatment Day 0   OT Frequency Eval only   Recommendation   OT Discharge Recommendation No rehabilitation needs   Additional Comments  The patient's raw score on the AM-PAC Daily Activity Inpatient Short Form is 24. A raw score of greater than or equal to 19 suggests the patient may benefit from discharge to home. Please refer to the recommendation of the Occupational Therapist for safe discharge planning. AM-PAC Daily Activity Inpatient   Lower Body Dressing 4   Bathing 4   Toileting 4   Upper Body Dressing 4   Grooming 4   Eating 4   Daily Activity Raw Score 24   Daily Activity Standardized Score (Calc for Raw Score >=11) 57.54   AM-PAC Applied Cognition Inpatient   Following a Speech/Presentation 4   Understanding Ordinary Conversation 4   Taking Medications 4   Remembering Where Things Are Placed or Put Away 4   Remembering List of 4-5 Errands 4   Taking Care of Complicated Tasks 4   Applied Cognition Raw Score 24   Applied Cognition Standardized Score 62.21   End of Consult   Education Provided Yes;Family or social support of family present for education by provider  (Education provided regarding OT)   Patient Position at End of Consult Bedside chair; All needs within reach;Bed/Chair alarm activated   Nurse Communication Nurse aware of consult        The patient's raw score on the AM-PAC Daily Activity Inpatient Short Form is 24. A raw score of greater than or equal to 19 suggests the patient may benefit from discharge to home. Please refer to the recommendation of the Occupational Therapist for safe discharge planning.     Maribel Herrmann OTR/L

## 2023-08-01 NOTE — PLAN OF CARE
Problem: Potential for Falls  Goal: Patient will remain free of falls  Description: INTERVENTIONS:  - Educate patient/family on patient safety including physical limitations  - Instruct patient to call for assistance with activity   - Consult OT/PT to assist with strengthening/mobility   - Keep Call bell within reach  - Keep bed low and locked with side rails adjusted as appropriate  - Keep care items and personal belongings within reach  - Initiate and maintain comfort rounds  - Make Fall Risk Sign visible to staff  - Offer Toileting every  Hours, in advance of need  - Initiate/Maintain alarm  - Obtain necessary fall risk management equipment:   - Apply yellow socks and bracelet for high fall risk patients  - Consider moving patient to room near nurses station  Outcome: Progressing     Problem: SAFETY ADULT  Goal: Patient will remain free of falls  Description: INTERVENTIONS:  - Educate patient/family on patient safety including physical limitations  - Instruct patient to call for assistance with activity   - Consult OT/PT to assist with strengthening/mobility   - Keep Call bell within reach  - Keep bed low and locked with side rails adjusted as appropriate  - Keep care items and personal belongings within reach  - Initiate and maintain comfort rounds  - Make Fall Risk Sign visible to staff  - Offer Toileting every  Hours, in advance of need  - Initiate/Maintain alarm  - Obtain necessary fall risk management equipment:   - Apply yellow socks and bracelet for high fall risk patients  - Consider moving patient to room near nurses station  Outcome: Progressing  Goal: Maintain or return to baseline ADL function  Description: INTERVENTIONS:  -  Assess patient's ability to carry out ADLs; assess patient's baseline for ADL function and identify physical deficits which impact ability to perform ADLs (bathing, care of mouth/teeth, toileting, grooming, dressing, etc.)  - Assess/evaluate cause of self-care deficits   - Assess range of motion  - Assess patient's mobility; develop plan if impaired  - Assess patient's need for assistive devices and provide as appropriate  - Encourage maximum independence but intervene and supervise when necessary  - Involve family in performance of ADLs  - Assess for home care needs following discharge   - Consider OT consult to assist with ADL evaluation and planning for discharge  - Provide patient education as appropriate  Outcome: Progressing  Goal: Maintains/Returns to pre admission functional level  Description: INTERVENTIONS:  - Perform BMAT or MOVE assessment daily.   - Set and communicate daily mobility goal to care team and patient/family/caregiver. - Collaborate with rehabilitation services on mobility goals if consulted  - Perform Range of Motion  times a day. - Reposition patient every  hours.   - Dangle patient  times a day  - Stand patient  times a day  - Ambulate patient  times a day  - Out of bed to chair  times a day   - Out of bed for meals times a day  - Out of bed for toileting  - Record patient progress and toleration of activity level   Outcome: Progressing     Problem: DISCHARGE PLANNING  Goal: Discharge to home or other facility with appropriate resources  Description: INTERVENTIONS:  - Identify barriers to discharge w/patient and caregiver  - Arrange for needed discharge resources and transportation as appropriate  - Identify discharge learning needs (meds, wound care, etc.)  - Arrange for interpretive services to assist at discharge as needed  - Refer to Case Management Department for coordinating discharge planning if the patient needs post-hospital services based on physician/advanced practitioner order or complex needs related to functional status, cognitive ability, or social support system  Outcome: Progressing     Problem: Knowledge Deficit  Goal: Patient/family/caregiver demonstrates understanding of disease process, treatment plan, medications, and discharge instructions  Description: Complete learning assessment and assess knowledge base.   Interventions:  - Provide teaching at level of understanding  - Provide teaching via preferred learning methods  Outcome: Progressing     Problem: CARDIOVASCULAR - ADULT  Goal: Maintains optimal cardiac output and hemodynamic stability  Description: INTERVENTIONS:  - Monitor I/O, vital signs and rhythm  - Monitor for S/S and trends of decreased cardiac output  - Administer and titrate ordered vasoactive medications to optimize hemodynamic stability  - Assess quality of pulses, skin color and temperature  - Assess for signs of decreased coronary artery perfusion  - Instruct patient to report change in severity of symptoms  Outcome: Progressing  Goal: Absence of cardiac dysrhythmias or at baseline rhythm  Description: INTERVENTIONS:  - Continuous cardiac monitoring, vital signs, obtain 12 lead EKG if ordered  - Administer antiarrhythmic and heart rate control medications as ordered  - Monitor electrolytes and administer replacement therapy as ordered  Outcome: Progressing     Problem: RESPIRATORY - ADULT  Goal: Achieves optimal ventilation and oxygenation  Description: INTERVENTIONS:  - Assess for changes in respiratory status  - Assess for changes in mentation and behavior  - Position to facilitate oxygenation and minimize respiratory effort  - Oxygen administered by appropriate delivery if ordered  - Initiate smoking cessation education as indicated  - Encourage broncho-pulmonary hygiene including cough, deep breathe, Incentive Spirometry  - Assess the need for suctioning and aspirate as needed  - Assess and instruct to report SOB or any respiratory difficulty  - Respiratory Therapy support as indicated  Outcome: Progressing     Problem: GENITOURINARY - ADULT  Goal: Maintains or returns to baseline urinary function  Description: INTERVENTIONS:  - Assess urinary function  - Encourage oral fluids to ensure adequate hydration if ordered  - Administer IV fluids as ordered to ensure adequate hydration  - Administer ordered medications as needed  - Offer frequent toileting  - Follow urinary retention protocol if ordered  Outcome: Progressing  Goal: Absence of urinary retention  Description: INTERVENTIONS:  - Assess patient’s ability to void and empty bladder  - Monitor I/O  - Bladder scan as needed  - Discuss with physician/AP medications to alleviate retention as needed  - Discuss catheterization for long term situations as appropriate  Outcome: Progressing  Goal: Urinary catheter remains patent  Description: INTERVENTIONS:  - Assess patency of urinary catheter  - If patient has a chronic nunez, consider changing catheter if non-functioning  - Follow guidelines for intermittent irrigation of non-functioning urinary catheter  Outcome: Progressing

## 2023-08-01 NOTE — ASSESSMENT & PLAN NOTE
· 88 on RA on admission. · Afebrile and recently completed 10 days abx. · 6 L mid flow overnight. · 90% on 5 L of oxygen via nasal cannula this morning.   · 2/2 CHF  · 7/31/2023 patient satting in 90s without O2 supplementation, resolved

## 2023-08-01 NOTE — PLAN OF CARE
Problem: Potential for Falls  Goal: Patient will remain free of falls  Description: INTERVENTIONS:  - Educate patient/family on patient safety including physical limitations  - Instruct patient to call for assistance with activity   - Consult OT/PT to assist with strengthening/mobility   - Keep Call bell within reach  - Keep bed low and locked with side rails adjusted as appropriate  - Keep care items and personal belongings within reach  - Initiate and maintain comfort rounds  - Make Fall Risk Sign visible to staff  - Offer Toileting every 2 Hours, in advance of need  - Initiate/Maintain bed/chair alarm  - Obtain necessary fall risk management equipment  - Apply yellow socks and bracelet for high fall risk patients  - Consider moving patient to room near nurses station  Outcome: Progressing     Problem: DISCHARGE PLANNING  Goal: Discharge to home or other facility with appropriate resources  Description: INTERVENTIONS:  - Identify barriers to discharge w/patient and caregiver  - Arrange for needed discharge resources and transportation as appropriate  - Identify discharge learning needs (meds, wound care, etc.)  - Arrange for interpretive services to assist at discharge as needed  - Refer to Case Management Department for coordinating discharge planning if the patient needs post-hospital services based on physician/advanced practitioner order or complex needs related to functional status, cognitive ability, or social support system  Outcome: Progressing     Problem: Knowledge Deficit  Goal: Patient/family/caregiver demonstrates understanding of disease process, treatment plan, medications, and discharge instructions  Description: Complete learning assessment and assess knowledge base.   Interventions:  - Provide teaching at level of understanding  - Provide teaching via preferred learning methods  Outcome: Progressing     Problem: CARDIOVASCULAR - ADULT  Goal: Maintains optimal cardiac output and hemodynamic stability  Description: INTERVENTIONS:  - Monitor I/O, vital signs and rhythm  - Monitor for S/S and trends of decreased cardiac output  - Administer and titrate ordered vasoactive medications to optimize hemodynamic stability  - Assess quality of pulses, skin color and temperature  - Assess for signs of decreased coronary artery perfusion  - Instruct patient to report change in severity of symptoms  Outcome: Progressing  Goal: Absence of cardiac dysrhythmias or at baseline rhythm  Description: INTERVENTIONS:  - Continuous cardiac monitoring, vital signs, obtain 12 lead EKG if ordered  - Administer antiarrhythmic and heart rate control medications as ordered  - Monitor electrolytes and administer replacement therapy as ordered  Outcome: Progressing     Problem: RESPIRATORY - ADULT  Goal: Achieves optimal ventilation and oxygenation  Description: INTERVENTIONS:  - Assess for changes in respiratory status  - Assess for changes in mentation and behavior  - Position to facilitate oxygenation and minimize respiratory effort  - Oxygen administered by appropriate delivery if ordered  - Initiate smoking cessation education as indicated  - Encourage broncho-pulmonary hygiene including cough, deep breathe, Incentive Spirometry  - Assess the need for suctioning and aspirate as needed  - Assess and instruct to report SOB or any respiratory difficulty  - Respiratory Therapy support as indicated  Outcome: Progressing     Problem: GENITOURINARY - ADULT  Goal: Maintains or returns to baseline urinary function  Description: INTERVENTIONS:  - Assess urinary function  - Encourage oral fluids to ensure adequate hydration if ordered  - Administer IV fluids as ordered to ensure adequate hydration  - Administer ordered medications as needed  - Offer frequent toileting  - Follow urinary retention protocol if ordered  Outcome: Progressing  Goal: Absence of urinary retention  Description: INTERVENTIONS:  - Assess patient’s ability to void and empty bladder  - Monitor I/O  - Bladder scan as needed  - Discuss with physician/AP medications to alleviate retention as needed  - Discuss catheterization for long term situations as appropriate  Outcome: Progressing  Goal: Urinary catheter remains patent  Description: INTERVENTIONS:  - Assess patency of urinary catheter  - If patient has a chronic nunez, consider changing catheter if non-functioning  - Follow guidelines for intermittent irrigation of non-functioning urinary catheter  Outcome: Progressing     Problem: Nutrition/Hydration-ADULT  Goal: Nutrient/Hydration intake appropriate for improving, restoring or maintaining nutritional needs  Description: Monitor and assess patient's nutrition/hydration status for malnutrition. Collaborate with interdisciplinary team and initiate plan and interventions as ordered. Monitor patient's weight and dietary intake as ordered or per policy. Utilize nutrition screening tool and intervene as necessary. Determine patient's food preferences and provide high-protein, high-caloric foods as appropriate.      INTERVENTIONS:  - Monitor oral intake, urinary output, labs, and treatment plans  - Assess nutrition and hydration status and recommend course of action  - Evaluate amount of meals eaten  - Assist patient with eating if necessary   - Allow adequate time for meals  - Recommend/ encourage appropriate diets, oral nutritional supplements, and vitamin/mineral supplements  - Order, calculate, and assess calorie counts as needed  - Recommend, monitor, and adjust tube feedings and TPN/PPN based on assessed needs  - Assess need for intravenous fluids  - Provide specific nutrition/hydration education as appropriate  - Include patient/family/caregiver in decisions related to nutrition  Outcome: Progressing

## 2023-08-02 VITALS
HEART RATE: 95 BPM | HEIGHT: 62 IN | RESPIRATION RATE: 16 BRPM | TEMPERATURE: 97.9 F | DIASTOLIC BLOOD PRESSURE: 56 MMHG | SYSTOLIC BLOOD PRESSURE: 87 MMHG | WEIGHT: 125.88 LBS | BODY MASS INDEX: 23.17 KG/M2 | OXYGEN SATURATION: 95 %

## 2023-08-02 PROCEDURE — 99232 SBSQ HOSP IP/OBS MODERATE 35: CPT | Performed by: INTERNAL MEDICINE

## 2023-08-02 PROCEDURE — 99239 HOSP IP/OBS DSCHRG MGMT >30: CPT | Performed by: HOSPITALIST

## 2023-08-02 RX ORDER — TORSEMIDE 20 MG/1
20 TABLET ORAL DAILY
Qty: 30 TABLET | Refills: 0 | Status: SHIPPED | OUTPATIENT
Start: 2023-08-02 | End: 2023-08-08

## 2023-08-02 RX ORDER — ATORVASTATIN CALCIUM 20 MG/1
20 TABLET, FILM COATED ORAL
Qty: 30 TABLET | Refills: 0 | Status: SHIPPED | OUTPATIENT
Start: 2023-08-02

## 2023-08-02 RX ADMIN — ENOXAPARIN SODIUM 40 MG: 40 INJECTION SUBCUTANEOUS at 09:46

## 2023-08-02 RX ADMIN — SACUBITRIL AND VALSARTAN 1 TABLET: 24; 26 TABLET, FILM COATED ORAL at 09:46

## 2023-08-02 RX ADMIN — LEVOTHYROXINE SODIUM 100 MCG: 100 TABLET ORAL at 05:11

## 2023-08-02 NOTE — PHYSICAL THERAPY NOTE
Physical Therapy Screen    Patient Name: Sayda STERN Date: 8/2/2023     Problem List  Principal Problem:    Acute on chronic diastolic heart failure ([address])  Active Problems:    Hypothyroidism    CKD (chronic kidney disease) stage 3, GFR 30-59 ml/min ([facility])    ILD (interstitial lung disease) ([address])    Acute respiratory failure with hypoxia ([address])    Coronary artery disease involving native coronary artery       Past Medical History  Past Medical History:   Diagnosis Date    Abnormal CXR 1/14/2022    Cancer ([address])     Disease of thyroid gland     Dysuria 12/16/2021    Encounter for support and coordination of transition of care 5/6/2021        Past Surgical History  Past Surgical History:   Procedure Laterality Date    CARDIAC CATHETERIZATION Left 8/1/2023    Procedure: Cardiac Left Heart Cath;  Surgeon: Kalia Araujo DO;  [facility]; Service: Cardiology    CARDIAC CATHETERIZATION  8/1/2023    Procedure: Cardiac catheterization;  Surgeon: Kalia Araujo DO;  [facility]; Service: Cardiology    CARDIAC CATHETERIZATION N/A 8/1/2023    Procedure: Cardiac Coronary Angiogram;  Surgeon: Kalia Araujo DO;  [facility]; Service: Cardiology    CARDIAC ELECTROPHYSIOLOGY PROCEDURE N/A 12/30/2022    Procedure: Cardiac eps/aflutter ablation;  Surgeon: Piero Hernandez MD;  [facility]; Service: Cardiology    CARDIAC ELECTROPHYSIOLOGY PROCEDURE N/A 12/30/2022    Procedure: Cardiac loop recorder implant;  Surgeon: Piero Hernandez MD;  [facility]; Service: Cardiology    CARDIAC ELECTROPHYSIOLOGY PROCEDURE N/A 2/6/2023    Procedure: CARDIAC EPS/SVT ABLATION;  Surgeon: Piero Hernandez MD;  [facility];   Service: Cardiology    CARDIAC ELECTROPHYSIOLOGY PROCEDURE N/A 2/16/2023    Procedure: Cardiac pacer implant;  Surgeon: Junie Romero MD; [facility]; Service: Cardiology    CARDIAC ELECTROPHYSIOLOGY PROCEDURE N/A 2/16/2023    Procedure: CARDIAC LOOP RECORDER EXPLANT;  Surgeon: Lupe Andre MD;  [facility]; Service: Cardiology    HYSTERECTOMY           08/02/23 1603   Note Type   Note type Screen   Additional Comments Pt orders received. Chart reviewed. Per OT Gayathri Parikh pt is independent with all aspects of mobility. She is ambulating w/o AD with steady gait and no LOB. Pt denies questions or concerns over return to previous environment. At this time, pt will be a screen from PT caseload. Please place new orders if there is a change in status.      Hollie Medrano, PT

## 2023-08-02 NOTE — PLAN OF CARE
Problem: Potential for Falls  Goal: Patient will remain free of falls  Description: INTERVENTIONS:  - Educate patient/family on patient safety including physical limitations  - Instruct patient to call for assistance with activity   - Consult OT/PT to assist with strengthening/mobility   - Keep Call bell within reach  - Keep bed low and locked with side rails adjusted as appropriate  - Keep care items and personal belongings within reach  - Initiate and maintain comfort rounds  - Make Fall Risk Sign visible to staff  - Offer Toileting every  Hours, in advance of need  - Initiate/Maintain alarm  - Obtain necessary fall risk management equipment:   - Apply yellow socks and bracelet for high fall risk patients  - Consider moving patient to room near nurses station  Outcome: Completed     Problem: SAFETY ADULT  Goal: Patient will remain free of falls  Description: INTERVENTIONS:  - Educate patient/family on patient safety including physical limitations  - Instruct patient to call for assistance with activity   - Consult OT/PT to assist with strengthening/mobility   - Keep Call bell within reach  - Keep bed low and locked with side rails adjusted as appropriate  - Keep care items and personal belongings within reach  - Initiate and maintain comfort rounds  - Make Fall Risk Sign visible to staff  - Offer Toileting every  Hours, in advance of need  - Initiate/Maintain alarm  - Obtain necessary fall risk management equipment:   - Apply yellow socks and bracelet for high fall risk patients  - Consider moving patient to room near nurses station  Outcome: Completed  Goal: Maintain or return to baseline ADL function  Description: INTERVENTIONS:  -  Assess patient's ability to carry out ADLs; assess patient's baseline for ADL function and identify physical deficits which impact ability to perform ADLs (bathing, care of mouth/teeth, toileting, grooming, dressing, etc.)  - Assess/evaluate cause of self-care deficits   - Assess range of motion  - Assess patient's mobility; develop plan if impaired  - Assess patient's need for assistive devices and provide as appropriate  - Encourage maximum independence but intervene and supervise when necessary  - Involve family in performance of ADLs  - Assess for home care needs following discharge   - Consider OT consult to assist with ADL evaluation and planning for discharge  - Provide patient education as appropriate  Outcome: Completed  Goal: Maintains/Returns to pre admission functional level  Description: INTERVENTIONS:  - Perform BMAT or MOVE assessment daily.   - Set and communicate daily mobility goal to care team and patient/family/caregiver. - Collaborate with rehabilitation services on mobility goals if consulted  - Perform Range of Motion  times a day. - Reposition patient every  hours.   - Dangle patient  times a day  - Stand patient  times a day  - Ambulate patient  times a day  - Out of bed to chair  times a day   - Out of bed for meals  times a day  - Out of bed for toileting  - Record patient progress and toleration of activity level   Outcome: Completed     Problem: DISCHARGE PLANNING  Goal: Discharge to home or other facility with appropriate resources  Description: INTERVENTIONS:  - Identify barriers to discharge w/patient and caregiver  - Arrange for needed discharge resources and transportation as appropriate  - Identify discharge learning needs (meds, wound care, etc.)  - Arrange for interpretive services to assist at discharge as needed  - Refer to Case Management Department for coordinating discharge planning if the patient needs post-hospital services based on physician/advanced practitioner order or complex needs related to functional status, cognitive ability, or social support system  Outcome: Completed     Problem: Knowledge Deficit  Goal: Patient/family/caregiver demonstrates understanding of disease process, treatment plan, medications, and discharge instructions  Description: Complete learning assessment and assess knowledge base.   Interventions:  - Provide teaching at level of understanding  - Provide teaching via preferred learning methods  Outcome: Completed     Problem: CARDIOVASCULAR - ADULT  Goal: Maintains optimal cardiac output and hemodynamic stability  Description: INTERVENTIONS:  - Monitor I/O, vital signs and rhythm  - Monitor for S/S and trends of decreased cardiac output  - Administer and titrate ordered vasoactive medications to optimize hemodynamic stability  - Assess quality of pulses, skin color and temperature  - Assess for signs of decreased coronary artery perfusion  - Instruct patient to report change in severity of symptoms  Outcome: Completed  Goal: Absence of cardiac dysrhythmias or at baseline rhythm  Description: INTERVENTIONS:  - Continuous cardiac monitoring, vital signs, obtain 12 lead EKG if ordered  - Administer antiarrhythmic and heart rate control medications as ordered  - Monitor electrolytes and administer replacement therapy as ordered  Outcome: Completed     Problem: RESPIRATORY - ADULT  Goal: Achieves optimal ventilation and oxygenation  Description: INTERVENTIONS:  - Assess for changes in respiratory status  - Assess for changes in mentation and behavior  - Position to facilitate oxygenation and minimize respiratory effort  - Oxygen administered by appropriate delivery if ordered  - Initiate smoking cessation education as indicated  - Encourage broncho-pulmonary hygiene including cough, deep breathe, Incentive Spirometry  - Assess the need for suctioning and aspirate as needed  - Assess and instruct to report SOB or any respiratory difficulty  - Respiratory Therapy support as indicated  Outcome: Completed     Problem: GENITOURINARY - ADULT  Goal: Maintains or returns to baseline urinary function  Description: INTERVENTIONS:  - Assess urinary function  - Encourage oral fluids to ensure adequate hydration if ordered  - Administer IV fluids as ordered to ensure adequate hydration  - Administer ordered medications as needed  - Offer frequent toileting  - Follow urinary retention protocol if ordered  Outcome: Completed  Goal: Absence of urinary retention  Description: INTERVENTIONS:  - Assess patient’s ability to void and empty bladder  - Monitor I/O  - Bladder scan as needed  - Discuss with physician/AP medications to alleviate retention as needed  - Discuss catheterization for long term situations as appropriate  Outcome: Completed  Goal: Urinary catheter remains patent  Description: INTERVENTIONS:  - Assess patency of urinary catheter  - If patient has a chronic nunez, consider changing catheter if non-functioning  - Follow guidelines for intermittent irrigation of non-functioning urinary catheter  Outcome: Completed     Problem: Nutrition/Hydration-ADULT  Goal: Nutrient/Hydration intake appropriate for improving, restoring or maintaining nutritional needs  Description: Monitor and assess patient's nutrition/hydration status for malnutrition. Collaborate with interdisciplinary team and initiate plan and interventions as ordered. Monitor patient's weight and dietary intake as ordered or per policy. Utilize nutrition screening tool and intervene as necessary. Determine patient's food preferences and provide high-protein, high-caloric foods as appropriate.      INTERVENTIONS:  - Monitor oral intake, urinary output, labs, and treatment plans  - Assess nutrition and hydration status and recommend course of action  - Evaluate amount of meals eaten  - Assist patient with eating if necessary   - Allow adequate time for meals  - Recommend/ encourage appropriate diets, oral nutritional supplements, and vitamin/mineral supplements  - Order, calculate, and assess calorie counts as needed  - Recommend, monitor, and adjust tube feedings and TPN/PPN based on assessed needs  - Assess need for intravenous fluids  - Provide specific nutrition/hydration education as appropriate  - Include patient/family/caregiver in decisions related to nutrition  Outcome: Completed     Problem: MOBILITY - ADULT  Goal: Maintain or return to baseline ADL function  Description: INTERVENTIONS:  -  Assess patient's ability to carry out ADLs; assess patient's baseline for ADL function and identify physical deficits which impact ability to perform ADLs (bathing, care of mouth/teeth, toileting, grooming, dressing, etc.)  - Assess/evaluate cause of self-care deficits   - Assess range of motion  - Assess patient's mobility; develop plan if impaired  - Assess patient's need for assistive devices and provide as appropriate  - Encourage maximum independence but intervene and supervise when necessary  - Involve family in performance of ADLs  - Assess for home care needs following discharge   - Consider OT consult to assist with ADL evaluation and planning for discharge  - Provide patient education as appropriate  Outcome: Completed  Goal: Maintains/Returns to pre admission functional level  Description: INTERVENTIONS:  - Perform BMAT or MOVE assessment daily.   - Set and communicate daily mobility goal to care team and patient/family/caregiver. - Collaborate with rehabilitation services on mobility goals if consulted  - Perform Range of Motion  times a day. - Reposition patient every  hours.   - Dangle patient  times a day  - Stand patient  times a day  - Ambulate patient  times a day  - Out of bed to chair  times a day   - Out of bed for meals times a day  - Out of bed for toileting  - Record patient progress and toleration of activity level   Outcome: Completed

## 2023-08-02 NOTE — DISCHARGE SUMMARY
[facility]  Discharge- Arnold Human 1950, 67 y.o. female MRN: [id number]  Unit/Bed#: S -01 Encounter: [id number]  Primary Care Provider: David Vasquez MD   Date and time admitted to hospital: 7/29/2023  6:39 AM    * Acute on chronic diastolic heart failure [facility])  Assessment & Plan  Wt Readings from Last 3 Encounters:   08/01/23 59.7 kg (131 lb 9.8 oz)   07/26/23 60.1 kg (132 lb 9.6 oz)   07/16/23 58.7 kg (129 lb 6.6 oz)     · Markedly elevated BNP, dyspnea laying down, CT chest c/f effusions and pulm edema  · Pacemaker interrogated 7/31/2023 and cardiac catheterization 8/1/2023 performed by cardiology. No signs of occlusive disease noted on catheterization. Cardiology cleared patient for home. Plan:    · Continue home doses Toprol XL and Entresto. · Patient started on patient started on torsemide 20 mg once daily      Acute respiratory failure with hypoxia ([facility])  Assessment & Plan  · 88 on RA on admission. · Afebrile and recently completed 10 days abx. · 6 L mid flow overnight. · 90% on 5 L of oxygen via nasal cannula this morning. · 2/2 CHF  · 7/31/2023 patient satting in 90s without O2 supplementation, resolved      Coronary artery disease involving native coronary artery  Assessment & Plan  Coronary cath done 8/1/2023, negative for obstructive disease as cause of worsening ejection fraction. Patient ASCVD calculated at 9%, will continue on atorvastatin 20 mg    Plan:    Continue home medications as above  Start taking atorvastatin 20 mg at home      CKD (chronic kidney disease) stage 3, GFR 30-59 ml/min [facility])  Assessment & Plan  Lab Results   Component Value Date    EGFR 60 08/01/2023    EGFR 52 07/31/2023    EGFR 44 07/30/2023    CREATININE 0.94 08/01/2023    CREATININE 1.06 07/31/2023    CREATININE 1.20 07/30/2023   Estimated Creatinine Clearance: 42.2 mL/min (by C-G formula based on SCr of 0.94 mg/dL).   · Cr at b/l 0.8-1    Hypothyroidism  Assessment & Plan  · Free T4 normal, TSH 9.167, no need for advancement of treatment    Plan    · Continue levothyroxine 100 mcg daily. ILD (interstitial lung disease) [facility])  Assessment & Plan  · Managed by PCP      Medical Problems     Resolved Problems  Date Reviewed: 8/1/2023   None       Discharging Resident: Joanne Hutchins MD  Discharging Attending: No att. providers found  PCP: David Vasquez MD  Admission Date:   Admission Orders (From admission, onward)     Ordered        07/29/23 0917  INPATIENT ADMISSION  Once                      Discharge Date: 08/02/23    Consultations During Hospital Stay:  · Cardiology  · Occupational Therapy  · Physical therapy    Procedures Performed:   · Cardiac catheterization    Significant Findings / Test Results:   · Echo 7/29/2023 determined patient EF 30% down from 55% on 2/4/2023  · Cardiac cath negative for occlusive disease    Incidental Findings:   · None     Test Results Pending at Discharge (will require follow up): · None     Outpatient Tests Requested:  · None    Complications: None    Reason for Admission: Acute respiratory failure with hypoxia secondary to acute on chronic diastolic heart failure    Hospital Course:   Arnold Evans is a 67 y.o. female patient who originally presented to the hospital on 7/29/2023 due to acute onset cough, shortness of breath and orthopnea. Patient was found to be in acute respiratory failure with hypoxia secondary to acute on chronic diastolic heart failure. The patient was evaluated in the ED and was subsequently diuresed and placed on BiPAP to aid with breathing. Patient responded well and improved requiring only nasal cannula until 7/31/2023. Patient had type II MI in the setting of heart failure and hypoxia with troponin elevation trended to 192. Echocardiogram 2/29/2023 revealed lower ejection fraction of 30% compared to previous ejection fraction 55% on 2/4/2023.   Implanted device interrogation excluded arrhythmia as cause. Cardiac catheterization 8/1/2023 excluded occlusive disease as cause. Patient started on torsemide 20 mg once daily as outpatient. Patient was started on atorvastatin 20 mg at dinner due to calculated ASCVD score of 9%. Patient instructed to stop taking furosemide. Please see above list of diagnoses and related plan for additional information. Condition at Discharge: stable    Discharge Day Visit / Exam:   * Please refer to separate progress note for these details *    Discussion with Family: Patient declined call to . Patient called daughter on cell phone. Discharge instructions/Information to patient and family:   See after visit summary for information provided to patient and family. Provisions for Follow-Up Care:  See after visit summary for information related to follow-up care and any pertinent home health orders. Disposition:   Home    Planned Readmission: None    Discharge Medications:  See after visit summary for reconciled discharge medications provided to patient and/or family.       **Please Note: This note may have been constructed using a voice recognition system**

## 2023-08-02 NOTE — PROGRESS NOTES
General Cardiology   Progress Note -  [employer] [facility]   Sheryl Miner 67 y.o. female MRN: [id number]    Unit/Bed#: S -01 Encounter: [id number]    Assessment/ Plan    1. Acute on chronic HFrEF   LVEF 30%  Patient was diuresed with Bumex 2 mg IV TID then BID and held yesterday and today due to cath showing LVEDP 2 mmHg   On GDMT: metoprolol XL and entresto (home medications) Unable to titrate due to borderline BP   Patient took furosemide 20 mg PO BID at home prior to admission  Will discharge on Torsemide 20 mg PO daily, starting today    Creatinine 0.94  Reviewed HF education with patient in length     2. History of tachycardic mediated cardiomyopathy   EF 30% from 55% 2/2023  On GDMT as noted above     3. PAT/AVNRT  S/p ablation x 2     4. SSS s/p PPM     5. Type II MI in the setting of #1     6. Moderate AI and mild to moderate MR   Reviewed on echocardiogram     Patient is scheduled to follow up with Dr. Kenn Lechuga 8/8/2023. Subjective  Patient resting in chair. She reports no complaint of chest pain, SOB or palpitations. No fever or chills. Review of Systems   Constitutional: Negative for chills and fever. HENT: Negative for congestion. Cardiovascular: Negative for chest pain, dyspnea on exertion, leg swelling, orthopnea and palpitations. Respiratory: Negative for shortness of breath. Musculoskeletal: Negative for falls. Gastrointestinal: Negative for bloating, nausea and vomiting. Neurological: Negative for dizziness and light-headedness. Psychiatric/Behavioral: Negative for altered mental status. All other systems reviewed and are negative. Objective:   Vitals: Blood pressure 93/63, pulse 68, temperature 98 °F (36.7 °C), temperature source Oral, resp. rate 16, height 5' 2" (1.575 m), weight 57.1 kg (125 lb 14.1 oz), SpO2 98 %. ,       Body mass index is 23.02 kg/m². ,     Systolic (12YSM), ISZ:74 , Min:85 , KZJ:010     Diastolic (14FIJ), FJV:36, Min:54, Max:63      Intake/Output Summary (Last 24 hours) at 8/2/2023 1030  Last data filed at 8/2/2023 0853  Gross per 24 hour   Intake 60 ml   Output 350 ml   Net -290 ml     Weight (last 2 days)     Date/Time Weight    08/02/23 0553 57.1 (125.88)    08/01/23 0542 59.7 (131.61)    07/31/23 0605 59.9 (132.06)    07/31/23 0600 59.9 (132.06)        Telemetry Review: Normal sinus rhythm    Physical Exam  Constitutional:       General: She is not in acute distress. HENT:      Head: Normocephalic. Mouth/Throat:      Mouth: Mucous membranes are moist.   Cardiovascular:      Rate and Rhythm: Normal rate and regular rhythm. Pulses: Normal pulses. Pulmonary:      Effort: Pulmonary effort is normal.      Breath sounds: Normal breath sounds. Abdominal:      General: Bowel sounds are normal.      Palpations: Abdomen is soft. Musculoskeletal:         General: No swelling. Normal range of motion. Cervical back: Neck supple. Skin:     General: Skin is warm and dry. Capillary Refill: Capillary refill takes less than 2 seconds. Neurological:      Mental Status: She is alert and oriented to person, place, and time.    Psychiatric:         Mood and Affect: Mood normal.         LABORATORY RESULTS      CBC with diff:   Results from last 7 days   Lab Units 08/01/23 0542 07/31/23 0448 07/30/23 0502 07/29/23  0659   WBC Thousand/uL 4.02* 5.88 9.69 10.28*   HEMOGLOBIN g/dL 12.4 10.8* 11.0* 12.1   HEMATOCRIT % 40.6 35.0 34.3* 37.9   MCV fL 93 93 92 92   PLATELETS Thousands/uL 132* 114* 139* 185   RBC Million/uL 4.36 3.78* 3.75* 4.13   MCH pg 28.4 28.6 29.3 29.3   MCHC g/dL 30.5* 30.9* 32.1 31.9   RDW % 15.2* 15.7* 15.7* 15.3*   MPV fL 11.6 12.2 11.4 11.4   NRBC AUTO /100 WBCs  --   --   --  0       CMP:  Results from last 7 days   Lab Units 08/01/23 0542 07/31/23 0448 07/30/23  0502 07/29/23  1753 07/29/23  0659   POTASSIUM mmol/L 3.8 4.0 4.8 4.9 3.6   CHLORIDE mmol/L 98 98 101 100 101   CO2 mmol/L 34* 35* 28 27 28   BUN mg/dL 30* 24 18 15 12 CREATININE mg/dL 0.94 1.06 1.20 1.04 0.87   CALCIUM mg/dL 8.7 8.5 9.0 8.8 9.0   AST U/L  --   --  51* 59* 64*   ALT U/L  --   --  36 36 33   ALK PHOS U/L  --   --  102 103 96   EGFR ml/min/1.73sq m 60 52 44 53 66       BMP:  Results from last 7 days   Lab Units 08/01/23  0542 07/31/23  0448 07/30/23  0502 07/29/23  1753 07/29/23  0659   POTASSIUM mmol/L 3.8 4.0 4.8 4.9 3.6   CHLORIDE mmol/L 98 98 101 100 101   CO2 mmol/L 34* 35* 28 27 28   BUN mg/dL 30* 24 18 15 12   CREATININE mg/dL 0.94 1.06 1.20 1.04 0.87   CALCIUM mg/dL 8.7 8.5 9.0 8.8 9.0       Lab Results   Component Value Date    NTBNP 45,371 (H) 02/14/2023    NTBNP 1,799 (H) 02/04/2023    NTBNP 4,408 (H) 01/10/2022             Results from last 7 days   Lab Units 07/31/23  0448 07/30/23  0502 07/29/23  0659   MAGNESIUM mg/dL 2.1 1.7* 1.8*                 Results from last 7 days   Lab Units 07/29/23  0659   TSH 3RD GENERATON uIU/mL 9.167*   FREE T4 ng/dL 1.07       Results from last 7 days   Lab Units 07/29/23  0659   INR  1.03       Lipid Profile:   No results found for: "CHOL"  Lab Results   Component Value Date    HDL 50 02/04/2023    HDL 49 (L) 10/14/2022    HDL 58 03/05/2022     Lab Results   Component Value Date    LDLCALC 84 02/04/2023    LDLCALC 109 (H) 10/14/2022    LDLCALC 131 (H) 03/05/2022     Lab Results   Component Value Date    TRIG 60 02/04/2023    TRIG 117 10/14/2022    TRIG 101 03/05/2022       Cardiac testing:   No results found for this or any previous visit. No results found for this or any previous visit. No results found for this or any previous visit. No valid procedures specified. No results found for this or any previous visit.     Meds/Allergies   all current active meds have been reviewed and current meds:   Current Facility-Administered Medications   Medication Dose Route Frequency   • acetaminophen (TYLENOL) tablet 650 mg  650 mg Oral Q6H PRN   • albuterol inhalation solution 2.5 mg  2.5 mg Nebulization Q4H PRN   • ALPRAZolam (XANAX) tablet 0.25 mg  0.25 mg Oral Daily PRN   • atorvastatin (LIPITOR) tablet 20 mg  20 mg Oral Daily With Dinner   • benzonatate (TESSALON PERLES) capsule 100 mg  100 mg Oral TID PRN   • enoxaparin (LOVENOX) subcutaneous injection 40 mg  40 mg Subcutaneous Daily   • levothyroxine tablet 100 mcg  100 mcg Oral Early Morning   • melatonin tablet 3 mg  3 mg Oral HS   • metoprolol succinate (TOPROL-XL) 24 hr tablet 25 mg  25 mg Oral Q12H [place]   • sacubitril-valsartan (ENTRESTO) 24-26 MG per tablet 1 tablet  1 tablet Oral BID     Medications Prior to Admission   Medication   • Entresto 24-26 MG TABS   • furosemide (LASIX) 20 mg tablet   • levothyroxine 100 mcg tablet   • metoprolol succinate (TOPROL-XL) 25 mg 24 hr tablet   • ondansetron (ZOFRAN-ODT) 4 mg disintegrating tablet     Counseling / Coordination of Care  Total floor / unit time spent today 20 minutes. Greater than 50% of total time was spent with the patient and / or family counseling and / or coordination of care. ** Please Note: Dragon 360 Dictation voice to text software may have been used in the creation of this document.  **

## 2023-08-03 ENCOUNTER — TRANSITIONAL CARE MANAGEMENT (OUTPATIENT)
Dept: FAMILY MEDICINE CLINIC | Facility: CLINIC | Age: 73
End: 2023-08-03

## 2023-08-03 ENCOUNTER — PATIENT OUTREACH (OUTPATIENT)
Dept: FAMILY MEDICINE CLINIC | Facility: CLINIC | Age: 73
End: 2023-08-03

## 2023-08-03 DIAGNOSIS — Z71.89 COMPLEX CARE COORDINATION: Primary | ICD-10-CM

## 2023-08-03 LAB
BACTERIA BLD CULT: NORMAL
BACTERIA BLD CULT: NORMAL

## 2023-08-03 NOTE — PROGRESS NOTES
Received referral via in basket message. Chart reviewed. Pt was admitted 7/29-8/2/23 for episode of acute on chronic diastolic heart failure. Had a cardiac cath with outcome of no occlusions. Discharged to home. Call to patient. Introduced self. Pt agrees to complex care management telephone calls. Provided my contact phone number. Reviewed AVS and medication list.  Pt has all medications in the home and taking as directed. She has a blood pressure cuff however her daughter borrowed it and will bring back to her. Advised to monitor her blood pressure daily and bring readings to Cardiology appointment next week. Pt has a scale in home. Weight today self reported as 125.8. Using teach back, pt shared she is to call Cardiologist for weight gain of 3lbs/day or 5lbs/week. She is following a  Cardiac diet. Denies shortness of breath at this time on room air. We reviewed the Heart Failure Zone tool and copy placed in mail to patient. Has appointment with Cardiology 8/8/23 at 3:00pm  PCP 8/19/23 at 3:20pm  Provided information on after hour coverage with Health Call Triage Nurses. All questions answered at this time. Pt prefers next outreach call in two weeks.

## 2023-08-03 NOTE — UTILIZATION REVIEW
NOTIFICATION OF ADMISSION DISCHARGE   This is a Notification of Discharge from [address] [place] [address]. Please be advised that this patient has been discharge from our facility. Below you will find the admission and discharge date and time including the patient’s disposition. UTILIZATION REVIEW CONTACT:  Marylin Barthel, MA  Utilization   Network Utilization Review Department  Phone: [phone] x carefully listen to the prompts. All voicemails are confidential.  Email: [email or web address]     ADMISSION INFORMATION  PRESENTATION DATE: 7/29/2023  6:39 AM  OBERVATION ADMISSION DATE:   INPATIENT ADMISSION DATE: 7/29/23  9:17 AM   DISCHARGE DATE: 8/2/2023  5:17 PM   DISPOSITION:Home/Self Care    IMPORTANT INFORMATION:  Send all requests for admission clinical reviews, approved or denied determinations and any other requests to dedicated fax number below belonging to the campus where the patient is receiving treatment.  List of dedicated fax numbers:  [place] DENIALS (Administrative/Medical Necessity) [phone] [facility] [facility] [phone]   [facility] [phone]   [facility] [phone] [address] [facility] [phone] [address] [facility] [phone]   [facility] [phone]   [address] [place] [address] [address] [place] [address] [phone]   [address] [place] [address] [phone] [address] [facility] [phone] [address] [facility] [address] [facility] [phone]

## 2023-08-07 ENCOUNTER — RA CDI HCC (OUTPATIENT)
Dept: OTHER | Facility: HOSPITAL | Age: 73
End: 2023-08-07

## 2023-08-07 NOTE — PROGRESS NOTES
Advanced Heart Failure/Pulmonary Hypertension Outpatient Note - Luz Maria Wakefield 67 y.o. female MRN: [id number]    @ Encounter: [id number]    Assessment:  67 y.o. female Hx per chart p/w HF fu.    Recent DC after ADHF and suspected stress CM 8/2/23  Follows Dr. Will Cope gen cards and EP  NICM, HFrEF, EF remotely down 2/2 TIC>then recovered to 55% 2/2023>then recent admit 7/2023 for suspected stress CM with re-reduced EF 30%  Echo pattern was concordant with stress CM  CM trigger may have been severely stressful life event around 6/2686 implicating her grandchildren (both ended up ok, one had a LOC type of event and hospital admission)  Nonobstructive CAD. 8/1/23 LHC: moderate diffuse LAD dz, nonobstructive. LVEDP was 2 after diuresis as inpt.   PAT vs AVNRT, s/p past ablations x2  SSS Has D-PPM placed 2/2023, RA lead, RV lead - His lead - with LPF capture   LINQ implanted 2022  Moderate AI and MR  ILD per chart  Remote smoker tobacco  No illicit drugs or heavy etoh  Many family members with cardiac evaluations including her siblings, no clear diagnoses she recalls      I have reviewed all pertinent patient data including but not limited to:    Lab Results   Component Value Date    CREATININE 0.94 08/01/2023     Lab Results   Component Value Date    K 3.8 08/01/2023     Lab Results   Component Value Date    HGBA1C 5.2 10/14/2022     Lab Results   Component Value Date    FHQ5HXGGSZKY 9.167 (H) 07/29/2023    TSH 4.70 (H) 04/09/2022     Lab Results   Component Value Date    LDLCALC 84 02/04/2023     Lab Results   Component Value Date    BNP >4,700 (H) 07/29/2023      Lab Results   Component Value Date    NTBNP 45,371 (H) 02/14/2023        TODAY'S PLAN:  I am meeting patient for the first time today  Warm, euvolemic  Feels well, no new cardiac complaints  More active since recent DC, fatigue improving, no overt SOB, no dizzines or syncope    gdmt below  low BP somewhat limiting  Advance and then recheck echo for EF 11/2023    Discussed help regarding her anxiety over the event with her grandchildren; she declines and is feeling better    Fu PCP for deranged thyroids    Neurohormonal Blockade/GDMT:  --Beta-Blocker: toprol xl 25 qd  --ACEi, ARB, ARNi: entresto 24/26 bid  --MRA: starting aldactone 25 qd, fu BMP in 1 week  --SGLT2i: future  --Hydralazine/nitrates: none    --Diuretic: on torsemide 20 qd>decrease to 10 qd  Long discussion about evidence of worsening HF, when to self uptitrate and call us    Other HF pharmaco-invasive therapy (if applicable):   PPM,  ICD , CRT (if applicable): Interrogation:  6/13/23:  MDT DUAL CHAMBER PPM (MVP ON) - ACTIVE SYSTEM IS MRI CONDITIONAL   DEVICE INTERROGATED IN THE [place] OFFICE: BATTERY VOLTAGE ADEQUATE (14.7 YRS). AP 12%  7.9% (AAI-DDD 60PPM); ALL LEAD PARAMETERS WITHIN NORMAL LIMITS/STABLE; 3 VT-NS EPISODES WITH NSVT ON EGM'S (10 @ 180 BPM, 13 @ 168 BPM, 5 @ 222 BPM); EF 55% (2/4/2023 ECHO); PATIENT ON METOPROLOL SUCC; DECREASE MADE TO RA AMPLITUDE TO PROMOTE DEVICE LONGEVITY WHILE MAINTAINING AN APPROPRIATE SAFETY MARGIN. TASK TO DR. CUETO FOR NSVT, >200 BPM; NORMAL DEVICE FUNCTION. Advanced Therapies (if applicable): --Inotrope:  --LVAD/Transplant Candidacy:    Studies:  I have reviewed all pertinent patient data/labs/imaging where available, including but not limited to the below studies. Selected results may be displayed here but comprehensive listing is omitted for note clarity and can be found in the epic chart. ECG. Echo. Stress. Cath. HPI:   67 y.o. female Hx per chart p/w HF fu.  No new CP/SOB/dizziness/palpitations/syncope. No new fatigue. No new unintentional weight changes. No new leg swelling, PND, pillow orthopnea. No new fevers, chills, cough, nausea, vomiting, diarrhea, dysuria. Interval History:   As noted in 'plan' section above and prior epic chart notes.     Past Medical History:   Diagnosis Date   • Abnormal CXR 1/14/2022   • Cancer [facility])    • Disease of thyroid gland    • Dysuria 12/16/2021   • Encounter for support and coordination of transition of care 5/6/2021     Patient Active Problem List    Diagnosis Date Noted   • Coronary artery disease involving native coronary artery 08/01/2023   • Acute respiratory failure with hypoxia ([address]) 07/29/2023   • Splenic cyst 07/26/2023   • Lung infiltrate 07/26/2023   • Gall stones 07/26/2023   • Thoracic aortic ectasia ([address]) 05/11/2023   • AV block, postoperative 02/15/2023   • Atrial tachycardia ([address]) 02/03/2023   • Paroxysmal atrial flutter ([address]) 10/05/2022   • Elevated blood sugar 05/10/2022   • Menopause 05/10/2022   • Dilated cardiomyopathy ([address]) 05/04/2022   • Pulmonary hypertension ([address]) 05/03/2022   • ILD (interstitial lung disease) ([address]) 05/03/2022   • Hard of hearing 05/03/2022   • Allergies 03/17/2022   • CKD (chronic kidney disease) stage 3, GFR 30-59 ml/min ([facility]) 01/25/2022   • Acute on chronic diastolic heart failure ([address]) 01/17/2022   • Paroxysmal atrial fibrillation ([address]) 01/10/2022   • Dyslipidemia 10/07/2021   • Hypothyroidism 05/06/2021   • Myelodysplasia (myelodysplastic syndrome) ([address]) 07/26/2017   • Waldenstrom macroglobulinemia ([address]) 07/26/2017   • History of Hodgkin's lymphoma 07/26/2017   • Rash 07/26/2017       ROS:  10 point ROS negative except as specified in HPI/interval history    Allergies   Allergen Reactions   • Penicillins Rash       Current Outpatient Medications   Medication Instructions   • atorvastatin (LIPITOR) 20 mg, Oral, Daily with dinner   • Entresto 24-26 MG TABS TAKE 1 TABLET BY MOUTH TWICE A DAY   • levothyroxine 100 mcg tablet TAKE 1 TABLET BY MOUTH EVERY DAY   • metoprolol succinate (TOPROL-XL) 25 mg 24 hr tablet TAKE 1 TABLET BY MOUTH TWICE A DAY   • spironolactone (ALDACTONE) 25 mg, Oral, Daily   • torsemide (DEMADEX) 20 mg, Oral, Daily        Social History     Socioeconomic History   • Marital status:      Spouse name: Not on file   • Number of children: Not on file   • Years of education: Not on file   • Highest education level: Not on file   Occupational History   • Not on file   Tobacco Use   • Smoking status: Former     Packs/day: 1.00     Years: 10.00     Total pack years: 10.00     Types: Cigarettes     Start date: 79     Quit date: 1993     Years since quittin.6   • Smokeless tobacco: Never   Vaping Use   • Vaping Use: Never used   Substance and Sexual Activity   • Alcohol use: Never   • Drug use: Never   • Sexual activity: Never   Other Topics Concern   • Not on file   Social History Narrative    Most recent tobacco use screenin2018     Social Determinants of Health     Financial Resource Strain: Low Risk  (2023)    Overall Financial Resource Strain (CARDIA)    • Difficulty of Paying Living Expenses: Not hard at all   Food Insecurity: No Food Insecurity (2023)    Hunger Vital Sign    • Worried About Running Out of Food in the Last Year: Never true    • Ran Out of Food in the Last Year: Never true   Transportation Needs: No Transportation Needs (2023)    PRAPARE - Transportation    • Lack of Transportation (Medical): No    • Lack of Transportation (Non-Medical):  No   Physical Activity: Not on file   Stress: Not on file   Social Connections: Not on file   Intimate Partner Violence: Not on file   Housing Stability: Low Risk  (2023)    Housing Stability Vital Sign    • Unable to Pay for Housing in the Last Year: No    • Number of Places Lived in the Last Year: 2    • Unstable Housing in the Last Year: No       Family History   Problem Relation Age of Onset   • No Known Problems Mother    • No Known Problems Father    • Cancer Maternal Aunt        Physical Exam:  Vitals:    23 1454   BP: 102/68   BP Location: Left arm   Patient Position: Sitting   Cuff Size: Standard   Pulse: 75   SpO2: 95%   Weight: 57.6 kg (127 lb)   Height: 5' 2" (1.575 m)     Constitutional: NAD, non toxic  Ears/nose/mouth/throat: atraumatic  CV: RRR, nl S1S2, no murmurs/rubs/gallups, no JVD, no HJR  Resp: ctabl  GI: Soft, NTND  MSK: no swollen joints in exposed areas  Extr: No edema, warm LE  Pysche: Normal affect  Neuro: appropriate in conversation  Skin: dry and intact in exposed areas    Labs & Results:    Lab Results   Component Value Date    SODIUM 141 08/01/2023    K 3.8 08/01/2023    CL 98 08/01/2023    CO2 34 (H) 08/01/2023    BUN 30 (H) 08/01/2023    CREATININE 0.94 08/01/2023    GLUC 104 08/01/2023    CALCIUM 8.7 08/01/2023     Lab Results   Component Value Date    WBC 4.02 (L) 08/01/2023    HGB 12.4 08/01/2023    HCT 40.6 08/01/2023    MCV 93 08/01/2023     (L) 08/01/2023     Lab Results   Component Value Date    BNP >4,700 (H) 07/29/2023      Lab Results   Component Value Date    CHOLESTEROL 146 02/04/2023    CHOLESTEROL 180 10/14/2022    CHOLESTEROL 209 (H) 03/05/2022     Lab Results   Component Value Date    HDL 50 02/04/2023    HDL 49 (L) 10/14/2022    HDL 58 03/05/2022     Lab Results   Component Value Date    TRIG 60 02/04/2023    TRIG 117 10/14/2022    TRIG 101 03/05/2022     No results found for: "[address]"    Counseling / Coordination of Care  Time spent today 27 minutes. Greater than 50% of total time was spent with the patient and / or family counseling and / or coordination of care. We discussed diagnoses, most recent studies, tests and any changes in treatment plan. Thank you for the opportunity to participate in the care of this patient.     Dom Wang MD  Attending Physician  Advanced Heart Failure and Transplant Cardiology  [address] [facility]

## 2023-08-07 NOTE — PROGRESS NOTES
[address] coding opportunities       Chart reviewed, no opportunity found: [address] Review     Patients Insurance     Medicare Insurance: [employer]

## 2023-08-08 ENCOUNTER — OFFICE VISIT (OUTPATIENT)
Dept: CARDIOLOGY CLINIC | Facility: CLINIC | Age: 73
End: 2023-08-08
Payer: COMMERCIAL

## 2023-08-08 VITALS
HEIGHT: 62 IN | OXYGEN SATURATION: 95 % | HEART RATE: 75 BPM | WEIGHT: 127 LBS | BODY MASS INDEX: 23.37 KG/M2 | SYSTOLIC BLOOD PRESSURE: 102 MMHG | DIASTOLIC BLOOD PRESSURE: 68 MMHG

## 2023-08-08 DIAGNOSIS — I50.33 ACUTE ON CHRONIC DIASTOLIC HEART FAILURE (HCC): ICD-10-CM

## 2023-08-08 DIAGNOSIS — I47.1 ATRIAL TACHYCARDIA (HCC): ICD-10-CM

## 2023-08-08 DIAGNOSIS — J81.0 ACUTE PULMONARY EDEMA (HCC): ICD-10-CM

## 2023-08-08 DIAGNOSIS — I51.81 STRESS-INDUCED CARDIOMYOPATHY: Primary | ICD-10-CM

## 2023-08-08 DIAGNOSIS — I42.0 DILATED CARDIOMYOPATHY (HCC): ICD-10-CM

## 2023-08-08 DIAGNOSIS — I25.10 CORONARY ARTERY DISEASE INVOLVING NATIVE CORONARY ARTERY OF NATIVE HEART WITHOUT ANGINA PECTORIS: ICD-10-CM

## 2023-08-08 PROCEDURE — 99214 OFFICE O/P EST MOD 30 MIN: CPT | Performed by: STUDENT IN AN ORGANIZED HEALTH CARE EDUCATION/TRAINING PROGRAM

## 2023-08-08 RX ORDER — SPIRONOLACTONE 25 MG/1
25 TABLET ORAL DAILY
Qty: 90 TABLET | Refills: 5 | Status: SHIPPED | OUTPATIENT
Start: 2023-08-08 | End: 2025-01-29

## 2023-08-08 RX ORDER — TORSEMIDE 20 MG/1
10 TABLET ORAL DAILY
Qty: 30 TABLET | Refills: 0 | Status: SHIPPED | OUTPATIENT
Start: 2023-08-08

## 2023-08-10 ENCOUNTER — OFFICE VISIT (OUTPATIENT)
Dept: FAMILY MEDICINE CLINIC | Facility: CLINIC | Age: 73
End: 2023-08-10
Payer: COMMERCIAL

## 2023-08-10 ENCOUNTER — RA CDI HCC (OUTPATIENT)
Dept: OTHER | Facility: HOSPITAL | Age: 73
End: 2023-08-10

## 2023-08-10 VITALS
SYSTOLIC BLOOD PRESSURE: 120 MMHG | DIASTOLIC BLOOD PRESSURE: 72 MMHG | BODY MASS INDEX: 23.37 KG/M2 | WEIGHT: 127 LBS | HEART RATE: 75 BPM | OXYGEN SATURATION: 100 % | HEIGHT: 62 IN

## 2023-08-10 DIAGNOSIS — E03.9 HYPOTHYROIDISM, UNSPECIFIED TYPE: ICD-10-CM

## 2023-08-10 DIAGNOSIS — I47.1 ATRIAL TACHYCARDIA (HCC): ICD-10-CM

## 2023-08-10 DIAGNOSIS — E03.9 ACQUIRED HYPOTHYROIDISM: Primary | ICD-10-CM

## 2023-08-10 DIAGNOSIS — J96.01 ACUTE RESPIRATORY FAILURE WITH HYPOXIA (HCC): ICD-10-CM

## 2023-08-10 DIAGNOSIS — I50.33 ACUTE ON CHRONIC DIASTOLIC HEART FAILURE (HCC): ICD-10-CM

## 2023-08-10 PROCEDURE — 99496 TRANSJ CARE MGMT HIGH F2F 7D: CPT | Performed by: FAMILY MEDICINE

## 2023-08-10 NOTE — ASSESSMENT & PLAN NOTE
TSH 9.1, free T4 normal.  Patient is on levothyroxine 100 mcg daily. Advised to repeat thyroid panel at 4 weeks interval, will be contacted with results.

## 2023-08-10 NOTE — PROGRESS NOTES
FAMILY MEDICINE TRANSITION OF CARE OFFICE VISIT  [facility] - [facility]    NAME: Sheryle Black  AGE: 67 y.o. SEX: female  : 1950       DATE: 8/10/2023    Assessment and Plan     Hypothyroidism  TSH 9.1, free T4 normal.  Patient is on levothyroxine 100 mcg daily. Advised to repeat thyroid panel at 4 weeks interval, will be contacted with results. Acute respiratory failure with hypoxia (HCC)  Symptoms resolved, oxygen saturation is back to normal.    Acute on chronic diastolic heart failure (HCC)  Wt Readings from Last 3 Encounters:   08/10/23 57.6 kg (127 lb)   23 57.6 kg (127 lb)   23 57.1 kg (125 lb 14.1 oz)     Symptoms of dyspnea improved. Patient is back to her baseline. Continue beta-blocker, spironolactone, torsemide per cardiology. Atrial tachycardia (HCC)  Continue metoprolol and Entresto per cardiology. - Counseling Documentation: patient was counseled regarding: diagnostic results, instructions for management, risk factor reductions, prognosis, patient and family education, impressions, risks and benefits of treatment options and importance of compliance with treatment    Transitional Care Management Review     Sheryle Black is a 67 y.o. female here for TCM follow-up    During the TCM phone call patient stated:    TCM Call     Date and time call was made  8/3/2023  9:53 AM    Hospital care reviewed  Records reviewed    Patient was hospitialized at  [address] [place] [address]    Date of Admission  23    Date of discharge  23    Diagnosis  Acute on chronic diastolic heart failure    Disposition  Home    Were the patients medications reviewed and updated  Yes    Current Symptoms  None      TCM Call     Post hospital issues  None    Should patient be enrolled in anticoag monitoring? No    Scheduled for follow up?   Yes    Patients specialists  Cardiologist    Did you obtain your prescribed medications  Yes    Do you need help managing your prescriptions or medications  No    Is transportation to your appointment needed  No    I have advised the patient to call PCP with any new or worsening symptoms  Brigitte Schaumann, [address]  Family    The type of support provided  None    Do you have social support  Yes, as much as I need    Are you recieving any outpatient services  No    Are you recieving home care services  No    Are you using any community resources  No    Current waiver services  No    Have you fallen in the last 12 months  No    Interperter language line needed  No    Counseling  Patient          History of Present Illness     HPI    Patient following up from recent hospital discharge. Admitted for evaluation of symptoms of acute onset cough, shortness of breath and orthopnea. Found to be in acute respiratory failure with hypoxia secondary to acute on chronic diastolic heart failure. Patient noted to have elevated troponin, echo revealed low ejection fraction of 30% compared to previous EF of 55% in February. Patient was started on torsemide, and was closely monitored by cardiology. Also noted to have an elevated TSH of 9.1, however T4 was within normal limits. She is on levothyroxine 100 mcg. Patient has had her outpatient cardiology follow-up. Has another follow-up in 2 weeks. Denies any symptoms of chest pain or shortness of breath today. Is back to her baseline now. The following portions of the patient's history were reviewed and updated as appropriate: allergies, current medications, past family history, past medical history, past social history, past surgical history and problem list.    Review of Systems       Review of Systems   Constitutional: Negative. Respiratory: Negative. Cardiovascular: Negative.         Active Problem List     Patient Active Problem List   Diagnosis   • Hypothyroidism   • Myelodysplasia (myelodysplastic syndrome) ([address])   • Waldenstrom macroglobulinemia (HCC)   • Dyslipidemia   • Paroxysmal atrial fibrillation (HCC)   • History of Hodgkin's lymphoma   • Rash   • Acute on chronic diastolic heart failure (HCC)   • CKD (chronic kidney disease) stage 3, GFR 30-59 ml/min (HCC)   • Allergies   • Pulmonary hypertension (HCC)   • ILD (interstitial lung disease) (HCC)   • Hard of hearing   • Dilated cardiomyopathy (HCC)   • Elevated blood sugar   • Menopause   • Paroxysmal atrial flutter (HCC)   • Atrial tachycardia (HCC)   • AV block, postoperative   • Thoracic aortic ectasia (HCC)   • Splenic cyst   • Lung infiltrate   • Gall stones   • Acute respiratory failure with hypoxia (HCC)   • Coronary artery disease involving native coronary artery       Objective     /72   Pulse 55   Ht 5' 2" (1.575 m)   Wt 57.6 kg (127 lb)   SpO2 100%   BMI 23.23 kg/m²     Physical Exam  Constitutional:       Appearance: Normal appearance. Cardiovascular:      Heart sounds: Normal heart sounds. Pulmonary:      Breath sounds: Normal breath sounds. Abdominal:      Palpations: Abdomen is soft. Neurological:      Mental Status: She is oriented to person, place, and time. Psychiatric:         Mood and Affect: Mood normal.         Laboratory Results: I have personally reviewed the pertinent laboratory results/reports     Radiology/Other Diagnostic Testing Results: I have personally reviewed pertinent reports. Echo follow up/limited w/ contrast if indicated    Result Date: 7/29/2023  •  Left Ventricle: Left ventricular cavity size is normal. Wall thickness is increased. The left ventricular ejection fraction is 30% by visual estimation. . Systolic function is severely reduced. There is no thrombus. •  The following segments are akinetic: mid anterior, mid anteroseptal, mid inferoseptal, mid inferior, mid inferolateral, mid anterolateral, apical anterior, apical septal, apical inferior, apical lateral and apex.  •  All other segments are normal. • Right Ventricle: Right ventricular cavity size is normal. Systolic function is normal. Wall motion is abnormal. The mid to apical free wall is akinetic. •  Atrial Septum: There is a tiny patent foramen ovale confirmed at rest with predominant left to right shunting. •  Aortic Valve: There is moderate regurgitation. There is a small, mobile echodensity present on the aortic aspect which likely represents a Lambl's excrescence. •  Mitral Valve: There is moderate annular calcification. There is mild to moderate regurgitation with a centrally directed jet. •  Tricuspid Valve: There is moderate regurgitation. The right ventricular systolic pressure is mildly elevated. The estimated right ventricular systolic pressure is 06.30 mmHg. •  Pulmonic Valve: There is mild regurgitation. •  Aorta: The aortic root is mildly dilated at 4.3 cm. XR chest 1 view portable    Result Date: 7/29/2023  CHEST INDICATION:   sob. COMPARISON: CXR 2/17/2023 and chest CT 7/29/2023. EXAM PERFORMED/VIEWS:  XR CHEST PORTABLE. FINDINGS: Mild cardiomegaly with left subclavian pacemaker leads in the right atrium and right ventricle. Pulmonary edema superimposed on pulmonary fibrosis. Possible patchy pneumonia. No effusion or pneumothorax. Upper abdomen normal. Mild curvature of the spine. Moderate pulmonary edema superimposed on pulmonary fibrosis. Pneumonia not excluded in the appropriate clinical setting. Workstation performed: [id number]     CTA ED chest PE study    Result Date: 7/29/2023  CTA - CHEST WITH IV CONTRAST - PULMONARY ANGIOGRAM INDICATION:   Pulmonary embolism (PE) suspected, positive D-dimer sob, cough, elevated D-dimer. CTs of the chest from Eranna 3, 2022 and June 22, 2023 COMPARISON: None. TECHNIQUE: CTA examination of the chest was performed using angiographic technique according to a protocol specifically tailored to evaluate for pulmonary embolism. Multiplanar 2D reformatted images were created from the source data.  In addition, coronal 3D MIP postprocessing was performed on the acquisition scanner. This examination was performed utilizing dual energy CT technique. Radiation dose length product (DLP) for this visit:  225 mGy-cm . This examination, like all CT scans performed in the [facility], was performed utilizing techniques to minimize radiation dose exposure, including the use of iterative reconstruction and automated exposure control. IV Contrast:  60 mL of iohexol (OMNIPAQUE) FINDINGS: PULMONARY ARTERIAL TREE:  No pulmonary embolus is seen. Mild dilatation of the central pulmonary arteries, suggesting pulmonary arterial hypertension. LUNGS: Reticulation, groundglass attenuation and honeycombing in the bases of both lower lobes and right middle lobe, similar in appearance to a high-resolution CT of the chest from 6/3/2022, typical of chronic interstitial lung disease. Prominent interlobular septal thickening, developing since earlier CTs. Chronic bilateral apical fibrosis patchy irregular subsolid nodules in both lower lobes and right upper and middle lobes as well as patchy consolidation in the bases of both lower lobes, not present last month. Mild bilateral lower lobe and right middle lobe bronchiectasis. PLEURA: Small bilateral pleural effusions, developing since 6/22/2023. HEART/GREAT VESSELS: Heart mildly enlarged. Permanent pacemaker present. Mild ectasia of the ascending aorta, 4.1 cm in maximal diameter. MEDIASTINUM AND ALAN: No lymphadenopathy or mass. Dilatation of the upper third of the esophagus, similar to earlier CTs, most likely due to chronic esophageal dysmotility. Small hiatal hernia. Trachea and main stem bronchi normal. CHEST WALL AND LOWER NECK:   Unremarkable. VISUALIZED STRUCTURES IN THE UPPER ABDOMEN:  Unremarkable. OSSEOUS STRUCTURES: Scoliosis. Advanced degenerative disc disease at T11-T12. No recent fracture or destructive lesion. 1.  No evidence of pulmonary embolus.  2. Chronic interstitial lung disease with a UIP or IPF pattern as well as mild bibasilar bronchiectasis. 3.  Findings consistent with CHF, including small pleural effusions and interstitial pulmonary edema. 4.  Patchy nodular opacities and consolidation in both lower lobes, consistent with multifocal pneumonia or, more likely, pulmonary edema.  Workstation performed: [id number]        Current Medications     Current Outpatient Medications:   •  atorvastatin (LIPITOR) 20 mg tablet, Take 1 tablet (20 mg total) by mouth daily with dinner, Disp: 30 tablet, Rfl: 0  •  Entresto 24-26 MG TABS, TAKE 1 TABLET BY MOUTH TWICE A DAY, Disp: 60 tablet, Rfl: 5  •  levothyroxine 100 mcg tablet, TAKE 1 TABLET BY MOUTH EVERY DAY, Disp: 90 tablet, Rfl: 0  •  metoprolol succinate (TOPROL-XL) 25 mg 24 hr tablet, TAKE 1 TABLET BY MOUTH TWICE A DAY, Disp: 180 tablet, Rfl: 4  •  spironolactone (ALDACTONE) 25 mg tablet, Take 1 tablet (25 mg total) by mouth daily, Disp: 90 tablet, Rfl: 5  •  torsemide (DEMADEX) 20 mg tablet, Take 0.5 tablets (10 mg total) by mouth daily, Disp: 30 tablet, Rfl: 0    Tahmina Lei MD  [address] [facility]

## 2023-08-10 NOTE — PROGRESS NOTES
[address] [facility] coding opportunities     I13.0, I27.20     Chart Reviewed number of suggestions sent to Provider: 2     GR    Patients Insurance     Medicare Insurance: [employer]

## 2023-08-10 NOTE — ASSESSMENT & PLAN NOTE
Wt Readings from Last 3 Encounters:   08/10/23 57.6 kg (127 lb)   08/08/23 57.6 kg (127 lb)   08/02/23 57.1 kg (125 lb 14.1 oz)     Symptoms of dyspnea improved. Patient is back to her baseline. Continue beta-blocker, spironolactone, torsemide per cardiology.

## 2023-08-16 ENCOUNTER — APPOINTMENT (OUTPATIENT)
Dept: LAB | Facility: CLINIC | Age: 73
End: 2023-08-16
Payer: COMMERCIAL

## 2023-08-16 DIAGNOSIS — I42.0 DILATED CARDIOMYOPATHY (HCC): ICD-10-CM

## 2023-08-16 LAB
ALBUMIN SERPL BCP-MCNC: 4.1 G/DL (ref 3.5–5)
ALP SERPL-CCNC: 67 U/L (ref 34–104)
ALT SERPL W P-5'-P-CCNC: 7 U/L (ref 7–52)
ANION GAP SERPL CALCULATED.3IONS-SCNC: 8 MMOL/L
AST SERPL W P-5'-P-CCNC: 14 U/L (ref 13–39)
BASOPHILS # BLD AUTO: 0.04 THOUSANDS/ÂΜL (ref 0–0.1)
BASOPHILS NFR BLD AUTO: 1 % (ref 0–1)
BILIRUB SERPL-MCNC: 0.75 MG/DL (ref 0.2–1)
BUN SERPL-MCNC: 26 MG/DL (ref 5–25)
CALCIUM SERPL-MCNC: 9.4 MG/DL (ref 8.4–10.2)
CHLORIDE SERPL-SCNC: 100 MMOL/L (ref 96–108)
CO2 SERPL-SCNC: 32 MMOL/L (ref 21–32)
CREAT SERPL-MCNC: 1.13 MG/DL (ref 0.6–1.3)
EOSINOPHIL # BLD AUTO: 0.16 THOUSAND/ÂΜL (ref 0–0.61)
EOSINOPHIL NFR BLD AUTO: 3 % (ref 0–6)
ERYTHROCYTE [DISTWIDTH] IN BLOOD BY AUTOMATED COUNT: 15.7 % (ref 11.6–15.1)
GFR SERPL CREATININE-BSD FRML MDRD: 48 ML/MIN/1.73SQ M
GLUCOSE P FAST SERPL-MCNC: 101 MG/DL (ref 65–99)
HCT VFR BLD AUTO: 42.6 % (ref 34.8–46.1)
HGB BLD-MCNC: 13 G/DL (ref 11.5–15.4)
IMM GRANULOCYTES # BLD AUTO: 0.01 THOUSAND/UL (ref 0–0.2)
IMM GRANULOCYTES NFR BLD AUTO: 0 % (ref 0–2)
LYMPHOCYTES # BLD AUTO: 2.85 THOUSANDS/ÂΜL (ref 0.6–4.47)
LYMPHOCYTES NFR BLD AUTO: 54 % (ref 14–44)
MCH RBC QN AUTO: 28.4 PG (ref 26.8–34.3)
MCHC RBC AUTO-ENTMCNC: 30.5 G/DL (ref 31.4–37.4)
MCV RBC AUTO: 93 FL (ref 82–98)
MONOCYTES # BLD AUTO: 0.45 THOUSAND/ÂΜL (ref 0.17–1.22)
MONOCYTES NFR BLD AUTO: 8 % (ref 4–12)
NEUTROPHILS # BLD AUTO: 1.83 THOUSANDS/ÂΜL (ref 1.85–7.62)
NEUTS SEG NFR BLD AUTO: 34 % (ref 43–75)
NRBC BLD AUTO-RTO: 0 /100 WBCS
PLATELET # BLD AUTO: 135 THOUSANDS/UL (ref 149–390)
PMV BLD AUTO: 11.7 FL (ref 8.9–12.7)
POTASSIUM SERPL-SCNC: 4.2 MMOL/L (ref 3.5–5.3)
PROT SERPL-MCNC: 7.2 G/DL (ref 6.4–8.4)
RBC # BLD AUTO: 4.58 MILLION/UL (ref 3.81–5.12)
SODIUM SERPL-SCNC: 140 MMOL/L (ref 135–147)
WBC # BLD AUTO: 5.34 THOUSAND/UL (ref 4.31–10.16)

## 2023-08-17 ENCOUNTER — PATIENT OUTREACH (OUTPATIENT)
Dept: FAMILY MEDICINE CLINIC | Facility: CLINIC | Age: 73
End: 2023-08-17

## 2023-08-21 NOTE — PROGRESS NOTES
Cardiology  Hospital Follow Up   Office Visit Note  Sharon Lizarraga   67 y.o.   female   MRN: [id number]  [facility] [address]  [address]  [address]  [id number] [phone]    PCP: Daphnie Rodrigues MD  Cardiologist:  Dr. Patty Betancourt                Summary of recommendations  Low-sodium diet, Heart failure education provided as below  Repeat limited echo Nov 2023- change in LV fxn  Add Jardiance 10 mg/d-Price check first.  I gave her some samples  BMP 1 week  Cardiac rehab has been prescribed and recommended  Upon request I completed the temporary parking placard. She will complete the patient portion and mail it in  Follow-up with her PCP regarding her elevated TSH of 9.1,  7/29/23  Follow up will be scheduled with Dr Patty Betancourt  12/19          Assessment/plan  Stress-induced cardiomyopathy, recent hospital admission 7/29-8/2/23, EF 30%  · EF was 25% (1/22) suspected secondary to tachy induced cardiomyopathy,   · then recovered to 55% 2/2023>  · then recent admit 7/2023 for suspected stress CM with re-reduced EF 30%  · LHC: No obstructive CAD  · Dry weight historically around 130 pound  Wt Readings from Last 3 Encounters:   08/22/23 55.3 kg (122 lb)   08/10/23 57.6 kg (127 lb)   08/08/23 57.6 kg (127 lb)     --beta-blocker:   Metoprolol succinate 25 mg twice daily  --Diuretic:   Torsemide 20 mg  --ACE/ARB/ARNI:   Entresto 24/26 mg q.12  --MRA: Spironolactone 25 mg daily  --SLGT2I; 8/22:add Jardiance 10 mg daily, will price check to see if cost favorable  --ICD:    --2 g sodium diet, 1800 cc fluid restriction.  Daily weights  paroxysmal atrial tachycardia//AVNRT s/p ablation x2   Sinus node dysfunction status post PPM; placed 2/2023, RA lead, RV lead - His lead - with LPF capture   · Interro 6/13/23 AP 12%  7.9% (AAI-DDD 60PPM);   · Quick look 8/1/2023: A paced 12% V paced 15%  S/P ILR 2022  CAD, moderate non obstructive diffuse disease of the LAD [facility] 8/2023  · On statin, beta-blocker  HTN. /72   Moderate AI and MR  Hyperlipidemia, started on atorvastatin 20 mg daily. Follow-up lipids in 1- 3 months    Latest Reference Range & Units 09/29/21 07:00 03/05/22 07:31 10/14/22 06:56 02/04/23 04:45   Cholesterol See Comment mg/dL 202 (H) 209 (H) 180 146   Triglycerides See Comment mg/dL 79 101 117 60   HDL >=50 mg/dL 58 58 49 (L) 50   Non-HDL Cholesterol <130 mg/dL (calc) 144 (H) 151 (H) 131 (H)    LDL Calculated 0 - 100 mg/dL 126 (H) 131 (H) 109 (H) 84   Chol HDLC Ratio <5.0 (calc) 3.5 3.6 3.7      Myelodysplastic syndrome. Follows with LV hematology  Waldenstrom macroglobulinemia. Follows with LV hematology  History Hodgkin's lymphoma . [facility] Follows with LV hematology  Hypothyroidism on levothyroxine  ILD, per chart review  Cardiac testing  · TTE 7/29/23. EF 30%. The following segments are akinetic: mid anterior, mid anteroseptal, mid inferoseptal, mid inferior, mid inferolateral, mid anterolateral, apical anterior, apical septal, apical inferior, apical lateral and apex. RV normal size and function. The mid to apical free wall is akinetic. There is a tiny patent foramen ovale confirmed at rest with predominant left to right shunting. There is moderate AI:There is a small, mobile echodensity present on the aortic aspect which likely represents a Lambl's excrescence. There is mild to moderate mitral regurgitation with a centrally directed jet. TR.  RVSP 42 mmHg. The aortic root is mildly dilated at   · [facility] 8/1/23 No angiongraphic evidence of signficant obstructive disease; moderate diffuse disease of the LAD. LVEDP is low without gradient on lv                 HPI  Juan F Beckwith is a 67 yo female with myelodysplastic syndrome, water and trauma macroglobulinemia and history of Hodgkin's lymphoma who follows with [facility]. She has no known history of heart disease       Adm 1/10-1/15/22  CC:  Shortness of breath x 2 months with upper respiratory symptoms.   Recent orthopnea. DX:  Atrial fibrillation, new onset, initially presenting with RVR. Cardiomyopathy, EF 25%, new onset. Question tachycardia induced, versus viral induced versus other  ProBNP greater than 4400  Transthoracic echocardiogram:  EF 25%. Severe global hypokinesis with regional variation  Chest x-ray suspected asymmetric pulmonary edema, pulmonary fibrosis. Recommend high-resolution CT to rule out interstitial lung disease  Followed by Cardiology  Diuresed  Started on GDMT, with a beta-blocker and Entresto  Placed on amiodarone. Did receive some IV amiodarone, transition to p.o. loading  She did convert to sinus rhythm in the hospital  Started on anticoagulation with Eliquis  Discharged with a life vest  Patient declines left heart catheterization  Cardiology recommend follow-up echo in 3 months dizzy for EF improved. If not, revisit left heart catheterization  Outpatient high-resolution CT scan ordered to evaluate fibrosis seen on an inpatient chest x-ray  Discharge weight : 144 lb  Discharge creatinine : 1.3  Discharge diuretic: Furosemide 20 mg b.i.d.    1/19/22  Hospital follow-up. She is accompanied by her daughter. Normally, she is a full-time . Since her hospitalization she has not been working. She is currently living with a granddaughter. Arrangements are being made to move in with 1 of her daughters. Review of systems:  Fatigue. LANTIGUA. Compliant with wearing her life vest, as per the [employer] life vest Webpage. Compliant with medications. Trying to adhere to a salt restricted diet. She denies palpitations. She admits that prior to hospitalization she did feel them. No chest pain. We talk extensively about heart failure education, the importance of adhering to salt restricted diet, fluid restriction, medical adherence. Agreeable to getting a BMP, magnesium within the next few days.    Vital signs stable  On exam she has bibasilar wheezes; they do not clear with cough  She was provided samples of Entresto and Eliquis  For the next 2 days I recommend she increase her furosemide to 40 mg b.i.d. and increase supplemental potassium in her diet   We discussed testing or evaluation of her cardiomyopathy. At this time she declines a stress test.  She declined a screening sleep study. It at times, she does find that she is tired during the day. Follow-up with her cardiologist in a few weeks      Interval history  10/6/22 OV Dr Deacon Mehta  In and out of atrial flutter  Per to EP for an ablation  Recommended implantable loop recorder as she was asymptomatic with her arrhythmia      Adm 2/3-2/8/23: ablation  SVT/AVNRT-ablation  Echocardiogram: EF 55%  She was noted to have bradycardia, intermittent 2-1 AV block, AV block  Pacemaker offered, patient refused  She was to continue monitoring through her loop recorder  At discharge she was hypotensive, suspected to be due to sedation. Entresto, metoprolol and Lasix were all held  Cardiac meds: Discharged on apixaban 5 mg twice daily  Charge weight 133 pounds    Adm 2/14-2/17/23: acute HF; PPM implant  CC: SOB, lower extremity edema  Treated for acute on chronic combined heart failure  Diuresed  Evaluated by EP and underwent permanent pacemaker implant 2/16/2023  Discharge weight: 137 pounds  Discharge diuretic: Lasix 20 twice daily  Discharge creatinine:0.98  Other discharge cardiac medications: Entresto 24/26 twice daily, Toprol 25 mg daily, Eliquis 5 twice daily    Adm 7/29-8/2/23. Acute heart failure  CC[de-identified] Acute onset cough, SOB, orthopnea. LANTIGUA. o2 sats high 80s on RA  Treated for acute respiratory failure with hypoxia, secondary to acute heart failure  Initially required treatment with BiPAP  CT chest: No PE.   It did disclose however bilateral effusions with pulmonary edema  BNP elevated  diuresed with IV Lasix however poor response hence this was switched to Bumex  Trop Elevated 192  Followed by cardiology  Non-MI troponin elevation in setting of acute heart failure and hypoxia  Underwent LHC: No obstr CAD  Echo 7/29/2023 patient EF 30%, Moderate AI and mild to moderate MR ; EF down from 55% on 2/4/2023, wall motion abnormalities suggestive of Takotsubo syndrome. In order to diurese, Bumex was increased to 2 mg 3 times a day  Pacemaker interrogation: No recurrent arrhythmias  Underwent LHC: No obstructive CAD. Diuretics held after cath given LVEDP was 2  Discharge weight: 131 lb  Discharge diuretic: Torsemide 20 mg daily  Discharge creatinine: 0.94  Other discharge cardiac medications: Lipitor 20 mg daily, Entresto 24/26 twice daily, Toprol 25 twice daily      8/8/23 OV Dr Yasmany Espinoza  Wt: 127 pound  Torsemide decreased to 10 mg daily  Started spironolactone 25 mg daily  BMP 1 week  Follow-up PCP regarding thyroid    8/22/23  Close F/U  Recently she gained wt, more short of breath and increased the torsemide back to 20 mg/d  ROS: Today she tells me she feels good. She denies chest pain, worsening shortness of breath, palpitations, lightheadedness or dizziness. Adherent to her medications and tolerating them  Adhering to a salt restricted diet  last labs 8/1623: Creatinine 1.13 BUN 26 potassium 4.2. LFTs normal  TSH 7/29/2023: Elevated at 9.167, normal free T4  Euvolemic on exam  I recommend initiating Jardiance 10 mg daily if cost favorable. I provided samples and will check with the pharmacy to see what the cost is. We will get a BMP in a short intervas  Upon request to complete the temporary disability parking ( for 6 mos) placard given Class III symptoms. I encouraged participation in cardiac rehab  She will return to see her cardiologist in a few months.   Advised to call if she needs something in the interim            I have spent 40 minutes with Patient and family today in which greater than 50% of this time was spent in counseling/coordination of care regarding Importance of tx compliance, Risk factor reductions and Impressions. Assessment  Diagnoses and all orders for this visit:    Chronic HFrEF (heart failure with reduced ejection fraction) (HCC)    Coronary artery disease involving native coronary artery of native heart without angina pectoris    Paroxysmal atrial fibrillation (HCC)    Paroxysmal atrial flutter (HCC)    Pulmonary hypertension ([address])    Dyslipidemia    Cardiomyopathy, unspecified type ([address])  -     Echo follow up/limited w/ contrast if indicated; Future    Atrial tachycardia (HCC)    Dilated cardiomyopathy (HCC)  -     Discontinue: Empagliflozin (Jardiance) 10 MG TABS tablet; Take 1 tablet (10 mg total) by mouth every morning  -     Basic metabolic panel; Future  -     Empagliflozin (Jardiance) 10 MG TABS tablet; Take 1 tablet (10 mg total) by mouth every morning    Acute on chronic diastolic heart failure (HCC)  -     torsemide (DEMADEX) 20 mg tablet; Take 1 tablet (20 mg total) by mouth daily    Coronary artery disease involving native coronary artery  -     atorvastatin (LIPITOR) 20 mg tablet; Take 1 tablet (20 mg total) by mouth daily with dinner          Past Medical History:   Diagnosis Date   • Abnormal CXR 1/14/2022   • Cancer [facility])    • Disease of thyroid gland    • Dysuria 12/16/2021   • Encounter for support and coordination of transition of care 5/6/2021       Review of Systems   Constitutional: Negative for chills and malaise/fatigue. Cardiovascular: Negative for chest pain, claudication, cyanosis, dyspnea on exertion, irregular heartbeat, leg swelling, near-syncope, orthopnea, palpitations, paroxysmal nocturnal dyspnea and syncope. Respiratory: Negative for cough and shortness of breath. Gastrointestinal: Negative for heartburn and nausea. Neurological: Negative for dizziness, focal weakness, headaches, light-headedness and weakness. All other systems reviewed and are negative. Allergies   Allergen Reactions   • Penicillins Rash     .     Current Outpatient Medications:   • atorvastatin (LIPITOR) 20 mg tablet, Take 1 tablet (20 mg total) by mouth daily with dinner, Disp: 90 tablet, Rfl: 3  •  Empagliflozin (Jardiance) 10 MG TABS tablet, Take 1 tablet (10 mg total) by mouth every morning, Disp: 30 tablet, Rfl: 5  •  Entresto 24-26 MG TABS, TAKE 1 TABLET BY MOUTH TWICE A DAY, Disp: 60 tablet, Rfl: 5  •  levothyroxine 100 mcg tablet, TAKE 1 TABLET BY MOUTH EVERY DAY, Disp: 90 tablet, Rfl: 0  •  metoprolol succinate (TOPROL-XL) 25 mg 24 hr tablet, TAKE 1 TABLET BY MOUTH TWICE A DAY, Disp: 180 tablet, Rfl: 4  •  spironolactone (ALDACTONE) 25 mg tablet, Take 1 tablet (25 mg total) by mouth daily, Disp: 90 tablet, Rfl: 5  •  torsemide (DEMADEX) 20 mg tablet, Take 1 tablet (20 mg total) by mouth daily, Disp: 90 tablet, Rfl: 3        Social History     Socioeconomic History   • Marital status:      Spouse name: Not on file   • Number of children: Not on file   • Years of education: Not on file   • Highest education level: Not on file   Occupational History   • Not on file   Tobacco Use   • Smoking status: Former     Packs/day: 1.00     Years: 10.00     Total pack years: 10.00     Types: Cigarettes     Start date:      Quit date: 1993     Years since quittin.6   • Smokeless tobacco: Never   Vaping Use   • Vaping Use: Never used   Substance and Sexual Activity   • Alcohol use: Never   • Drug use: Never   • Sexual activity: Never   Other Topics Concern   • Not on file   Social History Narrative    Most recent tobacco use screenin2018     Social Determinants of Health     Financial Resource Strain: Low Risk  (2023)    Overall Financial Resource Strain (CARDIA)    • Difficulty of Paying Living Expenses: Not hard at all   Food Insecurity: No Food Insecurity (2023)    Hunger Vital Sign    • Worried About Running Out of Food in the Last Year: Never true    • Ran Out of Food in the Last Year: Never true   Transportation Needs: No Transportation Needs (2023) PRAPARE - Transportation    • Lack of Transportation (Medical): No    • Lack of Transportation (Non-Medical): No   Physical Activity: Not on file   Stress: Not on file   Social Connections: Not on file   Intimate Partner Violence: Not on file   Housing Stability: Low Risk  (7/31/2023)    Housing Stability Vital Sign    • Unable to Pay for Housing in the Last Year: No    • Number of Places Lived in the Last Year: 2    • Unstable Housing in the Last Year: No       Family History   Problem Relation Age of Onset   • No Known Problems Mother    • No Known Problems Father    • Cancer Maternal Aunt        Physical Exam  Vitals and nursing note reviewed. Constitutional:       General: She is not in acute distress. Appearance: She is not diaphoretic. HENT:      Head: Normocephalic and atraumatic. Eyes:      Conjunctiva/sclera: Conjunctivae normal.   Cardiovascular:      Rate and Rhythm: Normal rate and regular rhythm. Pulses: Intact distal pulses. Heart sounds: Normal heart sounds. Pulmonary:      Effort: Pulmonary effort is normal.      Breath sounds: Examination of the right-lower field reveals wheezing. Examination of the left-lower field reveals wheezing. Wheezing present. Abdominal:      General: Bowel sounds are normal.      Palpations: Abdomen is soft. Musculoskeletal:         General: Normal range of motion. Cervical back: Normal range of motion and neck supple. Skin:     General: Skin is warm and dry. Neurological:      Mental Status: She is alert and oriented to person, place, and time. Vitals: Blood pressure 106/72, pulse 82, height 5' 2" (1.575 m), weight 55.3 kg (122 lb), SpO2 99 %.    Wt Readings from Last 3 Encounters:   08/22/23 55.3 kg (122 lb)   08/10/23 57.6 kg (127 lb)   08/08/23 57.6 kg (127 lb)         Labs & Results:  Lab Results   Component Value Date    WBC 5.34 08/16/2023    HGB 13.0 08/16/2023    HCT 42.6 08/16/2023    MCV 93 08/16/2023     (L) 08/16/2023     BNP   Date Value Ref Range Status   07/29/2023 >4,700 (H) 0 - 100 pg/mL Final     No components found for: "CHEM"  No results found for: "CKTOTAL", "TROPONINI", "TROPONINT", "CKMBINDEX"  No results found for this or any previous visit. No results found for this or any previous visit. This note was completed in part utilizing [employer] direct voice recognition software. Grammatical errors, random word insertion, spelling mistakes, and incomplete sentences may be an occasional consequence of the system secondary to software limitations, ambient noise and hardware issues. At the time of dictation, efforts were made to edit, clarify and /or correct errors. Please read the chart carefully and recognize, using context, where substitutions have occurred.   If you have any questions or concerns about the context, text or information contained within the body of this dictation, please contact myself, the provider, for further clarification

## 2023-08-22 ENCOUNTER — OFFICE VISIT (OUTPATIENT)
Dept: CARDIOLOGY CLINIC | Facility: CLINIC | Age: 73
End: 2023-08-22
Payer: COMMERCIAL

## 2023-08-22 VITALS
BODY MASS INDEX: 22.45 KG/M2 | HEART RATE: 82 BPM | SYSTOLIC BLOOD PRESSURE: 106 MMHG | WEIGHT: 122 LBS | DIASTOLIC BLOOD PRESSURE: 72 MMHG | OXYGEN SATURATION: 99 % | HEIGHT: 62 IN

## 2023-08-22 DIAGNOSIS — I50.33 ACUTE ON CHRONIC DIASTOLIC HEART FAILURE (HCC): ICD-10-CM

## 2023-08-22 DIAGNOSIS — I50.22 CHRONIC HFREF (HEART FAILURE WITH REDUCED EJECTION FRACTION) (HCC): Primary | ICD-10-CM

## 2023-08-22 DIAGNOSIS — I47.1 ATRIAL TACHYCARDIA (HCC): ICD-10-CM

## 2023-08-22 DIAGNOSIS — I48.0 PAROXYSMAL ATRIAL FIBRILLATION (HCC): ICD-10-CM

## 2023-08-22 DIAGNOSIS — I27.20 PULMONARY HYPERTENSION (HCC): ICD-10-CM

## 2023-08-22 DIAGNOSIS — I25.10 CORONARY ARTERY DISEASE INVOLVING NATIVE CORONARY ARTERY OF NATIVE HEART WITHOUT ANGINA PECTORIS: ICD-10-CM

## 2023-08-22 DIAGNOSIS — I25.10 CORONARY ARTERY DISEASE INVOLVING NATIVE CORONARY ARTERY: ICD-10-CM

## 2023-08-22 DIAGNOSIS — I42.9 CARDIOMYOPATHY, UNSPECIFIED TYPE (HCC): ICD-10-CM

## 2023-08-22 DIAGNOSIS — I48.92 PAROXYSMAL ATRIAL FLUTTER (HCC): ICD-10-CM

## 2023-08-22 DIAGNOSIS — I42.0 DILATED CARDIOMYOPATHY (HCC): ICD-10-CM

## 2023-08-22 DIAGNOSIS — E78.5 DYSLIPIDEMIA: ICD-10-CM

## 2023-08-22 PROCEDURE — 99215 OFFICE O/P EST HI 40 MIN: CPT | Performed by: NURSE PRACTITIONER

## 2023-08-22 RX ORDER — TORSEMIDE 20 MG/1
20 TABLET ORAL DAILY
Qty: 90 TABLET | Refills: 3 | Status: SHIPPED | OUTPATIENT
Start: 2023-08-22

## 2023-08-22 RX ORDER — ATORVASTATIN CALCIUM 20 MG/1
20 TABLET, FILM COATED ORAL
Qty: 90 TABLET | Refills: 3 | Status: SHIPPED | OUTPATIENT
Start: 2023-08-22

## 2023-08-22 NOTE — PATIENT INSTRUCTIONS
DASH Eating Plan   WHAT YOU NEED TO KNOW:   The DASH (Dietary Approaches to Stop Hypertension) Eating Plan is designed to help prevent or lower high blood pressure. It can also help to lower LDL (bad) cholesterol and decrease your risk for heart disease. The plan is low in sodium, sugar, unhealthy fats, and total fat. It is high in potassium, calcium, magnesium, and fiber. These nutrients are added when you eat more fruits, vegetables, and whole grains. With the DASH eating plan, you need to eat a certain number of servings from each food group. This will help you get enough of certain nutrients and limit others. The amount of servings you should eat depends on how many calories you need. Your dietitian can help you create meal plans with the right number of servings for each food group. DISCHARGE INSTRUCTIONS:   What you need to know about sodium:  Your dietitian will tell you how much sodium is safe for you to have each day. People with high blood pressure should have no more than 1,500 to 2,300 mg of sodium in a day. A teaspoon (tsp) of salt has 2,300 mg of sodium. This may seem like a difficult goal, but small changes to the foods you eat can make a big difference. Your healthcare provider or dietitian can help you create a meal plan that follows your sodium limit. Read food labels. Food labels can help you choose foods that are low in sodium. The amount of sodium is listed in milligrams (mg). The % Daily Value (DV) column tells you how much of your daily needs are met by 1 serving of the food for each nutrient listed. Choose foods that have less than 5% of the DV of sodium. These foods are considered low in sodium. Foods that have 20% or more of the DV of sodium are considered high in sodium. Avoid foods that have more than 300 mg of sodium in each serving. Choose foods that say low-sodium, reduced-sodium, or no salt added on the food label. Limit added salt.   Do not salt food at the table if you add salt when you cook. Use herbs and spices, such as onions, garlic, and salt-free seasonings to add flavor. Try lemon or lime juice or vinegar to add a tart flavor. Use hot peppers or a small amount of hot pepper sauce to add a spicy flavor. Limit foods high in added salt, such as the following:    Seasonings made with salt, such as garlic salt, celery salt, onion salt, seasoned salt, meat tenderizers, and monosodium glutamate (MSG)    Miso soup and canned or dried soup mixes    Regular soy sauce, barbecue sauce, teriyaki sauce, steak sauce, Worcestershire sauce, and most flavored vinegars    Snack foods, such as salted chips, popcorn, pretzels, pork rinds, salted crackers, and salted nuts    Frozen foods, such as dinners, entrees, vegetables with sauces, and breaded meats    Ask about salt substitutes. Ask your healthcare provider if you may use salt substitutes. Some salt substitutes have ingredients that can be harmful if you have certain health conditions. Choose foods carefully at restaurants. Meals from restaurants, especially fast food restaurants, are often high in sodium. Some restaurants have nutrition information that tells you the amount of sodium in their foods. Ask to have your food prepared with less, or no salt. What you need to know about fats:  Healthy fats include unsaturated fats and omega-3 fatty acids. Unhealthy fats include saturated fats and trans fats.   Include healthy fats, such as the following:      Cooking oils, such as soybean, canola, olive, or sunflower    Fatty fish, such as salmon, tuna, mackerel, or sardines    Flaxseed oil or ground flaxseed    ½ cup of cooked beans, such as black beans, kidney beans, or morrison beans    1½ ounces of low-sodium nuts, such as almonds or walnuts    Low-sugar, low-sodium peanut butter    Seeds such as jay seeds or sunflower seeds       Limit or do not have unhealthy fats, such as the following:      Foods that contain fat from animals, such as fatty meats, whole milk, butter, and cream    Shortening, stick margarine, palm oil, and coconut oil    Full-fat or creamy salad dressing    Creamy soup    Crackers, chips, and baked goods made with margarine or shortening    Foods that are fried in unhealthy fats    Gravy and sauces, such as Dhiraj or cheese sauces    What you need to know about carbohydrates (carbs): All carbs break down into sugar. Complex carbs contain more fiber than simple carbs. This means complex carbs go into the bloodstream more slowly and cause less of a blood sugar spike. Try to include more complex carbs and fewer simple carbs. Include complex carbs, such as the followin slice of whole-grain bread    1 ounce of dry cereal that does not contain added sugar    ½ cup of cooked oatmeal    2 ounces of cooked whole-grain pasta    ½ cup of cooked brown rice    Limit or do not have simple carbs, such as the following:      [employer], such as doughnuts, pastries, and cookies    Mixes for cornbread and biscuits    White rice and pasta mixes, such as boxed macaroni and cheese    Instant and cold cereals that contain sugar    Jelly, jam, and ice cream that contain sugar    Condiments such as ketchup    Drinks high in sugar, such as soft drinks, lemonade, and fruit juice    What you need to know about vegetables and fruits:  Vegetables and fruits can be fresh, frozen, or canned. If possible, try to choose low-sodium canned options.   Include a variety of vegetables and fruits, such as the followin medium apple, pear, or peach (about ½ cup chopped)    ½ small banana    ½ cup berries, such as blueberries, strawberries, or blackberries    1 cup of raw leafy greens, such as lettuce, spinach, kale, or maryellen greens    ½ cup of frozen or canned (no added salt) vegetables, such as green beans    ½ cup of fresh, frozen, or canned fruit (canned in light syrup or fruit juice)    ½ cup of vegetable or fruit juice    Limit or do not have vegetables and fruits made in the following ways:      Frozen fruit such as cherries that have added sugar    Fruit in cream or butter sauce    Canned vegetables that are high in sodium    Sauerkraut, pickled vegetables, and other foods prepared in brine    Fried vegetables or vegetables in butter or high-fat sauces    What you need to know about protein foods: Include lean or low-fat protein foods, such as the following:      Poultry (chicken, turkey) with no skin    Fish (especially fatty fish, such as salmon, fresh tuna, or mackerel)    Lean beef and pork (loin, round, extra lean hamburger)    Egg whites and egg substitutes    1 cup of nonfat (skim) or 1% milk    1½ ounces of fat-free or low-fat cheese    6 ounces of nonfat or low-fat yogurt    Limit or do not have high-fat protein foods, such as the following:      Smoked or cured meat, such as corned beef, caldera, ham, hot dogs, and sausage    Canned beans and canned meats or spreads, such as potted meats, sardines, anchovies, and imitation seafood    Deli or lunch meats, such as bologna, ham, turkey, and roast beef    High-fat meat (T-bone steak, regular hamburger, and ribs)    Whole eggs and egg yolks    Whole milk, 2% milk, and cream    Regular cheese and processed cheese    Other guidelines to follow:   Maintain a healthy weight. Your risk for heart disease is higher if you are overweight. Your healthcare provider may suggest that you lose weight if you are overweight. You can lose weight by eating fewer calories and foods that have added sugars and fat. The DASH meal plan can help you do this. Decrease calories by eating smaller portions at each meal and fewer snacks. Ask your healthcare provider for more information about how to lose weight. Exercise regularly. Regular exercise can help you reach or maintain a healthy weight. Regular exercise can also help decrease your blood pressure and improve your cholesterol levels.  Get 30 minutes or more of moderate exercise each day of the week. To lose weight, get at least 60 minutes of exercise. Talk to your healthcare provider about the best exercise program for you. Limit alcohol. Women should limit alcohol to 1 drink a day. Men should limit alcohol to 2 drinks a day. A drink of alcohol is 12 ounces of beer, 5 ounces of wine, or 1½ ounces of liquor. For more information:   [employer] [facility] and [address]  [id number]  [place] [address]  Phone: [phone]  Web Address: [email or web address]    © Copyright [employer] 2022 Information is for End User's use only and may not be sold, redistributed or otherwise used for commercial purposes. The above information is an  only. It is not intended as medical advice for individual conditions or treatments. Talk to your doctor, nurse or pharmacist before following any medical regimen to see if it is safe and effective for you.

## 2023-08-22 NOTE — LETTER
August 22, 2023     Cynthia Mancia MD  [address] [place], [place] [id number]    Patient: Cheyenne Andrew   YOB: 1950   Date of Visit: 8/22/2023       Dear Dr. Haylie Guzman:    Thank you for referring Chip Rodgers to me for evaluation. Below are my notes for this consultation. If you have questions, please do not hesitate to call me. I look forward to following your patient along with you. Sincerely,        BAO Cordoba        CC: DO Charisse Ogden CRNP  8/22/2023  3:21 PM  Sign when [facility] Visit  Cardiology  Hospital Follow Up   Office Visit Note  Cheyenne Andrew   67 y.o.   female   MRN: [id number]  [facility] [address]  [address]  [address] [facility] [address]  [phone]  [phone]    PCP: Cynthia Mancia MD  Cardiologist:  Dr. Rosamaria Geren                Summary of recommendations  Low-sodium diet, Heart failure education provided as below  Repeat limited echo Nov 2023- change in LV fxn  Add Jardiance 10 mg/d-Price check first.  I gave her some samples  BMP 1 week  Cardiac rehab has been prescribed and recommended  Upon request I completed the temporary parking placard.   She will complete the patient portion and mail it in  Follow-up with her PCP regarding her elevated TSH of 9.1,  7/29/23  Follow up will be scheduled with Dr Rosamaria Green  12/19          Assessment/plan  Stress-induced cardiomyopathy, recent hospital admission 7/29-8/2/23, EF 30%  EF was 25% (1/22) suspected secondary to tachy induced cardiomyopathy,   then recovered to 55% 2/2023>  then recent admit 7/2023 for suspected stress CM with re-reduced EF 30%  LHC: No obstructive CAD  Dry weight historically around 130 pound  Wt Readings from Last 3 Encounters:   08/22/23 55.3 kg (122 lb)   08/10/23 57.6 kg (127 lb)   08/08/23 57.6 kg (127 lb)     --beta-blocker:   Metoprolol succinate 25 mg twice daily  --Diuretic:   Torsemide 20 mg  --ACE/ARB/ARNI:   Entresto 24/26 mg q.12  --MRA: Spironolactone 25 mg daily  --SLGT2I; 8/22:add Jardiance 10 mg daily, will price check to see if cost favorable  --ICD:    --2 g sodium diet, 1800 cc fluid restriction. Daily weights  paroxysmal atrial tachycardia//AVNRT s/p ablation x2   Sinus node dysfunction status post PPM; placed 2/2023, RA lead, RV lead - His lead - with LPF capture   Interro 6/13/23 AP 12%  7.9% (AAI-DDD 60PPM); Quick look 8/1/2023: A paced 12% V paced 15%  S/P ILR 2022  CAD, moderate non obstructive diffuse disease of the LAD [facility] 8/2023  On statin, beta-blocker  HTN. /72   Moderate AI and MR  Hyperlipidemia, started on atorvastatin 20 mg daily. Follow-up lipids in 1- 3 months    Latest Reference Range & Units 09/29/21 07:00 03/05/22 07:31 10/14/22 06:56 02/04/23 04:45   Cholesterol See Comment mg/dL 202 (H) 209 (H) 180 146   Triglycerides See Comment mg/dL 79 101 117 60   HDL >=50 mg/dL 58 58 49 (L) 50   Non-HDL Cholesterol <130 mg/dL (calc) 144 (H) 151 (H) 131 (H)    LDL Calculated 0 - 100 mg/dL 126 (H) 131 (H) 109 (H) 84   Chol HDLC Ratio <5.0 (calc) 3.5 3.6 3.7      Myelodysplastic syndrome. Follows with LV hematology  Waldenstrom macroglobulinemia. Follows with LV hematology  History Hodgkin's lymphoma . [facility] Follows with LV hematology  Hypothyroidism on levothyroxine  ILD, per chart review  Cardiac testing  TTE 7/29/23. EF 30%. The following segments are akinetic: mid anterior, mid anteroseptal, mid inferoseptal, mid inferior, mid inferolateral, mid anterolateral, apical anterior, apical septal, apical inferior, apical lateral and apex. RV normal size and function. The mid to apical free wall is akinetic. There is a tiny patent foramen ovale confirmed at rest with predominant left to right shunting. There is moderate AI:There is a small, mobile echodensity present on the aortic aspect which likely represents a Lambl's excrescence. There is mild to moderate mitral regurgitation with a centrally directed jet.   TR.  RVSP 42 mmHg.The aortic root is mildly dilated [facility]   [facility] No angiongraphic evidence of signficant obstructive disease; moderate diffuse disease of the LAD. LVEDP is low without gradient on lv                 HPI  Swhetha Bentley is a 67 yo female with myelodysplastic syndrome, water and trauma macroglobulinemia and history of Hodgkin's lymphoma who follows with [facility]. She has no known history of heart disease       Adm 1/10-1/15/22  CC:  Shortness of breath x 2 months with upper respiratory symptoms. Recent orthopnea. DX:  Atrial fibrillation, new onset, initially presenting with RVR. Cardiomyopathy, EF 25%, new onset. Question tachycardia induced, versus viral induced versus other  ProBNP greater than 4400  Transthoracic echocardiogram:  EF 25%. Severe global hypokinesis with regional variation  Chest x-ray suspected asymmetric pulmonary edema, pulmonary fibrosis. Recommend high-resolution CT to rule out interstitial lung disease  Followed by Cardiology  Diuresed  Started on GDMT, with a beta-blocker and Entresto  Placed on amiodarone. Did receive some IV amiodarone, transition to p.o. loading  She did convert to sinus rhythm in the hospital  Started on anticoagulation with Eliquis  Discharged with a life vest  Patient declines left heart catheterization  Cardiology recommend follow-up echo in 3 months dizzy for EF improved. If not, revisit left heart catheterization  Outpatient high-resolution CT scan ordered to evaluate fibrosis seen on an inpatient chest x-ray  Discharge weight : 144 lb  Discharge creatinine : 1.3  Discharge diuretic: Furosemide 20 mg b.i.d.    1/19/22  Hospital follow-up. She is accompanied by her daughter. Normally, she is a full-time . Since her hospitalization she has not been working. She is currently living with a granddaughter. Arrangements are being made to move in with 1 of her daughters. Review of systems:  Fatigue. LANTIGUA.   Compliant with wearing her life vest, as per the [employer] life vest Webpage. Compliant with medications. Trying to adhere to a salt restricted diet. She denies palpitations. She admits that prior to hospitalization she did feel them. No chest pain. We talk extensively about heart failure education, the importance of adhering to salt restricted diet, fluid restriction, medical adherence. Agreeable to getting a BMP, magnesium within the next few days. Vital signs stable  On exam she has bibasilar wheezes; they do not clear with cough  She was provided samples of Entresto and Eliquis  For the next 2 days I recommend she increase her furosemide to 40 mg b.i.d. and increase supplemental potassium in her diet   We discussed testing or evaluation of her cardiomyopathy. At this time she declines a stress test.  She declined a screening sleep study. It at times, she does find that she is tired during the day. Follow-up with her cardiologist in a few weeks      Interval history  10/6/22 OV Dr Jagjit Miller  In and out of atrial flutter  Per to EP for an ablation  Recommended implantable loop recorder as she was asymptomatic with her arrhythmia      Adm 2/3-2/8/23: ablation  SVT/AVNRT-ablation  Echocardiogram: EF 55%  She was noted to have bradycardia, intermittent 2-1 AV block, AV block  Pacemaker offered, patient refused  She was to continue monitoring through her loop recorder  At discharge she was hypotensive, suspected to be due to sedation.   Entresto, metoprolol and Lasix were all held  Cardiac meds: Discharged on apixaban 5 mg twice daily  Charge weight 133 pounds    Adm 2/14-2/17/23: acute HF; PPM implant  CC: SOB, lower extremity edema  Treated for acute on chronic combined heart failure  Diuresed  Evaluated by EP and underwent permanent pacemaker implant 2/16/2023  Discharge weight: 137 pounds  Discharge diuretic: Lasix 20 twice daily  Discharge creatinine:0.98  Other discharge cardiac medications: Entresto 24/26 twice daily, Toprol 25 mg daily, Eliquis 5 twice daily    Adm 7/29-8/2/23. Acute heart failure  CC[de-identified] Acute onset cough, SOB, orthopnea. LANTIGUA. o2 sats high 80s on RA  Treated for acute respiratory failure with hypoxia, secondary to acute heart failure  Initially required treatment with BiPAP  CT chest: No PE. It did disclose however bilateral effusions with pulmonary edema  BNP elevated  diuresed with IV Lasix however poor response hence this was switched to Bumex  Trop Elevated 192  Followed by cardiology  Non-MI troponin elevation in setting of acute heart failure and hypoxia  Underwent LHC: No obstr CAD  Echo 7/29/2023 patient EF 30%, Moderate AI and mild to moderate MR ; EF down from 55% on 2/4/2023, wall motion abnormalities suggestive of Takotsubo syndrome. In order to diurese, Bumex was increased to 2 mg 3 times a day  Pacemaker interrogation: No recurrent arrhythmias  Underwent LHC: No obstructive CAD. Diuretics held after cath given LVEDP was 2  Discharge weight: 131 lb  Discharge diuretic: Torsemide 20 mg daily  Discharge creatinine: 0.94  Other discharge cardiac medications: Lipitor 20 mg daily, Entresto 24/26 twice daily, Toprol 25 twice daily      8/8/23 OV Dr Krystal Andrade  Wt: 127 pound  Torsemide decreased to 10 mg daily  Started spironolactone 25 mg daily  BMP 1 week  Follow-up PCP regarding thyroid    8/22/23  Close F/U  Recently she gained wt, more short of breath and increased the torsemide back to 20 mg/d  ROS: Today she tells me she feels good. She denies chest pain, worsening shortness of breath, palpitations, lightheadedness or dizziness. Adherent to her medications and tolerating them  Adhering to a salt restricted diet  last labs 8/1623: Creatinine 1.13 BUN 26 potassium 4.2. LFTs normal  TSH 7/29/2023: Elevated at 9.167, normal free T4  Euvolemic on exam  I recommend initiating Jardiance 10 mg daily if cost favorable. I provided samples and will check with the pharmacy to see what the cost is.   We will get a BMP in a short intervas  Upon request to complete the temporary disability parking ( for 6 mos) placard given Class III symptoms. I encouraged participation in cardiac rehab  She will return to see her cardiologist in a few months. Advised to call if she needs something in the interim            I have spent 40 minutes with Patient and family today in which greater than 50% of this time was spent in counseling/coordination of care regarding Importance of tx compliance, Risk factor reductions and Impressions. Assessment  Diagnoses and all orders for this visit:    Chronic HFrEF (heart failure with reduced ejection fraction) (HCC)    Coronary artery disease involving native coronary artery of native heart without angina pectoris    Paroxysmal atrial fibrillation (HCC)    Paroxysmal atrial flutter (HCC)    Pulmonary hypertension ([address])    Dyslipidemia    Cardiomyopathy, unspecified type ([address])  -     Echo follow up/limited w/ contrast if indicated; Future    Atrial tachycardia (HCC)    Dilated cardiomyopathy (HCC)  -     Discontinue: Empagliflozin (Jardiance) 10 MG TABS tablet; Take 1 tablet (10 mg total) by mouth every morning  -     Basic metabolic panel; Future  -     Empagliflozin (Jardiance) 10 MG TABS tablet; Take 1 tablet (10 mg total) by mouth every morning    Acute on chronic diastolic heart failure (HCC)  -     torsemide (DEMADEX) 20 mg tablet; Take 1 tablet (20 mg total) by mouth daily    Coronary artery disease involving native coronary artery  -     atorvastatin (LIPITOR) 20 mg tablet; Take 1 tablet (20 mg total) by mouth daily with dinner          Past Medical History:   Diagnosis Date   • Abnormal CXR 1/14/2022   • Cancer [facility])    • Disease of thyroid gland    • Dysuria 12/16/2021   • Encounter for support and coordination of transition of care 5/6/2021       Review of Systems   Constitutional: Negative for chills and malaise/fatigue.    Cardiovascular: Negative for chest pain, claudication, cyanosis, dyspnea on exertion, irregular heartbeat, leg swelling, near-syncope, orthopnea, palpitations, paroxysmal nocturnal dyspnea and syncope. Respiratory: Negative for cough and shortness of breath. Gastrointestinal: Negative for heartburn and nausea. Neurological: Negative for dizziness, focal weakness, headaches, light-headedness and weakness. All other systems reviewed and are negative. Allergies   Allergen Reactions   • Penicillins Rash     .     Current Outpatient Medications:   •  atorvastatin (LIPITOR) 20 mg tablet, Take 1 tablet (20 mg total) by mouth daily with dinner, Disp: 90 tablet, Rfl: 3  •  Empagliflozin (Jardiance) 10 MG TABS tablet, Take 1 tablet (10 mg total) by mouth every morning, Disp: 30 tablet, Rfl: 5  •  Entresto 24-26 MG TABS, TAKE 1 TABLET BY MOUTH TWICE A DAY, Disp: 60 tablet, Rfl: 5  •  levothyroxine 100 mcg tablet, TAKE 1 TABLET BY MOUTH EVERY DAY, Disp: 90 tablet, Rfl: 0  •  metoprolol succinate (TOPROL-XL) 25 mg 24 hr tablet, TAKE 1 TABLET BY MOUTH TWICE A DAY, Disp: 180 tablet, Rfl: 4  •  spironolactone (ALDACTONE) 25 mg tablet, Take 1 tablet (25 mg total) by mouth daily, Disp: 90 tablet, Rfl: 5  •  torsemide (DEMADEX) 20 mg tablet, Take 1 tablet (20 mg total) by mouth daily, Disp: 90 tablet, Rfl: 3        Social History     Socioeconomic History   • Marital status:      Spouse name: Not on file   • Number of children: Not on file   • Years of education: Not on file   • Highest education level: Not on file   Occupational History   • Not on file   Tobacco Use   • Smoking status: Former     Packs/day: 1.00     Years: 10.00     Total pack years: 10.00     Types: Cigarettes     Start date: 79     Quit date: 1993     Years since quittin.6   • Smokeless tobacco: Never   Vaping Use   • Vaping Use: Never used   Substance and Sexual Activity   • Alcohol use: Never   • Drug use: Never   • Sexual activity: Never   Other Topics Concern   • Not on file   Social History Narrative    Most recent tobacco use screenin2018     Social Determinants of Health     Financial Resource Strain: Low Risk  (2023)    Overall Financial Resource Strain (CARDIA)    • Difficulty of Paying Living Expenses: Not hard at all   Food Insecurity: No Food Insecurity (2023)    Hunger Vital Sign    • Worried About Running Out of Food in the Last Year: Never true    • Ran Out of Food in the Last Year: Never true   Transportation Needs: No Transportation Needs (2023)    PRAPARE - Transportation    • Lack of Transportation (Medical): No    • Lack of Transportation (Non-Medical): No   Physical Activity: Not on file   Stress: Not on file   Social Connections: Not on file   Intimate Partner Violence: Not on file   Housing Stability: Low Risk  (2023)    Housing Stability Vital Sign    • Unable to Pay for Housing in the Last Year: No    • Number of Places Lived in the Last Year: 2    • Unstable Housing in the Last Year: No       Family History   Problem Relation Age of Onset   • No Known Problems Mother    • No Known Problems Father    • Cancer Maternal Aunt        Physical Exam  Vitals and nursing note reviewed. Constitutional:       General: She is not in acute distress. Appearance: She is not diaphoretic. HENT:      Head: Normocephalic and atraumatic. Eyes:      Conjunctiva/sclera: Conjunctivae normal.   Cardiovascular:      Rate and Rhythm: Normal rate and regular rhythm. Pulses: Intact distal pulses. Heart sounds: Normal heart sounds. Pulmonary:      Effort: Pulmonary effort is normal.      Breath sounds: Examination of the right-lower field reveals wheezing. Examination of the left-lower field reveals wheezing. Wheezing present. Abdominal:      General: Bowel sounds are normal.      Palpations: Abdomen is soft. Musculoskeletal:         General: Normal range of motion. Cervical back: Normal range of motion and neck supple.    Skin:     General: Skin is warm and dry. Neurological:      Mental Status: She is alert and oriented to person, place, and time. Vitals: Blood pressure 106/72, pulse 82, height 5' 2" (1.575 m), weight 55.3 kg (122 lb), SpO2 99 %. Wt Readings from Last 3 Encounters:   08/22/23 55.3 kg (122 lb)   08/10/23 57.6 kg (127 lb)   08/08/23 57.6 kg (127 lb)         Labs & Results:  Lab Results   Component Value Date    WBC 5.34 08/16/2023    HGB 13.0 08/16/2023    HCT 42.6 08/16/2023    MCV 93 08/16/2023     (L) 08/16/2023     BNP   Date Value Ref Range Status   07/29/2023 >4,700 (H) 0 - 100 pg/mL Final     No components found for: "CHEM"  No results found for: "CKTOTAL", "TROPONINI", "TROPONINT", "CKMBINDEX"  No results found for this or any previous visit. No results found for this or any previous visit. This note was completed in part utilizing [employer] direct voice recognition software. Grammatical errors, random word insertion, spelling mistakes, and incomplete sentences may be an occasional consequence of the system secondary to software limitations, ambient noise and hardware issues. At the time of dictation, efforts were made to edit, clarify and /or correct errors. Please read the chart carefully and recognize, using context, where substitutions have occurred.   If you have any questions or concerns about the context, text or information contained within the body of this dictation, please contact myself, the provider, for further clarification

## 2023-08-23 ENCOUNTER — TELEPHONE (OUTPATIENT)
Dept: CARDIOLOGY CLINIC | Facility: CLINIC | Age: 73
End: 2023-08-23

## 2023-08-23 NOTE — TELEPHONE ENCOUNTER
Left message for pt to reach out to her PCP regarding thyroid studies. ----- Message from Santy Alcocer, [address] [facility] sent at 8/22/2023  3:16 PM EDT -----  Please call and advise the patient to follow-up with her PCP regarding her elevated thyroid studies. I forgot to remind her.   Thank you

## 2023-09-01 ENCOUNTER — APPOINTMENT (OUTPATIENT)
Dept: LAB | Facility: CLINIC | Age: 73
End: 2023-09-01
Payer: COMMERCIAL

## 2023-09-01 DIAGNOSIS — N18.31 STAGE 3A CHRONIC KIDNEY DISEASE (HCC): ICD-10-CM

## 2023-09-01 DIAGNOSIS — I42.0 DILATED CARDIOMYOPATHY (HCC): ICD-10-CM

## 2023-09-01 DIAGNOSIS — E03.9 HYPOTHYROIDISM, UNSPECIFIED TYPE: ICD-10-CM

## 2023-09-01 DIAGNOSIS — D46.9 MYELODYSPLASIA (MYELODYSPLASTIC SYNDROME) (HCC): ICD-10-CM

## 2023-09-01 LAB
ALBUMIN SERPL BCP-MCNC: 4 G/DL (ref 3.5–5)
ALP SERPL-CCNC: 66 U/L (ref 34–104)
ALT SERPL W P-5'-P-CCNC: 10 U/L (ref 7–52)
ANION GAP SERPL CALCULATED.3IONS-SCNC: 6 MMOL/L
AST SERPL W P-5'-P-CCNC: 16 U/L (ref 13–39)
BASOPHILS # BLD AUTO: 0.02 THOUSANDS/ÂΜL (ref 0–0.1)
BASOPHILS NFR BLD AUTO: 1 % (ref 0–1)
BILIRUB SERPL-MCNC: 0.79 MG/DL (ref 0.2–1)
BUN SERPL-MCNC: 20 MG/DL (ref 5–25)
CALCIUM SERPL-MCNC: 9.4 MG/DL (ref 8.4–10.2)
CHLORIDE SERPL-SCNC: 103 MMOL/L (ref 96–108)
CO2 SERPL-SCNC: 33 MMOL/L (ref 21–32)
CREAT SERPL-MCNC: 0.94 MG/DL (ref 0.6–1.3)
EOSINOPHIL # BLD AUTO: 0.11 THOUSAND/ÂΜL (ref 0–0.61)
EOSINOPHIL NFR BLD AUTO: 3 % (ref 0–6)
ERYTHROCYTE [DISTWIDTH] IN BLOOD BY AUTOMATED COUNT: 14.9 % (ref 11.6–15.1)
GFR SERPL CREATININE-BSD FRML MDRD: 60 ML/MIN/1.73SQ M
GLUCOSE P FAST SERPL-MCNC: 92 MG/DL (ref 65–99)
HCT VFR BLD AUTO: 41.2 % (ref 34.8–46.1)
HGB BLD-MCNC: 12.7 G/DL (ref 11.5–15.4)
IMM GRANULOCYTES # BLD AUTO: 0.01 THOUSAND/UL (ref 0–0.2)
IMM GRANULOCYTES NFR BLD AUTO: 0 % (ref 0–2)
LYMPHOCYTES # BLD AUTO: 2.18 THOUSANDS/ÂΜL (ref 0.6–4.47)
LYMPHOCYTES NFR BLD AUTO: 55 % (ref 14–44)
MCH RBC QN AUTO: 28.6 PG (ref 26.8–34.3)
MCHC RBC AUTO-ENTMCNC: 30.8 G/DL (ref 31.4–37.4)
MCV RBC AUTO: 93 FL (ref 82–98)
MONOCYTES # BLD AUTO: 0.32 THOUSAND/ÂΜL (ref 0.17–1.22)
MONOCYTES NFR BLD AUTO: 8 % (ref 4–12)
NEUTROPHILS # BLD AUTO: 1.31 THOUSANDS/ÂΜL (ref 1.85–7.62)
NEUTS SEG NFR BLD AUTO: 33 % (ref 43–75)
NRBC BLD AUTO-RTO: 0 /100 WBCS
PLATELET # BLD AUTO: 84 THOUSANDS/UL (ref 149–390)
PMV BLD AUTO: 12.8 FL (ref 8.9–12.7)
POTASSIUM SERPL-SCNC: 4.2 MMOL/L (ref 3.5–5.3)
PROT SERPL-MCNC: 6.8 G/DL (ref 6.4–8.4)
RBC # BLD AUTO: 4.44 MILLION/UL (ref 3.81–5.12)
SODIUM SERPL-SCNC: 142 MMOL/L (ref 135–147)
TSH SERPL DL<=0.05 MIU/L-ACNC: 3.34 UIU/ML (ref 0.45–4.5)
WBC # BLD AUTO: 3.95 THOUSAND/UL (ref 4.31–10.16)

## 2023-09-01 PROCEDURE — 85025 COMPLETE CBC W/AUTO DIFF WBC: CPT

## 2023-09-01 PROCEDURE — 84443 ASSAY THYROID STIM HORMONE: CPT

## 2023-09-01 PROCEDURE — 36415 COLL VENOUS BLD VENIPUNCTURE: CPT

## 2023-09-01 PROCEDURE — 80053 COMPREHEN METABOLIC PANEL: CPT

## 2023-09-05 ENCOUNTER — PATIENT OUTREACH (OUTPATIENT)
Dept: FAMILY MEDICINE CLINIC | Facility: CLINIC | Age: 73
End: 2023-09-05

## 2023-09-05 NOTE — PROGRESS NOTES
2nd telephone call to patient, left my contact information on vm and sent an unable to reach letter via [employer] if future assistance is needed.

## 2023-09-05 NOTE — LETTER
Date: 09/05/23    Dear Jenny Gunn,   My name is Catherine Thomas; I am a registered nurse care manager working with [address] [place] [address] at [facility]. I have not been able to reach you and would like to set a time that I can talk with you over the phone. My work is to help patients that have complex medical conditions get the care they need. This includes patients who may have been in the hospital or emergency room. I have enclosed information for you. Please call me with any questions you may have. I look forward to speaking with you.   Sincerely,  Caryn Paniagua  [phone]  Outpatient Care Manager

## 2023-09-07 ENCOUNTER — TELEPHONE (OUTPATIENT)
Dept: FAMILY MEDICINE CLINIC | Facility: CLINIC | Age: 73
End: 2023-09-07

## 2023-09-07 NOTE — TELEPHONE ENCOUNTER
----- Message from Daphnie Rodrigues MD sent at 9/7/2023  3:09 PM EDT -----  Please call the patient regarding her abnormal result. Her platelet count dropped to 85, that puts her at a risk of spontaneous bleeding. Please advise fall precautions. She follows up with [facility] hematology. Please advise patient to contact her hematologist as well.   Her thyroid function was reported normal.

## 2023-09-08 ENCOUNTER — TELEPHONE (OUTPATIENT)
Dept: FAMILY MEDICINE CLINIC | Facility: CLINIC | Age: 73
End: 2023-09-08

## 2023-09-08 ENCOUNTER — TRANSCRIBE ORDERS (OUTPATIENT)
Dept: CARDIAC REHAB | Facility: CLINIC | Age: 73
End: 2023-09-08

## 2023-09-08 DIAGNOSIS — I50.9 HEART FAILURE, UNSPECIFIED HF CHRONICITY, UNSPECIFIED HEART FAILURE TYPE (HCC): Primary | ICD-10-CM

## 2023-09-08 NOTE — TELEPHONE ENCOUNTER
----- Message from Maldonado Stoddard MD sent at 9/7/2023  3:09 PM EDT -----  Please call the patient regarding her abnormal result. Her platelet count dropped to 85, that puts her at a risk of spontaneous bleeding. Please advise fall precautions. She follows up with [facility] hematology. Please advise patient to contact her hematologist as well.   Her thyroid function was reported normal.

## 2023-09-11 ENCOUNTER — CLINICAL SUPPORT (OUTPATIENT)
Dept: CARDIAC REHAB | Facility: CLINIC | Age: 73
End: 2023-09-11

## 2023-09-11 DIAGNOSIS — I50.9 HEART FAILURE, UNSPECIFIED HF CHRONICITY, UNSPECIFIED HEART FAILURE TYPE (HCC): Primary | ICD-10-CM

## 2023-09-11 DIAGNOSIS — I25.10 CORONARY ARTERY DISEASE INVOLVING NATIVE CORONARY ARTERY: ICD-10-CM

## 2023-09-11 NOTE — PROGRESS NOTES
Cardiac Rehabilitation Plan of Care   Initial Care Plan          Today's date: 2023   # of Exercise Sessions Completed: 1- initial care plan  Patient name: Sonia Skinner      : 1950  Age: 67 y.o. MRN: [id number]  Referring Physician: Irineo Dang DO  Cardiologist: Rossy Richards DO and Dileep Leigh MD  Provider: Jocelynn  Clinician: Tai Tavarez MS, [facility], [facility], operated by [facility]    Dx:   Encounter Diagnosis   Name Primary? • Coronary artery disease involving native coronary artery      Date of onset: 23      SUMMARY OF PROGRESS:  Today is Toshia's initial evaluation to begin Cardiac Rehab for Takotsubo cardiomyopathy, EF 30%. Patient has history of CHF and was most recently admitted 23 with suspected stress induced CM. A LHC was done on 23 which revealed no obstructive CAD, moderate diffuse disease in LAD. She also has a hx of Parox Afib and A-tach with previous ablations and sinus node dysfunction s/p pacer. Moderate AI and MR. She is following fluid and sodium restriction and monitoring her weight daily. The patient does not currently follow a formal exercise program at home. She has resumed light to moderate ADLs without limitations. Depression screening using the PHQ-9 interprets the patient's score of 0 as 0 =No Depression. JEAN MARIE-7 screening tool for anxiety suggests 0  0-4  = Not anxious. When addressed, the patient denies having depression/anxiety. Patient reports excellent social/emotional support from her family. Information to utilize [employer] was provided as well as contact information for counseling through [employer]. PHQ-9 score will be reassessed in 30 days due to an initial score revealing concern for depression. They rate stress 1/10. Stress management will be reviewed. The patient is a former smoker (quit 30 years ago). She has abstained since quitting. Patient admits to 100% medication compliance. They report the following physical limitations: knee pain.  The patient completed an initial submaximal TM ETT. The patient completed 2 minutes of stage 1 (2.2METs) with test termination of RHR +30. Resting /58 with Normal response to exercise reaching 108/62. Blood Pressure will be monitored throughout the program and cardiologist will be notified of elevated trends. Patient reported no symptoms with exercise. Telemetry revealed 1st degree AVB with occ paced beats, rare PAC and PVCs. Elizabeth Tompkins was counseled on exercise guidelines to achieve a minimum of 150 mins/wk of moderate intensity (RPE 4-6) exercise and encouraged to add 1-2 days of exercise on opposite days of cardiac rehab as tolerated. We discussed current dietary habits and goals of heart healthy eating for heart failure. Patient's personal goals include: Increase confidence to walk further distances, increase EF. The patient's CAD risk factors include: hypertension and hyperlipidemia. His education will focus on lifestyle modification/education specific to His needs. Patient will attend group education classes on heart healthy eating, reading food labels, stress management, risk factor reduction, understanding heart disease and common heart medications.  Patient will attend 35 monitored exercise sessions, 3x/wk for 12-18 weeks beginning Tuesday Sept 19th at 11am.       Medication compliance: Yes   Comments: Pt reports to be compliant with medications  Fall Risk: Low   Comments: Ambulates with a steady gait with no assist device    EKG Interpretation: 1st degree AVB with occ paced beats, rare PAC and PVC      EXERCISE ASSESSMENT and PLAN    Exercise Prescription:      Frequency: 3 days/week   Supplement with home exercise 2+ days/wk as tolerated       Minutes: 35-40         METS: 1.8-2.5          HR: RHR +30bpm   RPE: 4-6         Modalities: Treadmill, UBE, NuStep and Recumbent bike      30 Day Goals for Exercise Progression:    Frequency: 3 days/week of cardiac rehab       Supplement with home exercise 2+ days/wk as tolerated    Minutes: 40                              >150 mins/wk of moderate intensity exercise   METS: 2.0-3.0   HR: RHR +30 bpm    RPE: 4-5   Modalities: Treadmill, NuStep and Recumbent bike    Strength trainin-3 days / week  12-15 repetitions  Will be added following 2-3 weeks of monitored exercise sessions   Modalities: Arm Curl and Sit to Stands    Home Exercise: none    Goals: 10% improvement in functional capacity - based on max METs achieved in fitness assessment, improved DASI score by 10%, Increase in exercise capacity by 40% - based on peak METs tolerated in cardiac rehab exercise session, Exercise 5 days/wk, >150mins/wk of moderate intensity exercise, Resume ADLs with increased strength, Attend Rehab regularly, Decrease sitting time and Start a walking program    Progression Toward Goals:  Reviewed Pt goals and determined plan of care, Will continue to educate and progress as tolerated. Education: benefit of exercise for CAD risk factors, home exercise guidelines, AHA guidelines to achieve >150 mins/wk of moderate exercise and RPE scale   Plan:education on home exercise guidelines, home exercise 30+ mins 2 days opposite CR and Education class: Risk Factors for Heart Disease  Readiness to change: Preparation:  (Getting ready to change)       NUTRITION ASSESSMENT AND PLAN    Weight control:    Starting weight: 125 lbs   Current weight:       Diabetes: N/A  A1c: 5.2    last measured: 10/14/22    Lipid management: Last lipid profile 23  Chol 146  TRG 60  HDL 50  LDL 84    Goals:eliminate processed meats, reduce portion sizes of meat to 3oz or less, use low fat dairy, reduce cheese intake or use reduced-fat, eat 3 or more servings of whole grains a day and HF diet- sodium and fluid restriction Heart failure diet- 2g sodium diet and 1800 cc fluid restriction    Measurable goals were based Rate Your Plate Dietary Self-Assessment.  These are the areas in which the patient could score higher on the assessment. Goals include recommendations for a heart healthy diet based on American Heart Association. Progression Toward Goals: Reviewed Pt goals and determined plan of care, Will continue to educate and progress as tolerated. Education: heart healthy eating  low sodium diet  hydration  HF Diet  Plan: Education class: Reading Food Labels, Education Class: Heart Healthy Eating, switch to low fat cheeses, replace refined grain bread with whole grain bread, reduce portion sizes, avoid processed foods and learn how to read food labels  Readiness to change: Preparation:  (Getting ready to change)       PSYCHOSOCIAL ASSESSMENT AND PLAN    Emotional:  Depression assessment:  PHQ-9 = 0  0 =No Depression            Anxiety measure:  JEAN MARIE-7 = 0  0-4  = Not anxious  Self-reported stress level:  1  Social support: Patient reports excellent emotional/social support from her family    Goals:  Physical Fitness in [facility] Score < 3, Daily Activity in [place] Score < 3, Overall Health in [place] Score < 3 and improved positive thoughts of well being    Progression Toward Goals: Reviewed Pt goals and determined plan of care, Will continue to educate and progress as tolerated.     Education: benefits of a positive support system and stress management techniques  Plan: Class: Stress and Your Health, Class: Relaxation, Exercise, Enjoy a hobby and Read a Book  Readiness to change: Preparation:  (Getting ready to change)       OTHER CORE COMPONENTS     Tobacco:   Social History     Tobacco Use   Smoking Status Former   • Packs/day: 1.00   • Years: 10.00   • Total pack years: 10.00   • Types: Cigarettes   • Start date: 80   • Quit date: 1993   • Years since quittin.7   Smokeless Tobacco Never       Tobacco Use Intervention:   N/A: Pt has a remote history of smoking    Anginal Symptoms:  None   NTG use: No prescription    Blood pressure:    Restin/58   Exercise: 108/62    Goals: consistent BP < 130/80, reduced dietary sodium <2300mg, moderate intensity exercise >150 mins/wk and medication compliance    Progression Toward Goals: Reviewed Pt goals and determined plan of care, Will continue to educate and progress as tolerated.     Education:  understanding high blood pressure and it's relationship to CAD and low sodium diet and HTN  Plan: Class: Understanding Heart Disease, Class: Common Heart Medications, medication compliance, Avoid Processed foods and engage in regular exercise  Readiness to change: Preparation:  (Getting ready to change)

## 2023-09-11 NOTE — PROGRESS NOTES
CARDIAC REHAB ASSESSMENT    Today's date: 2023  Patient name: Shasta Mendez     : 1950       MRN: [id number]  PCP: Jacklyn Fregoso MD  Referring Physician: Amara Jeffers DO  Cardiologist: Gio Moreno DO     Dx:   Encounter Diagnosis   Name Primary?    • Coronary artery disease involving native coronary artery        Date of onset: 23  Cultural needs: none    Weight    Wt Readings from Last 1 Encounters:   23 55.3 kg (122 lb)      Height:   Ht Readings from Last 1 Encounters:   23 5' 2" (1.575 m)     Medical History:   Past Medical History:   Diagnosis Date   • Abnormal CXR 2022   • Cancer [facility])    • Disease of thyroid gland    • Dysuria 2021   • Encounter for support and coordination of transition of care 2021         Physical Limitations: knee pain    Fall Risk: Low   Comments: Ambulates with a steady gait with no assist device    Anginal Equivalent: None/denies angina   NTG use: No prescription    Risk Factors   Cholesterol: Yes  Smoking: Former user quit in   HTN: Yes  DM: No  Obesity: No   Inactivity: Yes  Stress:  perceived  stress: 1/10   Stressors: none- family keeps stress away    Goals for Stress Management:Read, Exercise and Enjoy a hobby    Family History:  Family History   Problem Relation Age of Onset   • No Known Problems Mother    • No Known Problems Father    • Cancer Maternal Aunt        Allergies: Penicillins  ETOH:   Social History     Substance and Sexual Activity   Alcohol Use Never         Current Medications:   Current Outpatient Medications   Medication Sig Dispense Refill   • atorvastatin (LIPITOR) 20 mg tablet Take 1 tablet (20 mg total) by mouth daily with dinner 90 tablet 3   • Empagliflozin (Jardiance) 10 MG TABS tablet Take 1 tablet (10 mg total) by mouth every morning 30 tablet 5   • Entresto 24-26 MG TABS TAKE 1 TABLET BY MOUTH TWICE A DAY 60 tablet 5   • levothyroxine 100 mcg tablet TAKE 1 TABLET BY MOUTH EVERY DAY 90 tablet 0   • metoprolol succinate (TOPROL-XL) 25 mg 24 hr tablet TAKE 1 TABLET BY MOUTH TWICE A  tablet 4   • spironolactone (ALDACTONE) 25 mg tablet Take 1 tablet (25 mg total) by mouth daily 90 tablet 5   • torsemide (DEMADEX) 20 mg tablet Take 1 tablet (20 mg total) by mouth daily 90 tablet 3     No current facility-administered medications for this visit. Functional Status Prior to Diagnosis for Treatment   Occupation: retired  Recreation: spending time with   ADL’s: Capable of performing light to moderate ADLs  [place]ity: able to perform self-care  Exercise: walking       Current Functional Status  Occupation: retired  Recreation: spending time with family, read crotchet     ADL’s:Capable of performing light to moderate ADLs  Prosperity: lives alone, able to perform self-care  Exercise: none      Patient Specific Goals: Increase confidence to walk further distances, increase EF    Short Term Program Goals: increased strength improved energy/stamina with ADLs exercise 120-150 mins/wk reduce work of breathing    Long Term Goals: increased maximal walking duration  increased intial training workload  Improved Duke Activity Status score  Improved functional capacity based on initial fitness assessment  improved exercise tolerance  Improved Quality of Life - [facility] score reduced  Improved lipid profile  Reduced stress  improved Rate Your Plate Score  decreased shortness of breath    Ability to reach goals/rehabilitation potential:  Excellent    Projected return to function: 12 weeks  Objective tests: sub-max TM ETT      Nutritional   Reviewed details of Rate your Plate. Discussed key elements of heart healthy eating. Reviewed patient goals for dietary modifications and their clinical implications. Reviewed most recent lipid profile.      Goals for dietary modification based on Rate Your Plate Dietary Assessment:  choose lean cuts of meat  reduce portions of meat to 3 oz  more meatless meals  low fat dairy   increase whole grains  increase fruits and vegetables  Heart failure diet- 2g sodium diet and 1800 cc fluid restriction      Emotional/Social  Patient reports they are coping well with good social support and denies depression or anxiety    Marital status:     Domestic Violence Screening: No    Comments: SOB started last January had lifevest and meds and EF improved to 55%, had 1 ablation in december, had another, and then a pacemaker, recently more SOB in end of July    HF exacerbation caused by takutsubo cardiomyopathy

## 2023-09-15 ENCOUNTER — REMOTE DEVICE CLINIC VISIT (OUTPATIENT)
Dept: CARDIOLOGY CLINIC | Facility: CLINIC | Age: 73
End: 2023-09-15
Payer: COMMERCIAL

## 2023-09-15 DIAGNOSIS — Z95.0 CARDIAC PACEMAKER IN SITU: Primary | ICD-10-CM

## 2023-09-15 PROCEDURE — 93294 REM INTERROG EVL PM/LDLS PM: CPT | Performed by: INTERNAL MEDICINE

## 2023-09-15 PROCEDURE — 93296 REM INTERROG EVL PM/IDS: CPT | Performed by: INTERNAL MEDICINE

## 2023-09-15 NOTE — PROGRESS NOTES
Results for orders placed or performed in visit on 09/15/23   Cardiac EP device report    Narrative    MDT DUAL CHAMBER PPM (MVP ON) - ACTIVE SYSTEM IS MRI CONDITIONAL  CARELINK TRANSMISSION: BATTERY STATUS "14 YRS." AP 16%  23%. ALL AVAILABLE LEAD PARAMETERS WITHIN NORMAL LIMITS. NO SIGNIFICANT HIGH RATE EPISODES. NORMAL DEVICE FUNCTION.  NC

## 2023-09-18 NOTE — PROGRESS NOTES
Siddharth Davalos      Dear Dr. Louis Harden    Thank you for referring your patient to our cardiac rehabilitation program. The patient has completed 1 visit- her initial evaluation. However, rehab is being discontinued at this time due to:    Voluntary Dropout: The patient has chosen to quit due to: financial reasons- copay per session. Please contact us at [phone] if you have any questions about this patent’s case. Thank you for your continued support of cardiac rehabilitation.        Sincerely,    Joni Sabillon MS, CEP, [facility]  Cardiopulmonary Rehab

## 2023-09-19 ENCOUNTER — PATIENT OUTREACH (OUTPATIENT)
Dept: FAMILY MEDICINE CLINIC | Facility: CLINIC | Age: 73
End: 2023-09-19

## 2023-09-19 NOTE — PROGRESS NOTES
No return call from messages left or letter sent via [employer]. Will close complex care management episode at this time. Pt provided my contact information if future assistance is needed.

## 2023-10-06 ENCOUNTER — TRANSCRIBE ORDERS (OUTPATIENT)
Dept: LAB | Facility: CLINIC | Age: 73
End: 2023-10-06

## 2023-10-06 ENCOUNTER — APPOINTMENT (OUTPATIENT)
Dept: LAB | Facility: CLINIC | Age: 73
End: 2023-10-06
Payer: COMMERCIAL

## 2023-10-06 DIAGNOSIS — E03.9 HYPOTHYROIDISM, UNSPECIFIED TYPE: ICD-10-CM

## 2023-10-06 DIAGNOSIS — R73.9 ELEVATED BLOOD SUGAR: ICD-10-CM

## 2023-10-06 DIAGNOSIS — C88.0 MACROGLOBULINEMIA (HCC): ICD-10-CM

## 2023-10-06 DIAGNOSIS — D64.9 ANEMIA, UNSPECIFIED TYPE: ICD-10-CM

## 2023-10-06 DIAGNOSIS — Z85.71 PERSONAL HISTORY OF HODGKIN'S DISEASE: ICD-10-CM

## 2023-10-06 DIAGNOSIS — C88.0 MACROGLOBULINEMIA (HCC): Primary | ICD-10-CM

## 2023-10-06 DIAGNOSIS — E78.5 DYSLIPIDEMIA: ICD-10-CM

## 2023-10-06 LAB
ALBUMIN SERPL BCP-MCNC: 4.2 G/DL (ref 3.5–5)
ALP SERPL-CCNC: 71 U/L (ref 34–104)
ALT SERPL W P-5'-P-CCNC: 11 U/L (ref 7–52)
ANION GAP SERPL CALCULATED.3IONS-SCNC: 11 MMOL/L
AST SERPL W P-5'-P-CCNC: 18 U/L (ref 13–39)
BASOPHILS # BLD AUTO: 0.02 THOUSANDS/ÂΜL (ref 0–0.1)
BASOPHILS NFR BLD AUTO: 0 % (ref 0–1)
BILIRUB SERPL-MCNC: 0.79 MG/DL (ref 0.2–1)
BUN SERPL-MCNC: 33 MG/DL (ref 5–25)
CALCIUM SERPL-MCNC: 9.8 MG/DL (ref 8.4–10.2)
CHLORIDE SERPL-SCNC: 99 MMOL/L (ref 96–108)
CHOLEST SERPL-MCNC: 103 MG/DL
CO2 SERPL-SCNC: 33 MMOL/L (ref 21–32)
CREAT SERPL-MCNC: 1.19 MG/DL (ref 0.6–1.3)
EOSINOPHIL # BLD AUTO: 0.13 THOUSAND/ÂΜL (ref 0–0.61)
EOSINOPHIL NFR BLD AUTO: 2 % (ref 0–6)
ERYTHROCYTE [DISTWIDTH] IN BLOOD BY AUTOMATED COUNT: 14.2 % (ref 11.6–15.1)
EST. AVERAGE GLUCOSE BLD GHB EST-MCNC: 140 MG/DL
GFR SERPL CREATININE-BSD FRML MDRD: 45 ML/MIN/1.73SQ M
GLUCOSE P FAST SERPL-MCNC: 105 MG/DL (ref 65–99)
HBA1C MFR BLD: 6.5 %
HCT VFR BLD AUTO: 42 % (ref 34.8–46.1)
HDLC SERPL-MCNC: 44 MG/DL
HGB BLD-MCNC: 13.5 G/DL (ref 11.5–15.4)
IGA SERPL-MCNC: 356 MG/DL (ref 66–433)
IGG SERPL-MCNC: 1029 MG/DL (ref 635–1741)
IGM SERPL-MCNC: 104 MG/DL (ref 45–281)
IMM GRANULOCYTES # BLD AUTO: 0.02 THOUSAND/UL (ref 0–0.2)
IMM GRANULOCYTES NFR BLD AUTO: 0 % (ref 0–2)
LDLC SERPL CALC-MCNC: 41 MG/DL (ref 0–100)
LYMPHOCYTES # BLD AUTO: 3.13 THOUSANDS/ÂΜL (ref 0.6–4.47)
LYMPHOCYTES NFR BLD AUTO: 52 % (ref 14–44)
MCH RBC QN AUTO: 29.7 PG (ref 26.8–34.3)
MCHC RBC AUTO-ENTMCNC: 32.1 G/DL (ref 31.4–37.4)
MCV RBC AUTO: 92 FL (ref 82–98)
MONOCYTES # BLD AUTO: 0.45 THOUSAND/ÂΜL (ref 0.17–1.22)
MONOCYTES NFR BLD AUTO: 8 % (ref 4–12)
NEUTROPHILS # BLD AUTO: 2.27 THOUSANDS/ÂΜL (ref 1.85–7.62)
NEUTS SEG NFR BLD AUTO: 38 % (ref 43–75)
NONHDLC SERPL-MCNC: 59 MG/DL
NRBC BLD AUTO-RTO: 0 /100 WBCS
PLATELET # BLD AUTO: 100 THOUSANDS/UL (ref 149–390)
PMV BLD AUTO: 13.2 FL (ref 8.9–12.7)
POTASSIUM SERPL-SCNC: 4 MMOL/L (ref 3.5–5.3)
PROT SERPL-MCNC: 7.4 G/DL (ref 6.4–8.4)
RBC # BLD AUTO: 4.55 MILLION/UL (ref 3.81–5.12)
SODIUM SERPL-SCNC: 143 MMOL/L (ref 135–147)
TRIGL SERPL-MCNC: 90 MG/DL
TSH SERPL DL<=0.05 MIU/L-ACNC: 1.77 UIU/ML (ref 0.45–4.5)
WBC # BLD AUTO: 6.02 THOUSAND/UL (ref 4.31–10.16)

## 2023-10-06 PROCEDURE — 82784 ASSAY IGA/IGD/IGG/IGM EACH: CPT

## 2023-10-06 PROCEDURE — 84443 ASSAY THYROID STIM HORMONE: CPT

## 2023-10-06 PROCEDURE — 83625 ASSAY OF LDH ENZYMES: CPT

## 2023-10-06 PROCEDURE — 85025 COMPLETE CBC W/AUTO DIFF WBC: CPT

## 2023-10-06 PROCEDURE — 36415 COLL VENOUS BLD VENIPUNCTURE: CPT

## 2023-10-06 PROCEDURE — 80061 LIPID PANEL: CPT

## 2023-10-06 PROCEDURE — 83036 HEMOGLOBIN GLYCOSYLATED A1C: CPT

## 2023-10-06 PROCEDURE — 83615 LACTATE (LD) (LDH) ENZYME: CPT

## 2023-10-06 PROCEDURE — 80053 COMPREHEN METABOLIC PANEL: CPT

## 2023-10-09 DIAGNOSIS — E03.9 HYPOTHYROIDISM, UNSPECIFIED TYPE: ICD-10-CM

## 2023-10-09 LAB
LDH SERPL-CCNC: 136 IU/L (ref 119–226)
LDH1 CFR SERPL ELPH: 29 % (ref 17–32)
LDH2 CFR SERPL ELPH: 36 % (ref 25–40)
LDH3 CFR SERPL ELPH: 21 % (ref 17–27)
LDH4 CFR SERPL ELPH: 8 % (ref 5–13)
LDH5 CFR SERPL ELPH: 6 % (ref 4–20)

## 2023-10-09 RX ORDER — LEVOTHYROXINE SODIUM 0.1 MG/1
TABLET ORAL
Qty: 90 TABLET | Refills: 0 | Status: SHIPPED | OUTPATIENT
Start: 2023-10-09

## 2023-10-18 ENCOUNTER — OFFICE VISIT (OUTPATIENT)
Dept: PULMONOLOGY | Facility: CLINIC | Age: 73
End: 2023-10-18
Payer: COMMERCIAL

## 2023-10-18 VITALS
HEIGHT: 62 IN | HEART RATE: 90 BPM | BODY MASS INDEX: 22.26 KG/M2 | SYSTOLIC BLOOD PRESSURE: 122 MMHG | OXYGEN SATURATION: 91 % | DIASTOLIC BLOOD PRESSURE: 62 MMHG | WEIGHT: 121 LBS | TEMPERATURE: 97.9 F

## 2023-10-18 DIAGNOSIS — Z23 NEED FOR INFLUENZA VACCINATION: Primary | ICD-10-CM

## 2023-10-18 DIAGNOSIS — Z23 ENCOUNTER FOR IMMUNIZATION: ICD-10-CM

## 2023-10-18 DIAGNOSIS — J84.9 ILD (INTERSTITIAL LUNG DISEASE) (HCC): ICD-10-CM

## 2023-10-18 PROCEDURE — 90662 IIV NO PRSV INCREASED AG IM: CPT

## 2023-10-18 PROCEDURE — G0008 ADMIN INFLUENZA VIRUS VAC: HCPCS

## 2023-10-18 PROCEDURE — 99214 OFFICE O/P EST MOD 30 MIN: CPT | Performed by: INTERNAL MEDICINE

## 2023-10-18 NOTE — PROGRESS NOTES
Pulmonary Follow Up Note   Lisa Ortiz 75 y.o. female MRN: [id number]  10/18/2023    Assessment:    ILD (interstitial lung disease) ([address])  Chance Ornelas has some worsening on her pulmonary function test with respect to her total lung capacity, which may be worsened because of issues with congestive heart failure which have arisen over the past few months. I elected to repeat testing in a year rather than make changes based on this test result, given the fact that she feels subjectively no worse. Perform high-res CT in 1 year. Plan:    Diagnoses and all orders for this visit:    Need for influenza vaccination  -     FLU VACCINE QUADRIVALENT GREATER THAN OR EQUAL TO 3 YO IM    ILD (interstitial lung disease) ([address])  -     CT chest high resolution; Future    Encounter for immunization  -     influenza vaccine, high-dose, PF 0.5 mL    Follow-up in 1 year. History of Present Illness   HPI:  Lisa Ortiz is a 75 y.o. female who presents for routine follow-up. She reports feeling well overall. She has a little bit of coughing now which is new for her, she thinks this is related to change of season and allergies. She otherwise has no issues with dyspnea, cough, or any other new issues to report. She has had a lot of cardiac issues since her last appointment, having undergone ablation, catheterization, and pacemaker placement. Pulmonary function test were performed 4 months ago and were significant for a drop in the total lung capacity compared to prior (diffusion capacity was also low at 21% predicted, but she was unable to perform that testing previously for comparison). We discussed that since she is feeling well, likely the changes were related to cardiac issues at the time of testing. We agreed that performing repeat testing in 1 year would be a reasonable intervention, after which we could likely space out to every 2 years if she remains stable.     Review of Systems   Respiratory:  Negative for cough and shortness of breath. Historical Information   Past Medical History:   Diagnosis Date   • Abnormal CXR 1/14/2022   • Cancer [facility])    • Disease of thyroid gland    • Dysuria 12/16/2021   • Encounter for support and coordination of transition of care 5/6/2021     Past Surgical History:   Procedure Laterality Date   • CARDIAC CATHETERIZATION Left 8/1/2023    Procedure: Cardiac Left Heart Cath;  Surgeon: Darshan Reyes DO;  [facility]; Service: Cardiology   • CARDIAC CATHETERIZATION  8/1/2023    Procedure: Cardiac catheterization;  Surgeon: Darshan Reyes DO;  [facility]; Service: Cardiology   • CARDIAC CATHETERIZATION N/A 8/1/2023    Procedure: Cardiac Coronary Angiogram;  Surgeon: Darshan Reyes DO;  [facility]; Service: Cardiology   • CARDIAC ELECTROPHYSIOLOGY PROCEDURE N/A 12/30/2022    Procedure: Cardiac eps/aflutter ablation;  Surgeon: Valerie Garcia MD;  Location: [facility] CARDIAC [facility]; Service: Cardiology   • CARDIAC ELECTROPHYSIOLOGY PROCEDURE N/A 12/30/2022    Procedure: Cardiac loop recorder implant;  Surgeon: Valerie Garcia MD;  [facility]; Service: Cardiology   • CARDIAC ELECTROPHYSIOLOGY PROCEDURE N/A 2/6/2023    Procedure: CARDIAC EPS/SVT ABLATION;  Surgeon: Valerie Garcia MD;  [facility]; Service: Cardiology   • CARDIAC ELECTROPHYSIOLOGY PROCEDURE N/A 2/16/2023    Procedure: Cardiac pacer implant;  Surgeon: Sukhjinder Thomas MD;  [facility]: [facility]; Service: Cardiology   • CARDIAC ELECTROPHYSIOLOGY PROCEDURE N/A 2/16/2023    Procedure: CARDIAC LOOP RECORDER EXPLANT;  Surgeon: Sukhjinder Thomas MD;  Location: [facility] CARDIAC [facility];   Service: Cardiology   • HYSTERECTOMY       Family History   Problem Relation Age of Onset   • No Known Problems Mother    • No Known Problems Father    • Cancer Maternal Aunt        Meds/Allergies     Current Outpatient Medications:   •  atorvastatin (LIPITOR) 20 mg tablet, Take 1 tablet (20 mg total) by mouth daily with dinner, Disp: 90 tablet, Rfl: 3  •  Empagliflozin (Jardiance) 10 MG TABS tablet, Take 1 tablet (10 mg total) by mouth every morning, Disp: 30 tablet, Rfl: 5  •  Entresto 24-26 MG TABS, TAKE 1 TABLET BY MOUTH TWICE A DAY, Disp: 60 tablet, Rfl: 5  •  levothyroxine 100 mcg tablet, TAKE 1 TABLET BY MOUTH EVERY DAY, Disp: 90 tablet, Rfl: 0  •  metoprolol succinate (TOPROL-XL) 25 mg 24 hr tablet, TAKE 1 TABLET BY MOUTH TWICE A DAY, Disp: 180 tablet, Rfl: 4  •  spironolactone (ALDACTONE) 25 mg tablet, Take 1 tablet (25 mg total) by mouth daily, Disp: 90 tablet, Rfl: 5  •  torsemide (DEMADEX) 20 mg tablet, Take 1 tablet (20 mg total) by mouth daily, Disp: 90 tablet, Rfl: 3  Allergies   Allergen Reactions   • Penicillins Rash       Vitals: Blood pressure 122/62, pulse 90, temperature 97.9 °F (36.6 °C), height 5' 2" (1.575 m), weight 54.9 kg (121 lb), SpO2 91 %. Body mass index is 22.13 kg/m². Oxygen Therapy  SpO2: 91 %  Oxygen Therapy: None (Room air)    Physical Exam  Physical Exam  Vitals reviewed. Constitutional:       General: She is not in acute distress. Appearance: Normal appearance. She is well-developed. She is not ill-appearing. HENT:      Head: Normocephalic and atraumatic. Eyes:      General: No scleral icterus. Conjunctiva/sclera: Conjunctivae normal.   Neck:      Vascular: No JVD. Cardiovascular:      Rate and Rhythm: Normal rate and regular rhythm. Heart sounds: Normal heart sounds. No murmur heard. No friction rub. No gallop. Pulmonary:      Effort: Pulmonary effort is normal. No respiratory distress. Breath sounds: Normal breath sounds. No wheezing or rales. Musculoskeletal:      Cervical back: Neck supple. Right lower leg: No edema. Left lower leg: No edema. Skin:     General: Skin is warm and dry. Findings: No rash. Neurological:      General: No focal deficit present.       Mental Status: She is alert and oriented to person, place, and time. Mental status is at baseline. Psychiatric:         Mood and Affect: Mood normal.         Behavior: Behavior normal.         Labs: I have personally reviewed pertinent lab results. Lab Results   Component Value Date    WBC 6.02 10/06/2023    HGB 13.5 10/06/2023    HCT 42.0 10/06/2023    MCV 92 10/06/2023     (L) 10/06/2023     Lab Results   Component Value Date    CALCIUM 9.8 10/06/2023    K 4.0 10/06/2023    CO2 33 (H) 10/06/2023    CL 99 10/06/2023    BUN 33 (H) 10/06/2023    CREATININE 1.19 10/06/2023     No results found for: "IGE"  Lab Results   Component Value Date    ALT 11 10/06/2023    AST 18 10/06/2023    ALKPHOS 71 10/06/2023       Imaging and other studies: I have personally reviewed relevant films in PACS. EKG, Pathology, and Other Studies: I have personally reviewed relevant reports in Epic. Ramón Johnson M.D.   [facility]

## 2023-10-18 NOTE — ASSESSMENT & PLAN NOTE
Lexie Phillips has some worsening on her pulmonary function test with respect to her total lung capacity, which may be worsened because of issues with congestive heart failure which have arisen over the past few months. I elected to repeat testing in a year rather than make changes based on this test result, given the fact that she feels subjectively no worse. Perform high-res CT in 1 year.

## 2023-11-01 ENCOUNTER — HOSPITAL ENCOUNTER (OUTPATIENT)
Dept: NON INVASIVE DIAGNOSTICS | Facility: CLINIC | Age: 73
Discharge: HOME | End: 2023-11-01
Payer: COMMERCIAL

## 2023-11-01 VITALS
WEIGHT: 121.03 LBS | HEIGHT: 62 IN | DIASTOLIC BLOOD PRESSURE: 62 MMHG | SYSTOLIC BLOOD PRESSURE: 122 MMHG | BODY MASS INDEX: 22.27 KG/M2 | HEART RATE: 90 BPM

## 2023-11-01 DIAGNOSIS — I42.9 CARDIOMYOPATHY, UNSPECIFIED TYPE (HCC): ICD-10-CM

## 2023-11-01 LAB
AORTIC ROOT: 4.4 CM
APICAL FOUR CHAMBER EJECTION FRACTION: 55 %
ASCENDING AORTA: 3.7 CM
AV REGURGITATION PRESSURE HALF TIME: 524 MS
RA PRESSURE ESTIMATED: 8 MMHG
RV PSP: 53 MMHG
SL CV AV DECELERATION TIME RETROGRADE: 1808 MS
SL CV AV PEAK GRADIENT RETROGRADE: 92 MMHG
SL CV LV EF: 60
TR MAX PG: 45 MMHG
TR PEAK VELOCITY: 3.3 M/S
TRICUSPID VALVE PEAK REGURGITATION VELOCITY: 3.34 M/S

## 2023-11-01 PROCEDURE — 93308 TTE F-UP OR LMTD: CPT | Performed by: INTERNAL MEDICINE

## 2023-11-01 PROCEDURE — 93325 DOPPLER ECHO COLOR FLOW MAPG: CPT

## 2023-11-01 PROCEDURE — 93321 DOPPLER ECHO F-UP/LMTD STD: CPT

## 2023-11-01 PROCEDURE — 93325 DOPPLER ECHO COLOR FLOW MAPG: CPT | Performed by: INTERNAL MEDICINE

## 2023-11-01 PROCEDURE — 93321 DOPPLER ECHO F-UP/LMTD STD: CPT | Performed by: INTERNAL MEDICINE

## 2023-11-01 PROCEDURE — 93308 TTE F-UP OR LMTD: CPT

## 2023-11-03 ENCOUNTER — RA CDI HCC (OUTPATIENT)
Dept: OTHER | Facility: HOSPITAL | Age: 73
End: 2023-11-03

## 2023-11-03 NOTE — PROGRESS NOTES
[address] coding opportunities       Chart reviewed, no opportunity found: [address] Review     Patients Insurance     Medicare Insurance: [employer]

## 2023-11-08 ENCOUNTER — RA CDI HCC (OUTPATIENT)
Dept: OTHER | Facility: HOSPITAL | Age: 73
End: 2023-11-08

## 2023-11-09 NOTE — PROGRESS NOTES
[address] [facility] coding opportunities     I13.0     Chart Reviewed number of suggestions sent to Provider: 1   GR    Patients Insurance     Medicare Insurance: [id number]

## 2023-11-13 ENCOUNTER — OFFICE VISIT (OUTPATIENT)
Dept: FAMILY MEDICINE CLINIC | Facility: CLINIC | Age: 73
End: 2023-11-13
Payer: COMMERCIAL

## 2023-11-13 VITALS
BODY MASS INDEX: 22.82 KG/M2 | WEIGHT: 124 LBS | SYSTOLIC BLOOD PRESSURE: 120 MMHG | DIASTOLIC BLOOD PRESSURE: 70 MMHG | HEIGHT: 62 IN

## 2023-11-13 DIAGNOSIS — J01.10 ACUTE NON-RECURRENT FRONTAL SINUSITIS: ICD-10-CM

## 2023-11-13 DIAGNOSIS — R73.9 ELEVATED BLOOD SUGAR: ICD-10-CM

## 2023-11-13 DIAGNOSIS — E78.5 DYSLIPIDEMIA: ICD-10-CM

## 2023-11-13 DIAGNOSIS — E03.9 HYPOTHYROIDISM, UNSPECIFIED TYPE: Primary | ICD-10-CM

## 2023-11-13 DIAGNOSIS — J84.9 ILD (INTERSTITIAL LUNG DISEASE) (HCC): ICD-10-CM

## 2023-11-13 DIAGNOSIS — M54.50 ACUTE BILATERAL LOW BACK PAIN WITHOUT SCIATICA: ICD-10-CM

## 2023-11-13 DIAGNOSIS — N18.30 STAGE 3 CHRONIC KIDNEY DISEASE, UNSPECIFIED WHETHER STAGE 3A OR 3B CKD (HCC): ICD-10-CM

## 2023-11-13 DIAGNOSIS — I42.0 DILATED CARDIOMYOPATHY (HCC): ICD-10-CM

## 2023-11-13 PROCEDURE — 99215 OFFICE O/P EST HI 40 MIN: CPT | Performed by: FAMILY MEDICINE

## 2023-11-13 RX ORDER — AZITHROMYCIN 250 MG/1
TABLET, FILM COATED ORAL
Qty: 6 TABLET | Refills: 0 | Status: SHIPPED | OUTPATIENT
Start: 2023-11-13 | End: 2023-11-17

## 2023-11-13 NOTE — ASSESSMENT & PLAN NOTE
Patient denies any symptoms of chest pain or shortness of breath. Continue all medications per Cardiology.

## 2023-11-13 NOTE — ASSESSMENT & PLAN NOTE
Due to a fall. Symptoms are localized in the lower back. Patient does not have any local tenderness or neurological symptoms in her legs. DEXA scan reviewed, noted to have a T score of -1.9 in lumbar spine. Patient offered an x-ray of the lower back but she declines. Patient would like to start with exercise at home. Continue with warm compresses and gentle range of motion. Patient to call back for x-ray if symptoms worsen.

## 2023-11-13 NOTE — ASSESSMENT & PLAN NOTE
Patient is on Jardiance, started by cardiology for dilated cardiomyopathy. Continue same. Continue monitoring A1c.

## 2023-11-13 NOTE — ASSESSMENT & PLAN NOTE
Lab Results   Component Value Date    EGFR 45 10/06/2023    EGFR 60 09/01/2023    EGFR 48 08/16/2023    CREATININE 1.19 10/06/2023    CREATININE 0.94 09/01/2023    CREATININE 1.13 08/16/2023   Patient's blood pressure is at goal.  Continue spironolactone, torsemide, Entresto per cardiology.

## 2023-11-13 NOTE — ASSESSMENT & PLAN NOTE
TSH 1.7, free T4 normal.  Patient is on levothyroxine 100 mcg daily. Continue same. Repeat labs x 6 months.

## 2023-11-13 NOTE — PROGRESS NOTES
Subjective:      Patient ID: Ninfa Casas is a 75 y.o. female. HPI    Patient is here for routine 6-month follow-up. Has history of dyslipidemia, hypothyroidism. Dilated cardiomyopathy, CHF, interstitial lung disease. Metabolic labs reviewed with patient. Being closely monitored by cardiology and pulmonary for chronic medical problems. Noted to have an A1c of 6.5, has been started on Jardiance for cardiovascular benefits. Patient reports an accidental fall a few weeks ago, has been experiencing low back pain. Patient has been doing warm compresses and gentle range of motion. Has history of osteopenia. Offered an x-ray of the lower back but patient declines today. Does not have any local tenderness over her spine either. Also reports symptoms of sinus congestion and postnasal drip going on for at least 3 weeks now. Reports mild productive cough. With above comorbidities I would start her on an oral antibiotic today. Patient denies any symptoms of chest pain/shortness of breath or swelling in her legs. Past Medical History:   Diagnosis Date    Abnormal CXR 1/14/2022    Cancer [facility])     Disease of thyroid gland     Dysuria 12/16/2021    Encounter for support and coordination of transition of care 5/6/2021       Family History   Problem Relation Age of Onset    No Known Problems Mother     No Known Problems Father     Cancer Maternal Aunt        Past Surgical History:   Procedure Laterality Date    CARDIAC CATHETERIZATION Left 8/1/2023    Procedure: Cardiac Left Heart Cath;  Surgeon: Santy Saba DO;  [facility]; Service: Cardiology    CARDIAC CATHETERIZATION  8/1/2023    Procedure: Cardiac catheterization;  Surgeon: Santy Saba DO;  [facility]; Service: Cardiology    CARDIAC CATHETERIZATION N/A 8/1/2023    Procedure: Cardiac Coronary Angiogram;  Surgeon: Santy Saba DO;  [facility];   Service: Cardiology    CARDIAC ELECTROPHYSIOLOGY PROCEDURE N/A 12/30/2022    Procedure: Cardiac eps/aflutter ablation;  Surgeon: Mayelin Laureano MD;  [facility]; Service: Cardiology    CARDIAC ELECTROPHYSIOLOGY PROCEDURE N/A 12/30/2022    Procedure: Cardiac loop recorder implant;  Surgeon: Mayelin Laureano MD;  [facility]; Service: Cardiology    CARDIAC ELECTROPHYSIOLOGY PROCEDURE N/A 2/6/2023    Procedure: CARDIAC EPS/SVT ABLATION;  Surgeon: Mayelin Laureano MD;  [facility]; Service: Cardiology    CARDIAC ELECTROPHYSIOLOGY PROCEDURE N/A 2/16/2023    Procedure: Cardiac pacer implant;  Surgeon: Yolanda Love MD;  [facility]; Service: Cardiology    CARDIAC ELECTROPHYSIOLOGY PROCEDURE N/A 2/16/2023    Procedure: CARDIAC LOOP RECORDER EXPLANT;  Surgeon: Yolanda Love MD;  [facility]; Service: Cardiology    HYSTERECTOMY          reports that she quit smoking about 30 years ago. Her smoking use included cigarettes. She started smoking about 50 years ago. She has a 10.00 pack-year smoking history. She has never used smokeless tobacco. She reports that she does not drink alcohol and does not use drugs.       Current Outpatient Medications:     atorvastatin (LIPITOR) 20 mg tablet, Take 1 tablet (20 mg total) by mouth daily with dinner, Disp: 90 tablet, Rfl: 3    Empagliflozin (Jardiance) 10 MG TABS tablet, Take 1 tablet (10 mg total) by mouth every morning, Disp: 30 tablet, Rfl: 5    Entresto 24-26 MG TABS, TAKE 1 TABLET BY MOUTH TWICE A DAY, Disp: 60 tablet, Rfl: 5    levothyroxine 100 mcg tablet, TAKE 1 TABLET BY MOUTH EVERY DAY, Disp: 90 tablet, Rfl: 0    metoprolol succinate (TOPROL-XL) 25 mg 24 hr tablet, TAKE 1 TABLET BY MOUTH TWICE A DAY, Disp: 180 tablet, Rfl: 4    spironolactone (ALDACTONE) 25 mg tablet, Take 1 tablet (25 mg total) by mouth daily, Disp: 90 tablet, Rfl: 5    torsemide (DEMADEX) 20 mg tablet, Take 1 tablet (20 mg total) by mouth daily, Disp: 90 tablet, Rfl: 3    The following portions of the patient's history were reviewed and updated as appropriate: allergies, current medications, past family history, past medical history, past social history, past surgical history and problem list.    Review of Systems   Constitutional: Negative. HENT:  Positive for congestion and postnasal drip. Respiratory:  Positive for cough. Cardiovascular: Negative. Musculoskeletal:  Positive for back pain. Objective:    /70   Ht 5' 2" (1.575 m)   Wt 56.2 kg (124 lb)   BMI 22.68 kg/m²      Physical Exam  Constitutional:       Appearance: Normal appearance. HENT:      Right Ear: Tympanic membrane normal.      Left Ear: Tympanic membrane normal.      Mouth/Throat:      Pharynx: Posterior oropharyngeal erythema present. Cardiovascular:      Heart sounds: Normal heart sounds. Pulmonary:      Effort: No respiratory distress. Musculoskeletal:         General: No tenderness. Comments: No spinal tenderness   Neurological:      General: No focal deficit present. Mental Status: She is oriented to person, place, and time. Mental status is at baseline.    Psychiatric:         Mood and Affect: Mood normal.           Recent Results (from the past 1008 hour(s))   Comprehensive metabolic panel    Collection Time: 10/06/23  7:12 AM   Result Value Ref Range    Sodium 143 135 - 147 mmol/L    Potassium 4.0 3.5 - 5.3 mmol/L    Chloride 99 96 - 108 mmol/L    CO2 33 (H) 21 - 32 mmol/L    ANION GAP 11 mmol/L    BUN 33 (H) 5 - 25 mg/dL    Creatinine 1.19 0.60 - 1.30 mg/dL    Glucose, Fasting 105 (H) 65 - 99 mg/dL    Calcium 9.8 8.4 - 10.2 mg/dL    AST 18 13 - 39 U/L    ALT 11 7 - 52 U/L    Alkaline Phosphatase 71 34 - 104 U/L    Total Protein 7.4 6.4 - 8.4 g/dL    Albumin 4.2 3.5 - 5.0 g/dL    Total Bilirubin 0.79 0.20 - 1.00 mg/dL    eGFR 45 ml/min/1.73sq m   TSH, 3rd generation with Free T4 reflex    Collection Time: 10/06/23  7:12 AM Result Value Ref Range    TSH 3RD GENERATON 1.771 0.450 - 4.500 uIU/mL   Lipid panel    Collection Time: 10/06/23  7:12 AM   Result Value Ref Range    Cholesterol 103 See Comment mg/dL    Triglycerides 90 See Comment mg/dL    HDL, Direct 44 (L) >=50 mg/dL    LDL Calculated 41 0 - 100 mg/dL    Non-HDL-Chol (CHOL-HDL) 59 mg/dl   Hemoglobin A1C    Collection Time: 10/06/23  7:12 AM   Result Value Ref Range    Hemoglobin A1C 6.5 (H) Normal 4.0-5.6%; PreDiabetic 5.7-6.4%;  Diabetic >=6.5%; Glycemic control for adults with diabetes <7.0% %     mg/dl   Lactate dehydrogenase, isoenzymes    Collection Time: 10/06/23  7:12 AM   Result Value Ref Range     119 - 226 IU/L    LD-1 29 17 - 32 %    LD-2 36 25 - 40 %    LD-3 21 17 - 27 %    LD-4 8 5 - 13 %    LD-5 6 4 - 20 %   CBC and differential    Collection Time: 10/06/23  7:12 AM   Result Value Ref Range    WBC 6.02 4.31 - 10.16 Thousand/uL    RBC 4.55 3.81 - 5.12 Million/uL    Hemoglobin 13.5 11.5 - 15.4 g/dL    Hematocrit 42.0 34.8 - 46.1 %    MCV 92 82 - 98 fL    MCH 29.7 26.8 - 34.3 pg    MCHC 32.1 31.4 - 37.4 g/dL    RDW 14.2 11.6 - 15.1 %    MPV 13.2 (H) 8.9 - 12.7 fL    Platelets 998 (L) 786 - 390 Thousands/uL    nRBC 0 /100 WBCs    Neutrophils Relative 38 (L) 43 - 75 %    Immat GRANS % 0 0 - 2 %    Lymphocytes Relative 52 (H) 14 - 44 %    Monocytes Relative 8 4 - 12 %    Eosinophils Relative 2 0 - 6 %    Basophils Relative 0 0 - 1 %    Neutrophils Absolute 2.27 1.85 - 7.62 Thousands/µL    Immature Grans Absolute 0.02 0.00 - 0.20 Thousand/uL    Lymphocytes Absolute 3.13 0.60 - 4.47 Thousands/µL    Monocytes Absolute 0.45 0.17 - 1.22 Thousand/µL    Eosinophils Absolute 0.13 0.00 - 0.61 Thousand/µL    Basophils Absolute 0.02 0.00 - 0.10 Thousands/µL   IgG, IgA, IgM    Collection Time: 10/06/23  7:12 AM   Result Value Ref Range     66 - 433 mg/dL    IGG 1,029 635 - 1,741 mg/dL     45 - 281 mg/dL   Echo follow up/limited w/ contrast if indicated Collection Time: 11/01/23  9:31 AM   Result Value Ref Range    AV peak gradient 92 mmHg    Triscuspid Valve Regurgitation Peak Gradient 45.0 mmHg    AV regurgitation pressure 1/2 time 524 ms    TR Peak Sandeep 3.3 m/s    A4C EF 55 %    Tricuspid valve peak regurgitation velocity 3.34 m/s    Ao root 4.40 cm    Asc Ao 3.7 cm    AV Deceleration Time 1,808 ms    LV EF 60     Est. RA pres 8.0 mmHg    Right Ventricular Peak Systolic Pressure 37.14 mmHg       Assessment/Plan:    No problem-specific Assessment & Plan notes found for this encounter. Problem List Items Addressed This Visit          Endocrine    Hypothyroidism - Primary     TSH 1.7, free T4 normal.  Patient is on levothyroxine 100 mcg daily. Continue same. Repeat labs x 6 months. Relevant Orders    TSH, 3rd generation with Free T4 reflex       Respiratory    ILD (interstitial lung disease) ([address])     Stable. Continue monitoring/management per pulmonary. Acute non-recurrent frontal sinusitis    Relevant Medications    azithromycin (ZITHROMAX) 250 mg tablet       Cardiovascular and Mediastinum    Dilated cardiomyopathy ([address])     Patient denies any symptoms of chest pain or shortness of breath. Continue all medications per Cardiology. Genitourinary    CKD (chronic kidney disease) stage 3, GFR 30-59 ml/min ([facility])     Lab Results   Component Value Date    EGFR 45 10/06/2023    EGFR 60 09/01/2023    EGFR 48 08/16/2023    CREATININE 1.19 10/06/2023    CREATININE 0.94 09/01/2023    CREATININE 1.13 08/16/2023   Patient's blood pressure is at goal.  Continue spironolactone, torsemide, Entresto per cardiology. Other    Dyslipidemia     LDL is at goal.  Continue atorvastatin 20 mg. Continue monitoring metabolic labs at 6-month. Relevant Orders    Lipid panel    Elevated blood sugar     Patient is on Jardiance, started by cardiology for dilated cardiomyopathy. Continue same. Continue monitoring A1c. Relevant Orders    Comprehensive metabolic panel    Hemoglobin A1C    Acute bilateral low back pain without sciatica     Due to a fall. Symptoms are localized in the lower back. Patient does not have any local tenderness or neurological symptoms in her legs. DEXA scan reviewed, noted to have a T score of -1.9 in lumbar spine. Patient offered an x-ray of the lower back but she declines. Patient would like to start with exercise at home. Continue with warm compresses and gentle range of motion. Patient to call back for x-ray if symptoms worsen. I have spent a total time of 40 minutes on 11/13/23 in caring for this patient including Diagnostic results, Prognosis, Risks and benefits of tx options, Instructions for management, Patient and family education, Importance of tx compliance, Risk factor reductions, Impressions, Counseling / Coordination of care, Documenting in the medical record, Reviewing / ordering tests, medicine, procedures  , and Obtaining or reviewing history  .

## 2023-11-22 ENCOUNTER — TELEPHONE (OUTPATIENT)
Dept: CARDIOLOGY CLINIC | Facility: CLINIC | Age: 73
End: 2023-11-22

## 2023-11-22 DIAGNOSIS — I42.9 CARDIOMYOPATHY, UNSPECIFIED TYPE (HCC): Primary | ICD-10-CM

## 2023-11-22 DIAGNOSIS — R00.0 IRREGULAR TACHYCARDIA: Primary | ICD-10-CM

## 2023-11-22 NOTE — TELEPHONE ENCOUNTER
Called patient and left message to give us a call back. Jose Rowland - it looks like her device report shows increased ectopy burden/extra heart beats. Please ask Ms. Weaver to increase her toprol xl 25 bid to 25mg in the morning and 50mg in the evening now to help suppress her extra heart beats. Please also ask her to get the BMP I just ordered. Please also ask her to call her primary care doctor to re-evaluated her deranged thyroid function as this can sometimes contribute to extra heart beats. If she feels poorly, please ask her to schedule a follow up appmnt asap with Dr. Gonsalo Castellanos or a general cardiology AP. If there are no availabilities she can also schedule with me.      Thanks,   Chika Rangel

## 2023-11-28 ENCOUNTER — TELEPHONE (OUTPATIENT)
Dept: CARDIOLOGY CLINIC | Facility: CLINIC | Age: 73
End: 2023-11-28

## 2023-11-28 NOTE — TELEPHONE ENCOUNTER
Called patient and gave information. To start toprol xl 25 mg bid. Patient will get blood work on 11/29/2023 in the morning.       ----- Message from Violet Rojo MD sent at 11/22/2023 11:58 AM EST -----  Regarding: FW: nsvt on pm  Jordy Arauz - it looks like her device report shows increased ectopy burden/extra heart beats. Please ask Ms. Weaver to increase her toprol xl 25 bid to 25mg in the morning and 50mg in the evening now to help suppress her extra heart beats. Please also ask her to get the BMP I just ordered. Please also ask her to call her primary care doctor to re-evaluated her deranged thyroid function as this can sometimes contribute to extra heart beats. If she feels poorly, please ask her to schedule a follow up appmnt asap with Dr. Jey Angulo or a general cardiology AP. If there are no availabilities she can also schedule with me. Thanks,  Mary Ann Spann  ----- Message -----  From: Yulior Paul  Sent: 11/22/2023   6:31 AM EST  To: Violet Rojo MD  Subject: nsvt on pm                                       Dr. Pope July,  Pt had 6 Vt episodes w/ avail egms showing nsvt 8beats @ 188 bpm, 9 beats @ 160 bpm, 8 beats @ 167 bpm, 11 beats @ 168 bpm, 15 beats @ 162 bpm. PT takes entresto, metoprolol succ. EF: 60% (echo 11/1/23). Thanks,  ~Veronica   NON-BILLABLE CARELINK TRANSMISSION: BATTERY VOLTAGE ADEQUATE (14 YRS). AP: 15.5%. : 15.8% (MVP-ON). ALL AVAILABLE LEAD PARAMETERS WITHIN NORMAL LIMITS. 6 VT EPISODES W/ AVAIL EGMS SHOWING NSVT 8 BEATS @ 188 BPM, 9 BEATS @ 160 BPM, 8 BEATS @ 167 BPM, 11 BEATS @ 168 BPM, 15 BEATS @ 162 BPM. PT TAKES ENTRESTO, METOPROLOL SUCC. EF: 60% (ECHO 11/1/23). TASK TO DR. Nic Carson. NORMAL DEVICE FUNCTION.  [facility]

## 2023-11-29 ENCOUNTER — TELEPHONE (OUTPATIENT)
Dept: CARDIOLOGY CLINIC | Facility: CLINIC | Age: 73
End: 2023-11-29

## 2023-11-29 ENCOUNTER — APPOINTMENT (OUTPATIENT)
Dept: LAB | Facility: CLINIC | Age: 73
End: 2023-11-29
Payer: COMMERCIAL

## 2023-11-29 DIAGNOSIS — I47.19 ATRIAL TACHYCARDIA: ICD-10-CM

## 2023-11-29 DIAGNOSIS — I50.43 ACUTE ON CHRONIC COMBINED SYSTOLIC (CONGESTIVE) AND DIASTOLIC (CONGESTIVE) HEART FAILURE (HCC): ICD-10-CM

## 2023-11-29 LAB
ANION GAP SERPL CALCULATED.3IONS-SCNC: 7 MMOL/L
BUN SERPL-MCNC: 26 MG/DL (ref 5–25)
CALCIUM SERPL-MCNC: 9.6 MG/DL (ref 8.4–10.2)
CHLORIDE SERPL-SCNC: 104 MMOL/L (ref 96–108)
CO2 SERPL-SCNC: 32 MMOL/L (ref 21–32)
CREAT SERPL-MCNC: 1.12 MG/DL (ref 0.6–1.3)
GFR SERPL CREATININE-BSD FRML MDRD: 48 ML/MIN/1.73SQ M
GLUCOSE P FAST SERPL-MCNC: 99 MG/DL (ref 65–99)
MAGNESIUM SERPL-MCNC: 2.1 MG/DL (ref 1.9–2.7)
POTASSIUM SERPL-SCNC: 3.8 MMOL/L (ref 3.5–5.3)
SODIUM SERPL-SCNC: 143 MMOL/L (ref 135–147)
TSH SERPL DL<=0.05 MIU/L-ACNC: 0.76 UIU/ML (ref 0.45–4.5)

## 2023-11-29 PROCEDURE — 84443 ASSAY THYROID STIM HORMONE: CPT | Performed by: STUDENT IN AN ORGANIZED HEALTH CARE EDUCATION/TRAINING PROGRAM

## 2023-11-29 PROCEDURE — 36415 COLL VENOUS BLD VENIPUNCTURE: CPT | Performed by: STUDENT IN AN ORGANIZED HEALTH CARE EDUCATION/TRAINING PROGRAM

## 2023-11-29 PROCEDURE — 83735 ASSAY OF MAGNESIUM: CPT | Performed by: STUDENT IN AN ORGANIZED HEALTH CARE EDUCATION/TRAINING PROGRAM

## 2023-11-29 PROCEDURE — 80048 BASIC METABOLIC PNL TOTAL CA: CPT | Performed by: STUDENT IN AN ORGANIZED HEALTH CARE EDUCATION/TRAINING PROGRAM

## 2023-11-29 RX ORDER — METOPROLOL SUCCINATE 25 MG/1
25 TABLET, EXTENDED RELEASE ORAL 2 TIMES DAILY
Qty: 180 TABLET | Refills: 0 | Status: SHIPPED | OUTPATIENT
Start: 2023-11-29

## 2023-11-29 NOTE — TELEPHONE ENCOUNTER
Called patient and gave results. ----- Message from Amanda Serna MD sent at 11/29/2023  3:44 PM EST -----  Please notify pt her labs were acceptable.       Thanks!    - Rosalio Alonzo

## 2023-12-08 DIAGNOSIS — I50.43 ACUTE ON CHRONIC COMBINED SYSTOLIC (CONGESTIVE) AND DIASTOLIC (CONGESTIVE) HEART FAILURE (HCC): ICD-10-CM

## 2023-12-11 RX ORDER — SACUBITRIL AND VALSARTAN 24; 26 MG/1; MG/1
TABLET, FILM COATED ORAL
Qty: 60 TABLET | Refills: 5 | Status: SHIPPED | OUTPATIENT
Start: 2023-12-11

## 2023-12-14 ENCOUNTER — OFFICE VISIT (OUTPATIENT)
Dept: FAMILY MEDICINE CLINIC | Facility: CLINIC | Age: 73
End: 2023-12-14
Payer: COMMERCIAL

## 2023-12-14 VITALS
HEIGHT: 62 IN | OXYGEN SATURATION: 98 % | BODY MASS INDEX: 22.08 KG/M2 | TEMPERATURE: 101.8 F | HEART RATE: 56 BPM | DIASTOLIC BLOOD PRESSURE: 60 MMHG | WEIGHT: 120 LBS | SYSTOLIC BLOOD PRESSURE: 110 MMHG

## 2023-12-14 DIAGNOSIS — I48.92 PAROXYSMAL ATRIAL FLUTTER (HCC): ICD-10-CM

## 2023-12-14 DIAGNOSIS — I42.0 DILATED CARDIOMYOPATHY (HCC): ICD-10-CM

## 2023-12-14 DIAGNOSIS — Z20.828 EXPOSURE TO RESPIRATORY SYNCYTIAL VIRUS (RSV): Primary | ICD-10-CM

## 2023-12-14 DIAGNOSIS — I27.20 PULMONARY HYPERTENSION (HCC): ICD-10-CM

## 2023-12-14 DIAGNOSIS — J84.9 ILD (INTERSTITIAL LUNG DISEASE) (HCC): ICD-10-CM

## 2023-12-14 DIAGNOSIS — I50.33 ACUTE ON CHRONIC DIASTOLIC HEART FAILURE (HCC): ICD-10-CM

## 2023-12-14 PROCEDURE — 99213 OFFICE O/P EST LOW 20 MIN: CPT | Performed by: FAMILY MEDICINE

## 2023-12-14 NOTE — PROGRESS NOTES
Assessment/Plan:   Diagnoses and all orders for this visit:    Exposure to respiratory syncytial virus (RSV)  - exposure to her granddaughter who had RSV   - advised OTC supportive care (Tylenol/Motrin, Mucinex (without "D"), hot steam in the shower)  - rest and hydration   - discussed ER precautions - pt and granddaughter aware and agreeable   ILD (interstitial lung disease) ([address])  Acute on chronic diastolic heart failure (HCC)  Pulmonary hypertension (HCC)  Paroxysmal atrial flutter (HCC)  Dilated cardiomyopathy (HCC)          Subjective:    Patient ID: Gio Dunn is a 75 y.o. female. HPI  65yo F presents to the office with her granddaughter for eval of sick s/s  - was exposed to her other granddaughter who had RSV   - started with s/s on Monday - (+) fevers, chills, intermittent productive cough, scratchy throat, SOB, LANTIGUA, poor appetite   - denies CP/palpitations/wheezing   - UTD with latest COVID Booster and annual Flu vaccine       The following portions of the patient's history were reviewed and updated as appropriate: allergies, current medications, past family history, past medical history, past social history, past surgical history and problem list.    Review of Systems  as per HPI    Objective:  /60   Pulse 56   Temp (!) 101.8 °F (38.8 °C)   Ht 5' 2" (1.575 m)   Wt 54.4 kg (120 lb)   SpO2 98%   BMI 21.95 kg/m²    Physical Exam  Vitals reviewed. Constitutional:       General: She is not in acute distress. Appearance: Normal appearance. She is not ill-appearing, toxic-appearing or diaphoretic. HENT:      Head: Normocephalic and atraumatic. Right Ear: External ear normal.      Left Ear: External ear normal.      Nose: Nose normal.   Eyes:      General: No scleral icterus. Right eye: No discharge. Left eye: No discharge. Extraocular Movements: Extraocular movements intact.       Conjunctiva/sclera: Conjunctivae normal.   Cardiovascular:      Rate and Rhythm: Normal rate and regular rhythm. Heart sounds: Normal heart sounds. Pulmonary:      Effort: Pulmonary effort is normal. No respiratory distress. Breath sounds: Normal breath sounds. No stridor. No wheezing, rhonchi or rales. Abdominal:      Palpations: Abdomen is soft. Musculoskeletal:         General: Normal range of motion. Cervical back: Normal range of motion. Right lower leg: No edema. Left lower leg: No edema. Neurological:      General: No focal deficit present. Mental Status: She is alert and oriented to person, place, and time.    Psychiatric:         Mood and Affect: Mood normal.         Behavior: Behavior normal.

## 2023-12-15 ENCOUNTER — REMOTE DEVICE CLINIC VISIT (OUTPATIENT)
Dept: CARDIOLOGY CLINIC | Facility: CLINIC | Age: 73
End: 2023-12-15
Payer: COMMERCIAL

## 2023-12-15 DIAGNOSIS — Z95.0 CARDIAC PACEMAKER IN SITU: Primary | ICD-10-CM

## 2023-12-15 PROCEDURE — 93296 REM INTERROG EVL PM/IDS: CPT | Performed by: INTERNAL MEDICINE

## 2023-12-15 PROCEDURE — 93294 REM INTERROG EVL PM/LDLS PM: CPT | Performed by: INTERNAL MEDICINE

## 2023-12-15 NOTE — PROGRESS NOTES
Results for orders placed or performed in visit on 12/15/23   Cardiac EP device report    Narrative    MDT DUAL CHAMBER PPM (MVP ON) - ACTIVE SYSTEM IS MRI CONDITIONAL  CARELINK TRANSMISSION: BATTERY VOLTAGE ADEQUATE (13.6 YRS). AP-22%, -20%. ALL AVAILABLE LEAD PARAMETERS WITHIN NORMAL LIMITS. 1 NSVT EPISODE FOR 6 BEATS, AVG CL~360MS. EF-60% (ECHO 11/1/23). PT ON METOPROLOL. NORMAL DEVICE FUNCTION.  GV

## 2023-12-18 PROBLEM — I47.19 PAROXYSMAL ATRIAL TACHYCARDIA: Status: ACTIVE | Noted: 2022-10-05

## 2023-12-18 PROBLEM — I50.32 CHRONIC DIASTOLIC HEART FAILURE (HCC): Status: ACTIVE | Noted: 2022-01-17

## 2023-12-18 PROBLEM — Z86.79 HISTORY OF RADIOFREQUENCY ABLATION (RFA) FOR COMPLEX LEFT ATRIAL ARRHYTHMIA: Status: ACTIVE | Noted: 2023-12-18

## 2023-12-18 PROBLEM — Z98.890 HISTORY OF RADIOFREQUENCY ABLATION (RFA) FOR COMPLEX LEFT ATRIAL ARRHYTHMIA: Status: ACTIVE | Noted: 2023-12-18

## 2023-12-18 PROBLEM — I51.81 TAKOTSUBO CARDIOMYOPATHY: Status: ACTIVE | Noted: 2022-05-04

## 2023-12-18 PROBLEM — I47.19 AVNRT (AV NODAL RE-ENTRY TACHYCARDIA): Status: ACTIVE | Noted: 2022-01-10

## 2023-12-18 PROBLEM — Z95.0 PRESENCE OF PERMANENT CARDIAC PACEMAKER: Status: ACTIVE | Noted: 2023-12-18

## 2024-01-05 DIAGNOSIS — E03.9 HYPOTHYROIDISM, UNSPECIFIED TYPE: ICD-10-CM

## 2024-01-05 RX ORDER — LEVOTHYROXINE SODIUM 0.1 MG/1
TABLET ORAL
Qty: 90 TABLET | Refills: 0 | Status: SHIPPED | OUTPATIENT
Start: 2024-01-05

## 2024-01-12 PROBLEM — J01.10 ACUTE NON-RECURRENT FRONTAL SINUSITIS: Status: RESOLVED | Noted: 2023-11-13 | Resolved: 2024-01-12

## 2024-01-28 DIAGNOSIS — I50.43 ACUTE ON CHRONIC COMBINED SYSTOLIC (CONGESTIVE) AND DIASTOLIC (CONGESTIVE) HEART FAILURE (HCC): ICD-10-CM

## 2024-01-28 DIAGNOSIS — E03.9 HYPOTHYROIDISM, UNSPECIFIED TYPE: ICD-10-CM

## 2024-01-28 DIAGNOSIS — I47.19 ATRIAL TACHYCARDIA: ICD-10-CM

## 2024-01-28 RX ORDER — LEVOTHYROXINE SODIUM 0.1 MG/1
TABLET ORAL
Qty: 90 TABLET | Refills: 1 | Status: SHIPPED | OUTPATIENT
Start: 2024-01-28

## 2024-01-29 RX ORDER — METOPROLOL SUCCINATE 25 MG/1
25 TABLET, EXTENDED RELEASE ORAL 2 TIMES DAILY
Qty: 180 TABLET | Refills: 0 | Status: SHIPPED | OUTPATIENT
Start: 2024-01-29

## 2024-02-19 ENCOUNTER — OFFICE VISIT (OUTPATIENT)
Dept: CARDIOLOGY CLINIC | Facility: CLINIC | Age: 74
End: 2024-02-19
Payer: COMMERCIAL

## 2024-02-19 VITALS
HEART RATE: 46 BPM | WEIGHT: 124.2 LBS | HEIGHT: 62 IN | BODY MASS INDEX: 22.86 KG/M2 | SYSTOLIC BLOOD PRESSURE: 108 MMHG | DIASTOLIC BLOOD PRESSURE: 66 MMHG

## 2024-02-19 DIAGNOSIS — I51.81 TAKOTSUBO CARDIOMYOPATHY: ICD-10-CM

## 2024-02-19 DIAGNOSIS — Z86.79 HISTORY OF RADIOFREQUENCY ABLATION (RFA) FOR COMPLEX LEFT ATRIAL ARRHYTHMIA: ICD-10-CM

## 2024-02-19 DIAGNOSIS — Z98.890 HISTORY OF RADIOFREQUENCY ABLATION (RFA) FOR COMPLEX LEFT ATRIAL ARRHYTHMIA: ICD-10-CM

## 2024-02-19 DIAGNOSIS — Z95.0 PRESENCE OF PERMANENT CARDIAC PACEMAKER: ICD-10-CM

## 2024-02-19 DIAGNOSIS — I35.1 NONRHEUMATIC AORTIC VALVE INSUFFICIENCY: ICD-10-CM

## 2024-02-19 DIAGNOSIS — I44.30 HEART BLOCK, AV: ICD-10-CM

## 2024-02-19 DIAGNOSIS — I50.32 CHRONIC DIASTOLIC HEART FAILURE (HCC): Primary | ICD-10-CM

## 2024-02-19 DIAGNOSIS — E78.5 DYSLIPIDEMIA: ICD-10-CM

## 2024-02-19 DIAGNOSIS — I47.19 PAROXYSMAL ATRIAL TACHYCARDIA: ICD-10-CM

## 2024-02-19 PROCEDURE — 93000 ELECTROCARDIOGRAM COMPLETE: CPT | Performed by: INTERNAL MEDICINE

## 2024-02-19 PROCEDURE — 99214 OFFICE O/P EST MOD 30 MIN: CPT | Performed by: INTERNAL MEDICINE

## 2024-02-19 NOTE — PROGRESS NOTES
Cardiology Follow Up    Toshia Weaver  1950  [id number]  [facility]  [address]  [address]  [facility] [place] [id number] [phone]    1. Chronic diastolic heart failure (HCC)        2. Takotsubo cardiomyopathy        3. Paroxysmal atrial tachycardia  POCT ECG      4. History of radiofrequency ablation (RFA) for complex left atrial arrhythmia        5. Heart block, AV        6. Presence of permanent cardiac pacemaker        7. Nonrheumatic aortic valve insufficiency        8. Dyslipidemia              Discussion/Summary:  Ms. Weaver is a pleasant 73-year-old female who presents to the office today for hospital follow-up.  Cardiac wise she has been feeling well.    Her device checks reveal an appropriate functioning device with no recurrent atrial arrhythmias.  No changes were made to her AV may blocking regimen.    She appears compensated on exam on her current diuretic regimen to which no changes were advised.  A follow-up echocardiogram after her hospitalization for Takotsubo syndrome revealed normalization of her LV function.  She is maintained on proper GDMT with Entresto, metoprolol succinate, and spironolactone.  Jardiance was prescribed but she initiation of the medication due to fear of side effects.    Her blood pressure is well controlled in the office today.  Since her last visit she was initiated on statin therapy with acceptable lipids with the exception of a low HDL for which therapeutic lifestyle modifications were recommended.    I will request an echocardiogram after her next visit for re-evaluation of her aortic insufficiency deemed moderate on her last two echoes.    I will see her back in the office in six months or sooner if deemed necessary.    Interval History:   Ms. Weaver is a pleasant 73-year-old female who presents to the office today for routine follow-up.    Since her last visit with me she  underwent two ablations for atrial tachycardia, the first originating from the left atrial inferoseptal region.  She was noted to have recurrence and underwent a second ablation with atrial tachycardia mapped again in the inferoseptal location and also the anterior left atrial wall with successful ablation.  She also had AVNRT during the procedure with successful slow pathway modification.  Postprocedure she developed 2-1 AV block necessitating permanent pacemaker implantation.  Thereafter she was readmitted with recurrent heart failure and a decline in her EF with regional wall motion abnormality suggestive of Takotsubo syndrome.  She did undergo a left heart catheterization revealing no obstructive coronary artery disease.  A follow-up echocardiogram a few months later revealed normalization of her LV function.    She has been feeling well.  She does not participate in any formal physical activity.  With the activity she performs she denies any exertional chest pain or shortness of breath.  She denies any signs or symptoms of volume overload including lower extremity edema, paroxysmal nocturnal dyspnea, orthopnea, acute weight gain or increasing abdominal girth.  She has not been weighing herself at home as faithfully as she had been.  She abides by a salt restricted diet.  She denies any sensation of  palpitations.  She denies lightheadedness, syncope or presyncope.  She denies symptoms of claudication.      Medical Problems                 Problem List       Hypothyroidism    Myelodysplasia (myelodysplastic syndrome) (HCC)    Waldenstrom macroglobulinemia (HCC)    Mixed hyperlipidemia    Persistent atrial fibrillation (HCC)    History of Hodgkin's lymphoma    Rash    Chronic combined systolic (congestive) and diastolic (congestive) heart failure (HCC)    Wt Readings from Last 3 Encounters:   02/19/24 56.3 kg (124 lb 3.2 oz)   12/14/23 54.4 kg (120 lb)   11/13/23 56.2 kg (124 lb)                 UIP (usual  interstitial pneumonitis) (HCC)    Stage 3a chronic kidney disease (HCC)    Lab Results   Component Value Date    EGFR 48 2023    EGFR 45 10/06/2023    EGFR 60 2023    CREATININE 1.12 2023    CREATININE 1.19 10/06/2023    CREATININE 0.94 2023         Cough    Allergies (Chronic)    Pulmonary hypertension (HCC)    ILD (interstitial lung disease) (HCC)    Bronchiectasis without complication (HCC)    Hard of hearing                  Past Medical History:   Diagnosis Date    Abnormal CXR 2022    Cancer (HCC)     Disease of thyroid gland     Dysuria 2021    Encounter for support and coordination of transition of care 2021     Social History     Socioeconomic History    Marital status:      Spouse name: Not on file    Number of children: Not on file    Years of education: Not on file    Highest education level: Not on file   Occupational History    Not on file   Tobacco Use    Smoking status: Former     Current packs/day: 0.00     Average packs/day: 1 pack/day for 20.0 years (20.0 ttl pk-yrs)     Types: Cigarettes     Start date:      Quit date: 3/10/1997     Years since quittin.9    Smokeless tobacco: Former     Quit date: 8/10/2000   Vaping Use    Vaping status: Never Used   Substance and Sexual Activity    Alcohol use: Never    Drug use: Never    Sexual activity: Not Currently     Partners: Male   Other Topics Concern    Not on file   Social History Narrative    Most recent tobacco use screenin2018     Social Determinants of Health     Financial Resource Strain: Low Risk  (2023)    Overall Financial Resource Strain (CARDIA)     Difficulty of Paying Living Expenses: Not hard at all   Food Insecurity: No Food Insecurity (2023)    Hunger Vital Sign     Worried About Running Out of Food in the Last Year: Never true     Ran Out of Food in the Last Year: Never true   Transportation Needs: No Transportation Needs (2023)    PRAPARE - Transportation      Lack of Transportation (Medical): No     Lack of Transportation (Non-Medical): No   Physical Activity: Not on file   Stress: Not on file   Social Connections: Not on file   Intimate Partner Violence: Not on file   Housing Stability: Unknown (7/31/2023)    Housing Stability Vital Sign     Unable to Pay for Housing in the Last Year: No     Number of Places Lived in the Last Year: Not on file     Unstable Housing in the Last Year: No      Family History   Problem Relation Age of Onset    No Known Problems Mother     No Known Problems Father     Cancer Maternal Aunt      Past Surgical History:   Procedure Laterality Date    CARDIAC CATHETERIZATION Left 8/1/2023    Procedure: Cardiac Left Heart Cath;  Surgeon: Kay Steven DO;  [facility];  Service: Cardiology    CARDIAC CATHETERIZATION  8/1/2023    Procedure: Cardiac catheterization;  Surgeon: Kya Steven DO;  [facility];  Service: Cardiology    CARDIAC CATHETERIZATION N/A 8/1/2023    Procedure: Cardiac Coronary Angiogram;  Surgeon: Kay Steven DO;  [facility];  Service: Cardiology    CARDIAC ELECTROPHYSIOLOGY PROCEDURE N/A 12/30/2022    Procedure: Cardiac eps/aflutter ablation;  Surgeon: Derw Fatima MD;  [facility];  Service: Cardiology    CARDIAC ELECTROPHYSIOLOGY PROCEDURE N/A 12/30/2022    Procedure: Cardiac loop recorder implant;  Surgeon: Drew Fatima MD;  [facility];  Service: Cardiology    CARDIAC ELECTROPHYSIOLOGY PROCEDURE N/A 2/6/2023    Procedure: CARDIAC EPS/SVT ABLATION;  Surgeon: Drew Fatima MD;  [facility];  Service: Cardiology    CARDIAC ELECTROPHYSIOLOGY PROCEDURE N/A 2/16/2023    Procedure: Cardiac pacer implant;  Surgeon: Dustin Lopez MD;  [facility];  Service: Cardiology    CARDIAC ELECTROPHYSIOLOGY PROCEDURE N/A 2/16/2023    Procedure: CARDIAC LOOP RECORDER EXPLANT;  Surgeon:  "Dustin Lopez MD;  [facility];  Service: Cardiology    HYSTERECTOMY         Current Outpatient Medications:     atorvastatin (LIPITOR) 20 mg tablet, Take 1 tablet (20 mg total) by mouth daily with dinner, Disp: 90 tablet, Rfl: 3    Entresto 24-26 MG TABS, TAKE 1 TABLET BY MOUTH TWICE A DAY, Disp: 60 tablet, Rfl: 5    levothyroxine 100 mcg tablet, TAKE 1 TABLET BY MOUTH EVERY DAY, Disp: 90 tablet, Rfl: 1    metoprolol succinate (TOPROL-XL) 25 mg 24 hr tablet, TAKE 1 TABLET BY MOUTH TWICE A DAY, Disp: 180 tablet, Rfl: 0    spironolactone (ALDACTONE) 25 mg tablet, Take 1 tablet (25 mg total) by mouth daily, Disp: 90 tablet, Rfl: 5    torsemide (DEMADEX) 20 mg tablet, Take 1 tablet (20 mg total) by mouth daily, Disp: 90 tablet, Rfl: 3    Empagliflozin (Jardiance) 10 MG TABS tablet, Take 1 tablet (10 mg total) by mouth every morning (Patient not taking: Reported on 2/19/2024), Disp: 30 tablet, Rfl: 5  Allergies   Allergen Reactions    Penicillins Rash       Labs:     Chemistry        Component Value Date/Time    K 3.8 11/29/2023 0711    K 3.8 05/05/2023 0723    K 3.3 (L) 09/30/2022 0654     11/29/2023 0711     05/05/2023 0723     09/30/2022 0654    CO2 32 11/29/2023 0711    CO2 33 (H) 05/05/2023 0723    CO2 36 (H) 09/30/2022 0654    BUN 26 (H) 11/29/2023 0711    BUN 25 05/05/2023 0723    BUN 24 09/30/2022 0654    CREATININE 1.12 11/29/2023 0711    CREATININE 0.96 09/30/2022 0654        Component Value Date/Time    CALCIUM 9.6 11/29/2023 0711    CALCIUM 9.6 05/05/2023 0723    CALCIUM 9.4 09/30/2022 0654    ALKPHOS 71 10/06/2023 0712    ALKPHOS 72 05/05/2023 0723    ALKPHOS 63 09/30/2022 0654    AST 18 10/06/2023 0712    AST 13 05/05/2023 0723    AST 12 09/30/2022 0654    ALT 11 10/06/2023 0712    ALT 5 (L) 05/05/2023 0723    ALT 6 09/30/2022 0654            No results found for: \"CHOL\"  Lab Results   Component Value Date    HDL 44 (L) 10/06/2023    HDL 50 02/04/2023    HDL 49 (L) " "10/14/2022     Lab Results   Component Value Date    LDLCALC 41 10/06/2023    LDLCALC 84 02/04/2023    LDLCALC 109 (H) 10/14/2022     Lab Results   Component Value Date    TRIG 90 10/06/2023    TRIG 60 02/04/2023    TRIG 117 10/14/2022     No results found for: \"CHOLHDL\"    Imaging: Echo complete w/ contrast if indicated    Result Date: 4/11/2022  Narrative:   Left Ventricle: Left ventricular cavity size is normal. Wall thickness is normal. The left ventricular ejection fraction is 50%. Systolic function is low normal. Wall motion is normal. Diastolic function is mildly abnormal, consistent with grade I (abnormal) relaxation.   IVS: There is abnormal septal motion consistent with bundle branch block. There is sigmoid appearance of the septum.   Right Ventricle: Right ventricular cavity size is normal. Systolic function is normal.   Aortic Valve: There is mild regurgitation.   Tricuspid Valve: There is mild regurgitation.   Pulmonary Artery: The estimated pulmonary artery systolic pressure is 48.0 mmHg. The pulmonary artery systolic pressure is moderately increased.         Review of Systems   Cardiovascular:  Negative for chest pain, claudication, dyspnea on exertion, irregular heartbeat, leg swelling, near-syncope, orthopnea and palpitations.   All other systems reviewed and are negative.      Vitals:    02/19/24 0828   BP: 108/66   Pulse: (!) 46       Vitals:    02/19/24 0828   Weight: 56.3 kg (124 lb 3.2 oz)       Height: 5' 2\" (157.5 cm)   Body mass index is 22.72 kg/m².    Physical Exam:  General:  Alert and cooperative, appears stated age  HEENT:  PERRLA, EOMI, no scleral icterus, no conjunctival pallor  Neck:  No lymphadenopathy, no thyromegaly, no carotid bruits, no elevated JVP  Heart:  Regular rate and rhythm, normal S1/S2, no S3/S4, no murmur  Lungs: Coarse breath sounds bilaterally  Abdomen:  Soft, non-tender, positive bowel sounds, no rebound or guarding,   no organomegaly   Extremities:  No clubbing, " cyanosis or edema   Vascular:  2+ pedal pulses  Skin:  No rashes or lesions on exposed skin  Neurologic:  Cranial nerves II-XII grossly intact without focal deficits

## 2024-02-26 DIAGNOSIS — I47.19 ATRIAL TACHYCARDIA: ICD-10-CM

## 2024-02-26 DIAGNOSIS — I50.43 ACUTE ON CHRONIC COMBINED SYSTOLIC (CONGESTIVE) AND DIASTOLIC (CONGESTIVE) HEART FAILURE (HCC): ICD-10-CM

## 2024-02-26 DIAGNOSIS — I25.10 CORONARY ARTERY DISEASE INVOLVING NATIVE CORONARY ARTERY: ICD-10-CM

## 2024-02-26 DIAGNOSIS — I50.33 ACUTE ON CHRONIC DIASTOLIC HEART FAILURE (HCC): ICD-10-CM

## 2024-02-26 DIAGNOSIS — E03.9 HYPOTHYROIDISM, UNSPECIFIED TYPE: ICD-10-CM

## 2024-02-26 DIAGNOSIS — I42.0 DILATED CARDIOMYOPATHY (HCC): ICD-10-CM

## 2024-02-26 RX ORDER — METOPROLOL SUCCINATE 25 MG/1
25 TABLET, EXTENDED RELEASE ORAL 2 TIMES DAILY
Qty: 180 TABLET | Refills: 0 | Status: SHIPPED | OUTPATIENT
Start: 2024-02-26

## 2024-02-26 RX ORDER — SACUBITRIL AND VALSARTAN 24; 26 MG/1; MG/1
1 TABLET, FILM COATED ORAL 2 TIMES DAILY
Qty: 60 TABLET | Refills: 5 | Status: SHIPPED | OUTPATIENT
Start: 2024-02-26

## 2024-02-26 RX ORDER — LEVOTHYROXINE SODIUM 0.1 MG/1
100 TABLET ORAL DAILY
Qty: 90 TABLET | Refills: 1 | Status: SHIPPED | OUTPATIENT
Start: 2024-02-26

## 2024-02-26 RX ORDER — TORSEMIDE 20 MG/1
20 TABLET ORAL DAILY
Qty: 90 TABLET | Refills: 3 | Status: SHIPPED | OUTPATIENT
Start: 2024-02-26

## 2024-02-26 RX ORDER — SPIRONOLACTONE 25 MG/1
25 TABLET ORAL DAILY
Qty: 90 TABLET | Refills: 5 | Status: SHIPPED | OUTPATIENT
Start: 2024-02-26 | End: 2025-08-19

## 2024-02-26 RX ORDER — ATORVASTATIN CALCIUM 20 MG/1
20 TABLET, FILM COATED ORAL
Qty: 90 TABLET | Refills: 3 | Status: SHIPPED | OUTPATIENT
Start: 2024-02-26

## 2024-03-15 ENCOUNTER — REMOTE DEVICE CLINIC VISIT (OUTPATIENT)
Dept: CARDIOLOGY CLINIC | Facility: CLINIC | Age: 74
End: 2024-03-15
Payer: COMMERCIAL

## 2024-03-15 DIAGNOSIS — Z95.0 PRESENCE OF PERMANENT CARDIAC PACEMAKER: Primary | ICD-10-CM

## 2024-03-15 PROCEDURE — 93296 REM INTERROG EVL PM/IDS: CPT | Performed by: INTERNAL MEDICINE

## 2024-03-15 PROCEDURE — 93294 REM INTERROG EVL PM/LDLS PM: CPT | Performed by: INTERNAL MEDICINE

## 2024-03-15 NOTE — PROGRESS NOTES
Results for orders placed or performed in visit on 03/15/24   Cardiac EP device report    Narrative    MDT DUAL CHAMBER PPM (MVP ON) - ACTIVE SYSTEM IS MRI CONDITIONAL  CARELINK TRANSMISSION: BATTERY VOLTAGE ADEQUATE (13 YRS). AP 8.5%   9.8% (AAI-DDD 60PPM). ALL AVAILABLE LEAD PARAMETERS WITHIN NORMAL LIMITS. 5 VT-NS EPISODE FOR NSVT (19 @ 173 BPM, 15 @ 154 BPM, 12 @ 158 BPM, 15 @ 158 BPM, 6 @ 169 BPM). HX OF NSVT; EF 60% (11/1/23 ECHO). PT TAKING METOPROLOL SUCC. NORMAL DEVICE FUNCTION.  ES

## 2024-03-18 ENCOUNTER — TELEPHONE (OUTPATIENT)
Age: 74
End: 2024-03-18

## 2024-03-18 NOTE — TELEPHONE ENCOUNTER
I scheduled patient with Dr. Zamudio tomorrow but she wanted me to send you a message.  You have no openings.

## 2024-03-18 NOTE — TELEPHONE ENCOUNTER
Patients is requesting a call back from the office to discuss her having diarrhea for past 10 days and would like something prescribed. She has been taking Pepto for diarrhea and she is also taking mucinex for a respiratory infection she's had since January.

## 2024-03-19 ENCOUNTER — OFFICE VISIT (OUTPATIENT)
Dept: FAMILY MEDICINE CLINIC | Facility: CLINIC | Age: 74
End: 2024-03-19
Payer: COMMERCIAL

## 2024-03-19 VITALS
SYSTOLIC BLOOD PRESSURE: 110 MMHG | WEIGHT: 112 LBS | HEIGHT: 62 IN | DIASTOLIC BLOOD PRESSURE: 60 MMHG | TEMPERATURE: 97.8 F | BODY MASS INDEX: 20.61 KG/M2 | OXYGEN SATURATION: 100 % | HEART RATE: 84 BPM

## 2024-03-19 DIAGNOSIS — I50.33 ACUTE ON CHRONIC DIASTOLIC HEART FAILURE (HCC): ICD-10-CM

## 2024-03-19 DIAGNOSIS — I48.92 PAROXYSMAL ATRIAL FLUTTER (HCC): ICD-10-CM

## 2024-03-19 DIAGNOSIS — J84.9 ILD (INTERSTITIAL LUNG DISEASE) (HCC): ICD-10-CM

## 2024-03-19 DIAGNOSIS — R19.7 DIARRHEA, UNSPECIFIED TYPE: Primary | ICD-10-CM

## 2024-03-19 PROCEDURE — G2211 COMPLEX E/M VISIT ADD ON: HCPCS | Performed by: FAMILY MEDICINE

## 2024-03-19 PROCEDURE — 99214 OFFICE O/P EST MOD 30 MIN: CPT | Performed by: FAMILY MEDICINE

## 2024-03-19 RX ORDER — DIPHENOXYLATE HYDROCHLORIDE AND ATROPINE SULFATE 2.5; .025 MG/1; MG/1
TABLET ORAL
Qty: 6 TABLET | Refills: 0 | Status: SHIPPED | OUTPATIENT
Start: 2024-03-19

## 2024-03-19 NOTE — PROGRESS NOTES
Name: Toshia Weaver      : 1950      MRN: [id number]  Encounter Provider: Cyndie Zamudio MD  Encounter Date: 3/19/2024   Encounter department: [facility]    Assessment & Plan     1. Diarrhea, unspecified type  Assessment & Plan:  Watery diarrhea only once per day, seems like either viral or functional, advised to take Lomotil only 1 tablet/day if needed for diarrhea, if symptoms continue then she needs further workup    Orders:  -     diphenoxylate-atropine (LOMOTIL) 2.5-0.025 mg per tablet; Take one tab by mouth once a day prn    2. Acute on chronic diastolic heart failure (HCC)  Assessment & Plan:  Wt Readings from Last 3 Encounters:   24 50.8 kg (112 lb)   24 56.3 kg (124 lb 3.2 oz)   23 54.4 kg (120 lb)   No leg edema and she follows cardiologist and on diuretics              3. Paroxysmal atrial flutter ([facility])  Assessment & Plan:  She has irregular heart and she follows cardiologist she is on metoprolol and Entresto      4. ILD (interstitial lung disease) ([facility])  Assessment & Plan:  She has bilateral slight crackling sound, she has chronic interstitial lung disease and follows pulmonologist           Subjective     She says for last 1 week she has watery diarrhea only in the morning, 1 times daily, she has no abdominal pain nausea vomiting, she has mild chronic cough as she has interstitial lung disease, pulmonologist, she will start serially blood at the stool, no recent fever chills      Review of Systems   Constitutional: Negative.    HENT: Negative.     Eyes: Negative.    Respiratory:  Positive for cough.    Cardiovascular: Negative.    Gastrointestinal:  Positive for diarrhea. Negative for abdominal distention, abdominal pain, anal bleeding and blood in stool.   Genitourinary: Negative.        Past Medical History:   Diagnosis Date   • Abnormal CXR 2022   • Cancer (HCC)    • Disease of thyroid gland    • Dysuria 2021   • Encounter for support and  coordination of transition of care 5/6/2021     Past Surgical History:   Procedure Laterality Date   • CARDIAC CATHETERIZATION Left 8/1/2023    Procedure: Cardiac Left Heart Cath;  Surgeon: Kay Steven DO;  [facility];  Service: Cardiology   • CARDIAC CATHETERIZATION  8/1/2023    Procedure: Cardiac catheterization;  Surgeon: Kay Steven DO;  [facility];  Service: Cardiology   • CARDIAC CATHETERIZATION N/A 8/1/2023    Procedure: Cardiac Coronary Angiogram;  Surgeon: Kay Steven DO;  [facility];  Service: Cardiology   • CARDIAC ELECTROPHYSIOLOGY PROCEDURE N/A 12/30/2022    Procedure: Cardiac eps/aflutter ablation;  Surgeon: Drew Fatima MD;  [facility];  Service: Cardiology   • CARDIAC ELECTROPHYSIOLOGY PROCEDURE N/A 12/30/2022    Procedure: Cardiac loop recorder implant;  Surgeon: Drew Fatima MD;  [facility];  Service: Cardiology   • CARDIAC ELECTROPHYSIOLOGY PROCEDURE N/A 2/6/2023    Procedure: CARDIAC EPS/SVT ABLATION;  Surgeon: Drew Fatima MD;  [facility];  Service: Cardiology   • CARDIAC ELECTROPHYSIOLOGY PROCEDURE N/A 2/16/2023    Procedure: Cardiac pacer implant;  Surgeon: Dustin Lopez MD;  [facility];  Service: Cardiology   • CARDIAC ELECTROPHYSIOLOGY PROCEDURE N/A 2/16/2023    Procedure: CARDIAC LOOP RECORDER EXPLANT;  Surgeon: Dustin Lopez MD;  [facility];  Service: Cardiology   • HYSTERECTOMY       Family History   Problem Relation Age of Onset   • No Known Problems Mother    • No Known Problems Father    • Cancer Maternal Aunt      Social History     Socioeconomic History   • Marital status:      Spouse name: None   • Number of children: None   • Years of education: None   • Highest education level: None   Occupational History   • None   Tobacco Use   • Smoking status: Former     Current packs/day: 0.00      Average packs/day: 1 pack/day for 20.0 years (20.0 ttl pk-yrs)     Types: Cigarettes     Start date: 1973     Quit date: 3/10/1997     Years since quittin.0   • Smokeless tobacco: Former     Quit date: 8/10/2000   Vaping Use   • Vaping status: Never Used   Substance and Sexual Activity   • Alcohol use: Never   • Drug use: Never   • Sexual activity: Not Currently     Partners: Male   Other Topics Concern   • None   Social History Narrative    Most recent tobacco use screenin2018     Social Determinants of Health     Financial Resource Strain: Low Risk  (2023)    Overall Financial Resource Strain (CARDIA)    • Difficulty of Paying Living Expenses: Not hard at all   Food Insecurity: No Food Insecurity (2023)    Hunger Vital Sign    • Worried About Running Out of Food in the Last Year: Never true    • Ran Out of Food in the Last Year: Never true   Transportation Needs: No Transportation Needs (2023)    PRAPARE - Transportation    • Lack of Transportation (Medical): No    • Lack of Transportation (Non-Medical): No   Physical Activity: Not on file   Stress: Not on file   Social Connections: Not on file   Intimate Partner Violence: Not on file   Housing Stability: Unknown (2023)    Housing Stability Vital Sign    • Unable to Pay for Housing in the Last Year: No    • Number of Places Lived in the Last Year: Not on file    • Unstable Housing in the Last Year: No     Current Outpatient Medications on File Prior to Visit   Medication Sig   • atorvastatin (LIPITOR) 20 mg tablet Take 1 tablet (20 mg total) by mouth daily with dinner   • levothyroxine 100 mcg tablet Take 1 tablet (100 mcg total) by mouth daily   • metoprolol succinate (TOPROL-XL) 25 mg 24 hr tablet Take 1 tablet (25 mg total) by mouth 2 (two) times a day   • sacubitril-valsartan (Entresto) 24-26 MG TABS Take 1 tablet by mouth 2 (two) times a day   • spironolactone (ALDACTONE) 25 mg tablet Take 1 tablet (25 mg total) by mouth daily  "  • torsemide (DEMADEX) 20 mg tablet Take 1 tablet (20 mg total) by mouth daily   • [DISCONTINUED] amiodarone 200 mg tablet Take 1 tablet (200 mg total) by mouth daily   • [DISCONTINUED] Empagliflozin (Jardiance) 10 MG TABS tablet Take 1 tablet (10 mg total) by mouth every morning (Patient not taking: Reported on 2/19/2024)     Allergies   Allergen Reactions   • Penicillins Rash     Immunization History   Administered Date(s) Administered   • COVID-19 MODERNA VACC 0.5 ML IM 01/19/2021, 02/23/2021   • Influenza, high dose seasonal 0.7 mL 10/07/2021, 10/26/2022, 10/18/2023   • Pneumococcal Conjugate Vaccine 20-valent (Pcv20), Polysace 05/11/2023       Objective     /60   Pulse 84   Temp 97.8 °F (36.6 °C)   Ht 5' 2\" (1.575 m)   Wt 50.8 kg (112 lb)   SpO2 100%   BMI 20.49 kg/m²     Physical Exam  Vitals and nursing note reviewed.   Constitutional:       Appearance: Normal appearance. She is well-developed.   Eyes:      Extraocular Movements: Extraocular movements intact.   Neck:      Thyroid: No thyromegaly.   Cardiovascular:      Rate and Rhythm: Normal rate. Rhythm irregular.      Heart sounds: Normal heart sounds. No murmur heard.  Pulmonary:      Effort: Pulmonary effort is normal.      Breath sounds: Normal breath sounds. No wheezing or rales.   Abdominal:      General: Abdomen is flat. There is no distension.      Palpations: There is no mass.      Tenderness: There is no abdominal tenderness.   Musculoskeletal:      Cervical back: Normal range of motion and neck supple.   Skin:     Findings: No erythema or rash.   Neurological:      Mental Status: She is alert.       Cyndie Zamudio MD    "

## 2024-03-19 NOTE — ASSESSMENT & PLAN NOTE
Watery diarrhea only once per day, seems like either viral or functional, advised to take Lomotil only 1 tablet/day if needed for diarrhea, if symptoms continue then she needs further workup

## 2024-03-19 NOTE — ASSESSMENT & PLAN NOTE
Wt Readings from Last 3 Encounters:   03/19/24 50.8 kg (112 lb)   02/19/24 56.3 kg (124 lb 3.2 oz)   12/14/23 54.4 kg (120 lb)   No leg edema and she follows cardiologist and on diuretics

## 2024-03-19 NOTE — ASSESSMENT & PLAN NOTE
She has bilateral slight crackling sound, she has chronic interstitial lung disease and follows pulmonologist

## 2024-05-01 ENCOUNTER — RA CDI HCC (OUTPATIENT)
Dept: OTHER | Facility: HOSPITAL | Age: 74
End: 2024-05-01

## 2024-05-06 ENCOUNTER — APPOINTMENT (OUTPATIENT)
Dept: LAB | Facility: CLINIC | Age: 74
End: 2024-05-06
Payer: COMMERCIAL

## 2024-05-06 DIAGNOSIS — E78.5 DYSLIPIDEMIA: ICD-10-CM

## 2024-05-06 DIAGNOSIS — E03.9 HYPOTHYROIDISM, UNSPECIFIED TYPE: ICD-10-CM

## 2024-05-06 DIAGNOSIS — R73.9 ELEVATED BLOOD SUGAR: ICD-10-CM

## 2024-05-06 LAB
ALBUMIN SERPL BCP-MCNC: 4.1 G/DL (ref 3.5–5)
ALP SERPL-CCNC: 67 U/L (ref 34–104)
ALT SERPL W P-5'-P-CCNC: 10 U/L (ref 7–52)
ANION GAP SERPL CALCULATED.3IONS-SCNC: 10 MMOL/L (ref 4–13)
AST SERPL W P-5'-P-CCNC: 16 U/L (ref 13–39)
BILIRUB SERPL-MCNC: 0.89 MG/DL (ref 0.2–1)
BUN SERPL-MCNC: 22 MG/DL (ref 5–25)
CALCIUM SERPL-MCNC: 9.5 MG/DL (ref 8.4–10.2)
CHLORIDE SERPL-SCNC: 99 MMOL/L (ref 96–108)
CHOLEST SERPL-MCNC: 128 MG/DL
CO2 SERPL-SCNC: 33 MMOL/L (ref 21–32)
CREAT SERPL-MCNC: 1.04 MG/DL (ref 0.6–1.3)
EST. AVERAGE GLUCOSE BLD GHB EST-MCNC: 146 MG/DL
GFR SERPL CREATININE-BSD FRML MDRD: 53 ML/MIN/1.73SQ M
GLUCOSE P FAST SERPL-MCNC: 105 MG/DL (ref 65–99)
HBA1C MFR BLD: 6.7 %
HDLC SERPL-MCNC: 46 MG/DL
LDLC SERPL CALC-MCNC: 62 MG/DL (ref 0–100)
NONHDLC SERPL-MCNC: 82 MG/DL
POTASSIUM SERPL-SCNC: 4.1 MMOL/L (ref 3.5–5.3)
PROT SERPL-MCNC: 7.7 G/DL (ref 6.4–8.4)
SODIUM SERPL-SCNC: 142 MMOL/L (ref 135–147)
TRIGL SERPL-MCNC: 102 MG/DL
TSH SERPL DL<=0.05 MIU/L-ACNC: 3.26 UIU/ML (ref 0.45–4.5)

## 2024-05-06 PROCEDURE — 80061 LIPID PANEL: CPT

## 2024-05-06 PROCEDURE — 36415 COLL VENOUS BLD VENIPUNCTURE: CPT

## 2024-05-06 PROCEDURE — 80053 COMPREHEN METABOLIC PANEL: CPT

## 2024-05-06 PROCEDURE — 84443 ASSAY THYROID STIM HORMONE: CPT

## 2024-05-06 PROCEDURE — 83036 HEMOGLOBIN GLYCOSYLATED A1C: CPT

## 2024-05-13 ENCOUNTER — OFFICE VISIT (OUTPATIENT)
Dept: FAMILY MEDICINE CLINIC | Facility: CLINIC | Age: 74
End: 2024-05-13
Payer: COMMERCIAL

## 2024-05-13 VITALS
WEIGHT: 117 LBS | BODY MASS INDEX: 21.53 KG/M2 | DIASTOLIC BLOOD PRESSURE: 72 MMHG | HEIGHT: 62 IN | SYSTOLIC BLOOD PRESSURE: 110 MMHG

## 2024-05-13 DIAGNOSIS — N18.30 STAGE 3 CHRONIC KIDNEY DISEASE, UNSPECIFIED WHETHER STAGE 3A OR 3B CKD (HCC): ICD-10-CM

## 2024-05-13 DIAGNOSIS — C88.0 WALDENSTROM MACROGLOBULINEMIA (HCC): ICD-10-CM

## 2024-05-13 DIAGNOSIS — D46.9 MYELODYSPLASIA (MYELODYSPLASTIC SYNDROME) (HCC): ICD-10-CM

## 2024-05-13 DIAGNOSIS — I77.810 THORACIC AORTIC ECTASIA (HCC): ICD-10-CM

## 2024-05-13 DIAGNOSIS — R73.03 PREDIABETES: Primary | ICD-10-CM

## 2024-05-13 DIAGNOSIS — J96.01 ACUTE RESPIRATORY FAILURE WITH HYPOXIA (HCC): ICD-10-CM

## 2024-05-13 DIAGNOSIS — E03.9 HYPOTHYROIDISM, UNSPECIFIED TYPE: ICD-10-CM

## 2024-05-13 DIAGNOSIS — J84.9 ILD (INTERSTITIAL LUNG DISEASE) (HCC): ICD-10-CM

## 2024-05-13 DIAGNOSIS — E78.5 DYSLIPIDEMIA: ICD-10-CM

## 2024-05-13 DIAGNOSIS — Z00.00 MEDICARE ANNUAL WELLNESS VISIT, SUBSEQUENT: ICD-10-CM

## 2024-05-13 DIAGNOSIS — Z12.31 SCREENING MAMMOGRAM FOR BREAST CANCER: ICD-10-CM

## 2024-05-13 PROBLEM — R19.7 DIARRHEA: Status: RESOLVED | Noted: 2024-03-19 | Resolved: 2024-05-13

## 2024-05-13 PROCEDURE — 99215 OFFICE O/P EST HI 40 MIN: CPT | Performed by: FAMILY MEDICINE

## 2024-05-13 PROCEDURE — G0439 PPPS, SUBSEQ VISIT: HCPCS | Performed by: FAMILY MEDICINE

## 2024-05-13 NOTE — ASSESSMENT & PLAN NOTE
TSH 3.2, free T4 normal.  Patient is on levothyroxine 100 mcg daily. Continue same. Repeat labs x 6 months.

## 2024-05-13 NOTE — PROGRESS NOTES
Assessment and Plan:     Problem List Items Addressed This Visit    None    1. Prediabetes  Assessment & Plan:  Patient noted to have borderline fasting blood sugar 105, A1c 6.7.  Patient has history of prediabetes.  Patient states that she has a problem with cookies, and Pasta loves to bake.   Recommended to avoid simple sugar, portion control, improve physical activity.  Recommended to repeat metabolic panel including A1c at 4 months interval.       Orders:  -     Comprehensive metabolic panel; Future; Expected date: 09/10/2024  -     Hemoglobin A1C; Future; Expected date: 09/10/2024    2. Screening mammogram for breast cancer  -     Mammo screening bilateral w 3d & cad; Future    3. Myelodysplasia (myelodysplastic syndrome) (HCC)  Assessment & Plan:  Platelet count  low but stable.  Patient is asymptomatic.  Repeat CBC to ensure stability  Continue monitoring per Hematology.      4. Acute respiratory failure with hypoxia (HCC)    5. Thoracic aortic ectasia (HCC)  Assessment & Plan:  Continue monitoring per cardiology.      6. Waldenstrom macroglobulinemia (HCC)  Assessment & Plan:  Monitoring per oncology.       7. Stage 3 chronic kidney disease, unspecified whether stage 3a or 3b CKD (HCC)  Assessment & Plan:  Lab Results   Component Value Date    EGFR 53 05/06/2024    EGFR 48 11/29/2023    EGFR 45 10/06/2023    CREATININE 1.04 05/06/2024    CREATININE 1.12 11/29/2023    CREATININE 1.19 10/06/2023   Patient's blood pressure is at goal. Continue spironolactone, torsemide, Entresto per cardiology.       8. Hypothyroidism, unspecified type  Assessment & Plan:  TSH 3.2, free T4 normal.  Patient is on levothyroxine 100 mcg daily. Continue same. Repeat labs x 6 months.     Orders:  -     TSH, 3rd generation with Free T4 reflex; Future; Expected date: 09/10/2024    9. Dyslipidemia  Assessment & Plan:  LDL is at goal.  Continue atorvastatin 20 mg.  Continue monitoring metabolic labs at 6-month.    Orders:  -     Lipid  panel; Future; Expected date: 09/10/2024    10. ILD (interstitial lung disease) (HCC)  Assessment & Plan:  Stable.  Continue monitoring/management per pulmonary.      11. Medicare annual wellness visit, subsequent      I have spent a total time of 40 minutes on 05/13/24 in caring for this patient including Diagnostic results, Prognosis, Risks and benefits of tx options, Instructions for management, Patient and family education, Importance of tx compliance, Risk factor reductions, Impressions, Counseling / Coordination of care, Documenting in the medical record, Reviewing / ordering tests, medicine, procedures  , and Obtaining or reviewing history  .        Depression Screening and Follow-up Plan: Patient was screened for depression during today's encounter. They screened negative with a PHQ-2 score of 0.      Preventive health issues were discussed with patient, and age appropriate screening tests were ordered as noted in patient's After Visit Summary.  Personalized health advice and appropriate referrals for health education or preventive services given if needed, as noted in patient's After Visit Summary.     History of Present Illness:     Patient presents for a Medicare Wellness Visit    HPI     Patient is here for routine 6-month follow-up.  Has history of dyslipidemia, hypothyroidism.  Dilated cardiomyopathy, CHF, interstitial lung disease.  Metabolic labs reviewed with patient.  Being closely monitored by cardiology and pulmonary for chronic medical problems.  Noted to have an A1c of 6.5, has been started on Jardiance for cardiovascular benefits.  Patient has not started the medication since she was worried about the side effects.    Patient Care Team:  Tahmina Lei MD as PCP - General (Family Medicine)     Review of Systems:     Review of Systems   Constitutional: Negative.    Respiratory: Negative.     Cardiovascular: Negative.         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)    History of Hodgkin's lymphoma    Rash    Acute on chronic diastolic heart failure (HCC)    CKD (chronic kidney disease) stage 3, GFR 30-59 ml/min (HCC)    Allergies    Pulmonary hypertension (HCC)    ILD (interstitial lung disease) (HCC)    Hard of hearing    Takotsubo cardiomyopathy    Elevated blood sugar    Menopause    Paroxysmal atrial tachycardia    Heart block, AV    Thoracic aortic ectasia (HCC)    Splenic cyst    Lung infiltrate    Gall stones    Acute respiratory failure with hypoxia (HCC)    Acute bilateral low back pain without sciatica    Presence of permanent cardiac pacemaker    History of radiofrequency ablation (RFA) for complex left atrial arrhythmia    Nonrheumatic aortic valve insufficiency    Diarrhea    Paroxysmal atrial flutter (HCC)      Past Medical and Surgical History:     Past Medical History:   Diagnosis Date    Abnormal CXR 1/14/2022    Cancer (HCC)     Disease of thyroid gland     Dysuria 12/16/2021    Encounter for support and coordination of transition of care 5/6/2021     Past Surgical History:   Procedure Laterality Date    CARDIAC CATHETERIZATION Left 8/1/2023    Procedure: Cardiac Left Heart Cath;  Surgeon: Kay Steven DO;  [facility];  Service: Cardiology    CARDIAC CATHETERIZATION  8/1/2023    Procedure: Cardiac catheterization;  Surgeon: Kay Steven DO;  [facility];  Service: Cardiology    CARDIAC CATHETERIZATION N/A 8/1/2023    Procedure: Cardiac Coronary Angiogram;  Surgeon: Kay Steven DO;  [facility];  Service: Cardiology    CARDIAC ELECTROPHYSIOLOGY PROCEDURE N/A 12/30/2022    Procedure: Cardiac eps/aflutter ablation;  Surgeon: Drew Fatima MD;  [facility]: [facility];  Service: Cardiology    CARDIAC ELECTROPHYSIOLOGY  PROCEDURE N/A 2022    Procedure: Cardiac loop recorder implant;  Surgeon: Drew Fatima MD;  [facility];  Service: Cardiology    CARDIAC ELECTROPHYSIOLOGY PROCEDURE N/A 2023    Procedure: CARDIAC EPS/SVT ABLATION;  Surgeon: Drew Fatima MD;  [facility];  Service: Cardiology    CARDIAC ELECTROPHYSIOLOGY PROCEDURE N/A 2023    Procedure: Cardiac pacer implant;  Surgeon: Dustin Lopez MD;  [facility];  Service: Cardiology    CARDIAC ELECTROPHYSIOLOGY PROCEDURE N/A 2023    Procedure: CARDIAC LOOP RECORDER EXPLANT;  Surgeon: Dustin Lopez MD;  [facility];  Service: Cardiology    HYSTERECTOMY        Family History:     Family History   Problem Relation Age of Onset    No Known Problems Mother     No Known Problems Father     Cancer Maternal Aunt       Social History:     Social History     Socioeconomic History    Marital status:      Spouse name: Not on file    Number of children: Not on file    Years of education: Not on file    Highest education level: Not on file   Occupational History    Not on file   Tobacco Use    Smoking status: Former     Current packs/day: 0.00     Average packs/day: 1 pack/day for 20.0 years (20.0 ttl pk-yrs)     Types: Cigarettes     Start date:      Quit date: 3/10/1997     Years since quittin.1    Smokeless tobacco: Former     Quit date: 8/10/2000   Vaping Use    Vaping status: Never Used   Substance and Sexual Activity    Alcohol use: Never    Drug use: Never    Sexual activity: Not Currently     Partners: Male   Other Topics Concern    Not on file   Social History Narrative    Most recent tobacco use screenin2018     Social Determinants of Health     Financial Resource Strain: Low Risk  (2023)    Overall Financial Resource Strain (CARDIA)     Difficulty of Paying Living Expenses: Not hard at all   Food Insecurity: No Food Insecurity (2024)    Hunger Vital  Sign     Worried About Running Out of Food in the Last Year: Never true     Ran Out of Food in the Last Year: Never true   Transportation Needs: No Transportation Needs (5/6/2024)    PRAPARE - Transportation     Lack of Transportation (Medical): No     Lack of Transportation (Non-Medical): No   Physical Activity: Not on file   Stress: Not on file   Social Connections: Not on file   Intimate Partner Violence: Not on file   Housing Stability: Low Risk  (5/6/2024)    Housing Stability Vital Sign     Unable to Pay for Housing in the Last Year: No     Number of Places Lived in the Last Year: 1     Unstable Housing in the Last Year: No      Medications and Allergies:     Current Outpatient Medications   Medication Sig Dispense Refill    atorvastatin (LIPITOR) 20 mg tablet Take 1 tablet (20 mg total) by mouth daily with dinner 90 tablet 3    levothyroxine 100 mcg tablet Take 1 tablet (100 mcg total) by mouth daily 90 tablet 1    metoprolol succinate (TOPROL-XL) 25 mg 24 hr tablet Take 1 tablet (25 mg total) by mouth 2 (two) times a day 180 tablet 0    sacubitril-valsartan (Entresto) 24-26 MG TABS Take 1 tablet by mouth 2 (two) times a day 60 tablet 5    spironolactone (ALDACTONE) 25 mg tablet Take 1 tablet (25 mg total) by mouth daily 90 tablet 5    torsemide (DEMADEX) 20 mg tablet Take 1 tablet (20 mg total) by mouth daily 90 tablet 3     No current facility-administered medications for this visit.     Allergies   Allergen Reactions    Penicillins Rash      Immunizations:     Immunization History   Administered Date(s) Administered    COVID-19 MODERNA VACC 0.5 ML IM 01/19/2021, 02/23/2021    Influenza, high dose seasonal 0.7 mL 10/07/2021, 10/26/2022, 10/18/2023    Pneumococcal Conjugate Vaccine 20-valent (Pcv20), Polysace 05/11/2023      Health Maintenance:         Topic Date Due    Colorectal Cancer Screening  Never done    Breast Cancer Screening: Mammogram  10/04/2024    Hepatitis C Screening  Completed         Topic  Date Due    COVID-19 Vaccine (3 - 2023-24 season) 09/01/2023      Medicare Screening Tests and Risk Assessments:     Toshia is here for her Subsequent Wellness visit. Last Medicare Wellness visit information reviewed, patient interviewed, no change since last AWV.     Health Risk Assessment:   Patient rates overall health as good. Patient feels that their physical health rating is much better. Patient is satisfied with their life. Eyesight was rated as same. Hearing was rated as slightly worse. Patient feels that their emotional and mental health rating is much better. Patients states they are never, rarely angry. Patient states they are never, rarely unusually tired/fatigued. Pain experienced in the last 7 days has been none. Patient states that she has experienced no weight loss or gain in last 6 months.     Depression Screening:   PHQ-2 Score: 0      Fall Risk Screening:   In the past year, patient has experienced: no history of falling in past year      Urinary Incontinence Screening:   Patient has not leaked urine accidently in the last six months.     Home Safety:  Patient does not have trouble with stairs inside or outside of their home. Patient has working smoke alarms and has working carbon monoxide detector. Home safety hazards include: none.     Nutrition:   Current diet is Regular and No Added Salt.     Medications:   Patient is not currently taking any over-the-counter supplements. Patient is able to manage medications.     Activities of Daily Living (ADLs)/Instrumental Activities of Daily Living (IADLs):   Walk and transfer into and out of bed and chair?: Yes  Dress and groom yourself?: Yes    Bathe or shower yourself?: Yes    Feed yourself? Yes  Do your laundry/housekeeping?: Yes  Manage your money, pay your bills and track your expenses?: Yes  Make your own meals?: Yes    Do your own shopping?: Yes    Previous Hospitalizations:   Any hospitalizations or ED visits within the last 12 months?: No       Advance Care Planning:   Living will: No    Durable POA for healthcare: No    Advanced directive: No      Cognitive Screening:   Provider or family/friend/caregiver concerned regarding cognition?: No    PREVENTIVE SCREENINGS      Cardiovascular Screening:    General: Screening Current      Diabetes Screening:     General: Screening Current      Colorectal Cancer Screening:     General: Risks and Benefits Discussed    Due for: Cologuard      Breast Cancer Screening:     General: Screening Current      Cervical Cancer Screening:    General: Screening Not Indicated      Osteoporosis Screening:    General: Risks and Benefits Discussed and Screening Current      Abdominal Aortic Aneurysm (AAA) Screening:        General: Risks and Benefits Discussed and Screening Not Indicated      Lung Cancer Screening:     General: Screening Not Indicated      Hepatitis C Screening:    General: Screening Current    Screening, Brief Intervention, and Referral to Treatment (SBIRT)    Screening  Typical number of drinks in a day: 0  Typical number of drinks in a week: 0  Interpretation: Low risk drinking behavior.    AUDIT-C Screenin) How often did you have a drink containing alcohol in the past year? never  2) How many drinks did you have on a typical day when you were drinking in the past year? 0  3) How often did you have 6 or more drinks on one occasion in the past year? never    AUDIT-C Score: 0  Interpretation: Score 0-2 (female): Negative screen for alcohol misuse    Single Item Drug Screening:  How often have you used an illegal drug (including marijuana) or a prescription medication for non-medical reasons in the past year? never    Single Item Drug Screen Score: 0  Interpretation: Negative screen for possible drug use disorder    Other Counseling Topics:   Car/seat belt/driving safety, skin self-exam, sunscreen and regular weightbearing exercise and calcium and vitamin D intake.     No results found.     Physical Exam:      There were no vitals taken for this visit.    Physical Exam  Constitutional:       Appearance: Normal appearance.   Cardiovascular:      Heart sounds: Normal heart sounds.   Pulmonary:      Breath sounds: Normal breath sounds.   Abdominal:      Palpations: Abdomen is soft.   Musculoskeletal:      Right lower leg: No edema.      Left lower leg: No edema.   Neurological:      Mental Status: She is oriented to person, place, and time.   Psychiatric:         Mood and Affect: Mood normal.          Tahmina Lei MD

## 2024-05-13 NOTE — ASSESSMENT & PLAN NOTE
Lab Results   Component Value Date    EGFR 53 05/06/2024    EGFR 48 11/29/2023    EGFR 45 10/06/2023    CREATININE 1.04 05/06/2024    CREATININE 1.12 11/29/2023    CREATININE 1.19 10/06/2023   Patient's blood pressure is at goal. Continue spironolactone, torsemide, Entresto per cardiology.

## 2024-05-13 NOTE — PATIENT INSTRUCTIONS
Medicare Preventive Visit Patient Instructions  Thank you for completing your Welcome to Medicare Visit or Medicare Annual Wellness Visit today. Your next wellness visit will be due in one year (5/14/2025).  The screening/preventive services that you may require over the next 5-10 years are detailed below. Some tests may not apply to you based off risk factors and/or age. Screening tests ordered at today's visit but not completed yet may show as past due. Also, please note that scanned in results may not display below.  Preventive Screenings:  Service Recommendations Previous Testing/Comments   Colorectal Cancer Screening  * Colonoscopy    * Fecal Occult Blood Test (FOBT)/Fecal Immunochemical Test (FIT)  * Fecal DNA/Cologuard Test  * Flexible Sigmoidoscopy Age: 45-75 years old   Colonoscopy: every 10 years (may be performed more frequently if at higher risk)  OR  FOBT/FIT: every 1 year  OR  Cologuard: every 3 years  OR  Sigmoidoscopy: every 5 years  Screening may be recommended earlier than age 45 if at higher risk for colorectal cancer. Also, an individualized decision between you and your healthcare provider will decide whether screening between the ages of 76-85 would be appropriate. Colonoscopy: Not on file  FOBT/FIT: Not on file  Cologuard: Not on file  Sigmoidoscopy: Not on file          Breast Cancer Screening Age: 40+ years old  Frequency: every 1-2 years  Not required if history of left and right mastectomy Mammogram: 10/04/2023    Screening Current   Cervical Cancer Screening Between the ages of 21-29, pap smear recommended once every 3 years.   Between the ages of 30-65, can perform pap smear with HPV co-testing every 5 years.   Recommendations may differ for women with a history of total hysterectomy, cervical cancer, or abnormal pap smears in past. Pap Smear: Not on file    Screening Not Indicated   Hepatitis C Screening Once for adults born between 1945 and 1965  More frequently in patients at high  risk for Hepatitis C Hep C Antibody: 10/14/2022    Screening Current   Diabetes Screening 1-2 times per year if you're at risk for diabetes or have pre-diabetes Fasting glucose: 105 mg/dL (5/6/2024)  A1C: 6.7 % (5/6/2024)  Screening Current   Cholesterol Screening Once every 5 years if you don't have a lipid disorder. May order more often based on risk factors. Lipid panel: 05/06/2024    Screening Current     Other Preventive Screenings Covered by Medicare:  Abdominal Aortic Aneurysm (AAA) Screening: covered once if your at risk. You're considered to be at risk if you have a family history of AAA.  Lung Cancer Screening: covers low dose CT scan once per year if you meet all of the following conditions: (1) Age 55-77; (2) No signs or symptoms of lung cancer; (3) Current smoker or have quit smoking within the last 15 years; (4) You have a tobacco smoking history of at least 20 pack years (packs per day multiplied by number of years you smoked); (5) You get a written order from a healthcare provider.  Glaucoma Screening: covered annually if you're considered high risk: (1) You have diabetes OR (2) Family history of glaucoma OR (3)  aged 50 and older OR (4)  American aged 65 and older  Osteoporosis Screening: covered every 2 years if you meet one of the following conditions: (1) You're estrogen deficient and at risk for osteoporosis based off medical history and other findings; (2) Have a vertebral abnormality; (3) On glucocorticoid therapy for more than 3 months; (4) Have primary hyperparathyroidism; (5) On osteoporosis medications and need to assess response to drug therapy.   Last bone density test (DXA Scan): 07/24/2023.  HIV Screening: covered annually if you're between the age of 15-65. Also covered annually if you are younger than 15 and older than 65 with risk factors for HIV infection. For pregnant patients, it is covered up to 3 times per pregnancy.    Immunizations:  Immunization  Recommendations   Influenza Vaccine Annual influenza vaccination during flu season is recommended for all persons aged >= 6 months who do not have contraindications   Pneumococcal Vaccine   * Pneumococcal conjugate vaccine = PCV13 (Prevnar 13), PCV15 (Vaxneuvance), PCV20 (Prevnar 20)  * Pneumococcal polysaccharide vaccine = PPSV23 (Pneumovax) Adults 19-63 yo with certain risk factors or if 65+ yo  If never received any pneumonia vaccine: recommend Prevnar 20 (PCV20)  Give PCV20 if previously received 1 dose of PCV13 or PPSV23   Hepatitis B Vaccine 3 dose series if at intermediate or high risk (ex: diabetes, end stage renal disease, liver disease)   Respiratory syncytial virus (RSV) Vaccine - COVERED BY MEDICARE PART D  * RSVPreF3 (Arexvy) CDC recommends that adults 60 years of age and older may receive a single dose of RSV vaccine using shared clinical decision-making (SCDM)   Tetanus (Td) Vaccine - COST NOT COVERED BY MEDICARE PART B Following completion of primary series, a booster dose should be given every 10 years to maintain immunity against tetanus. Td may also be given as tetanus wound prophylaxis.   Tdap Vaccine - COST NOT COVERED BY MEDICARE PART B Recommended at least once for all adults. For pregnant patients, recommended with each pregnancy.   Shingles Vaccine (Shingrix) - COST NOT COVERED BY MEDICARE PART B  2 shot series recommended in those 19 years and older who have or will have weakened immune systems or those 50 years and older     Health Maintenance Due:      Topic Date Due   • Colorectal Cancer Screening  Never done   • Breast Cancer Screening: Mammogram  10/04/2024   • Hepatitis C Screening  Completed     Immunizations Due:      Topic Date Due   • COVID-19 Vaccine (3 - 2023-24 season) 09/01/2023     Advance Directives   What are advance directives?  Advance directives are legal documents that state your wishes and plans for medical care. These plans are made ahead of time in case you lose your  ability to make decisions for yourself. Advance directives can apply to any medical decision, such as the treatments you want, and if you want to donate organs.   What are the types of advance directives?  There are many types of advance directives, and each state has rules about how to use them. You may choose a combination of any of the following:  Living will:  This is a written record of the treatment you want. You can also choose which treatments you do not want, which to limit, and which to stop at a certain time. This includes surgery, medicine, IV fluid, and tube feedings.   Durable power of  for healthcare (DPAHC):  This is a written record that states who you want to make healthcare choices for you when you are unable to make them for yourself. This person, called a proxy, is usually a family member or a friend. You may choose more than 1 proxy.  Do not resuscitate (DNR) order:  A DNR order is used in case your heart stops beating or you stop breathing. It is a request not to have certain forms of treatment, such as CPR. A DNR order may be included in other types of advance directives.  Medical directive:  This covers the care that you want if you are in a coma, near death, or unable to make decisions for yourself. You can list the treatments you want for each condition. Treatment may include pain medicine, surgery, blood transfusions, dialysis, IV or tube feedings, and a ventilator (breathing machine).  Values history:  This document has questions about your views, beliefs, and how you feel and think about life. This information can help others choose the care that you would choose.  Why are advance directives important?  An advance directive helps you control your care. Although spoken wishes may be used, it is better to have your wishes written down. Spoken wishes can be misunderstood, or not followed. Treatments may be given even if you do not want them. An advance directive may make it easier  for your family to make difficult choices about your care.       © Copyright [employer] 2018 Information is for End User's use only and may not be sold, redistributed or otherwise used for commercial purposes. All illustrations and images included in CareNotes® are the copyrighted property of [employer].D.A[employer]M., Inc. or [employer]

## 2024-05-13 NOTE — ASSESSMENT & PLAN NOTE
Patient noted to have borderline fasting blood sugar 105, A1c 6.7.  Patient has history of prediabetes.  Patient states that she has a problem with cookies, and Pasta loves to bake.   Recommended to avoid simple sugar, portion control, improve physical activity.  Recommended to repeat metabolic panel including A1c at 4 months interval.

## 2024-06-12 PROBLEM — Z00.00 MEDICARE ANNUAL WELLNESS VISIT, SUBSEQUENT: Status: RESOLVED | Noted: 2021-05-06 | Resolved: 2024-06-12

## 2024-06-24 ENCOUNTER — IN-CLINIC DEVICE VISIT (OUTPATIENT)
Dept: CARDIOLOGY CLINIC | Facility: CLINIC | Age: 74
End: 2024-06-24
Payer: COMMERCIAL

## 2024-06-24 DIAGNOSIS — I48.92 PAROXYSMAL ATRIAL FLUTTER (HCC): ICD-10-CM

## 2024-06-24 DIAGNOSIS — Z95.0 PRESENCE OF CARDIAC PACEMAKER: Primary | ICD-10-CM

## 2024-06-24 PROCEDURE — 93280 PM DEVICE PROGR EVAL DUAL: CPT | Performed by: INTERNAL MEDICINE

## 2024-06-24 NOTE — PROGRESS NOTES
Results for orders placed or performed in visit on 06/24/24   Cardiac EP device report    Narrative    MDT DUAL CHAMBER PPM (MVP ON) - ACTIVE SYSTEM IS MRI CONDITIONAL  DEVICE INTERROGATED IN THE [place] OFFICE: BATTERY VOLTAGE ADEQUATE (12.1 YRS). AP 16.0%  19.6%. ALL LEAD PARAMETERS WITHIN NORMAL LIMITS. 1 VT NS EPISODE, EGM SHOWS NSVT, 9 BEATS @ 162 BPM.  PT ASYMPTOMATIC.  PT TAKING METOPROLOL SUCC.  EF 60% (ECHO 11/1/2023). NO PROGRAMMING CHANGES MADE TO DEVICE PARAMETERS. NORMAL DEVICE FUNCTION. PAS/ES

## 2024-07-25 DIAGNOSIS — I47.19 ATRIAL TACHYCARDIA: ICD-10-CM

## 2024-07-25 DIAGNOSIS — I50.43 ACUTE ON CHRONIC COMBINED SYSTOLIC (CONGESTIVE) AND DIASTOLIC (CONGESTIVE) HEART FAILURE (HCC): ICD-10-CM

## 2024-07-25 RX ORDER — METOPROLOL SUCCINATE 25 MG/1
25 TABLET, EXTENDED RELEASE ORAL 2 TIMES DAILY
Qty: 180 TABLET | Refills: 0 | Status: SHIPPED | OUTPATIENT
Start: 2024-07-25

## 2024-08-22 DIAGNOSIS — I50.43 ACUTE ON CHRONIC COMBINED SYSTOLIC (CONGESTIVE) AND DIASTOLIC (CONGESTIVE) HEART FAILURE (HCC): ICD-10-CM

## 2024-08-22 RX ORDER — SACUBITRIL AND VALSARTAN 24; 26 MG/1; MG/1
1 TABLET, FILM COATED ORAL 2 TIMES DAILY
Qty: 60 TABLET | Refills: 5 | Status: SHIPPED | OUTPATIENT
Start: 2024-08-22

## 2024-09-22 ENCOUNTER — RA CDI HCC (OUTPATIENT)
Dept: OTHER | Facility: HOSPITAL | Age: 74
End: 2024-09-22

## 2024-09-23 NOTE — PROGRESS NOTES
HCC coding opportunities          Chart Reviewed number of suggestions sent to Provider: 1  I27.20     Patients Insurance     Medicare Insurance: [employer] Advantage

## 2024-09-24 ENCOUNTER — TRANSCRIBE ORDERS (OUTPATIENT)
Dept: LAB | Facility: CLINIC | Age: 74
End: 2024-09-24

## 2024-09-24 ENCOUNTER — APPOINTMENT (OUTPATIENT)
Dept: LAB | Facility: CLINIC | Age: 74
End: 2024-09-24
Payer: COMMERCIAL

## 2024-09-24 DIAGNOSIS — D46.9 MYELODYSPLASTIC SYNDROME (HCC): ICD-10-CM

## 2024-09-24 DIAGNOSIS — E78.5 DYSLIPIDEMIA: ICD-10-CM

## 2024-09-24 DIAGNOSIS — C88.0 MACROGLOBULINEMIA: ICD-10-CM

## 2024-09-24 DIAGNOSIS — R73.03 PREDIABETES: ICD-10-CM

## 2024-09-24 DIAGNOSIS — Z85.71 PERSONAL HISTORY OF HODGKIN'S DISEASE: Primary | ICD-10-CM

## 2024-09-24 DIAGNOSIS — E03.9 HYPOTHYROIDISM, UNSPECIFIED TYPE: ICD-10-CM

## 2024-09-24 DIAGNOSIS — Z85.71 PERSONAL HISTORY OF HODGKIN'S DISEASE: ICD-10-CM

## 2024-09-24 DIAGNOSIS — C88.00 MACROGLOBULINEMIA: ICD-10-CM

## 2024-09-24 LAB
ALBUMIN SERPL BCG-MCNC: 4.2 G/DL (ref 3.5–5)
ALBUMIN SERPL BCG-MCNC: 4.3 G/DL (ref 3.5–5)
ALP SERPL-CCNC: 64 U/L (ref 34–104)
ALP SERPL-CCNC: 64 U/L (ref 34–104)
ALT SERPL W P-5'-P-CCNC: 10 U/L (ref 7–52)
ALT SERPL W P-5'-P-CCNC: 9 U/L (ref 7–52)
ANION GAP SERPL CALCULATED.3IONS-SCNC: 8 MMOL/L (ref 4–13)
ANION GAP SERPL CALCULATED.3IONS-SCNC: 9 MMOL/L (ref 4–13)
AST SERPL W P-5'-P-CCNC: 16 U/L (ref 13–39)
AST SERPL W P-5'-P-CCNC: 17 U/L (ref 13–39)
BASOPHILS # BLD AUTO: 0.03 THOUSANDS/ΜL (ref 0–0.1)
BASOPHILS NFR BLD AUTO: 1 % (ref 0–1)
BILIRUB SERPL-MCNC: 0.92 MG/DL (ref 0.2–1)
BILIRUB SERPL-MCNC: 0.94 MG/DL (ref 0.2–1)
BUN SERPL-MCNC: 41 MG/DL (ref 5–25)
BUN SERPL-MCNC: 42 MG/DL (ref 5–25)
CALCIUM SERPL-MCNC: 9.6 MG/DL (ref 8.4–10.2)
CALCIUM SERPL-MCNC: 9.6 MG/DL (ref 8.4–10.2)
CHLORIDE SERPL-SCNC: 100 MMOL/L (ref 96–108)
CHLORIDE SERPL-SCNC: 99 MMOL/L (ref 96–108)
CHOLEST SERPL-MCNC: 122 MG/DL
CO2 SERPL-SCNC: 32 MMOL/L (ref 21–32)
CO2 SERPL-SCNC: 33 MMOL/L (ref 21–32)
CREAT SERPL-MCNC: 1.31 MG/DL (ref 0.6–1.3)
CREAT SERPL-MCNC: 1.32 MG/DL (ref 0.6–1.3)
EOSINOPHIL # BLD AUTO: 0.13 THOUSAND/ΜL (ref 0–0.61)
EOSINOPHIL NFR BLD AUTO: 3 % (ref 0–6)
ERYTHROCYTE [DISTWIDTH] IN BLOOD BY AUTOMATED COUNT: 13.3 % (ref 11.6–15.1)
EST. AVERAGE GLUCOSE BLD GHB EST-MCNC: 143 MG/DL
GFR SERPL CREATININE-BSD FRML MDRD: 39 ML/MIN/1.73SQ M
GFR SERPL CREATININE-BSD FRML MDRD: 40 ML/MIN/1.73SQ M
GLUCOSE P FAST SERPL-MCNC: 105 MG/DL (ref 65–99)
GLUCOSE P FAST SERPL-MCNC: 106 MG/DL (ref 65–99)
HBA1C MFR BLD: 6.6 %
HCT VFR BLD AUTO: 38.3 % (ref 34.8–46.1)
HDLC SERPL-MCNC: 44 MG/DL
HGB BLD-MCNC: 12.1 G/DL (ref 11.5–15.4)
IGA SERPL-MCNC: 357 MG/DL (ref 66–433)
IGG SERPL-MCNC: 1003 MG/DL (ref 635–1741)
IGM SERPL-MCNC: 90 MG/DL (ref 45–281)
IMM GRANULOCYTES # BLD AUTO: 0.01 THOUSAND/UL (ref 0–0.2)
IMM GRANULOCYTES NFR BLD AUTO: 0 % (ref 0–2)
LDH SERPL-CCNC: 155 U/L (ref 140–271)
LDLC SERPL CALC-MCNC: 58 MG/DL (ref 0–100)
LYMPHOCYTES # BLD AUTO: 2.1 THOUSANDS/ΜL (ref 0.6–4.47)
LYMPHOCYTES NFR BLD AUTO: 48 % (ref 14–44)
MCH RBC QN AUTO: 29.6 PG (ref 26.8–34.3)
MCHC RBC AUTO-ENTMCNC: 31.6 G/DL (ref 31.4–37.4)
MCV RBC AUTO: 94 FL (ref 82–98)
MONOCYTES # BLD AUTO: 0.26 THOUSAND/ΜL (ref 0.17–1.22)
MONOCYTES NFR BLD AUTO: 6 % (ref 4–12)
NEUTROPHILS # BLD AUTO: 1.83 THOUSANDS/ΜL (ref 1.85–7.62)
NEUTS SEG NFR BLD AUTO: 42 % (ref 43–75)
NONHDLC SERPL-MCNC: 78 MG/DL
NRBC BLD AUTO-RTO: 0 /100 WBCS
PLATELET # BLD AUTO: 110 THOUSANDS/UL (ref 149–390)
PMV BLD AUTO: 12.3 FL (ref 8.9–12.7)
POTASSIUM SERPL-SCNC: 4.4 MMOL/L (ref 3.5–5.3)
POTASSIUM SERPL-SCNC: 4.6 MMOL/L (ref 3.5–5.3)
PROT SERPL-MCNC: 7.1 G/DL (ref 6.4–8.4)
PROT SERPL-MCNC: 7.2 G/DL (ref 6.4–8.4)
RBC # BLD AUTO: 4.09 MILLION/UL (ref 3.81–5.12)
SODIUM SERPL-SCNC: 140 MMOL/L (ref 135–147)
SODIUM SERPL-SCNC: 141 MMOL/L (ref 135–147)
TRIGL SERPL-MCNC: 102 MG/DL
TSH SERPL DL<=0.05 MIU/L-ACNC: 1.31 UIU/ML (ref 0.45–4.5)
WBC # BLD AUTO: 4.36 THOUSAND/UL (ref 4.31–10.16)

## 2024-09-24 PROCEDURE — 83521 IG LIGHT CHAINS FREE EACH: CPT

## 2024-09-24 PROCEDURE — 82784 ASSAY IGA/IGD/IGG/IGM EACH: CPT

## 2024-09-24 PROCEDURE — 83036 HEMOGLOBIN GLYCOSYLATED A1C: CPT

## 2024-09-24 PROCEDURE — 36415 COLL VENOUS BLD VENIPUNCTURE: CPT

## 2024-09-24 PROCEDURE — 84443 ASSAY THYROID STIM HORMONE: CPT

## 2024-09-24 PROCEDURE — 80061 LIPID PANEL: CPT

## 2024-09-24 PROCEDURE — 83615 LACTATE (LD) (LDH) ENZYME: CPT

## 2024-09-24 PROCEDURE — 85025 COMPLETE CBC W/AUTO DIFF WBC: CPT

## 2024-09-24 PROCEDURE — 80053 COMPREHEN METABOLIC PANEL: CPT

## 2024-09-25 ENCOUNTER — REMOTE DEVICE CLINIC VISIT (OUTPATIENT)
Dept: CARDIOLOGY CLINIC | Facility: CLINIC | Age: 74
End: 2024-09-25
Payer: COMMERCIAL

## 2024-09-25 ENCOUNTER — RA CDI HCC (OUTPATIENT)
Dept: OTHER | Facility: HOSPITAL | Age: 74
End: 2024-09-25

## 2024-09-25 DIAGNOSIS — I49.5 SSS (SICK SINUS SYNDROME) (HCC): Primary | ICD-10-CM

## 2024-09-25 DIAGNOSIS — I48.91 ATRIAL FIBRILLATION, UNSPECIFIED TYPE (HCC): ICD-10-CM

## 2024-09-25 LAB
KAPPA LC FREE SER-MCNC: 40.2 MG/L (ref 3.3–19.4)
KAPPA LC FREE/LAMBDA FREE SER: 1.27 {RATIO} (ref 0.26–1.65)
LAMBDA LC FREE SERPL-MCNC: 31.6 MG/L (ref 5.7–26.3)

## 2024-09-25 PROCEDURE — 93296 REM INTERROG EVL PM/IDS: CPT | Performed by: INTERNAL MEDICINE

## 2024-09-25 PROCEDURE — 93294 REM INTERROG EVL PM/LDLS PM: CPT | Performed by: INTERNAL MEDICINE

## 2024-09-25 NOTE — PROGRESS NOTES
CARELINK TRANSMISSION: BATTERY VOLTAGE ADEQUATE (12.1 YRS). AP: 31.4%. : 37.8% (MVP-ON). ALL AVAILABLE LEAD PARAMETERS WITHIN NORMAL LIMITS. 3 VT EPISODES W/ EGMS SHOWING EPISODE # 23 NSVT 6 BEATS @ 200 BPM & # 21 NSVT 7 BEATS @ 171 BPM, EPISODE # 22 SHOWING PAT 16 BEATS @ 167 BPM. PT TAKES METOPROLOL SUCC, ENTRESTO. EF: 60% (ECHO 11/1/23). NORMAL DEVICE FUNCTION. CH

## 2024-10-03 ENCOUNTER — OFFICE VISIT (OUTPATIENT)
Dept: FAMILY MEDICINE CLINIC | Facility: CLINIC | Age: 74
End: 2024-10-03
Payer: COMMERCIAL

## 2024-10-03 ENCOUNTER — TELEPHONE (OUTPATIENT)
Dept: FAMILY MEDICINE CLINIC | Facility: CLINIC | Age: 74
End: 2024-10-03

## 2024-10-03 VITALS
OXYGEN SATURATION: 98 % | DIASTOLIC BLOOD PRESSURE: 70 MMHG | BODY MASS INDEX: 22.08 KG/M2 | HEIGHT: 62 IN | WEIGHT: 120 LBS | SYSTOLIC BLOOD PRESSURE: 110 MMHG | HEART RATE: 63 BPM

## 2024-10-03 DIAGNOSIS — N18.31 TYPE 2 DIABETES MELLITUS WITH STAGE 3A CHRONIC KIDNEY DISEASE, WITH LONG-TERM CURRENT USE OF INSULIN (HCC): ICD-10-CM

## 2024-10-03 DIAGNOSIS — R35.0 URINARY FREQUENCY: Primary | ICD-10-CM

## 2024-10-03 DIAGNOSIS — J01.10 ACUTE NON-RECURRENT FRONTAL SINUSITIS: ICD-10-CM

## 2024-10-03 DIAGNOSIS — N18.30 STAGE 3 CHRONIC KIDNEY DISEASE, UNSPECIFIED WHETHER STAGE 3A OR 3B CKD (HCC): ICD-10-CM

## 2024-10-03 DIAGNOSIS — Z79.4 TYPE 2 DIABETES MELLITUS WITH STAGE 3A CHRONIC KIDNEY DISEASE, WITH LONG-TERM CURRENT USE OF INSULIN (HCC): ICD-10-CM

## 2024-10-03 DIAGNOSIS — Z23 ENCOUNTER FOR IMMUNIZATION: ICD-10-CM

## 2024-10-03 DIAGNOSIS — R82.90 BAD ODOR OF URINE: ICD-10-CM

## 2024-10-03 DIAGNOSIS — E78.5 DYSLIPIDEMIA: ICD-10-CM

## 2024-10-03 DIAGNOSIS — E03.9 HYPOTHYROIDISM, UNSPECIFIED TYPE: ICD-10-CM

## 2024-10-03 DIAGNOSIS — E11.22 TYPE 2 DIABETES MELLITUS WITH STAGE 3A CHRONIC KIDNEY DISEASE, WITH LONG-TERM CURRENT USE OF INSULIN (HCC): ICD-10-CM

## 2024-10-03 LAB
LEFT EYE DIABETIC RETINOPATHY: NORMAL
LEFT EYE IMAGE QUALITY: NORMAL
LEFT EYE MACULAR EDEMA: NORMAL
LEFT EYE OTHER RETINOPATHY: NORMAL
RIGHT EYE DIABETIC RETINOPATHY: NORMAL
RIGHT EYE IMAGE QUALITY: NORMAL
RIGHT EYE MACULAR EDEMA: NORMAL
RIGHT EYE OTHER RETINOPATHY: NORMAL
SEVERITY (EYE EXAM): NORMAL
SL AMB  POCT GLUCOSE, UA: ABNORMAL
SL AMB LEUKOCYTE ESTERASE,UA: 75
SL AMB POCT BILIRUBIN,UA: ABNORMAL
SL AMB POCT BLOOD,UA: ABNORMAL
SL AMB POCT CLARITY,UA: CLEAR
SL AMB POCT COLOR,UA: YELLOW
SL AMB POCT KETONES,UA: ABNORMAL
SL AMB POCT NITRITE,UA: ABNORMAL
SL AMB POCT PH,UA: 5
SL AMB POCT SPECIFIC GRAVITY,UA: 1.01
SL AMB POCT URINE PROTEIN: ABNORMAL
SL AMB POCT UROBILINOGEN: ABNORMAL

## 2024-10-03 PROCEDURE — 81003 URINALYSIS AUTO W/O SCOPE: CPT | Performed by: FAMILY MEDICINE

## 2024-10-03 PROCEDURE — 90662 IIV NO PRSV INCREASED AG IM: CPT | Performed by: FAMILY MEDICINE

## 2024-10-03 PROCEDURE — G0008 ADMIN INFLUENZA VIRUS VAC: HCPCS | Performed by: FAMILY MEDICINE

## 2024-10-03 PROCEDURE — 99215 OFFICE O/P EST HI 40 MIN: CPT | Performed by: FAMILY MEDICINE

## 2024-10-03 RX ORDER — LEVOTHYROXINE SODIUM 100 UG/1
100 TABLET ORAL DAILY
Qty: 90 TABLET | Refills: 1 | Status: SHIPPED | OUTPATIENT
Start: 2024-10-03

## 2024-10-03 RX ORDER — CEFUROXIME AXETIL 500 MG/1
500 TABLET ORAL EVERY 12 HOURS SCHEDULED
Qty: 14 TABLET | Refills: 0 | Status: SHIPPED | OUTPATIENT
Start: 2024-10-03 | End: 2024-10-10

## 2024-10-03 NOTE — PROGRESS NOTES
Subjective:      Patient ID: Toshia Weaver is a 74 y.o. female.    HPI    I need to leave this jobPatient is here for routine to drug and alcohol monitor 6-month follow-up.  Has history of dyslipidemia, hypothyroidism.  Dilated cardiomyopathy, CHF, interstitial lung disease.  Metabolic labs reviewed with patient.  Being closely monitored by cardiology and pulmonary for chronic medical problems.  Noted to have an A1c of 6.5, diet controlled.  Patient reports symptoms of  mild cough, dysuria.  Urine dip in the office positive.  Denies any fever or chills.  Metabolic labs reviewed with patient today.  Past Medical History:   Diagnosis Date    Abnormal CXR 1/14/2022    Cancer (HCC)     Disease of thyroid gland     Dysuria 12/16/2021    Encounter for support and coordination of transition of care 5/6/2021       Family History   Problem Relation Age of Onset    No Known Problems Mother     No Known Problems Father     Cancer Maternal Aunt        Past Surgical History:   Procedure Laterality Date    CARDIAC CATHETERIZATION Left 8/1/2023    Procedure: Cardiac Left Heart Cath;  Surgeon: Kay Steven DO;  [facility];  Service: Cardiology    CARDIAC CATHETERIZATION  8/1/2023    Procedure: Cardiac catheterization;  Surgeon: Kay Steven DO;  [facility];  Service: Cardiology    CARDIAC CATHETERIZATION N/A 8/1/2023    Procedure: Cardiac Coronary Angiogram;  Surgeon: Kay Steven DO;  [facility];  Service: Cardiology    CARDIAC ELECTROPHYSIOLOGY PROCEDURE N/A 12/30/2022    Procedure: Cardiac eps/aflutter ablation;  Surgeon: Drew Fatima MD;  [facility];  Service: Cardiology    CARDIAC ELECTROPHYSIOLOGY PROCEDURE N/A 12/30/2022    Procedure: Cardiac loop recorder implant;  Surgeon: Drew Fatima MD;  [facility];  Service: Cardiology    CARDIAC ELECTROPHYSIOLOGY PROCEDURE N/A 2/6/2023    Procedure: CARDIAC  EPS/SVT ABLATION;  Surgeon: Drew Fatima MD;  [facility];  Service: Cardiology    CARDIAC ELECTROPHYSIOLOGY PROCEDURE N/A 2/16/2023    Procedure: Cardiac pacer implant;  Surgeon: Dustin Lopez MD;  [facility];  Service: Cardiology    CARDIAC ELECTROPHYSIOLOGY PROCEDURE N/A 2/16/2023    Procedure: CARDIAC LOOP RECORDER EXPLANT;  Surgeon: Dustin Lopez MD;  [facility];  Service: Cardiology    HYSTERECTOMY          reports that she quit smoking about 27 years ago. Her smoking use included cigarettes. She started smoking about 51 years ago. She has a 20 pack-year smoking history. She quit smokeless tobacco use about 24 years ago. She reports that she does not drink alcohol and does not use drugs.      Current Outpatient Medications:     atorvastatin (LIPITOR) 20 mg tablet, Take 1 tablet (20 mg total) by mouth daily with dinner, Disp: 90 tablet, Rfl: 3    cefuroxime (CEFTIN) 500 mg tablet, Take 1 tablet (500 mg total) by mouth every 12 (twelve) hours for 7 days, Disp: 14 tablet, Rfl: 0    Entresto 24-26 MG TABS, TAKE 1 TABLET BY MOUTH TWICE A DAY, Disp: 60 tablet, Rfl: 5    levothyroxine 100 mcg tablet, Take 1 tablet (100 mcg total) by mouth daily, Disp: 90 tablet, Rfl: 1    metoprolol succinate (TOPROL-XL) 25 mg 24 hr tablet, TAKE 1 TABLET BY MOUTH TWICE A DAY, Disp: 180 tablet, Rfl: 0    spironolactone (ALDACTONE) 25 mg tablet, Take 1 tablet (25 mg total) by mouth daily, Disp: 90 tablet, Rfl: 5    torsemide (DEMADEX) 20 mg tablet, Take 1 tablet (20 mg total) by mouth daily, Disp: 90 tablet, Rfl: 3    The following portions of the patient's history were reviewed and updated as appropriate: allergies, current medications, past family history, past medical history, past social history, past surgical history and problem list.    Review of Systems   Respiratory: Negative.     Cardiovascular: Negative.    Genitourinary:  Positive for dysuria.      "      Objective:    /70   Pulse 63   Ht 5' 2\" (1.575 m)   Wt 54.4 kg (120 lb)   SpO2 98%   BMI 21.95 kg/m²      Physical Exam  Cardiovascular:      Heart sounds: Normal heart sounds.   Pulmonary:      Breath sounds: Normal breath sounds.   Abdominal:      Palpations: Abdomen is soft.           Recent Results (from the past 1008 hour(s))   CBC and differential    Collection Time: 09/24/24  7:17 AM   Result Value Ref Range    WBC 4.36 4.31 - 10.16 Thousand/uL    RBC 4.09 3.81 - 5.12 Million/uL    Hemoglobin 12.1 11.5 - 15.4 g/dL    Hematocrit 38.3 34.8 - 46.1 %    MCV 94 82 - 98 fL    MCH 29.6 26.8 - 34.3 pg    MCHC 31.6 31.4 - 37.4 g/dL    RDW 13.3 11.6 - 15.1 %    MPV 12.3 8.9 - 12.7 fL    Platelets 110 (L) 149 - 390 Thousands/uL    nRBC 0 /100 WBCs    Segmented % 42 (L) 43 - 75 %    Immature Grans % 0 0 - 2 %    Lymphocytes % 48 (H) 14 - 44 %    Monocytes % 6 4 - 12 %    Eosinophils Relative 3 0 - 6 %    Basophils Relative 1 0 - 1 %    Absolute Neutrophils 1.83 (L) 1.85 - 7.62 Thousands/µL    Absolute Immature Grans 0.01 0.00 - 0.20 Thousand/uL    Absolute Lymphocytes 2.10 0.60 - 4.47 Thousands/µL    Absolute Monocytes 0.26 0.17 - 1.22 Thousand/µL    Eosinophils Absolute 0.13 0.00 - 0.61 Thousand/µL    Basophils Absolute 0.03 0.00 - 0.10 Thousands/µL   Comprehensive metabolic panel    Collection Time: 09/24/24  7:17 AM   Result Value Ref Range    Sodium 141 135 - 147 mmol/L    Potassium 4.6 3.5 - 5.3 mmol/L    Chloride 100 96 - 108 mmol/L    CO2 32 21 - 32 mmol/L    ANION GAP 9 4 - 13 mmol/L    BUN 42 (H) 5 - 25 mg/dL    Creatinine 1.32 (H) 0.60 - 1.30 mg/dL    Glucose, Fasting 105 (H) 65 - 99 mg/dL    Calcium 9.6 8.4 - 10.2 mg/dL    AST 17 13 - 39 U/L    ALT 10 7 - 52 U/L    Alkaline Phosphatase 64 34 - 104 U/L    Total Protein 7.2 6.4 - 8.4 g/dL    Albumin 4.2 3.5 - 5.0 g/dL    Total Bilirubin 0.92 0.20 - 1.00 mg/dL    eGFR 39 ml/min/1.73sq m   IgG, IgA, IgM    Collection Time: 09/24/24  7:17 AM "   Result Value Ref Range     66 - 433 mg/dL    IGG 1,003 635 - 1,741 mg/dL    IGM 90 45 - 281 mg/dL   LD,Blood    Collection Time: 09/24/24  7:17 AM   Result Value Ref Range     140 - 271 U/L   Immunoglobulin free LT chains blood    Collection Time: 09/24/24  7:17 AM   Result Value Ref Range    Ig Kappa Free Light Chain 40.2 (H) 3.3 - 19.4 mg/L    Ig Lambda Free Light Chain 31.6 (H) 5.7 - 26.3 mg/L    Kappa/Lambda FluidC Ratio 1.27 0.26 - 1.65   Comprehensive metabolic panel    Collection Time: 09/24/24  7:29 AM   Result Value Ref Range    Sodium 140 135 - 147 mmol/L    Potassium 4.4 3.5 - 5.3 mmol/L    Chloride 99 96 - 108 mmol/L    CO2 33 (H) 21 - 32 mmol/L    ANION GAP 8 4 - 13 mmol/L    BUN 41 (H) 5 - 25 mg/dL    Creatinine 1.31 (H) 0.60 - 1.30 mg/dL    Glucose, Fasting 106 (H) 65 - 99 mg/dL    Calcium 9.6 8.4 - 10.2 mg/dL    AST 16 13 - 39 U/L    ALT 9 7 - 52 U/L    Alkaline Phosphatase 64 34 - 104 U/L    Total Protein 7.1 6.4 - 8.4 g/dL    Albumin 4.3 3.5 - 5.0 g/dL    Total Bilirubin 0.94 0.20 - 1.00 mg/dL    eGFR 40 ml/min/1.73sq m   Hemoglobin A1C    Collection Time: 09/24/24  7:29 AM   Result Value Ref Range    Hemoglobin A1C 6.6 (H) Normal 4.0-5.6%; PreDiabetic 5.7-6.4%; Diabetic >=6.5%; Glycemic control for adults with diabetes <7.0% %     mg/dl   Lipid panel    Collection Time: 09/24/24  7:29 AM   Result Value Ref Range    Cholesterol 122 See Comment mg/dL    Triglycerides 102 See Comment mg/dL    HDL, Direct 44 (L) >=50 mg/dL    LDL Calculated 58 0 - 100 mg/dL    Non-HDL-Chol (CHOL-HDL) 78 mg/dl   TSH, 3rd generation with Free T4 reflex    Collection Time: 09/24/24  7:29 AM   Result Value Ref Range    TSH 3RD GENERATON 1.308 0.450 - 4.500 uIU/mL   POCT urine dip    Collection Time: 10/03/24  8:42 AM   Result Value Ref Range    LEUKOCYTE ESTERASE,UA 75     NITRITE,UA neg     SL AMB POCT UROBILINOGEN norm     POCT URINE PROTEIN neg      PH,UA 5     BLOOD,UA neg     SPECIFIC GRAVITY,UA  1.010     KETONES,UA neg     BILIRUBIN,UA neg     GLUCOSE, UA norm      COLOR,UA yellow     CLARITY,UA clear        Assessment/Plan:    No problem-specific Assessment & Plan notes found for this encounter.           Problem List Items Addressed This Visit          Respiratory    Acute non-recurrent frontal sinusitis     Patient on oral antibiotic for UTI.            Endocrine    Hypothyroidism     TSH 3.2, free T4 normal.  Patient is on levothyroxine 100 mcg daily. Continue same. Repeat labs x 6 months.          Relevant Medications    levothyroxine 100 mcg tablet    Other Relevant Orders    TSH, 3rd generation with Free T4 reflex    Type 2 diabetes mellitus with stage 3a chronic kidney disease, with long-term current use of insulin ([facility])       Lab Results   Component Value Date    HGBA1C 6.6 (H) 09/24/2024   Diet controlled.  Continue same.  Continue monitoring with metabolic labs at 6-month intervals.         Relevant Orders    Comprehensive metabolic panel    Hemoglobin A1C    Albumin / creatinine urine ratio    IRIS Diabetic eye exam       Genitourinary    CKD (chronic kidney disease) stage 3, GFR 30-59 ml/min ([facility])     Lab Results   Component Value Date    EGFR 40 09/24/2024    EGFR 39 09/24/2024    EGFR 53 05/06/2024    CREATININE 1.31 (H) 09/24/2024    CREATININE 1.32 (H) 09/24/2024    CREATININE 1.04 05/06/2024   Stable.  Continue monitoring.  Continue to avoid nephrotoxins.  Blood pressure A1c is at goal.            Other    Dyslipidemia     LDL is at goal.  Continue atorvastatin 20 mg.  Continue monitoring metabolic labs at 6-month.         Relevant Orders    Lipid panel    Urinary frequency - Primary     Urine dip is positive for leukocytes.  Started on cefuroxime which she took last year without any side effects.         Relevant Medications    cefuroxime (CEFTIN) 500 mg tablet    Other Relevant Orders    POCT urine dip (Completed)     Other Visit Diagnoses       Encounter for immunization         Relevant Orders    influenza vaccine, high-dose, PF 0.5 mL (Fluzone High Dose)    Bad odor of urine        Relevant Orders    POCT urine dip (Completed)          I have spent a total time of 40 minutes in caring for this patient on the day of the visit/encounter including Diagnostic results, Prognosis, Risks and benefits of tx options, Instructions for management, Patient and family education, Importance of tx compliance, Risk factor reductions, Impressions, Counseling / Coordination of care, Documenting in the medical record, Reviewing / ordering tests, medicine, procedures  , and Obtaining or reviewing history  .

## 2024-10-03 NOTE — ASSESSMENT & PLAN NOTE
Lab Results   Component Value Date    HGBA1C 6.6 (H) 09/24/2024   Diet controlled.  Continue same.  Continue monitoring with metabolic labs at 6-month intervals.

## 2024-10-03 NOTE — ASSESSMENT & PLAN NOTE
Lab Results   Component Value Date    EGFR 40 09/24/2024    EGFR 39 09/24/2024    EGFR 53 05/06/2024    CREATININE 1.31 (H) 09/24/2024    CREATININE 1.32 (H) 09/24/2024    CREATININE 1.04 05/06/2024   Stable.  Continue monitoring.  Continue to avoid nephrotoxins.  Blood pressure A1c is at goal.

## 2024-10-03 NOTE — ASSESSMENT & PLAN NOTE
Urine dip is positive for leukocytes.  Started on cefuroxime which she took last year without any side effects.

## 2024-10-03 NOTE — TELEPHONE ENCOUNTER
----- Message from Tahmina Lei MD sent at 10/3/2024  1:02 PM EDT -----  Please call patient with normal results.

## 2024-10-05 LAB
APPEARANCE UR: CLEAR
BACTERIA UR QL AUTO: ABNORMAL /HPF
BILIRUB UR QL STRIP: NEGATIVE
COLOR UR: YELLOW
GLUCOSE UR QL STRIP: NEGATIVE
HGB UR QL STRIP: NEGATIVE
HYALINE CASTS #/AREA URNS LPF: ABNORMAL /LPF
KETONES UR QL STRIP: NEGATIVE
LEUKOCYTE ESTERASE UR QL STRIP: ABNORMAL
NITRITE UR QL STRIP: NEGATIVE
PH UR STRIP: 5.5 [PH] (ref 5–8)
PROT UR QL STRIP: NEGATIVE
RBC #/AREA URNS HPF: ABNORMAL /HPF
SP GR UR STRIP: 1.01 (ref 1–1.03)
SQUAMOUS #/AREA URNS HPF: ABNORMAL /HPF
WBC #/AREA URNS HPF: ABNORMAL /HPF

## 2024-10-07 ENCOUNTER — TELEPHONE (OUTPATIENT)
Dept: FAMILY MEDICINE CLINIC | Facility: CLINIC | Age: 74
End: 2024-10-07

## 2024-10-07 NOTE — TELEPHONE ENCOUNTER
----- Message from Tahmina Lei MD sent at 10/5/2024  3:57 PM EDT -----  Please call patient with normal results.

## 2024-10-22 ENCOUNTER — VBI (OUTPATIENT)
Dept: ADMINISTRATIVE | Facility: OTHER | Age: 74
End: 2024-10-22

## 2024-10-22 NOTE — TELEPHONE ENCOUNTER
10/22/24 11:25 AM     Chart reviewed for CRC: Colonoscopy was/were not submitted to the patient's insurance.     Lisa Segundo MA   [facility]

## 2024-10-25 ENCOUNTER — TELEPHONE (OUTPATIENT)
Age: 74
End: 2024-10-25

## 2024-10-25 ENCOUNTER — OFFICE VISIT (OUTPATIENT)
Dept: URGENT CARE | Facility: CLINIC | Age: 74
End: 2024-10-25
Payer: COMMERCIAL

## 2024-10-25 ENCOUNTER — APPOINTMENT (OUTPATIENT)
Dept: RADIOLOGY | Facility: CLINIC | Age: 74
End: 2024-10-25
Payer: COMMERCIAL

## 2024-10-25 VITALS
WEIGHT: 120 LBS | HEART RATE: 87 BPM | SYSTOLIC BLOOD PRESSURE: 115 MMHG | BODY MASS INDEX: 22.08 KG/M2 | TEMPERATURE: 97.5 F | DIASTOLIC BLOOD PRESSURE: 57 MMHG | HEIGHT: 62 IN | RESPIRATION RATE: 15 BRPM

## 2024-10-25 DIAGNOSIS — R05.1 ACUTE COUGH: ICD-10-CM

## 2024-10-25 DIAGNOSIS — R05.1 ACUTE COUGH: Primary | ICD-10-CM

## 2024-10-25 PROCEDURE — 99213 OFFICE O/P EST LOW 20 MIN: CPT | Performed by: NURSE PRACTITIONER

## 2024-10-25 PROCEDURE — S9088 SERVICES PROVIDED IN URGENT: HCPCS | Performed by: NURSE PRACTITIONER

## 2024-10-25 PROCEDURE — 71046 X-RAY EXAM CHEST 2 VIEWS: CPT

## 2024-10-25 RX ORDER — GUAIFENESIN 200 MG/1
400 TABLET ORAL EVERY 4 HOURS PRN
Qty: 30 SUPPOSITORY | Refills: 0 | Status: SHIPPED | OUTPATIENT
Start: 2024-10-25

## 2024-10-25 RX ORDER — DOXYCYCLINE 100 MG/1
100 TABLET ORAL 2 TIMES DAILY
Qty: 14 TABLET | Refills: 0 | Status: SHIPPED | OUTPATIENT
Start: 2024-10-25 | End: 2024-11-01

## 2024-10-25 NOTE — PROGRESS NOTES
"  [facility]        NAME: Toshia Weaver is a 74 y.o. female  : 1950    MRN: [id number]  DATE: 2024  TIME: 2:46 PM    Assessment and Plan   Acute cough [R05.1]  1. Acute cough  XR chest pa and lateral    doxycycline (ADOXA) 100 MG tablet    guaiFENesin 200 MG tablet        Chest x-ray completed in office pending official radiology read.  No evidence of acute consolidation.  She has rhonchi and rales on exam.  No new swelling.  No recent fevers.  Augmentin and guaifenesin sent to pharmacy.  Advised to proceed to ER if symptoms worsen.  Otherwise follow-up with primary care provider    Patient Instructions       Follow up with PCP in 3-5 days.  Proceed to  ER if symptoms worsen.    Chief Complaint     Chief Complaint   Patient presents with    Cough     Pt reports that she has a bad cough and it is productive with \"yucky\" mucous.          History of Present Illness       Patient is a 74-year-old female presenting with over 1 week of moist, productive cough.  She states she had a cough longer than 1 week but it \"worsened within the last week\".  She is coughing up yellow \"yucky\" mucus.  Denies fever but has chills.  She has taken 3 days of azithromycin that were leftover from a family member.  She took those 5 to 6 days ago.    Cough  Associated symptoms include chills, ear pain (right) and shortness of breath. Pertinent negatives include no fever, headaches, rhinorrhea or sore throat.       Review of Systems   Review of Systems   Constitutional:  Positive for chills. Negative for activity change and fever.   HENT:  Positive for ear pain (right). Negative for congestion, rhinorrhea, sinus pain and sore throat.    Respiratory:  Positive for cough and shortness of breath.    Neurological:  Negative for headaches.         Current Medications       Current Outpatient Medications:     doxycycline (ADOXA) 100 MG tablet, Take 1 tablet (100 mg total) by mouth 2 (two) times a day for 7 days, Disp: " 14 tablet, Rfl: 0    guaiFENesin 200 MG tablet, Take 2 tablets (400 mg total) by mouth every 4 (four) hours as needed for cough, Disp: 30 suppository, Rfl: 0    atorvastatin (LIPITOR) 20 mg tablet, Take 1 tablet (20 mg total) by mouth daily with dinner, Disp: 90 tablet, Rfl: 3    Entresto 24-26 MG TABS, TAKE 1 TABLET BY MOUTH TWICE A DAY, Disp: 60 tablet, Rfl: 5    levothyroxine 100 mcg tablet, Take 1 tablet (100 mcg total) by mouth daily, Disp: 90 tablet, Rfl: 1    metoprolol succinate (TOPROL-XL) 25 mg 24 hr tablet, TAKE 1 TABLET BY MOUTH TWICE A DAY, Disp: 180 tablet, Rfl: 0    spironolactone (ALDACTONE) 25 mg tablet, Take 1 tablet (25 mg total) by mouth daily, Disp: 90 tablet, Rfl: 5    torsemide (DEMADEX) 20 mg tablet, Take 1 tablet (20 mg total) by mouth daily, Disp: 90 tablet, Rfl: 3    Current Allergies     Allergies as of 10/25/2024 - Reviewed 10/25/2024   Allergen Reaction Noted    Penicillins Rash 07/26/2017            The following portions of the patient's history were reviewed and updated as appropriate: allergies, current medications, past family history, past medical history, past social history, past surgical history and problem list.     Past Medical History:   Diagnosis Date    Abnormal CXR 1/14/2022    Cancer (HCC)     Disease of thyroid gland     Dysuria 12/16/2021    Encounter for support and coordination of transition of care 5/6/2021       Past Surgical History:   Procedure Laterality Date    CARDIAC CATHETERIZATION Left 8/1/2023    Procedure: Cardiac Left Heart Cath;  Surgeon: Kay Steven DO;  [facility];  Service: Cardiology    CARDIAC CATHETERIZATION  8/1/2023    Procedure: Cardiac catheterization;  Surgeon: Kay Steven DO;  [facility];  Service: Cardiology    CARDIAC CATHETERIZATION N/A 8/1/2023    Procedure: Cardiac Coronary Angiogram;  Surgeon: Kay Steven DO;  [facility];  Service: Cardiology    CARDIAC  "ELECTROPHYSIOLOGY PROCEDURE N/A 12/30/2022    Procedure: Cardiac eps/aflutter ablation;  Surgeon: Drew Fatima MD;  [facility];  Service: Cardiology    CARDIAC ELECTROPHYSIOLOGY PROCEDURE N/A 12/30/2022    Procedure: Cardiac loop recorder implant;  Surgeon: Drew Fatima MD;  [facility];  Service: Cardiology    CARDIAC ELECTROPHYSIOLOGY PROCEDURE N/A 2/6/2023    Procedure: CARDIAC EPS/SVT ABLATION;  Surgeon: Drew Fatima MD;  [facility];  Service: Cardiology    CARDIAC ELECTROPHYSIOLOGY PROCEDURE N/A 2/16/2023    Procedure: Cardiac pacer implant;  Surgeon: Dustin Lopez MD;  [facility];  Service: Cardiology    CARDIAC ELECTROPHYSIOLOGY PROCEDURE N/A 2/16/2023    Procedure: CARDIAC LOOP RECORDER EXPLANT;  Surgeon: Dustin Lopez MD;  [facility];  Service: Cardiology    HYSTERECTOMY         Family History   Problem Relation Age of Onset    No Known Problems Mother     No Known Problems Father     Cancer Maternal Aunt          Medications have been verified.        Objective   /57   Pulse 87   Temp 97.5 °F (36.4 °C)   Resp 15   Ht 5' 2\" (1.575 m)   Wt 54.4 kg (120 lb)   BMI 21.95 kg/m²        Physical Exam     Physical Exam  Vitals reviewed.   Constitutional:       General: She is awake. She is not in acute distress.     Appearance: Normal appearance. She is normal weight.   HENT:      Head: Normocephalic.      Right Ear: Hearing, tympanic membrane, ear canal and external ear normal.      Left Ear: Hearing, tympanic membrane, ear canal and external ear normal.      Nose: Nose normal.      Mouth/Throat:      Lips: Pink.      Pharynx: Oropharynx is clear.   Cardiovascular:      Rate and Rhythm: Normal rate.   Pulmonary:      Breath sounds: Rhonchi and rales present.   Skin:     General: Skin is warm and moist.   Neurological:      General: No focal deficit present.      Mental Status: She is alert and " oriented to person, place, and time.   Psychiatric:         Behavior: Behavior is cooperative.

## 2024-10-25 NOTE — TELEPHONE ENCOUNTER
Patient called in stating she has had a cough for over a week that has gotten worse this week and woke up with a sore throat this morning. No appts that I could schedule the patient. Spoke with Marialuisa who advised no appts available and would call if any cancellations and urgent care. Patient was made aware. NFA needed at this time.

## 2024-10-25 NOTE — PATIENT INSTRUCTIONS
"Doxycycline twice a day for 7 days   Guaifenesin tablets as directed  Tylenol as needed for pain or fever  Follow up with your PCP     If symptoms worsen, proceed to the ER    Patient Education     Upper respiratory infection in adults - Discharge instructions   The Basics   Written by the doctors and editors at [facility]   What are discharge instructions? -- Discharge instructions are information about how to take care of yourself after getting medical care for a health problem.  What is an upper respiratory infection? -- An upper respiratory infection (\"URI\") is an illness that can affect your nose, throat, ears, and sinuses. Almost all URIs are caused by a virus. The common cold is an example of a viral URI. Some URIs are caused by bacteria, but this is much less common.  URIs spread easily from person to person, most often through coughing or sneezing. A URI will almost always get better in a week or 2 without any treatment. Because most URIs are caused by viruses, antibiotics do not usually help.  If you do have a bacterial infection, your doctor might prescribe antibiotics.  How do I care for myself at home? -- Ask the doctor or nurse what you should do when you go home. Make sure that you understand exactly what you need to do to care for yourself. Ask questions if there is anything you do not understand.  You should also:   Wash your hands often (figure 1), and cough or sneeze into a tissue. If you do not have a tissue, cough or sneeze into your elbow instead of your hands.   Drink lots of fluids (water, juice, or broth) to stay hydrated, unless your doctor told you otherwise. This will help replace any fluids lost through runny nose or fever. Warm tea or soup can also help soothe a sore throat.   To help a stuffy nose and make it easier to breathe:   Use saline nose drops or spray.   Use a humidifier if the air in your home feels dry.   Follow the directions on the label carefully if you take " over-the-counter cough or cold medicines. Do not take more than 1 medicine that contains acetaminophen. Also, if you have a heart problem or high blood pressure, check with your doctor before you take any of these medicines.   Try to quit smoking if you smoke. Your doctor or nurse can help.  How can I prevent getting another URI? -- The best way to prevent a URI, or keep it from spreading to others, is to keep your hands clean. Wash your hands often with soap and water or alcohol gel rubs.  Some other ways to prevent the spread of infection include:   Always wash your hands with soap and water after you cough, sneeze, or blow your nose.   Clean surfaces and objects that you touch a lot. These include sinks, counters, tables, door handles, remotes, and phones. Use a bleach and water mixture. The germs that cause a URI can live on surfaces for at least 2 hours.   Do not share cups, food, towels, bed linens, or other personal items.   Stay away from other people when you are sick. When you do need to be around other people, consider wearing a face mask.  When should I call the doctor? -- Call for advice if:   You have a persistent fever of 100.4°F (38°C) or higher, chills, a very bad sore throat, or ear or sinus pain.   You get a new fever after several days of feeling the same or getting better.   You start having chest pain when you cough.   You have a cough that lasts more than 10 days.   You cough up blood.   You have any new or worsening symptoms, such as worsening cough or trouble breathing.  All topics are updated as new evidence becomes available and our peer review process is complete.  This topic retrieved from [employer] on: Mar 13, 2024.  Topic 884205 Version 1.0  Release: 32.2.4 - C32.71  © 2024 UpToDate, Inc. and/or its affiliates. All rights reserved.  figure 1: How to wash your hands     Wet your hands with clean water, and apply a small amount of soap. Lather and rub hands together for at least 20  seconds. Clean your wrists, palms, backs of your hands, between your fingers, tips of your fingers, thumbs, and under and around your nails. Rinse well, and dry your hands using a clean towel.  Graphic 617294 Version 7.0  Consumer Information Use and Disclaimer   Disclaimer: This generalized information is a limited summary of diagnosis, treatment, and/or medication information. It is not meant to be comprehensive and should be used as a tool to help the user understand and/or assess potential diagnostic and treatment options. It does NOT include all information about conditions, treatments, medications, side effects, or risks that may apply to a specific patient. It is not intended to be medical advice or a substitute for the medical advice, diagnosis, or treatment of a health care provider based on the health care provider's examination and assessment of a patient's specific and unique circumstances. Patients must speak with a health care provider for complete information about their health, medical questions, and treatment options, including any risks or benefits regarding use of medications. This information does not endorse any treatments or medications as safe, effective, or approved for treating a specific patient. UpToDate, Inc. and its affiliates disclaim any warranty or liability relating to this information or the use thereof.The use of this information is governed by the Terms of Use, available at https://www.woltersUbalouwer.com/en/know/clinical-effectiveness-terms. 2024© UpToDate, Inc. and its affiliates and/or licensors. All rights reserved.  Copyright   © 2024 UpToDate, Inc. and/or its affiliates. All rights reserved.

## 2024-11-02 PROBLEM — J01.10 ACUTE NON-RECURRENT FRONTAL SINUSITIS: Status: RESOLVED | Noted: 2023-11-13 | Resolved: 2024-11-02

## 2024-11-12 DIAGNOSIS — I47.19 ATRIAL TACHYCARDIA (HCC): ICD-10-CM

## 2024-11-12 DIAGNOSIS — I50.43 ACUTE ON CHRONIC COMBINED SYSTOLIC (CONGESTIVE) AND DIASTOLIC (CONGESTIVE) HEART FAILURE (HCC): ICD-10-CM

## 2024-11-13 RX ORDER — METOPROLOL SUCCINATE 25 MG/1
25 TABLET, EXTENDED RELEASE ORAL 2 TIMES DAILY
Qty: 180 TABLET | Refills: 0 | Status: SHIPPED | OUTPATIENT
Start: 2024-11-13

## 2024-11-22 ENCOUNTER — PATIENT MESSAGE (OUTPATIENT)
Dept: FAMILY MEDICINE CLINIC | Facility: CLINIC | Age: 74
End: 2024-11-22

## 2024-12-07 ENCOUNTER — VBI (OUTPATIENT)
Dept: ADMINISTRATIVE | Facility: OTHER | Age: 74
End: 2024-12-07

## 2024-12-07 NOTE — TELEPHONE ENCOUNTER
12/07/24 9:34 AM     Chart reviewed for CRC: Colonoscopy was/were not submitted to the patient's insurance.     Lisa Segundo MA   [facility]

## 2024-12-27 ENCOUNTER — RESULTS FOLLOW-UP (OUTPATIENT)
Dept: CARDIOLOGY CLINIC | Facility: CLINIC | Age: 74
End: 2024-12-27

## 2024-12-27 ENCOUNTER — REMOTE DEVICE CLINIC VISIT (OUTPATIENT)
Dept: CARDIOLOGY CLINIC | Facility: CLINIC | Age: 74
End: 2024-12-27
Payer: COMMERCIAL

## 2024-12-27 DIAGNOSIS — I44.30 AVB (ATRIOVENTRICULAR BLOCK): ICD-10-CM

## 2024-12-27 DIAGNOSIS — I48.91 ATRIAL FIBRILLATION, UNSPECIFIED TYPE (HCC): Primary | ICD-10-CM

## 2024-12-27 PROCEDURE — 93294 REM INTERROG EVL PM/LDLS PM: CPT | Performed by: INTERNAL MEDICINE

## 2024-12-27 PROCEDURE — 93296 REM INTERROG EVL PM/IDS: CPT | Performed by: INTERNAL MEDICINE

## 2024-12-27 NOTE — PROGRESS NOTES
Results for orders placed or performed in visit on 12/27/24   Cardiac EP device report    Narrative    MDT DUAL CHAMBER PPM (MVP ON) - ACTIVE SYSTEM IS MRI CONDITIONAL  CARELINK TRANSMISSION: BATTERY VOLTAGE ADEQUATE (11.5 YRS). AP: 24.7%. : 36.2% (MVP-ON). ALL AVAILABLE LEAD PARAMETERS WITHIN NORMAL LIMITS. 4 VT EPISODES W/ EGMS SHOWING NSVT 7 BEATS @ 169 BPM, 9 BEATS @ 189 BPM, 10 BEATS @ 194 BPM, 10 BEATS @ 176 BPM. PT TAKES ENTRESTO, METOPROLOL SUCC. EF: 60% (ECHO 11/1/23). NORMAL DEVICE FUNCTION. CH

## 2025-02-05 ENCOUNTER — RA CDI HCC (OUTPATIENT)
Dept: OTHER | Facility: HOSPITAL | Age: 75
End: 2025-02-05

## 2025-02-07 DIAGNOSIS — I50.43 ACUTE ON CHRONIC COMBINED SYSTOLIC (CONGESTIVE) AND DIASTOLIC (CONGESTIVE) HEART FAILURE (HCC): ICD-10-CM

## 2025-02-07 RX ORDER — SACUBITRIL AND VALSARTAN 24; 26 MG/1; MG/1
1 TABLET, FILM COATED ORAL 2 TIMES DAILY
Qty: 60 TABLET | Refills: 5 | Status: SHIPPED | OUTPATIENT
Start: 2025-02-07

## 2025-02-10 ENCOUNTER — APPOINTMENT (OUTPATIENT)
Dept: RADIOLOGY | Facility: CLINIC | Age: 75
End: 2025-02-10
Payer: COMMERCIAL

## 2025-02-10 ENCOUNTER — OFFICE VISIT (OUTPATIENT)
Dept: URGENT CARE | Facility: CLINIC | Age: 75
End: 2025-02-10
Payer: COMMERCIAL

## 2025-02-10 VITALS
TEMPERATURE: 98.7 F | DIASTOLIC BLOOD PRESSURE: 56 MMHG | HEART RATE: 87 BPM | RESPIRATION RATE: 16 BRPM | OXYGEN SATURATION: 98 % | SYSTOLIC BLOOD PRESSURE: 116 MMHG

## 2025-02-10 DIAGNOSIS — I47.19 ATRIAL TACHYCARDIA (HCC): ICD-10-CM

## 2025-02-10 DIAGNOSIS — I50.43 ACUTE ON CHRONIC COMBINED SYSTOLIC (CONGESTIVE) AND DIASTOLIC (CONGESTIVE) HEART FAILURE (HCC): ICD-10-CM

## 2025-02-10 DIAGNOSIS — M79.671 RIGHT FOOT PAIN: ICD-10-CM

## 2025-02-10 DIAGNOSIS — M79.671 RIGHT FOOT PAIN: Primary | ICD-10-CM

## 2025-02-10 PROCEDURE — 99213 OFFICE O/P EST LOW 20 MIN: CPT

## 2025-02-10 PROCEDURE — 73630 X-RAY EXAM OF FOOT: CPT

## 2025-02-10 PROCEDURE — S9088 SERVICES PROVIDED IN URGENT: HCPCS

## 2025-02-10 RX ORDER — PREDNISONE 20 MG/1
40 TABLET ORAL DAILY
Qty: 10 TABLET | Refills: 0 | Status: SHIPPED | OUTPATIENT
Start: 2025-02-10 | End: 2025-02-15

## 2025-02-10 NOTE — PROGRESS NOTES
[facility]        NAME: Toshia Weaver is a 74 y.o. female  : 1950    MRN: [id number]  DATE: February 10, 2025  TIME: 10:47 AM    Assessment and Plan   Right foot pain [M79.671]  1. Right foot pain  XR foot 3+ vw right    predniSONE 20 mg tablet        Preliminary reading of right foot x-rays without acute osseous abnormality.    Patient Instructions   The final xray result will appear in your mychart;   Take prednisone as directed  Ice as needed.  PCP follow-up in 3-5 days  Proceed to the ER if symptoms worsen.      If tests have been performed at [facility], our office will contact you with results if changes need to be made to the care plan discussed with you at the visit.  You can review your full results on [facility] MyChart.    Chief Complaint     Chief Complaint   Patient presents with    Foot Pain     Pt reports pain in her right foot, no injury noted however she was going up and down on a step stool doing work. She stated that pain has gotten progressively worse and now she cannot put any weight on it,          History of Present Illness       75 y/o F presents for atraumatic right foot pain x 4 days.  Patient mitts she was using a stepstool around the house which she does not normally do.  Denies feeling any pain or abnormal feelings while doing so.  Is unaware of what time of day she developed pain.  Has been applying an Ace wrap, icing, elevating.  Foot is swollen, erythematous and warm to palpation.  Gait is abnormal secondary to pain.        Review of Systems   Review of Systems   Constitutional:  Negative for chills and fever.   Musculoskeletal:  Positive for gait problem and joint swelling.   Skin:  Positive for color change.         Current Medications       Current Outpatient Medications:     predniSONE 20 mg tablet, Take 2 tablets (40 mg total) by mouth daily for 5 days, Disp: 10 tablet, Rfl: 0    atorvastatin (LIPITOR) 20 mg tablet, Take 1 tablet (20 mg total) by mouth daily  with dinner, Disp: 90 tablet, Rfl: 3    Entresto 24-26 MG TABS, TAKE 1 TABLET BY MOUTH TWICE A DAY, Disp: 60 tablet, Rfl: 5    guaiFENesin 200 MG tablet, Take 2 tablets (400 mg total) by mouth every 4 (four) hours as needed for cough, Disp: 30 suppository, Rfl: 0    levothyroxine 100 mcg tablet, Take 1 tablet (100 mcg total) by mouth daily, Disp: 90 tablet, Rfl: 1    metoprolol succinate (TOPROL-XL) 25 mg 24 hr tablet, TAKE 1 TABLET BY MOUTH TWICE A DAY, Disp: 180 tablet, Rfl: 0    spironolactone (ALDACTONE) 25 mg tablet, Take 1 tablet (25 mg total) by mouth daily, Disp: 90 tablet, Rfl: 5    torsemide (DEMADEX) 20 mg tablet, Take 1 tablet (20 mg total) by mouth daily, Disp: 90 tablet, Rfl: 3    Current Allergies     Allergies as of 02/10/2025 - Reviewed 02/10/2025   Allergen Reaction Noted    Penicillins Rash 07/26/2017            The following portions of the patient's history were reviewed and updated as appropriate: allergies, current medications, past family history, past medical history, past social history, past surgical history and problem list.     Past Medical History:   Diagnosis Date    Abnormal CXR 1/14/2022    Cancer (HCC)     Disease of thyroid gland     Dysuria 12/16/2021    Encounter for support and coordination of transition of care 5/6/2021       Past Surgical History:   Procedure Laterality Date    CARDIAC CATHETERIZATION Left 8/1/2023    Procedure: Cardiac Left Heart Cath;  Surgeon: Kay Steven DO;  [facility];  Service: Cardiology    CARDIAC CATHETERIZATION  8/1/2023    Procedure: Cardiac catheterization;  Surgeon: Kay Steven DO;  [facility];  Service: Cardiology    CARDIAC CATHETERIZATION N/A 8/1/2023    Procedure: Cardiac Coronary Angiogram;  Surgeon: Kay Steven DO;  [facility];  Service: Cardiology    CARDIAC ELECTROPHYSIOLOGY PROCEDURE N/A 12/30/2022    Procedure: Cardiac eps/aflutter ablation;  Surgeon: Drew  MD Kushal;  [facility];  Service: Cardiology    CARDIAC ELECTROPHYSIOLOGY PROCEDURE N/A 12/30/2022    Procedure: Cardiac loop recorder implant;  Surgeon: Drew Fatima MD;  [facility];  Service: Cardiology    CARDIAC ELECTROPHYSIOLOGY PROCEDURE N/A 2/6/2023    Procedure: CARDIAC EPS/SVT ABLATION;  Surgeon: Drew Fatima MD;  [facility];  Service: Cardiology    CARDIAC ELECTROPHYSIOLOGY PROCEDURE N/A 2/16/2023    Procedure: Cardiac pacer implant;  Surgeon: Dustin Lopez MD;  [facility];  Service: Cardiology    CARDIAC ELECTROPHYSIOLOGY PROCEDURE N/A 2/16/2023    Procedure: CARDIAC LOOP RECORDER EXPLANT;  Surgeon: Dustin Lopez MD;  [facility];  Service: Cardiology    HYSTERECTOMY         Family History   Problem Relation Age of Onset    No Known Problems Mother     No Known Problems Father     Cancer Maternal Aunt          Medications have been verified.        Objective   /56   Pulse 87   Temp 98.7 °F (37.1 °C)   Resp 16   SpO2 98%   No LMP recorded. Patient has had a hysterectomy.       Physical Exam     Physical Exam  Vitals and nursing note reviewed.   Constitutional:       General: She is not in acute distress.     Appearance: She is not toxic-appearing.   HENT:      Head: Normocephalic and atraumatic.   Eyes:      Conjunctiva/sclera: Conjunctivae normal.   Pulmonary:      Effort: Pulmonary effort is normal.   Musculoskeletal:         General: Swelling and tenderness present.      Comments: NVI  Right foot, diffuse erythema with warmth and tender to palpation.  Range of motion decreased in all directions secondary to pain  Unable to assess gait, sitting in wheelchair comfortably in no acute distress   Skin:     Findings: Erythema present. No bruising.   Neurological:      Mental Status: She is alert.   Psychiatric:         Mood and Affect: Mood normal.         Behavior: Behavior normal.

## 2025-02-11 RX ORDER — METOPROLOL SUCCINATE 25 MG/1
25 TABLET, EXTENDED RELEASE ORAL 2 TIMES DAILY
Qty: 180 TABLET | Refills: 1 | Status: SHIPPED | OUTPATIENT
Start: 2025-02-11

## 2025-02-21 ENCOUNTER — APPOINTMENT (OUTPATIENT)
Dept: LAB | Facility: CLINIC | Age: 75
End: 2025-02-21
Payer: COMMERCIAL

## 2025-02-21 ENCOUNTER — OFFICE VISIT (OUTPATIENT)
Dept: FAMILY MEDICINE CLINIC | Facility: CLINIC | Age: 75
End: 2025-02-21
Payer: COMMERCIAL

## 2025-02-21 VITALS
OXYGEN SATURATION: 98 % | RESPIRATION RATE: 16 BRPM | HEIGHT: 62 IN | SYSTOLIC BLOOD PRESSURE: 128 MMHG | BODY MASS INDEX: 23 KG/M2 | DIASTOLIC BLOOD PRESSURE: 72 MMHG | HEART RATE: 84 BPM | WEIGHT: 125 LBS

## 2025-02-21 DIAGNOSIS — I48.92 PAROXYSMAL ATRIAL FLUTTER (HCC): ICD-10-CM

## 2025-02-21 DIAGNOSIS — E03.9 HYPOTHYROIDISM, UNSPECIFIED TYPE: ICD-10-CM

## 2025-02-21 DIAGNOSIS — M79.89 PAIN AND SWELLING OF TOE OF RIGHT FOOT: ICD-10-CM

## 2025-02-21 DIAGNOSIS — R73.03 PREDIABETES: ICD-10-CM

## 2025-02-21 DIAGNOSIS — I50.33 ACUTE ON CHRONIC DIASTOLIC HEART FAILURE (HCC): ICD-10-CM

## 2025-02-21 DIAGNOSIS — Z79.4 TYPE 2 DIABETES MELLITUS WITH STAGE 3A CHRONIC KIDNEY DISEASE, WITH LONG-TERM CURRENT USE OF INSULIN (HCC): ICD-10-CM

## 2025-02-21 DIAGNOSIS — M79.674 PAIN AND SWELLING OF TOE OF RIGHT FOOT: ICD-10-CM

## 2025-02-21 DIAGNOSIS — J84.9 ILD (INTERSTITIAL LUNG DISEASE) (HCC): ICD-10-CM

## 2025-02-21 DIAGNOSIS — I50.33 ACUTE ON CHRONIC DIASTOLIC (CONGESTIVE) HEART FAILURE (HCC): ICD-10-CM

## 2025-02-21 DIAGNOSIS — Z12.11 COLON CANCER SCREENING: ICD-10-CM

## 2025-02-21 DIAGNOSIS — E11.22 TYPE 2 DIABETES MELLITUS WITH STAGE 3A CHRONIC KIDNEY DISEASE, WITH LONG-TERM CURRENT USE OF INSULIN (HCC): ICD-10-CM

## 2025-02-21 DIAGNOSIS — N18.31 TYPE 2 DIABETES MELLITUS WITH STAGE 3A CHRONIC KIDNEY DISEASE, WITH LONG-TERM CURRENT USE OF INSULIN (HCC): ICD-10-CM

## 2025-02-21 DIAGNOSIS — E78.5 DYSLIPIDEMIA: Primary | ICD-10-CM

## 2025-02-21 DIAGNOSIS — I51.81 TAKOTSUBO CARDIOMYOPATHY: ICD-10-CM

## 2025-02-21 DIAGNOSIS — D46.9 MYELODYSPLASIA (MYELODYSPLASTIC SYNDROME) (HCC): ICD-10-CM

## 2025-02-21 DIAGNOSIS — E78.5 DYSLIPIDEMIA: ICD-10-CM

## 2025-02-21 PROBLEM — R73.9 ELEVATED BLOOD SUGAR: Status: RESOLVED | Noted: 2022-05-10 | Resolved: 2025-02-21

## 2025-02-21 PROBLEM — R91.8 LUNG INFILTRATE: Status: RESOLVED | Noted: 2023-07-26 | Resolved: 2025-02-21

## 2025-02-21 LAB
ALBUMIN SERPL BCG-MCNC: 3.7 G/DL (ref 3.5–5)
ALP SERPL-CCNC: 70 U/L (ref 34–104)
ALT SERPL W P-5'-P-CCNC: 11 U/L (ref 7–52)
ANION GAP SERPL CALCULATED.3IONS-SCNC: 11 MMOL/L (ref 4–13)
AST SERPL W P-5'-P-CCNC: 14 U/L (ref 13–39)
BASOPHILS # BLD AUTO: 0.01 THOUSANDS/ΜL (ref 0–0.1)
BASOPHILS NFR BLD AUTO: 0 % (ref 0–1)
BILIRUB SERPL-MCNC: 0.72 MG/DL (ref 0.2–1)
BUN SERPL-MCNC: 34 MG/DL (ref 5–25)
CALCIUM SERPL-MCNC: 9.6 MG/DL (ref 8.4–10.2)
CHLORIDE SERPL-SCNC: 98 MMOL/L (ref 96–108)
CHOLEST SERPL-MCNC: 131 MG/DL (ref ?–200)
CO2 SERPL-SCNC: 31 MMOL/L (ref 21–32)
CREAT SERPL-MCNC: 1.63 MG/DL (ref 0.6–1.3)
CREAT UR-MCNC: 21.8 MG/DL
EOSINOPHIL # BLD AUTO: 0.13 THOUSAND/ΜL (ref 0–0.61)
EOSINOPHIL NFR BLD AUTO: 2 % (ref 0–6)
ERYTHROCYTE [DISTWIDTH] IN BLOOD BY AUTOMATED COUNT: 13.9 % (ref 11.6–15.1)
EST. AVERAGE GLUCOSE BLD GHB EST-MCNC: 140 MG/DL
GFR SERPL CREATININE-BSD FRML MDRD: 30 ML/MIN/1.73SQ M
GLUCOSE P FAST SERPL-MCNC: 102 MG/DL (ref 65–99)
HBA1C MFR BLD: 6.5 %
HCT VFR BLD AUTO: 39.9 % (ref 34.8–46.1)
HDLC SERPL-MCNC: 56 MG/DL
HGB BLD-MCNC: 12.5 G/DL (ref 11.5–15.4)
IMM GRANULOCYTES # BLD AUTO: 0.02 THOUSAND/UL (ref 0–0.2)
IMM GRANULOCYTES NFR BLD AUTO: 0 % (ref 0–2)
LDLC SERPL CALC-MCNC: 64 MG/DL (ref 0–100)
LYMPHOCYTES # BLD AUTO: 1.84 THOUSANDS/ΜL (ref 0.6–4.47)
LYMPHOCYTES NFR BLD AUTO: 24 % (ref 14–44)
MCH RBC QN AUTO: 29.2 PG (ref 26.8–34.3)
MCHC RBC AUTO-ENTMCNC: 31.3 G/DL (ref 31.4–37.4)
MCV RBC AUTO: 93 FL (ref 82–98)
MICROALBUMIN UR-MCNC: 8.5 MG/L
MICROALBUMIN/CREAT 24H UR: 39 MG/G CREATININE (ref 0–30)
MONOCYTES # BLD AUTO: 0.63 THOUSAND/ΜL (ref 0.17–1.22)
MONOCYTES NFR BLD AUTO: 8 % (ref 4–12)
NEUTROPHILS # BLD AUTO: 5.1 THOUSANDS/ΜL (ref 1.85–7.62)
NEUTS SEG NFR BLD AUTO: 66 % (ref 43–75)
NONHDLC SERPL-MCNC: 75 MG/DL
NRBC BLD AUTO-RTO: 0 /100 WBCS
PLATELET # BLD AUTO: 149 THOUSANDS/UL (ref 149–390)
PMV BLD AUTO: 11.6 FL (ref 8.9–12.7)
POTASSIUM SERPL-SCNC: 4.2 MMOL/L (ref 3.5–5.3)
PROT SERPL-MCNC: 6.9 G/DL (ref 6.4–8.4)
RBC # BLD AUTO: 4.28 MILLION/UL (ref 3.81–5.12)
SODIUM SERPL-SCNC: 140 MMOL/L (ref 135–147)
TRIGL SERPL-MCNC: 54 MG/DL (ref ?–150)
TSH SERPL DL<=0.05 MIU/L-ACNC: 2.32 UIU/ML (ref 0.45–4.5)
URATE SERPL-MCNC: 10.4 MG/DL (ref 2–7.5)
WBC # BLD AUTO: 7.73 THOUSAND/UL (ref 4.31–10.16)

## 2025-02-21 PROCEDURE — 80053 COMPREHEN METABOLIC PANEL: CPT

## 2025-02-21 PROCEDURE — 85025 COMPLETE CBC W/AUTO DIFF WBC: CPT

## 2025-02-21 PROCEDURE — 82570 ASSAY OF URINE CREATININE: CPT

## 2025-02-21 PROCEDURE — 82043 UR ALBUMIN QUANTITATIVE: CPT

## 2025-02-21 PROCEDURE — 84443 ASSAY THYROID STIM HORMONE: CPT

## 2025-02-21 PROCEDURE — 84550 ASSAY OF BLOOD/URIC ACID: CPT

## 2025-02-21 PROCEDURE — 80061 LIPID PANEL: CPT

## 2025-02-21 PROCEDURE — 99215 OFFICE O/P EST HI 40 MIN: CPT | Performed by: FAMILY MEDICINE

## 2025-02-21 PROCEDURE — 83036 HEMOGLOBIN GLYCOSYLATED A1C: CPT

## 2025-02-21 PROCEDURE — 36415 COLL VENOUS BLD VENIPUNCTURE: CPT

## 2025-02-21 PROCEDURE — G2211 COMPLEX E/M VISIT ADD ON: HCPCS | Performed by: FAMILY MEDICINE

## 2025-02-21 NOTE — ASSESSMENT & PLAN NOTE
Wt Readings from Last 3 Encounters:   02/21/25 56.7 kg (125 lb)   10/25/24 54.4 kg (120 lb)   10/03/24 54.4 kg (120 lb)

## 2025-02-21 NOTE — ASSESSMENT & PLAN NOTE
No shortness of breath.  No chest pain.  Remains on Entresto.  Followed by cardiology.  Also remains on torsemide and spironolactone

## 2025-02-21 NOTE — ASSESSMENT & PLAN NOTE
Remains on levothyroxine 100 mcg once daily which she has been on for years.  Continue same.  Check thyroid function testing

## 2025-02-21 NOTE — ASSESSMENT & PLAN NOTE
Somebody put a diagnosis of diabetes for patient.  She is not diabetic.  At 74 years of age her goal hemoglobin A1c is less than 7%.  Last hemoglobin A1c was at 6.7%.    Check repeat hemoglobin A1c.  Watch dietary intake of carbohydrates.    Not a diabetic

## 2025-02-21 NOTE — PROGRESS NOTES
Subjective:      Patient ID: Toshia Weaver is a 74 y.o. female.    74-year-old female with history of cardiomyopathy, congestive heart failure, hypothyroidism, interstitial lung disease presents as new patient to me as her prior physician left the network.  Generally has been feeling well.  She does follow-up with cardiologist closely.  She does remain on loop diuretics, levothyroxine, Entresto.  No chest pain, shortness of breath.  She does complain of pain and swelling of her right foot which occurred without any incident or trauma.  She was seen at urgent care and had x-ray performed which was normal.  She was placed on course of steroids that did improve the redness and swelling in her foot.  Right fourth toe is still slightly red and swollen.        Past Medical History:   Diagnosis Date   • Abnormal CXR 1/14/2022   • Cancer (HCC)    • Disease of thyroid gland    • Dysuria 12/16/2021   • Encounter for support and coordination of transition of care 5/6/2021       Family History   Problem Relation Age of Onset   • No Known Problems Mother    • No Known Problems Father    • Cancer Maternal Aunt        Past Surgical History:   Procedure Laterality Date   • CARDIAC CATHETERIZATION Left 8/1/2023    Procedure: Cardiac Left Heart Cath;  Surgeon: Kay Steven DO;  [facility];  Service: Cardiology   • CARDIAC CATHETERIZATION  8/1/2023    Procedure: Cardiac catheterization;  Surgeon: Kay Steven DO;  [facility];  Service: Cardiology   • CARDIAC CATHETERIZATION N/A 8/1/2023    Procedure: Cardiac Coronary Angiogram;  Surgeon: Kay Steven DO;  [facility];  Service: Cardiology   • CARDIAC ELECTROPHYSIOLOGY PROCEDURE N/A 12/30/2022    Procedure: Cardiac eps/aflutter ablation;  Surgeon: Drew Fatima MD;  [facility];  Service: Cardiology   • CARDIAC ELECTROPHYSIOLOGY PROCEDURE N/A 12/30/2022    Procedure: Cardiac loop  recorder implant;  Surgeon: Drew Fatima MD;  [facility];  Service: Cardiology   • CARDIAC ELECTROPHYSIOLOGY PROCEDURE N/A 2/6/2023    Procedure: CARDIAC EPS/SVT ABLATION;  Surgeon: Drew Fatima MD;  [facility];  Service: Cardiology   • CARDIAC ELECTROPHYSIOLOGY PROCEDURE N/A 2/16/2023    Procedure: Cardiac pacer implant;  Surgeon: Dustin Lopez MD;  [facility];  Service: Cardiology   • CARDIAC ELECTROPHYSIOLOGY PROCEDURE N/A 2/16/2023    Procedure: CARDIAC LOOP RECORDER EXPLANT;  Surgeon: Dustin Lopez MD;  [facility];  Service: Cardiology   • HYSTERECTOMY          reports that she quit smoking about 27 years ago. Her smoking use included cigarettes. She started smoking about 52 years ago. She has a 20 pack-year smoking history. She quit smokeless tobacco use about 24 years ago. She reports that she does not drink alcohol and does not use drugs.      Current Outpatient Medications:   •  atorvastatin (LIPITOR) 20 mg tablet, Take 1 tablet (20 mg total) by mouth daily with dinner, Disp: 90 tablet, Rfl: 3  •  Entresto 24-26 MG TABS, TAKE 1 TABLET BY MOUTH TWICE A DAY, Disp: 60 tablet, Rfl: 5  •  levothyroxine 100 mcg tablet, Take 1 tablet (100 mcg total) by mouth daily, Disp: 90 tablet, Rfl: 1  •  metoprolol succinate (TOPROL-XL) 25 mg 24 hr tablet, TAKE 1 TABLET BY MOUTH TWICE A DAY, Disp: 180 tablet, Rfl: 1  •  spironolactone (ALDACTONE) 25 mg tablet, Take 1 tablet (25 mg total) by mouth daily, Disp: 90 tablet, Rfl: 5  •  torsemide (DEMADEX) 20 mg tablet, Take 1 tablet (20 mg total) by mouth daily, Disp: 90 tablet, Rfl: 3    The following portions of the patient's history were reviewed and updated as appropriate: allergies, current medications, past family history, past medical history, past social history, past surgical history and problem list.    Review of Systems   Constitutional: Negative.    HENT:  Positive for hearing loss.   "  Eyes: Negative.    Respiratory: Negative.  Negative for shortness of breath.    Cardiovascular: Negative.  Negative for chest pain and leg swelling.   Gastrointestinal: Negative.    Endocrine: Negative.    Genitourinary: Negative.    Musculoskeletal:  Positive for arthralgias and joint swelling.   Skin: Negative.    Allergic/Immunologic: Negative.    Neurological: Negative.    Hematological: Negative.    Psychiatric/Behavioral: Negative.             Objective:    /72   Pulse 84   Resp 16   Ht 5' 2\" (1.575 m)   Wt 56.7 kg (125 lb)   SpO2 98%   BMI 22.86 kg/m²      Physical Exam  Vitals and nursing note reviewed.   Constitutional:       General: She is not in acute distress.     Appearance: Normal appearance. She is well-developed. She is not diaphoretic.   HENT:      Head: Normocephalic and atraumatic.      Right Ear: External ear normal.      Left Ear: External ear normal.      Nose: Nose normal.      Mouth/Throat:      Mouth: Mucous membranes are moist.      Pharynx: Oropharynx is clear.   Eyes:      Extraocular Movements: Extraocular movements intact.      Conjunctiva/sclera: Conjunctivae normal.      Pupils: Pupils are equal, round, and reactive to light.   Cardiovascular:      Rate and Rhythm: Normal rate and regular rhythm.      Heart sounds: Normal heart sounds. No murmur heard.  Pulmonary:      Effort: Pulmonary effort is normal.      Breath sounds: Normal breath sounds.   Abdominal:      General: Bowel sounds are normal.      Palpations: Abdomen is soft.   Musculoskeletal:         General: Swelling (Right fourth toe erythematous, swollen and slightly tender) and tenderness present. Normal range of motion.      Cervical back: Normal range of motion and neck supple.   Skin:     General: Skin is warm and dry.      Findings: No rash.   Neurological:      Mental Status: She is alert and oriented to person, place, and time.      Deep Tendon Reflexes: Reflexes are normal and symmetric.   Psychiatric:  "        Mood and Affect: Mood normal.         Behavior: Behavior normal.         Thought Content: Thought content normal.         Judgment: Judgment normal.           No results found for this or any previous visit (from the past 6 weeks).    Assessment/Plan:    Takotsubo cardiomyopathy  No shortness of breath.  No chest pain.  Remains on Entresto.  Followed by cardiology.  Also remains on torsemide and spironolactone    Paroxysmal atrial flutter (HCC)  Patient is followed by cardiologist.  Remains on beta-blocker.  She has multiple cardiac diagnoses including atrial tachycardia, atrial flutter, AVNRT.  That list needs to be cleaned up at some point    Acute on chronic diastolic heart failure (HCC)  Wt Readings from Last 3 Encounters:   02/21/25 56.7 kg (125 lb)   10/25/24 54.4 kg (120 lb)   10/03/24 54.4 kg (120 lb)             ILD (interstitial lung disease) ([facility])  Has been followed by pulmonologist.  Quit smoking years ago.  No shortness of breath.  Oxygen saturation at 97%.    Hypothyroidism  Remains on levothyroxine 100 mcg once daily which she has been on for years.  Continue same.  Check thyroid function testing    Myelodysplasia (myelodysplastic syndrome) ([facility]ly [facility])  Patient has had follow-up with hematology.  Check CBC.    Pain and swelling of toe of right foot  Patient had x-ray at urgent care which is negative.  Placed on prednisone which made improvement in the swelling of her foot and toes.  Could be related to hyperuricemia.  Check uric acid level.  She is on loop diuretic as well as spironolactone    Prediabetes  Somebody put a diagnosis of diabetes for patient.  She is not diabetic.  At 74 years of age her goal hemoglobin A1c is less than 7%.  Last hemoglobin A1c was at 6.7%.    Check repeat hemoglobin A1c.  Watch dietary intake of carbohydrates.    Not a diabetic    Dyslipidemia  Patient remains on atorvastatin 20 mg once daily.  Goal LDL less than 70 given her history of heart disease.  Repeat lipid  panel ordered    Colon cancer screening  Discussed options for colon cancer screening.  Patient would opt for Cologuard testing.  Order placed          Problem List Items Addressed This Visit        Cardiovascular and Mediastinum    RESOLVED: Acute on chronic diastolic heart failure (HCC)    Wt Readings from Last 3 Encounters:   02/21/25 56.7 kg (125 lb)   10/25/24 54.4 kg (120 lb)   10/03/24 54.4 kg (120 lb)                  Paroxysmal atrial flutter (HCC)    Patient is followed by cardiologist.  Remains on beta-blocker.  She has multiple cardiac diagnoses including atrial tachycardia, atrial flutter, AVNRT.  That list needs to be cleaned up at some point         Takotsubo cardiomyopathy    No shortness of breath.  No chest pain.  Remains on Entresto.  Followed by cardiology.  Also remains on torsemide and spironolactone         Relevant Orders    CBC and differential       Respiratory    ILD (interstitial lung disease) (HCC)    Has been followed by pulmonologist.  Quit smoking years ago.  No shortness of breath.  Oxygen saturation at 97%.            Endocrine    Hypothyroidism    Remains on levothyroxine 100 mcg once daily which she has been on for years.  Continue same.  Check thyroid function testing         Relevant Orders    TSH, 3rd generation with Free T4 reflex       Oncology    Myelodysplasia (myelodysplastic syndrome) (HCC)    Patient has had follow-up with hematology.  Check CBC.            Surgery/Wound/Pain    Pain and swelling of toe of right foot    Patient had x-ray at urgent care which is negative.  Placed on prednisone which made improvement in the swelling of her foot and toes.  Could be related to hyperuricemia.  Check uric acid level.  She is on loop diuretic as well as spironolactone         Relevant Orders    Uric acid       Other    Colon cancer screening    Discussed options for colon cancer screening.  Patient would opt for Cologuard testing.  Order placed         Relevant Orders     Justa    Dyslipidemia - Primary    Patient remains on atorvastatin 20 mg once daily.  Goal LDL less than 70 given her history of heart disease.  Repeat lipid panel ordered         Relevant Orders    Comprehensive metabolic panel    Lipid panel    Prediabetes    Somebody put a diagnosis of diabetes for patient.  She is not diabetic.  At 74 years of age her goal hemoglobin A1c is less than 7%.  Last hemoglobin A1c was at 6.7%.    Check repeat hemoglobin A1c.  Watch dietary intake of carbohydrates.    Not a diabetic         Relevant Orders    Hemoglobin A1C   Other Visit Diagnoses       Acute on chronic diastolic (congestive) heart failure (HCC)          Type 2 diabetes mellitus with stage 3a chronic kidney disease, with long-term current use of insulin (HCC)

## 2025-02-21 NOTE — ASSESSMENT & PLAN NOTE
Discussed options for colon cancer screening.  Patient would opt for Cologuard testing.  Order placed

## 2025-02-21 NOTE — ASSESSMENT & PLAN NOTE
Patient is followed by cardiologist.  Remains on beta-blocker.  She has multiple cardiac diagnoses including atrial tachycardia, atrial flutter, AVNRT.  That list needs to be cleaned up at some point

## 2025-02-21 NOTE — ASSESSMENT & PLAN NOTE
Patient remains on atorvastatin 20 mg once daily.  Goal LDL less than 70 given her history of heart disease.  Repeat lipid panel ordered

## 2025-02-21 NOTE — ASSESSMENT & PLAN NOTE
Has been followed by pulmonologist.  Quit smoking years ago.  No shortness of breath.  Oxygen saturation at 97%.

## 2025-02-21 NOTE — ASSESSMENT & PLAN NOTE
Patient had x-ray at urgent care which is negative.  Placed on prednisone which made improvement in the swelling of her foot and toes.  Could be related to hyperuricemia.  Check uric acid level.  She is on loop diuretic as well as spironolactone

## 2025-02-24 ENCOUNTER — RESULTS FOLLOW-UP (OUTPATIENT)
Dept: FAMILY MEDICINE CLINIC | Facility: CLINIC | Age: 75
End: 2025-02-24

## 2025-02-24 DIAGNOSIS — N18.30 STAGE 3 CHRONIC KIDNEY DISEASE, UNSPECIFIED WHETHER STAGE 3A OR 3B CKD (HCC): Primary | ICD-10-CM

## 2025-02-24 DIAGNOSIS — E79.0 HYPERURICEMIA: ICD-10-CM

## 2025-02-24 DIAGNOSIS — N18.30 STAGE 3 CHRONIC KIDNEY DISEASE, UNSPECIFIED WHETHER STAGE 3A OR 3B CKD (HCC): ICD-10-CM

## 2025-02-24 RX ORDER — FEBUXOSTAT 40 MG/1
40 TABLET, FILM COATED ORAL DAILY
Qty: 30 TABLET | Refills: 1 | Status: SHIPPED | OUTPATIENT
Start: 2025-02-24 | End: 2025-02-26

## 2025-02-26 DIAGNOSIS — N18.30 STAGE 3 CHRONIC KIDNEY DISEASE, UNSPECIFIED WHETHER STAGE 3A OR 3B CKD (HCC): ICD-10-CM

## 2025-02-26 DIAGNOSIS — E79.0 HYPERURICEMIA: ICD-10-CM

## 2025-02-26 RX ORDER — FEBUXOSTAT 40 MG/1
40 TABLET, FILM COATED ORAL DAILY
Qty: 30 TABLET | Refills: 1 | Status: SHIPPED | OUTPATIENT
Start: 2025-02-26

## 2025-02-26 RX ORDER — FEBUXOSTAT 40 MG/1
40 TABLET, FILM COATED ORAL DAILY
Qty: 30 TABLET | Refills: 1 | Status: SHIPPED | OUTPATIENT
Start: 2025-02-26 | End: 2025-02-26 | Stop reason: SDUPTHER

## 2025-03-20 DIAGNOSIS — N18.30 STAGE 3 CHRONIC KIDNEY DISEASE, UNSPECIFIED WHETHER STAGE 3A OR 3B CKD (HCC): ICD-10-CM

## 2025-03-20 DIAGNOSIS — E79.0 HYPERURICEMIA: ICD-10-CM

## 2025-03-21 RX ORDER — FEBUXOSTAT 40 MG/1
40 TABLET, FILM COATED ORAL DAILY
Qty: 90 TABLET | Refills: 0 | Status: SHIPPED | OUTPATIENT
Start: 2025-03-21

## 2025-03-23 PROBLEM — Z12.11 COLON CANCER SCREENING: Status: RESOLVED | Noted: 2025-02-21 | Resolved: 2025-03-23

## 2025-03-28 ENCOUNTER — REMOTE DEVICE CLINIC VISIT (OUTPATIENT)
Dept: CARDIOLOGY CLINIC | Facility: CLINIC | Age: 75
End: 2025-03-28
Payer: COMMERCIAL

## 2025-03-28 DIAGNOSIS — Z95.0 PRESENCE OF CARDIAC PACEMAKER: Primary | ICD-10-CM

## 2025-03-28 PROCEDURE — 93294 REM INTERROG EVL PM/LDLS PM: CPT | Performed by: INTERNAL MEDICINE

## 2025-03-28 NOTE — PROGRESS NOTES
Results for orders placed or performed in visit on 03/28/25   Cardiac EP device report    Narrative    MDT DUAL CHAMBER PPM (MVP ON) - ACTIVE SYSTEM IS MRI CONDITIONAL  CARELINK TRANSMISSION: BATTERY VOLTAGE ADEQUATE (11.1 YR). AP 35.0%.  53.3%. (>40%/AAI-DDD 60 PPM, -180 ms). ALL LEAD PARAMETERS WITHIN NORMAL LIMITS. 2 EPISODES OF NSVT (13 @ 156, 7 @ 167). 39 PVC'S/H. PT TAKES METOPROLOL SUCCINATE. EF 60% (ECHO 11/1/23). NORMAL DEVICE FUNCTION. CJC/GV

## 2025-03-29 ENCOUNTER — RESULTS FOLLOW-UP (OUTPATIENT)
Dept: CARDIOLOGY CLINIC | Facility: CLINIC | Age: 75
End: 2025-03-29

## 2025-04-13 DIAGNOSIS — I42.0 DILATED CARDIOMYOPATHY (HCC): ICD-10-CM

## 2025-04-14 RX ORDER — SPIRONOLACTONE 25 MG/1
25 TABLET ORAL DAILY
Qty: 90 TABLET | Refills: 5 | Status: SHIPPED | OUTPATIENT
Start: 2025-04-14

## 2025-05-04 DIAGNOSIS — I25.10 CORONARY ARTERY DISEASE INVOLVING NATIVE CORONARY ARTERY: ICD-10-CM

## 2025-05-04 DIAGNOSIS — I50.33 ACUTE ON CHRONIC DIASTOLIC HEART FAILURE (HCC): ICD-10-CM

## 2025-05-05 RX ORDER — ATORVASTATIN CALCIUM 20 MG/1
20 TABLET, FILM COATED ORAL
Qty: 90 TABLET | Refills: 3 | Status: SHIPPED | OUTPATIENT
Start: 2025-05-05

## 2025-05-05 RX ORDER — TORSEMIDE 20 MG/1
20 TABLET ORAL DAILY
Qty: 90 TABLET | Refills: 3 | Status: SHIPPED | OUTPATIENT
Start: 2025-05-05

## 2025-05-19 ENCOUNTER — RA CDI HCC (OUTPATIENT)
Dept: RADIOLOGY | Facility: HOSPITAL | Age: 75
End: 2025-05-19

## 2025-05-22 ENCOUNTER — APPOINTMENT (OUTPATIENT)
Dept: LAB | Facility: CLINIC | Age: 75
End: 2025-05-22
Payer: COMMERCIAL

## 2025-05-22 ENCOUNTER — RA CDI HCC (OUTPATIENT)
Dept: OTHER | Facility: HOSPITAL | Age: 75
End: 2025-05-22

## 2025-05-22 DIAGNOSIS — N18.30 STAGE 3 CHRONIC KIDNEY DISEASE, UNSPECIFIED WHETHER STAGE 3A OR 3B CKD (HCC): ICD-10-CM

## 2025-05-22 DIAGNOSIS — E79.0 HYPERURICEMIA: ICD-10-CM

## 2025-05-22 LAB
ANION GAP SERPL CALCULATED.3IONS-SCNC: 10 MMOL/L (ref 4–13)
BUN SERPL-MCNC: 26 MG/DL (ref 5–25)
CALCIUM SERPL-MCNC: 9.5 MG/DL (ref 8.4–10.2)
CHLORIDE SERPL-SCNC: 102 MMOL/L (ref 96–108)
CO2 SERPL-SCNC: 32 MMOL/L (ref 21–32)
CREAT SERPL-MCNC: 1.17 MG/DL (ref 0.6–1.3)
GFR SERPL CREATININE-BSD FRML MDRD: 45 ML/MIN/1.73SQ M
GLUCOSE P FAST SERPL-MCNC: 97 MG/DL (ref 65–99)
POTASSIUM SERPL-SCNC: 4.3 MMOL/L (ref 3.5–5.3)
SODIUM SERPL-SCNC: 144 MMOL/L (ref 135–147)
URATE SERPL-MCNC: 10.3 MG/DL (ref 2–7.5)

## 2025-05-22 PROCEDURE — 36415 COLL VENOUS BLD VENIPUNCTURE: CPT

## 2025-05-22 PROCEDURE — 80048 BASIC METABOLIC PNL TOTAL CA: CPT

## 2025-05-22 PROCEDURE — 84550 ASSAY OF BLOOD/URIC ACID: CPT

## 2025-05-22 NOTE — PROGRESS NOTES
HCC coding opportunities    I27.20  GR     Chart Reviewed number of suggestions sent to Provider: 1     Patients Insurance     Medicare Insurance: [employer] Advantage

## 2025-05-23 ENCOUNTER — RESULTS FOLLOW-UP (OUTPATIENT)
Dept: FAMILY MEDICINE CLINIC | Facility: CLINIC | Age: 75
End: 2025-05-23

## 2025-05-29 ENCOUNTER — OFFICE VISIT (OUTPATIENT)
Dept: FAMILY MEDICINE CLINIC | Facility: CLINIC | Age: 75
End: 2025-05-29
Payer: COMMERCIAL

## 2025-05-29 VITALS
DIASTOLIC BLOOD PRESSURE: 62 MMHG | BODY MASS INDEX: 22.45 KG/M2 | HEIGHT: 62 IN | HEART RATE: 80 BPM | RESPIRATION RATE: 16 BRPM | WEIGHT: 122 LBS | SYSTOLIC BLOOD PRESSURE: 128 MMHG

## 2025-05-29 DIAGNOSIS — I47.19 AVNRT (AV NODAL RE-ENTRY TACHYCARDIA) (HCC): ICD-10-CM

## 2025-05-29 DIAGNOSIS — J30.2 SEASONAL ALLERGIC RHINITIS, UNSPECIFIED TRIGGER: ICD-10-CM

## 2025-05-29 DIAGNOSIS — E03.9 HYPOTHYROIDISM, UNSPECIFIED TYPE: ICD-10-CM

## 2025-05-29 DIAGNOSIS — I27.20 PULMONARY HYPERTENSION (HCC): ICD-10-CM

## 2025-05-29 DIAGNOSIS — I77.810 THORACIC AORTIC ECTASIA (HCC): ICD-10-CM

## 2025-05-29 DIAGNOSIS — E79.0 HYPERURICEMIA: ICD-10-CM

## 2025-05-29 DIAGNOSIS — I48.92 PAROXYSMAL ATRIAL FLUTTER (HCC): ICD-10-CM

## 2025-05-29 DIAGNOSIS — E78.5 DYSLIPIDEMIA: Primary | ICD-10-CM

## 2025-05-29 DIAGNOSIS — N18.31 STAGE 3A CHRONIC KIDNEY DISEASE (HCC): ICD-10-CM

## 2025-05-29 DIAGNOSIS — J84.9 ILD (INTERSTITIAL LUNG DISEASE) (HCC): ICD-10-CM

## 2025-05-29 DIAGNOSIS — R73.03 PREDIABETES: ICD-10-CM

## 2025-05-29 DIAGNOSIS — I47.19 PAROXYSMAL ATRIAL TACHYCARDIA (HCC): ICD-10-CM

## 2025-05-29 DIAGNOSIS — D46.9 MYELODYSPLASIA (MYELODYSPLASTIC SYNDROME) (HCC): ICD-10-CM

## 2025-05-29 PROBLEM — R35.0 URINARY FREQUENCY: Status: RESOLVED | Noted: 2024-10-03 | Resolved: 2025-05-29

## 2025-05-29 PROBLEM — R21 RASH: Status: RESOLVED | Noted: 2017-07-26 | Resolved: 2025-05-29

## 2025-05-29 PROCEDURE — 99214 OFFICE O/P EST MOD 30 MIN: CPT | Performed by: FAMILY MEDICINE

## 2025-05-29 PROCEDURE — G2211 COMPLEX E/M VISIT ADD ON: HCPCS | Performed by: FAMILY MEDICINE

## 2025-05-29 PROCEDURE — G0439 PPPS, SUBSEQ VISIT: HCPCS | Performed by: FAMILY MEDICINE

## 2025-05-29 RX ORDER — AZELASTINE 1 MG/ML
1 SPRAY, METERED NASAL 2 TIMES DAILY
Qty: 30 ML | Refills: 0 | Status: SHIPPED | OUTPATIENT
Start: 2025-05-29

## 2025-05-29 RX ORDER — MONTELUKAST SODIUM 10 MG/1
10 TABLET ORAL
Qty: 30 TABLET | Refills: 5 | Status: SHIPPED | OUTPATIENT
Start: 2025-05-29

## 2025-05-29 RX ORDER — LEVOTHYROXINE SODIUM 100 UG/1
100 TABLET ORAL DAILY
Qty: 90 TABLET | Refills: 1 | Status: SHIPPED | OUTPATIENT
Start: 2025-05-29

## 2025-05-29 NOTE — PATIENT INSTRUCTIONS
Medicare Preventive Visit Patient Instructions  Thank you for completing your Welcome to Medicare Visit or Medicare Annual Wellness Visit today. Your next wellness visit will be due in one year (5/30/2026).  The screening/preventive services that you may require over the next 5-10 years are detailed below. Some tests may not apply to you based off risk factors and/or age. Screening tests ordered at today's visit but not completed yet may show as past due. Also, please note that scanned in results may not display below.  Preventive Screenings:  Service Recommendations Previous Testing/Comments   Colorectal Cancer Screening  * Colonoscopy    * Fecal Occult Blood Test (FOBT)/Fecal Immunochemical Test (FIT)  * Fecal DNA/Cologuard Test  * Flexible Sigmoidoscopy Age: 45-75 years old   Colonoscopy: every 10 years (may be performed more frequently if at higher risk)  OR  FOBT/FIT: every 1 year  OR  Cologuard: every 3 years  OR  Sigmoidoscopy: every 5 years  Screening may be recommended earlier than age 45 if at higher risk for colorectal cancer. Also, an individualized decision between you and your healthcare provider will decide whether screening between the ages of 76-85 would be appropriate. Colonoscopy: Not on file  FOBT/FIT: Not on file  Cologuard: Not on file  Sigmoidoscopy: Not on file    Risks and Benefits Discussed  Due for Cologuard     Breast Cancer Screening Age: 40+ years old  Frequency: every 1-2 years  Not required if history of left and right mastectomy Mammogram: 10/07/2024    Screening Current   Cervical Cancer Screening Between the ages of 21-29, pap smear recommended once every 3 years.   Between the ages of 30-65, can perform pap smear with HPV co-testing every 5 years.   Recommendations may differ for women with a history of total hysterectomy, cervical cancer, or abnormal pap smears in past. Pap Smear: Not on file    Screening Not Indicated   Hepatitis C Screening Once for adults born between 1945 and  1965  More frequently in patients at high risk for Hepatitis C Hep C Antibody: 10/14/2022    Screening Current   Diabetes Screening 1-2 times per year if you're at risk for diabetes or have pre-diabetes Fasting glucose: 97 mg/dL (5/22/2025)  A1C: 6.5 % (2/21/2025)  Screening Not Indicated  History Diabetes   Cholesterol Screening Once every 5 years if you don't have a lipid disorder. May order more often based on risk factors. Lipid panel: 02/21/2025    Screening Current     Other Preventive Screenings Covered by Medicare:  Abdominal Aortic Aneurysm (AAA) Screening: covered once if your at risk. You're considered to be at risk if you have a family history of AAA.  Lung Cancer Screening: covers low dose CT scan once per year if you meet all of the following conditions: (1) Age 55-77; (2) No signs or symptoms of lung cancer; (3) Current smoker or have quit smoking within the last 15 years; (4) You have a tobacco smoking history of at least 20 pack years (packs per day multiplied by number of years you smoked); (5) You get a written order from a healthcare provider.  Glaucoma Screening: covered annually if you're considered high risk: (1) You have diabetes OR (2) Family history of glaucoma OR (3)  aged 50 and older OR (4)  American aged 65 and older  Osteoporosis Screening: covered every 2 years if you meet one of the following conditions: (1) You're estrogen deficient and at risk for osteoporosis based off medical history and other findings; (2) Have a vertebral abnormality; (3) On glucocorticoid therapy for more than 3 months; (4) Have primary hyperparathyroidism; (5) On osteoporosis medications and need to assess response to drug therapy.   Last bone density test (DXA Scan): 07/24/2023.  HIV Screening: covered annually if you're between the age of 15-65. Also covered annually if you are younger than 15 and older than 65 with risk factors for HIV infection. For pregnant patients, it is covered  up to 3 times per pregnancy.    Immunizations:  Immunization Recommendations   Influenza Vaccine Annual influenza vaccination during flu season is recommended for all persons aged >= 6 months who do not have contraindications   Pneumococcal Vaccine   * Pneumococcal conjugate vaccine = PCV13 (Prevnar 13), PCV15 (Vaxneuvance), PCV20 (Prevnar 20)  * Pneumococcal polysaccharide vaccine = PPSV23 (Pneumovax) Adults 19-63 yo with certain risk factors or if 65+ yo  If never received any pneumonia vaccine: recommend Prevnar 20 (PCV20)  Give PCV20 if previously received 1 dose of PCV13 or PPSV23   Hepatitis B Vaccine 3 dose series if at intermediate or high risk (ex: diabetes, end stage renal disease, liver disease)   Respiratory syncytial virus (RSV) Vaccine - COVERED BY MEDICARE PART D  * RSVPreF3 (Arexvy) CDC recommends that adults 60 years of age and older may receive a single dose of RSV vaccine using shared clinical decision-making (SCDM)   Tetanus (Td) Vaccine - COST NOT COVERED BY MEDICARE PART B Following completion of primary series, a booster dose should be given every 10 years to maintain immunity against tetanus. Td may also be given as tetanus wound prophylaxis.   Tdap Vaccine - COST NOT COVERED BY MEDICARE PART B Recommended at least once for all adults. For pregnant patients, recommended with each pregnancy.   Shingles Vaccine (Shingrix) - COST NOT COVERED BY MEDICARE PART B  2 shot series recommended in those 19 years and older who have or will have weakened immune systems or those 50 years and older     Health Maintenance Due:      Topic Date Due   • Colorectal Cancer Screening  Never done   • Breast Cancer Screening: Mammogram  10/07/2025   • Hepatitis C Screening  Completed     Immunizations Due:      Topic Date Due   • COVID-19 Vaccine (3 - 2024-25 season) 09/01/2024     Advance Directives   What are advance directives?  Advance directives are legal documents that state your wishes and plans for medical  care. These plans are made ahead of time in case you lose your ability to make decisions for yourself. Advance directives can apply to any medical decision, such as the treatments you want, and if you want to donate organs.   What are the types of advance directives?  There are many types of advance directives, and each state has rules about how to use them. You may choose a combination of any of the following:  Living will:  This is a written record of the treatment you want. You can also choose which treatments you do not want, which to limit, and which to stop at a certain time. This includes surgery, medicine, IV fluid, and tube feedings.   Durable power of  for healthcare (DPAHC):  This is a written record that states who you want to make healthcare choices for you when you are unable to make them for yourself. This person, called a proxy, is usually a family member or a friend. You may choose more than 1 proxy.  Do not resuscitate (DNR) order:  A DNR order is used in case your heart stops beating or you stop breathing. It is a request not to have certain forms of treatment, such as CPR. A DNR order may be included in other types of advance directives.  Medical directive:  This covers the care that you want if you are in a coma, near death, or unable to make decisions for yourself. You can list the treatments you want for each condition. Treatment may include pain medicine, surgery, blood transfusions, dialysis, IV or tube feedings, and a ventilator (breathing machine).  Values history:  This document has questions about your views, beliefs, and how you feel and think about life. This information can help others choose the care that you would choose.  Why are advance directives important?  An advance directive helps you control your care. Although spoken wishes may be used, it is better to have your wishes written down. Spoken wishes can be misunderstood, or not followed. Treatments may be given even if  you do not want them. An advance directive may make it easier for your family to make difficult choices about your care.       © Copyright [employer] 2018 Information is for End User's use only and may not be sold, redistributed or otherwise used for commercial purposes. All illustrations and images included in CareNotes® are the copyrighted property of [employer] or [employer]

## 2025-05-29 NOTE — ASSESSMENT & PLAN NOTE
Hemoglobin A1c of 6.5%.  She is not a diabetic.  Goal A1c for a 74-year-old is less than 7%.  Repeat A1c to be done in 6 months

## 2025-05-29 NOTE — ASSESSMENT & PLAN NOTE
Well-controlled on levothyroxine 100 mcg once daily.  Continue same.  Repeat thyroid function testing to be done in 6 months

## 2025-05-29 NOTE — ASSESSMENT & PLAN NOTE
Uric acid level at 10.3.  Patient has not been taking Uloric on a daily basis.  She does have prescription at home.  Patient needs to take Uloric 40 mg once daily to prevent gouty flares

## 2025-05-29 NOTE — PROGRESS NOTES
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  Remains on beta-blocker.  She has multiple cardiac diagnoses including atrial tachycardia, atrial flutter, AVNRT.  That list needs to be cleaned up at some point         Paroxysmal atrial tachycardia ([facility])    Patient is followed by cardiologist. Remains on beta-blocker. She has multiple cardiac diagnoses including atrial tachycardia, atrial flutter, AVNRT. That list needs to be cleaned up at some point          Pulmonary hypertension ([facility])    Related to her cardiac diagnosis.  Follow-up with cardiology as scheduled         Thoracic aortic ectasia ([facility])    Managed by cardiology and followed by cardiology            Respiratory    ILD (interstitial lung disease) ([facility])    Has been followed by pulmonologist.  Quit smoking years ago.  No shortness of breath.  Oxygen saturation at 97%.         Relevant Medications    montelukast (SINGULAIR) 10 mg tablet    azelastine (ASTELIN) 0.1 % nasal spray    Seasonal allergic rhinitis    Will provide trial of Singulair and Astelin nasal spray.         Relevant Medications    montelukast (SINGULAIR) 10 mg tablet    azelastine (ASTELIN) 0.1 % nasal spray       Endocrine    Hypothyroidism    Well-controlled on levothyroxine 100 mcg once daily.  Continue same.  Repeat thyroid function testing to be done in 6 months         Relevant Medications    levothyroxine 100 mcg tablet    Other Relevant Orders    TSH, 3rd generation with Free T4 reflex       Genitourinary    CKD (chronic kidney disease) stage 3, GFR 30-59 ml/min ([facility])    Lab Results   Component Value Date    EGFR 45 05/22/2025    EGFR 30 02/21/2025    EGFR 40 09/24/2024    CREATININE 1.17 05/22/2025    CREATININE 1.63 (H) 02/21/2025    CREATININE 1.31 (H) 09/24/2024     Remains  on diuretic therapy.  Avoid nephrotoxic agents.  Control hypertension.            Oncology    Myelodysplasia (myelodysplastic syndrome) (HCC)    Followed by hematology/oncology         Relevant Orders    CBC and differential       Other    Dyslipidemia - Primary    Patient remains on atorvastatin 20 mg once daily.  Goal LDL less than 70 and her LDL is at 64.         Relevant Orders    Lipid panel    Comprehensive metabolic panel    Hyperuricemia    Uric acid level at 10.3.  Patient has not been taking Uloric on a daily basis.  She does have prescription at home.  Patient needs to take Uloric 40 mg once daily to prevent gouty flares         Relevant Orders    Uric acid    Prediabetes    Hemoglobin A1c of 6.5%.  She is not a diabetic.  Goal A1c for a 74-year-old is less than 7%.  Repeat A1c to be done in 6 months         Relevant Orders    Hemoglobin A1C     1. Dyslipidemia  Assessment & Plan:  Patient remains on atorvastatin 20 mg once daily.  Goal LDL less than 70 and her LDL is at 64.  Orders:  -     Lipid panel; Future; Expected date: 11/29/2025  -     Comprehensive metabolic panel; Future; Expected date: 11/29/2025  2. Hypothyroidism, unspecified type  Assessment & Plan:  Well-controlled on levothyroxine 100 mcg once daily.  Continue same.  Repeat thyroid function testing to be done in 6 months  Orders:  -     levothyroxine 100 mcg tablet; Take 1 tablet (100 mcg total) by mouth daily  -     TSH, 3rd generation with Free T4 reflex; Future; Expected date: 11/29/2025  3. Hyperuricemia  Assessment & Plan:  Uric acid level at 10.3.  Patient has not been taking Uloric on a daily basis.  She does have prescription at home.  Patient needs to take Uloric 40 mg once daily to prevent gouty flares  Orders:  -     Uric acid; Future; Expected date: 11/29/2025  4. Myelodysplasia (myelodysplastic syndrome) (HCC)  Assessment & Plan:  Followed by hematology/oncology  Orders:  -     CBC and differential; Future; Expected date:  11/29/2025  5. Stage 3a chronic kidney disease (HCC)  Assessment & Plan:  Lab Results   Component Value Date    EGFR 45 05/22/2025    EGFR 30 02/21/2025    EGFR 40 09/24/2024    CREATININE 1.17 05/22/2025    CREATININE 1.63 (H) 02/21/2025    CREATININE 1.31 (H) 09/24/2024     Remains on diuretic therapy.  Avoid nephrotoxic agents.  Control hypertension.  6. Prediabetes  Assessment & Plan:  Hemoglobin A1c of 6.5%.  She is not a diabetic.  Goal A1c for a 74-year-old is less than 7%.  Repeat A1c to be done in 6 months  Orders:  -     Hemoglobin A1C; Future; Expected date: 11/29/2025  7. Seasonal allergic rhinitis, unspecified trigger  Assessment & Plan:  Will provide trial of Singulair and Astelin nasal spray.  Orders:  -     montelukast (SINGULAIR) 10 mg tablet; Take 1 tablet (10 mg total) by mouth daily at bedtime  -     azelastine (ASTELIN) 0.1 % nasal spray; 1 spray into each nostril 2 (two) times a day Use in each nostril as directed  8. 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 Paroxysmal atrial flutter ([facility])  Assessment & Plan:  Patient is followed by cardiologist.  Remains on beta-blocker.  She has multiple cardiac diagnoses including atrial tachycardia, atrial flutter, AVNRT.  That list needs to be cleaned up at some point  10. Paroxysmal atrial tachycardia (HCC)  Assessment & Plan:  Patient is followed by cardiologist. Remains on beta-blocker. She has multiple cardiac diagnoses including atrial tachycardia, atrial flutter, AVNRT. That list needs to be cleaned up at some point   11. Pulmonary hypertension (HCC)  Assessment & Plan:  Related to her cardiac diagnosis.  Follow-up with cardiology as scheduled  12. Thoracic aortic ectasia (HCC)  Assessment & Plan:  Managed by cardiology and followed by cardiology  13. ILD (interstitial lung disease) ([facility])  Assessment & Plan:  Has been followed by pulmonologist.  Quit smoking years ago.  No shortness of breath.  Oxygen  saturation at 97%.  I have spent a total time of 40 minutes on 05/29/25 in caring for this patient including Diagnostic results, Prognosis, Risks and benefits of tx options, Instructions for management, Patient and family education, Importance of tx compliance, Risk factor reductions, Impressions, Counseling / Coordination of care, Documenting in the medical record, Reviewing / ordering tests, medicine, procedures  , and Obtaining or reviewing history  .        Depression Screening and Follow-up Plan: Patient was screened for depression during today's encounter. They screened negative with a PHQ-2 score of 0.        Preventive health issues were discussed with patient, and age appropriate screening tests were ordered as noted in patient's After Visit Summary.  Personalized health advice and appropriate referrals for health education or preventive services given if needed, as noted in patient's After Visit Summary.     History of Present Illness:     Patient presents for a Medicare Wellness Visit    74-year-old female with history of cardiomyopathy, congestive heart failure, hypothyroidism, interstitial lung disease presents for subsequent annual wellness visit and follow-up of chronic conditions.  Has had follow-up with cardiology, hematology/oncology.  Patient has not been taking Uloric regularly.  It was prescribed for her and she did  the prescription.  Labs reviewed.  She does get a sensation that her arms are heavy ever since she was started on cardiac medications.  She has conveyed this to her cardiologist who told her to take a tablet and a half of her beta-blocker instead of 1 tablet twice daily.  Scheduled for follow-up with cardiologist in July         Patient is here for routine 6-month follow-up.  Has history of dyslipidemia, hypothyroidism.  Dilated cardiomyopathy, CHF, interstitial lung disease.  Metabolic labs reviewed with patient.  Being closely monitored by cardiology and pulmonary for  chronic medical problems.  Noted to have an A1c of 6.5, has been started on Jardiance for cardiovascular benefits.  Patient has not started the medication since she was worried about the side effects.    Patient Care Team:  Quang Olsen DO as PCP - General (Family Medicine)  Celia Mesa DO (Cardiology)  Radha Jung MD (Internal Medicine)  Pj Murdock MD (Pulmonary Disease)     Review of Systems:     Review of Systems   Constitutional: Negative.    HENT:  Positive for hearing loss.    Eyes: Negative.    Respiratory: Negative.  Negative for shortness of breath.    Cardiovascular: Negative.  Negative for chest pain and leg swelling.   Gastrointestinal: Negative.    Endocrine: Negative.    Genitourinary: Negative.    Musculoskeletal:  Positive for arthralgias and joint swelling.   Skin: Negative.    Allergic/Immunologic: Negative.    Neurological: Negative.    Hematological: Negative.    Psychiatric/Behavioral: Negative.          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) ([facility])   • History of Hodgkin's lymphoma   • CKD (chronic kidney disease) stage 3, GFR 30-59 ml/min ([facility])   • Allergies   • Pulmonary hypertension (HCC)   • ILD (interstitial lung disease) ([facility])   • Hard of hearing   • Takotsubo cardiomyopathy   • Menopause   • Paroxysmal atrial tachycardia (HCC)   • Heart block, AV   • Thoracic aortic ectasia (HCC)   • Splenic cyst   • Gall stones   • Acute bilateral low back pain without sciatica   • Presence of permanent cardiac pacemaker   • History of radiofrequency ablation (RFA) for complex left atrial arrhythmia   • Nonrheumatic aortic valve insufficiency   • Paroxysmal atrial flutter (HCC)   • Prediabetes   • Pain and swelling of toe of right foot   • Hyperuricemia   • Seasonal allergic rhinitis      Past Medical and  Surgical History:     Past Medical History:   Diagnosis Date   • Abnormal CXR 1/14/2022   • Cancer (HCC)    • Disease of thyroid gland    • Dysuria 12/16/2021   • Encounter for support and coordination of transition of care 5/6/2021     Past Surgical History:   Procedure Laterality Date   • CARDIAC CATHETERIZATION Left 8/1/2023    Procedure: Cardiac Left Heart Cath;  Surgeon: Kay Steven DO;  [facility];  Service: Cardiology   • CARDIAC CATHETERIZATION  8/1/2023    Procedure: Cardiac catheterization;  Surgeon: Kay Steven DO;  [facility];  Service: Cardiology   • CARDIAC CATHETERIZATION N/A 8/1/2023    Procedure: Cardiac Coronary Angiogram;  Surgeon: Kay Steven DO;  [facility];  Service: Cardiology   • CARDIAC ELECTROPHYSIOLOGY PROCEDURE N/A 12/30/2022    Procedure: Cardiac eps/aflutter ablation;  Surgeon: Drew Fatima MD;  [facility];  Service: Cardiology   • CARDIAC ELECTROPHYSIOLOGY PROCEDURE N/A 12/30/2022    Procedure: Cardiac loop recorder implant;  Surgeon: Drew Fatima MD;  [facility];  Service: Cardiology   • CARDIAC ELECTROPHYSIOLOGY PROCEDURE N/A 2/6/2023    Procedure: CARDIAC EPS/SVT ABLATION;  Surgeon: Drew Fatima MD;  [facility];  Service: Cardiology   • CARDIAC ELECTROPHYSIOLOGY PROCEDURE N/A 2/16/2023    Procedure: Cardiac pacer implant;  Surgeon: Dustin Lopez MD;  [facility];  Service: Cardiology   • CARDIAC ELECTROPHYSIOLOGY PROCEDURE N/A 2/16/2023    Procedure: CARDIAC LOOP RECORDER EXPLANT;  Surgeon: Dustin Lopez MD;  [facility];  Service: Cardiology   • HYSTERECTOMY        Family History:     Family History   Problem Relation Name Age of Onset   • No Known Problems Mother     • No Known Problems Father     • Cancer Maternal Aunt Sam       Social History:     Social History     Socioeconomic History   •  Marital status:    Tobacco Use   • Smoking status: Former     Current packs/day: 0.00     Average packs/day: 1 pack/day for 20.0 years (20.0 ttl pk-yrs)     Types: Cigarettes     Start date:      Quit date: 3/10/1997     Years since quittin.2   • Smokeless tobacco: Former     Quit date: 8/10/2000   Vaping Use   • Vaping status: Never Used   Substance and Sexual Activity   • Alcohol use: Never   • Drug use: Never   • Sexual activity: Not Currently     Partners: Male   Social History Narrative    Most recent tobacco use screenin2018     Social Drivers of Health     Financial Resource Strain: Low Risk  (2023)    Overall Financial Resource Strain (CARDIA)    • Difficulty of Paying Living Expenses: Not hard at all   Food Insecurity: No Food Insecurity (2025)    Nursing - Inadequate Food Risk Classification    • Worried About Running Out of Food in the Last Year: Never true    • Ran Out of Food in the Last Year: Never true   Transportation Needs: No Transportation Needs (2025)    PRAPARE - Transportation    • Lack of Transportation (Medical): No    • Lack of Transportation (Non-Medical): No    Received from [employer]   Housing Stability: Low Risk  (2025)    Housing Stability Vital Sign    • Unable to Pay for Housing in the Last Year: No    • Number of Times Moved in the Last Year: 0    • Homeless in the Last Year: No      Medications and Allergies:     Current Outpatient Medications   Medication Sig Dispense Refill   • atorvastatin (LIPITOR) 20 mg tablet TAKE 1 TABLET BY MOUTH EVERY DAY WITH DINNER 90 tablet 3   • azelastine (ASTELIN) 0.1 % nasal spray 1 spray into each nostril 2 (two) times a day Use in each nostril as directed 30 mL 0   • Entresto 24-26 MG TABS TAKE 1 TABLET BY MOUTH TWICE A DAY 60 tablet 5   • febuxostat (ULORIC) 40 mg tablet TAKE 1 TABLET BY MOUTH EVERY DAY (NOT COVERED) 90 tablet 0   • levothyroxine 100 mcg tablet Take 1 tablet (100  mcg total) by mouth daily 90 tablet 1   • metoprolol succinate (TOPROL-XL) 25 mg 24 hr tablet TAKE 1 TABLET BY MOUTH TWICE A  tablet 1   • montelukast (SINGULAIR) 10 mg tablet Take 1 tablet (10 mg total) by mouth daily at bedtime 30 tablet 5   • spironolactone (ALDACTONE) 25 mg tablet TAKE 1 TABLET (25 MG TOTAL) BY MOUTH DAILY. 90 tablet 5   • torsemide (DEMADEX) 20 mg tablet TAKE 1 TABLET BY MOUTH EVERY DAY 90 tablet 3     No current facility-administered medications for this visit.     Allergies   Allergen Reactions   • Penicillins Rash      Immunizations:     Immunization History   Administered Date(s) Administered   • COVID-19 MODERNA VACC 0.5 ML IM 01/19/2021, 02/23/2021   • Influenza Split High Dose Preservative Free IM 10/03/2024   • Influenza, high dose seasonal 0.7 mL 10/07/2021, 10/26/2022, 10/18/2023   • Pneumococcal Conjugate Vaccine 20-valent (Pcv20), Polysace 05/11/2023      Health Maintenance:         Topic Date Due   • Colorectal Cancer Screening  Never done   • Breast Cancer Screening: Mammogram  10/07/2025   • Hepatitis C Screening  Completed         Topic Date Due   • COVID-19 Vaccine (3 - 2024-25 season) 09/01/2024      Medicare Screening Tests and Risk Assessments:     Toshia is here for her Subsequent Wellness visit. Last Medicare Wellness visit information reviewed, patient interviewed, no change since last AWV.     Health Risk Assessment:   Patient rates overall health as good. Patient feels that their physical health rating is much better. Patient is satisfied with their life. Eyesight was rated as same. Hearing was rated as slightly worse. Patient feels that their emotional and mental health rating is much better. Patients states they are never, rarely angry. Patient states they are never, rarely unusually tired/fatigued. Pain experienced in the last 7 days has been none. Patient states that she has experienced no weight loss or gain in last 6 months.     Depression Screening:    PHQ-2 Score: 0      Fall Risk Screening:   In the past year, patient has experienced: no history of falling in past year      Urinary Incontinence Screening:   Patient has not leaked urine accidently in the last six months.     Home Safety:  Patient does not have trouble with stairs inside or outside of their home. Patient has working smoke alarms and has working carbon monoxide detector. Home safety hazards include: none.     Nutrition:   Current diet is Regular and No Added Salt.     Medications:   Patient is not currently taking any over-the-counter supplements. Patient is able to manage medications.     Activities of Daily Living (ADLs)/Instrumental Activities of Daily Living (IADLs):   Walk and transfer into and out of bed and chair?: Yes  Dress and groom yourself?: Yes    Bathe or shower yourself?: Yes    Feed yourself? Yes  Do your laundry/housekeeping?: Yes  Manage your money, pay your bills and track your expenses?: Yes  Make your own meals?: Yes    Do your own shopping?: Yes    Previous Hospitalizations:   Any hospitalizations or ED visits within the last 12 months?: No      Advance Care Planning:   Living will: No    Durable POA for healthcare: No    Advanced directive: No    Advanced directive counseling given: No    Five wishes given: No    Patient declined ACP directive: No    End of Life Decisions reviewed with patient: No    Provider agrees with end of life decisions: No      Cognitive Screening:   Provider or family/friend/caregiver concerned regarding cognition?: No    Preventive Screenings      Cardiovascular Screening:    General: Screening Current      Diabetes Screening:     General: Screening Not Indicated and History Diabetes      Colorectal Cancer Screening:     General: Risks and Benefits Discussed    Due for: Cologuard      Breast Cancer Screening:     General: Screening Current      Cervical Cancer Screening:    General: Screening Not Indicated      Osteoporosis Screening:    General:  "Risks and Benefits Discussed and Screening Current      Abdominal Aortic Aneurysm (AAA) Screening:        General: Risks and Benefits Discussed and Screening Not Indicated      Lung Cancer Screening:     General: Screening Not Indicated      Hepatitis C Screening:    General: Screening Current    Immunizations:  - Immunizations due: Zoster (Shingrix)    Screening, Brief Intervention, and Referral to Treatment (SBIRT)     Screening  Typical number of drinks in a day: 0  Typical number of drinks in a week: 0  Interpretation: Low risk drinking behavior.    AUDIT-C Screenin) How often did you have a drink containing alcohol in the past year? never  2) How many drinks did you have on a typical day when you were drinking in the past year? 0  3) How often did you have 6 or more drinks on one occasion in the past year? never    AUDIT-C Score: 0  Interpretation: Score 0-2 (female): Negative screen for alcohol misuse    Single Item Drug Screening:  How often have you used an illegal drug (including marijuana) or a prescription medication for non-medical reasons in the past year? never    Single Item Drug Screen Score: 0  Interpretation: Negative screen for possible drug use disorder    Other Counseling Topics:   Car/seat belt/driving safety, skin self-exam, sunscreen and regular weightbearing exercise and calcium and vitamin D intake.     No results found.     Physical Exam:     /62   Pulse 80   Resp 16   Ht 5' 2\" (1.575 m)   Wt 55.3 kg (122 lb)   BMI 22.31 kg/m²     Physical Exam  Vitals and nursing note reviewed.   Constitutional:       General: She is not in acute distress.     Appearance: Normal appearance. She is well-developed. She is not diaphoretic.   HENT:      Head: Normocephalic and atraumatic.      Right Ear: External ear normal.      Left Ear: External ear normal.      Ears:      Comments: Hard of hearing     Nose: Nose normal.     Eyes:      Conjunctiva/sclera: Conjunctivae normal.      Pupils: " Pupils are equal, round, and reactive to light.       Cardiovascular:      Rate and Rhythm: Normal rate and regular rhythm.      Heart sounds: Normal heart sounds. No murmur heard.  Pulmonary:      Effort: Pulmonary effort is normal.      Breath sounds: Normal breath sounds.   Abdominal:      General: Bowel sounds are normal.      Palpations: Abdomen is soft.     Musculoskeletal:         General: Normal range of motion.      Cervical back: Normal range of motion and neck supple.      Right lower leg: No edema.      Left lower leg: No edema.     Skin:     General: Skin is warm and dry.      Findings: No rash.     Neurological:      Mental Status: She is alert and oriented to person, place, and time.      Deep Tendon Reflexes: Reflexes are normal and symmetric.     Psychiatric:         Mood and Affect: Mood normal.         Behavior: Behavior normal.         Thought Content: Thought content normal.         Judgment: Judgment normal.        Recent Results (from the past 20 weeks)   Albumin / creatinine urine ratio    Collection Time: 02/21/25  9:27 AM   Result Value Ref Range    Creatinine, Ur 21.8 Reference range not established. mg/dL    Albumin,U,Random 8.5 <20.0 mg/L    Albumin Creat Ratio 39 (H) 0 - 30 mg/g creatinine   CBC and differential    Collection Time: 02/21/25  9:27 AM   Result Value Ref Range    WBC 7.73 4.31 - 10.16 Thousand/uL    RBC 4.28 3.81 - 5.12 Million/uL    Hemoglobin 12.5 11.5 - 15.4 g/dL    Hematocrit 39.9 34.8 - 46.1 %    MCV 93 82 - 98 fL    MCH 29.2 26.8 - 34.3 pg    MCHC 31.3 (L) 31.4 - 37.4 g/dL    RDW 13.9 11.6 - 15.1 %    MPV 11.6 8.9 - 12.7 fL    Platelets 149 149 - 390 Thousands/uL    nRBC 0 /100 WBCs    Segmented % 66 43 - 75 %    Immature Grans % 0 0 - 2 %    Lymphocytes % 24 14 - 44 %    Monocytes % 8 4 - 12 %    Eosinophils Relative 2 0 - 6 %    Basophils Relative 0 0 - 1 %    Absolute Neutrophils 5.10 1.85 - 7.62 Thousands/µL    Absolute Immature Grans 0.02 0.00 - 0.20 Thousand/uL     Absolute Lymphocytes 1.84 0.60 - 4.47 Thousands/µL    Absolute Monocytes 0.63 0.17 - 1.22 Thousand/µL    Eosinophils Absolute 0.13 0.00 - 0.61 Thousand/µL    Basophils Absolute 0.01 0.00 - 0.10 Thousands/µL   Comprehensive metabolic panel    Collection Time: 02/21/25  9:27 AM   Result Value Ref Range    Sodium 140 135 - 147 mmol/L    Potassium 4.2 3.5 - 5.3 mmol/L    Chloride 98 96 - 108 mmol/L    CO2 31 21 - 32 mmol/L    ANION GAP 11 4 - 13 mmol/L    BUN 34 (H) 5 - 25 mg/dL    Creatinine 1.63 (H) 0.60 - 1.30 mg/dL    Glucose, Fasting 102 (H) 65 - 99 mg/dL    Calcium 9.6 8.4 - 10.2 mg/dL    AST 14 13 - 39 U/L    ALT 11 7 - 52 U/L    Alkaline Phosphatase 70 34 - 104 U/L    Total Protein 6.9 6.4 - 8.4 g/dL    Albumin 3.7 3.5 - 5.0 g/dL    Total Bilirubin 0.72 0.20 - 1.00 mg/dL    eGFR 30 ml/min/1.73sq m   Hemoglobin A1C    Collection Time: 02/21/25  9:27 AM   Result Value Ref Range    Hemoglobin A1C 6.5 (H) Normal 4.0-5.6%; PreDiabetic 5.7-6.4%; Diabetic >=6.5%; Glycemic control for adults with diabetes <7.0% %     mg/dl   Lipid panel    Collection Time: 02/21/25  9:27 AM   Result Value Ref Range    Cholesterol 131 See Comment mg/dL    Triglycerides 54 See Comment mg/dL    HDL, Direct 56 >=50 mg/dL    LDL Calculated 64 0 - 100 mg/dL    Non-HDL-Chol (CHOL-HDL) 75 mg/dl   TSH, 3rd generation with Free T4 reflex    Collection Time: 02/21/25  9:27 AM   Result Value Ref Range    TSH 3RD GENERATON 2.323 0.450 - 4.500 uIU/mL   Uric acid    Collection Time: 02/21/25  9:27 AM   Result Value Ref Range    Uric Acid 10.4 (H) 2.0 - 7.5 mg/dL   Basic metabolic panel    Collection Time: 05/22/25  7:02 AM   Result Value Ref Range    Sodium 144 135 - 147 mmol/L    Potassium 4.3 3.5 - 5.3 mmol/L    Chloride 102 96 - 108 mmol/L    CO2 32 21 - 32 mmol/L    ANION GAP 10 4 - 13 mmol/L    BUN 26 (H) 5 - 25 mg/dL    Creatinine 1.17 0.60 - 1.30 mg/dL    Glucose, Fasting 97 65 - 99 mg/dL    Calcium 9.5 8.4 - 10.2 mg/dL    eGFR 45  ml/min/1.73sq m   Uric acid    Collection Time: 05/22/25  7:02 AM   Result Value Ref Range    Uric Acid 10.3 (H) 2.0 - 7.5 mg/dL        Quang Olsen DO

## 2025-05-29 NOTE — ASSESSMENT & PLAN NOTE
Lab Results   Component Value Date    EGFR 45 05/22/2025    EGFR 30 02/21/2025    EGFR 40 09/24/2024    CREATININE 1.17 05/22/2025    CREATININE 1.63 (H) 02/21/2025    CREATININE 1.31 (H) 09/24/2024     Remains on diuretic therapy.  Avoid nephrotoxic agents.  Control hypertension.

## 2025-06-19 ENCOUNTER — TELEPHONE (OUTPATIENT)
Dept: FAMILY MEDICINE CLINIC | Facility: CLINIC | Age: 75
End: 2025-06-19

## 2025-06-19 NOTE — TELEPHONE ENCOUNTER
Patient called and stated that her right foot has been bothering her again and swollen. She is suppose to be taking Uloric for gout but does not. She said she thought she was suppose to take it weekly not daily and when she found out the correct way she still decided not  to start it. I did put her in with Teresa tomorrow 6/20 to be evaluated

## 2025-06-20 ENCOUNTER — OFFICE VISIT (OUTPATIENT)
Dept: FAMILY MEDICINE CLINIC | Facility: CLINIC | Age: 75
End: 2025-06-20
Payer: COMMERCIAL

## 2025-06-20 VITALS
HEIGHT: 62 IN | WEIGHT: 121 LBS | OXYGEN SATURATION: 99 % | SYSTOLIC BLOOD PRESSURE: 128 MMHG | HEART RATE: 62 BPM | DIASTOLIC BLOOD PRESSURE: 68 MMHG | RESPIRATION RATE: 16 BRPM | BODY MASS INDEX: 22.26 KG/M2

## 2025-06-20 DIAGNOSIS — M10.9 ACUTE GOUT OF RIGHT FOOT, UNSPECIFIED CAUSE: ICD-10-CM

## 2025-06-20 DIAGNOSIS — E79.0 HYPERURICEMIA: Primary | ICD-10-CM

## 2025-06-20 PROBLEM — M79.674 PAIN AND SWELLING OF TOE OF RIGHT FOOT: Status: RESOLVED | Noted: 2025-02-21 | Resolved: 2025-06-20

## 2025-06-20 PROBLEM — M79.89 PAIN AND SWELLING OF TOE OF RIGHT FOOT: Status: RESOLVED | Noted: 2025-02-21 | Resolved: 2025-06-20

## 2025-06-20 PROCEDURE — G2211 COMPLEX E/M VISIT ADD ON: HCPCS | Performed by: FAMILY MEDICINE

## 2025-06-20 PROCEDURE — 99213 OFFICE O/P EST LOW 20 MIN: CPT | Performed by: FAMILY MEDICINE

## 2025-06-20 RX ORDER — PREDNISONE 20 MG/1
40 TABLET ORAL DAILY
Qty: 10 TABLET | Refills: 0 | Status: SHIPPED | OUTPATIENT
Start: 2025-06-20 | End: 2025-06-25

## 2025-06-20 NOTE — ASSESSMENT & PLAN NOTE
Patient was diagnosed with hyperuricemia and acute gouty flare back in February.  She is on diuretics as well as spironolactone.  Her uric acid level was initially 10.4.  She was given colchicine for acute flare and placed on Uloric.  She has not been taking Uloric regularly.  She has medication at home.  She now has another acute gouty flare    Advised patient that she needs to start taking Uloric 40 mg once daily with repeat uric acid level being done next month

## 2025-06-20 NOTE — ASSESSMENT & PLAN NOTE
Patient with an acute gouty flare once again because she is not taking uricosuric agent    - Refrain from placing patient on colchicine.  Will place patient on pulse course prednisone 40 mg daily x 5 days and instructed to start taking her Uloric

## 2025-06-20 NOTE — PROGRESS NOTES
"  Subjective:      Patient ID: Toshia Weaver is a 74 y.o. female.    74-year-old female who was diagnosed with hyperuricemia and acute gouty flare back in February presents to the office once again complaining of pain and swelling in her right foot.  No inciting injuries or trauma.  Patient has history of hyperuricemia and should be taking Uloric to lower down her uric acid level which she has not been doing for no particular reason.  She is on loop diuretic as well as spironolactone        Past Medical History[1]    Family History[2]    Past Surgical History[3]     reports that she quit smoking about 28 years ago. Her smoking use included cigarettes. She started smoking about 52 years ago. She has a 20 pack-year smoking history. She quit smokeless tobacco use about 24 years ago. She reports that she does not drink alcohol and does not use drugs.    Current Medications[4]    The following portions of the patient's history were reviewed and updated as appropriate: allergies, current medications, past family history, past medical history, past social history, past surgical history and problem list.    Review of Systems   Constitutional: Negative.    HENT:  Positive for hearing loss.    Eyes: Negative.    Respiratory: Negative.  Negative for shortness of breath.    Cardiovascular: Negative.  Negative for chest pain and leg swelling.   Gastrointestinal: Negative.    Endocrine: Negative.    Genitourinary: Negative.    Musculoskeletal:  Positive for arthralgias and joint swelling.   Skin: Negative.    Allergic/Immunologic: Negative.    Neurological: Negative.    Hematological: Negative.    Psychiatric/Behavioral: Negative.             Objective:    /68   Pulse 62   Resp 16   Ht 5' 2\" (1.575 m)   Wt 54.9 kg (121 lb)   SpO2 99%   BMI 22.13 kg/m²      Physical Exam  Vitals and nursing note reviewed.   Constitutional:       Appearance: Normal appearance.     Musculoskeletal:         General: Swelling and " tenderness present.      Comments: Erythema, tenderness and swelling across the right foot, multiple toes and especially at the first MTP     Neurological:      Mental Status: She is alert.           Recent Results (from the past 6 weeks)   Basic metabolic panel    Collection Time: 05/22/25  7:02 AM   Result Value Ref Range    Sodium 144 135 - 147 mmol/L    Potassium 4.3 3.5 - 5.3 mmol/L    Chloride 102 96 - 108 mmol/L    CO2 32 21 - 32 mmol/L    ANION GAP 10 4 - 13 mmol/L    BUN 26 (H) 5 - 25 mg/dL    Creatinine 1.17 0.60 - 1.30 mg/dL    Glucose, Fasting 97 65 - 99 mg/dL    Calcium 9.5 8.4 - 10.2 mg/dL    eGFR 45 ml/min/1.73sq m   Uric acid    Collection Time: 05/22/25  7:02 AM   Result Value Ref Range    Uric Acid 10.3 (H) 2.0 - 7.5 mg/dL       Assessment/Plan:    Hyperuricemia  Patient was diagnosed with hyperuricemia and acute gouty flare back in February.  She is on diuretics as well as spironolactone.  Her uric acid level was initially 10.4.  She was given colchicine for acute flare and placed on Uloric.  She has not been taking Uloric regularly.  She has medication at home.  She now has another acute gouty flare    Advised patient that she needs to start taking Uloric 40 mg once daily with repeat uric acid level being done next month    Acute gout of right foot  Patient with an acute gouty flare once again because she is not taking uricosuric agent    - Refrain from placing patient on colchicine.  Will place patient on pulse course prednisone 40 mg daily x 5 days and instructed to start taking her Uloric        Problem List Items Addressed This Visit        Musculoskeletal and Integument    Acute gout of right foot    Patient with an acute gouty flare once again because she is not taking uricosuric agent    - Refrain from placing patient on colchicine.  Will place patient on pulse course prednisone 40 mg daily x 5 days and instructed to start taking her Uloric         Relevant Medications    predniSONE 20 mg  tablet    Other Relevant Orders    Basic metabolic panel    Uric acid       Other    Hyperuricemia - Primary    Patient was diagnosed with hyperuricemia and acute gouty flare back in February.  She is on diuretics as well as spironolactone.  Her uric acid level was initially 10.4.  She was given colchicine for acute flare and placed on Uloric.  She has not been taking Uloric regularly.  She has medication at home.  She now has another acute gouty flare    Advised patient that she needs to start taking Uloric 40 mg once daily with repeat uric acid level being done next month         Relevant Orders    Basic metabolic panel    Uric acid              [1]  Past Medical History:  Diagnosis Date   • Abnormal CXR 1/14/2022   • Cancer (HCC)    • Disease of thyroid gland    • Dysuria 12/16/2021   • Encounter for support and coordination of transition of care 5/6/2021   [2]  Family History  Problem Relation Name Age of Onset   • No Known Problems Mother     • No Known Problems Father     • Cancer Maternal Aunt Sam    [3]  Past Surgical History:  Procedure Laterality Date   • CARDIAC CATHETERIZATION Left 8/1/2023    Procedure: Cardiac Left Heart Cath;  Surgeon: Kay Steven DO;  [facility];  Service: Cardiology   • CARDIAC CATHETERIZATION  8/1/2023    Procedure: Cardiac catheterization;  Surgeon: Kay Steven DO;  [facility];  Service: Cardiology   • CARDIAC CATHETERIZATION N/A 8/1/2023    Procedure: Cardiac Coronary Angiogram;  Surgeon: Kay Steven DO;  [facility];  Service: Cardiology   • CARDIAC ELECTROPHYSIOLOGY PROCEDURE N/A 12/30/2022    Procedure: Cardiac eps/aflutter ablation;  Surgeon: Drew Fatima MD;  [facility];  Service: Cardiology   • CARDIAC ELECTROPHYSIOLOGY PROCEDURE N/A 12/30/2022    Procedure: Cardiac loop recorder implant;  Surgeon: Drew Fatima MD;  [facility];  Service: Cardiology    • CARDIAC ELECTROPHYSIOLOGY PROCEDURE N/A 2/6/2023    Procedure: CARDIAC EPS/SVT ABLATION;  Surgeon: Drew Fatima MD;  [facility];  Service: Cardiology   • CARDIAC ELECTROPHYSIOLOGY PROCEDURE N/A 2/16/2023    Procedure: Cardiac pacer implant;  Surgeon: Dustin Lopez MD;  [facility];  Service: Cardiology   • CARDIAC ELECTROPHYSIOLOGY PROCEDURE N/A 2/16/2023    Procedure: CARDIAC LOOP RECORDER EXPLANT;  Surgeon: Dustin Lopez MD;  [facility];  Service: Cardiology   • HYSTERECTOMY     [4]    Current Outpatient Medications:   •  atorvastatin (LIPITOR) 20 mg tablet, TAKE 1 TABLET BY MOUTH EVERY DAY WITH DINNER, Disp: 90 tablet, Rfl: 3  •  Entresto 24-26 MG TABS, TAKE 1 TABLET BY MOUTH TWICE A DAY, Disp: 60 tablet, Rfl: 5  •  levothyroxine 100 mcg tablet, Take 1 tablet (100 mcg total) by mouth daily, Disp: 90 tablet, Rfl: 1  •  metoprolol succinate (TOPROL-XL) 25 mg 24 hr tablet, TAKE 1 TABLET BY MOUTH TWICE A DAY, Disp: 180 tablet, Rfl: 1  •  montelukast (SINGULAIR) 10 mg tablet, Take 1 tablet (10 mg total) by mouth daily at bedtime, Disp: 30 tablet, Rfl: 5  •  predniSONE 20 mg tablet, Take 2 tablets (40 mg total) by mouth daily for 5 days, Disp: 10 tablet, Rfl: 0  •  spironolactone (ALDACTONE) 25 mg tablet, TAKE 1 TABLET (25 MG TOTAL) BY MOUTH DAILY., Disp: 90 tablet, Rfl: 5  •  torsemide (DEMADEX) 20 mg tablet, TAKE 1 TABLET BY MOUTH EVERY DAY, Disp: 90 tablet, Rfl: 3  •  febuxostat (ULORIC) 40 mg tablet, TAKE 1 TABLET BY MOUTH EVERY DAY (NOT COVERED) (Patient not taking: Reported on 6/20/2025), Disp: 90 tablet, Rfl: 0

## 2025-06-22 DIAGNOSIS — J30.2 SEASONAL ALLERGIC RHINITIS, UNSPECIFIED TRIGGER: ICD-10-CM

## 2025-06-22 RX ORDER — MONTELUKAST SODIUM 10 MG/1
10 TABLET ORAL
Qty: 90 TABLET | Refills: 1 | Status: SHIPPED | OUTPATIENT
Start: 2025-06-22

## 2025-07-24 ENCOUNTER — IN-CLINIC DEVICE VISIT (OUTPATIENT)
Dept: CARDIOLOGY CLINIC | Facility: CLINIC | Age: 75
End: 2025-07-24
Payer: COMMERCIAL

## 2025-07-24 ENCOUNTER — TELEPHONE (OUTPATIENT)
Dept: CARDIOLOGY CLINIC | Facility: CLINIC | Age: 75
End: 2025-07-24

## 2025-07-24 ENCOUNTER — APPOINTMENT (OUTPATIENT)
Dept: LAB | Facility: CLINIC | Age: 75
End: 2025-07-24
Payer: COMMERCIAL

## 2025-07-24 ENCOUNTER — OFFICE VISIT (OUTPATIENT)
Dept: CARDIOLOGY CLINIC | Facility: CLINIC | Age: 75
End: 2025-07-24
Payer: COMMERCIAL

## 2025-07-24 VITALS
WEIGHT: 122.8 LBS | BODY MASS INDEX: 22.46 KG/M2 | DIASTOLIC BLOOD PRESSURE: 50 MMHG | SYSTOLIC BLOOD PRESSURE: 72 MMHG | HEART RATE: 104 BPM

## 2025-07-24 DIAGNOSIS — Z98.890 HISTORY OF RADIOFREQUENCY ABLATION (RFA) FOR COMPLEX LEFT ATRIAL ARRHYTHMIA: ICD-10-CM

## 2025-07-24 DIAGNOSIS — Z86.79 HISTORY OF RADIOFREQUENCY ABLATION (RFA) FOR COMPLEX LEFT ATRIAL ARRHYTHMIA: ICD-10-CM

## 2025-07-24 DIAGNOSIS — E78.5 DYSLIPIDEMIA: ICD-10-CM

## 2025-07-24 DIAGNOSIS — I47.19 AVNRT (AV NODAL RE-ENTRY TACHYCARDIA) (HCC): ICD-10-CM

## 2025-07-24 DIAGNOSIS — E79.0 HYPERURICEMIA: ICD-10-CM

## 2025-07-24 DIAGNOSIS — M10.9 ACUTE GOUT OF RIGHT FOOT, UNSPECIFIED CAUSE: ICD-10-CM

## 2025-07-24 DIAGNOSIS — I44.30 HEART BLOCK, AV: ICD-10-CM

## 2025-07-24 DIAGNOSIS — I77.810 THORACIC AORTIC ECTASIA (HCC): ICD-10-CM

## 2025-07-24 DIAGNOSIS — I49.5 SSS (SICK SINUS SYNDROME) (HCC): Primary | ICD-10-CM

## 2025-07-24 DIAGNOSIS — Z95.0 PRESENCE OF PERMANENT CARDIAC PACEMAKER: ICD-10-CM

## 2025-07-24 DIAGNOSIS — I35.1 NONRHEUMATIC AORTIC VALVE INSUFFICIENCY: ICD-10-CM

## 2025-07-24 DIAGNOSIS — I51.81 TAKOTSUBO CARDIOMYOPATHY: Primary | ICD-10-CM

## 2025-07-24 DIAGNOSIS — I48.92 PAROXYSMAL ATRIAL FLUTTER (HCC): ICD-10-CM

## 2025-07-24 DIAGNOSIS — I47.19 PAROXYSMAL ATRIAL TACHYCARDIA (HCC): ICD-10-CM

## 2025-07-24 LAB
ANION GAP SERPL CALCULATED.3IONS-SCNC: 9 MMOL/L (ref 4–13)
BUN SERPL-MCNC: 44 MG/DL (ref 5–25)
CALCIUM SERPL-MCNC: 9.8 MG/DL (ref 8.4–10.2)
CHLORIDE SERPL-SCNC: 100 MMOL/L (ref 96–108)
CO2 SERPL-SCNC: 33 MMOL/L (ref 21–32)
CREAT SERPL-MCNC: 1.48 MG/DL (ref 0.6–1.3)
GFR SERPL CREATININE-BSD FRML MDRD: 34 ML/MIN/1.73SQ M
GLUCOSE SERPL-MCNC: 116 MG/DL (ref 65–140)
POTASSIUM SERPL-SCNC: 4.4 MMOL/L (ref 3.5–5.3)
SODIUM SERPL-SCNC: 142 MMOL/L (ref 135–147)
URATE SERPL-MCNC: 7.1 MG/DL (ref 2–7.5)

## 2025-07-24 PROCEDURE — 36415 COLL VENOUS BLD VENIPUNCTURE: CPT

## 2025-07-24 PROCEDURE — 93280 PM DEVICE PROGR EVAL DUAL: CPT | Performed by: INTERNAL MEDICINE

## 2025-07-24 PROCEDURE — 84550 ASSAY OF BLOOD/URIC ACID: CPT

## 2025-07-24 PROCEDURE — 80048 BASIC METABOLIC PNL TOTAL CA: CPT

## 2025-07-24 PROCEDURE — 93000 ELECTROCARDIOGRAM COMPLETE: CPT | Performed by: INTERNAL MEDICINE

## 2025-07-24 PROCEDURE — 99214 OFFICE O/P EST MOD 30 MIN: CPT | Performed by: INTERNAL MEDICINE

## 2025-07-24 NOTE — PROGRESS NOTES
Cardiology Follow Up    Toshia Weaver  1950  [id number]  [facility]  [address] [facility] [address]  [address]  [facility] [address] [phone]    1. Takotsubo cardiomyopathy  Echo complete w/ contrast if indicated    POCT ECG      2. Paroxysmal atrial tachycardia (HCC)  Echo complete w/ contrast if indicated    POCT ECG      3. Paroxysmal atrial flutter (HCC)  Echo complete w/ contrast if indicated    POCT ECG      4. 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 History of radiofrequency ablation (RFA) for complex left atrial arrhythmia        6. Heart block, AV  Echo complete w/ contrast if indicated    POCT ECG      7. Nonrheumatic aortic valve insufficiency        8. Presence of permanent cardiac pacemaker  Echo complete w/ contrast if indicated    POCT ECG      9. Dyslipidemia        10. Thoracic aortic ectasia (HCC)          Discussion/Summary:  Ms. Weaver is a pleasant 74-year-old female who presents to the office today for routine follow-up.  Cardiac wise she has been feeling well.    Her device checks reveal an appropriate functioning device with no recurrent atrial arrhythmias.  No changes were made to her AV may blocking regimen.    She appears compensated on exam on her current diuretic regimen to which no changes were advised.  A follow-up echocardiogram after her hospitalization for Takotsubo syndrome revealed normalization of her LV function.  She is maintained on proper GDMT with Entresto, metoprolol succinate, and spironolactone.  She declined initiation of Jardiance due to fear of side effects.    Her blood pressure is on the low side today with which she is asymptomatic.  She fasted this morning for blood work and has had nothing to eat or drink.  It did come up after hydration and a snack.  I will decrease her spironolactone to 12.5 mg daily.  I have asked that she return to the office in a week or so for  a blood pressure check.     Her most recent lipids reveal acceptable numbers on her current statin regimen to which no changes were advised.    I have requested an echocardiogram for re-evaluation of her aortic insufficiency deemed moderate on her last two echoes and also reassessment of her ectatic ascending aorta.    I will see her back in the office in six months or sooner if deemed necessary.    Interval History:   Ms. Weaver is a pleasant 74-year-old female who presents to the office today for routine follow-up.    She has been feeling well.  She does not participate in any formal physical activity.  With the activity she performs she denies any exertional chest pain or shortness of breath.  She denies any signs or symptoms of volume overload including lower extremity edema, paroxysmal nocturnal dyspnea, orthopnea, acute weight gain or increasing abdominal girth.  She has not been weighing herself at home as faithfully as she had been.  She abides by a salt restricted diet.  She denies any sensation of  palpitations.  She denies lightheadedness, syncope or presyncope.  She denies symptoms of claudication.      Medical Problems                 Problem List       Hypothyroidism    Myelodysplasia (myelodysplastic syndrome) (HCC)    Waldenstrom macroglobulinemia (HCC)    Mixed hyperlipidemia    Persistent atrial fibrillation (HCC)    History of Hodgkin's lymphoma    Rash    Chronic combined systolic (congestive) and diastolic (congestive) heart failure (HCC)    Wt Readings from Last 3 Encounters:   07/24/25 55.7 kg (122 lb 12.8 oz)   06/20/25 54.9 kg (121 lb)   05/29/25 55.3 kg (122 lb)                 UIP (usual interstitial pneumonitis) ([facility])    Stage 3a chronic kidney disease ([facility])    Lab Results   Component Value Date    EGFR 34 07/24/2025    EGFR 45 05/22/2025    EGFR 30 02/21/2025    CREATININE 1.48 (H) 07/24/2025    CREATININE 1.17 05/22/2025    CREATININE 1.63 (H) 02/21/2025         Cough    Allergies  (Chronic)    Pulmonary hypertension (HCC)    ILD (interstitial lung disease) (HCC)    Bronchiectasis without complication (HCC)    Hard of hearing                  Past Medical History:   Diagnosis Date    Abnormal CXR 2022    Cancer (HCC)     Disease of thyroid gland     Dysuria 2021    Encounter for support and coordination of transition of care 2021     Social History     Socioeconomic History    Marital status:      Spouse name: Not on file    Number of children: Not on file    Years of education: Not on file    Highest education level: Not on file   Occupational History    Not on file   Tobacco Use    Smoking status: Former     Current packs/day: 0.00     Average packs/day: 1 pack/day for 20.0 years (20.0 ttl pk-yrs)     Types: Cigarettes     Start date:      Quit date: 3/10/1997     Years since quittin.3    Smokeless tobacco: Former     Quit date: 8/10/2000   Vaping Use    Vaping status: Never Used   Substance and Sexual Activity    Alcohol use: Never    Drug use: Never    Sexual activity: Not Currently     Partners: Male   Other Topics Concern    Not on file   Social History Narrative    Most recent tobacco use screenin2018     Social Drivers of Health     Financial Resource Strain: Low Risk  (2023)    Overall Financial Resource Strain (CARDIA)     Difficulty of Paying Living Expenses: Not hard at all   Food Insecurity: No Food Insecurity (2025)    Nursing - Inadequate Food Risk Classification     Worried About Running Out of Food in the Last Year: Never true     Ran Out of Food in the Last Year: Never true     Ran Out of Food in the Last Year: Not on file   Transportation Needs: No Transportation Needs (2025)    PRAPARE - Transportation     Lack of Transportation (Medical): No     Lack of Transportation (Non-Medical): No   Physical Activity: Not on file   Stress: Not on file   Social Connections: Unknown (2024)    Received from [facility]     How often do you feel lonely or isolated from those around you? (Adult - for ages 18 years and over): Not on file   Intimate Partner Violence: Not on file   Housing Stability: Low Risk  (5/29/2025)    Housing Stability Vital Sign     Unable to Pay for Housing in the Last Year: No     Number of Times Moved in the Last Year: 0     Homeless in the Last Year: No      Family History   Problem Relation Name Age of Onset    No Known Problems Mother      No Known Problems Father      Cancer Maternal Aunt Sam      Past Surgical History:   Procedure Laterality Date    CARDIAC CATHETERIZATION Left 8/1/2023    Procedure: Cardiac Left Heart Cath;  Surgeon: Kay Stevne DO;  [facility];  Service: Cardiology    CARDIAC CATHETERIZATION  8/1/2023    Procedure: Cardiac catheterization;  Surgeon: Kay Steven DO;  [facility];  Service: Cardiology    CARDIAC CATHETERIZATION N/A 8/1/2023    Procedure: Cardiac Coronary Angiogram;  Surgeon: Kay Steven DO;  [facility];  Service: Cardiology    CARDIAC ELECTROPHYSIOLOGY PROCEDURE N/A 12/30/2022    Procedure: Cardiac eps/aflutter ablation;  Surgeon: Drew Fatima MD;  [facility];  Service: Cardiology    CARDIAC ELECTROPHYSIOLOGY PROCEDURE N/A 12/30/2022    Procedure: Cardiac loop recorder implant;  Surgeon: Drew Fatima MD;  [facility];  Service: Cardiology    CARDIAC ELECTROPHYSIOLOGY PROCEDURE N/A 2/6/2023    Procedure: CARDIAC EPS/SVT ABLATION;  Surgeon: Drew Fatima MD;  [facility];  Service: Cardiology    CARDIAC ELECTROPHYSIOLOGY PROCEDURE N/A 2/16/2023    Procedure: Cardiac pacer implant;  Surgeon: Dustin Lopez MD;  [facility];  Service: Cardiology    CARDIAC ELECTROPHYSIOLOGY PROCEDURE N/A 2/16/2023    Procedure: CARDIAC LOOP RECORDER EXPLANT;  Surgeon: Dustin Lopez MD;  [facility];   "Service: Cardiology    HYSTERECTOMY         Current Outpatient Medications:     atorvastatin (LIPITOR) 20 mg tablet, TAKE 1 TABLET BY MOUTH EVERY DAY WITH DINNER, Disp: 90 tablet, Rfl: 3    Entresto 24-26 MG TABS, TAKE 1 TABLET BY MOUTH TWICE A DAY, Disp: 60 tablet, Rfl: 5    levothyroxine 100 mcg tablet, Take 1 tablet (100 mcg total) by mouth daily, Disp: 90 tablet, Rfl: 1    metoprolol succinate (TOPROL-XL) 25 mg 24 hr tablet, TAKE 1 TABLET BY MOUTH TWICE A DAY, Disp: 180 tablet, Rfl: 1    montelukast (SINGULAIR) 10 mg tablet, TAKE 1 TABLET BY MOUTH DAILY AT BEDTIME, Disp: 90 tablet, Rfl: 1    spironolactone (ALDACTONE) 25 mg tablet, TAKE 1 TABLET (25 MG TOTAL) BY MOUTH DAILY., Disp: 90 tablet, Rfl: 5    torsemide (DEMADEX) 20 mg tablet, TAKE 1 TABLET BY MOUTH EVERY DAY, Disp: 90 tablet, Rfl: 3    febuxostat (ULORIC) 40 mg tablet, TAKE 1 TABLET BY MOUTH EVERY DAY (NOT COVERED) (Patient not taking: Reported on 6/20/2025), Disp: 90 tablet, Rfl: 0  Allergies   Allergen Reactions    Penicillins Rash       Labs:     Chemistry        Component Value Date/Time    K 4.4 07/24/2025 0830    K 3.8 05/05/2023 0723    K 3.3 (L) 09/30/2022 0654     07/24/2025 0830     05/05/2023 0723     09/30/2022 0654    CO2 33 (H) 07/24/2025 0830    CO2 33 (H) 05/05/2023 0723    CO2 36 (H) 09/30/2022 0654    BUN 44 (H) 07/24/2025 0830    BUN 25 05/05/2023 0723    BUN 24 09/30/2022 0654    CREATININE 1.48 (H) 07/24/2025 0830    CREATININE 0.96 09/30/2022 0654        Component Value Date/Time    CALCIUM 9.8 07/24/2025 0830    CALCIUM 9.6 05/05/2023 0723    CALCIUM 9.4 09/30/2022 0654    ALKPHOS 70 02/21/2025 0927    ALKPHOS 72 05/05/2023 0723    ALKPHOS 63 09/30/2022 0654    AST 14 02/21/2025 0927    AST 13 05/05/2023 0723    AST 12 09/30/2022 0654    ALT 11 02/21/2025 0927    ALT 5 (L) 05/05/2023 0723    ALT 6 09/30/2022 0654            No results found for: \"CHOL\"  Lab Results   Component Value Date    HDL 56 02/21/2025    " "HDL 44 (L) 09/24/2024    HDL 46 (L) 05/06/2024     Lab Results   Component Value Date    LDLCALC 64 02/21/2025    LDLCALC 58 09/24/2024    LDLCALC 62 05/06/2024     Lab Results   Component Value Date    TRIG 54 02/21/2025    TRIG 102 09/24/2024    TRIG 102 05/06/2024     No results found for: \"CHOLHDL\"    Imaging: Echo complete w/ contrast if indicated    Result Date: 4/11/2022  Narrative:   Left Ventricle: Left ventricular cavity size is normal. Wall thickness is normal. The left ventricular ejection fraction is 50%. Systolic function is low normal. Wall motion is normal. Diastolic function is mildly abnormal, consistent with grade I (abnormal) relaxation.   IVS: There is abnormal septal motion consistent with bundle branch block. There is sigmoid appearance of the septum.   Right Ventricle: Right ventricular cavity size is normal. Systolic function is normal.   Aortic Valve: There is mild regurgitation.   Tricuspid Valve: There is mild regurgitation.   Pulmonary Artery: The estimated pulmonary artery systolic pressure is 48.0 mmHg. The pulmonary artery systolic pressure is moderately increased.         Review of Systems   Cardiovascular:  Negative for chest pain, claudication, dyspnea on exertion, irregular heartbeat, leg swelling, near-syncope, orthopnea, palpitations and paroxysmal nocturnal dyspnea.   All other systems reviewed and are negative.      Vitals:    07/24/25 0915   BP: (!) 72/50   Pulse: 104         Vitals:    07/24/25 0915   Weight: 55.7 kg (122 lb 12.8 oz)             Body mass index is 22.46 kg/m².    Physical Exam:  General:  Alert and cooperative, appears stated age  HEENT:  PERRLA, EOMI, no scleral icterus, no conjunctival pallor  Neck:  No lymphadenopathy, no thyromegaly, no carotid bruits, no elevated JVP  Heart:  Regular rate and rhythm, normal S1/S2, no S3/S4, no murmur  Lungs: Coarse breath sounds bilaterally  Abdomen:  Soft, non-tender, positive bowel sounds, no rebound or guarding,   no " organomegaly   Extremities:  No clubbing, cyanosis or edema   Vascular:  2+ pedal pulses  Skin:  No rashes or lesions on exposed skin  Neurologic:  Cranial nerves II-XII grossly intact without focal deficits

## 2025-07-24 NOTE — TELEPHONE ENCOUNTER
Pt came in today for a follow up. Her BP was 80/42 L Arm . Pt was asymptomatic. Dr. Mesa informed pt to decrease Spironolactone 25mg in half and come in for a recheck BP nurse visit 07/29.Pt was informed to monitor BP and symptoms. Pt agreed.

## 2025-07-24 NOTE — PROGRESS NOTES
Results for orders placed or performed in visit on 07/24/25   Cardiac EP device report    Narrative    MDT DUAL CHAMBER PPM (MVP ON) - ACTIVE SYSTEM IS MRI CONDITIONAL  DEVICE INTERROGATED IN THE [facility] OFFICE: SAME DAY DR. CUETO APPT:  BATTERY VOLTAGE ADEQUATE (10.1 YR). AP 33.3%  49.1% (AAI-DDD 60 PPM). ALL LEAD PARAMETERS WITHIN NORMAL LIMITS & STABLE. SINCE 3/28/25 REMOTE: 2 NSVT EPISODES (10 @  BPM, 7 @  BPM). AT/AF BURDEN <0.1%. SINCE 6/24/24: PVC SINGLE COUNT 50.0/HR : RUNS 2.0/HR. EF 60% (11/1/2023 ECHO). S/P SVT ABLATION (2023), AFLUTTER ABLATION (2022). PATIENT ON METOPROLOL SUCC. NO PROGRAMMING CHANGES MADE TO DEVICE PARAMETERS. NORMAL DEVICE FUNCTION.    ES

## 2025-07-24 NOTE — TELEPHONE ENCOUNTER
[facility] and [facility] are still discussing a safety plan with a friend.   Pt called to confirm medication change, spironolactone reduced from 25 mg to 12.5 mg.  She verbalized understanding.

## 2025-07-28 DIAGNOSIS — E79.0 HYPERURICEMIA: ICD-10-CM

## 2025-07-28 DIAGNOSIS — N18.30 STAGE 3 CHRONIC KIDNEY DISEASE, UNSPECIFIED WHETHER STAGE 3A OR 3B CKD (HCC): ICD-10-CM

## 2025-07-28 RX ORDER — FEBUXOSTAT 40 MG/1
40 TABLET, FILM COATED ORAL DAILY
Qty: 90 TABLET | Refills: 1 | Status: SHIPPED | OUTPATIENT
Start: 2025-07-28

## 2025-07-29 ENCOUNTER — TELEPHONE (OUTPATIENT)
Dept: CARDIOLOGY CLINIC | Facility: CLINIC | Age: 75
End: 2025-07-29

## 2025-07-29 ENCOUNTER — CLINICAL SUPPORT (OUTPATIENT)
Dept: CARDIOLOGY CLINIC | Facility: CLINIC | Age: 75
End: 2025-07-29
Payer: COMMERCIAL

## 2025-07-29 ENCOUNTER — TELEPHONE (OUTPATIENT)
Age: 75
End: 2025-07-29

## 2025-07-29 VITALS
WEIGHT: 122 LBS | DIASTOLIC BLOOD PRESSURE: 62 MMHG | OXYGEN SATURATION: 99 % | HEART RATE: 87 BPM | BODY MASS INDEX: 22.31 KG/M2 | SYSTOLIC BLOOD PRESSURE: 118 MMHG

## 2025-07-29 DIAGNOSIS — I27.20 PULMONARY HYPERTENSION (HCC): Primary | ICD-10-CM

## 2025-07-29 PROCEDURE — 99211 OFF/OP EST MAY X REQ PHY/QHP: CPT

## 2025-07-29 RX ORDER — FEBUXOSTAT 40 MG/1
40 TABLET, FILM COATED ORAL DAILY
Qty: 90 TABLET | Refills: 1 | OUTPATIENT
Start: 2025-07-29

## 2025-08-11 ENCOUNTER — TELEPHONE (OUTPATIENT)
Age: 75
End: 2025-08-11

## (undated) DEVICE — Device: Brand: WEBSTER CS

## (undated) DEVICE — NEEDLE TRANSSEPTAL BRK-1 98CM

## (undated) DEVICE — PINNACLE INTRODUCER SHEATH: Brand: PINNACLE

## (undated) DEVICE — GLIDESHEATH SLENDER STAINLESS STEEL KIT: Brand: GLIDESHEATH SLENDER

## (undated) DEVICE — Device: Brand: PENTARAY NAV

## (undated) DEVICE — INTRO SHEATH PEEL AWAY 7FR

## (undated) DEVICE — DGW .035 FC J3MM 150CM TEF: Brand: EMERALD

## (undated) DEVICE — TR BAND RADIAL ARTERY COMPRESSION DEVICE: Brand: TR BAND

## (undated) DEVICE — Device: Brand: PROTRACK PIGTAIL WIRE

## (undated) DEVICE — MICROPUNCTURE INTRODUCER SET SILHOUETTE TRANSITIONLESS WITH STAINLESS STEEL WIRE GUIDE: Brand: MICROPUNCTURE

## (undated) DEVICE — GUIDE SHEATH AGILIS TRANSSEPTAL 8.5FR 71CM MED CURL

## (undated) DEVICE — GUIDEWIRE WHOLEY HI TORQUE INTERM MOD J .035 145CM

## (undated) DEVICE — DGW .035 FC J3MM 260CM TEF: Brand: EMERALD

## (undated) DEVICE — CATH ULTRASOUND ACUNAV ICE 8FR 90CM GE VIVID-I

## (undated) DEVICE — Device: Brand: THERMOCOOL SMARTTOUCH SF

## (undated) DEVICE — Device: Brand: ASAHI SILVERWAY

## (undated) DEVICE — CATH EP 5FR QUAD SUPREME JSN

## (undated) DEVICE — SLITTER ADJUSTABLE

## (undated) DEVICE — CATH GUIDING FIXED SHAPE 43CM -NC

## (undated) DEVICE — RADIFOCUS GLIDEWIRE: Brand: GLIDEWIRE

## (undated) DEVICE — RADIFOCUS OPTITORQUE ANGIOGRAPHIC CATHETER: Brand: OPTITORQUE

## (undated) DEVICE — Device: Brand: SMARTABLATE

## (undated) DEVICE — Device: Brand: REFERENCE PATCH CARTO 3

## (undated) DEVICE — MICROPUNCTURE 501